# Patient Record
Sex: FEMALE | Race: WHITE | Employment: OTHER | URBAN - METROPOLITAN AREA
[De-identification: names, ages, dates, MRNs, and addresses within clinical notes are randomized per-mention and may not be internally consistent; named-entity substitution may affect disease eponyms.]

---

## 2017-02-16 ENCOUNTER — OFFICE VISIT (OUTPATIENT)
Dept: INTERNAL MEDICINE CLINIC | Age: 72
End: 2017-02-16

## 2017-02-16 VITALS
WEIGHT: 150 LBS | SYSTOLIC BLOOD PRESSURE: 130 MMHG | HEART RATE: 98 BPM | HEIGHT: 64 IN | OXYGEN SATURATION: 99 % | TEMPERATURE: 97.3 F | DIASTOLIC BLOOD PRESSURE: 80 MMHG | BODY MASS INDEX: 25.61 KG/M2

## 2017-02-16 DIAGNOSIS — M54.42 LEFT-SIDED LOW BACK PAIN WITH LEFT-SIDED SCIATICA, UNSPECIFIED CHRONICITY: Primary | ICD-10-CM

## 2017-02-16 RX ORDER — PREDNISONE 20 MG/1
TABLET ORAL
Qty: 18 TAB | Refills: 0 | Status: SHIPPED | OUTPATIENT
Start: 2017-02-16 | End: 2017-06-15 | Stop reason: ALTCHOICE

## 2017-02-16 NOTE — MR AVS SNAPSHOT
Visit Information Date & Time Provider Department Dept. Phone Encounter #  
 2/16/2017  9:30 AM Raul Knutson, [address] [facility] [place] [phone] Your Appointments 6/9/2017  8:45 AM  
LAB with [facility] NURSE VISIT Internist of [facility] ([facility]) Appt Note: labs 6mos. sarris; labs 6mos. sarris [address], [address] [place] [place] [address] [place]  
  
   
 [address], [address]  
  
    
 6/15/2017  9:00 AM  
Office Visit with Keiko Garcia MD  
Internist of [facility] [place] Appt Note: ov 6mos. sarris; ov 6mos. sarris [address], [address] [place] [address] [place]  
  
   
 [address], [address] Upcoming Health Maintenance Date Due  
 MEDICARE YEARLY EXAM 5/25/2017 GLAUCOMA SCREENING Q2Y 5/16/2018 BREAST CANCER SCRN MAMMOGRAM 7/25/2018 COLONOSCOPY 10/25/2020 DTaP/Tdap/Td series (2 - Td) 5/24/2026 Allergies as of 2/16/2017  Review Complete On: 2/16/2017 By: Dewey Zamudio LPN Severity Noted Reaction Type Reactions Codeine    Nausea Only Current Immunizations  Reviewed on 12/13/2016 Name Date Influenza High Dose Vaccine PF 12/13/2016 Influenza Vaccine 10/24/2014 Influenza Vaccine PF 11/5/2013  9:03 AM  
 Influenza Vaccine Split 11/6/2012, 11/1/2011 Pneumococcal Conjugate (PCV-13) 5/5/2015 Pneumococcal Vaccine (Unspecified Type) 5/4/2010 TD Vaccine 5/5/2009 Zoster Vaccine, Live 5/7/2013 Not reviewed this visit Vitals BP Pulse Temp Height(growth percentile) Weight(growth percentile) SpO2  
 130/80 98 97.3 °F (36.3 °C) 5' 4\" (1.626 m) 150 lb (68 kg) 99% BMI OB Status Smoking Status 25.75 kg/m2 Postmenopausal Never Smoker BMI and BSA Data Body Mass Index Body Surface Area 25.75 kg/m 2 1.75 m 2 Preferred Pharmacy Pharmacy Name Phone [facility] [phone] [facility] [phone] Your Updated Medication List  
  
   
This list is accurate as of: 2/16/17  9:40 AM.  Always use your most recent med list.  
  
  
  
  
 alendronate 70 mg tablet Commonly known as:  FOSAMAX Take 1 Tab by mouth every seven (7) days. ALLEGRA 60 mg tablet Generic drug:  fexofenadine Take 60 mg by mouth daily. aspirin 81 mg chewable tablet Take 81 mg by mouth daily. atorvastatin 10 mg tablet Commonly known as:  LIPITOR  
TAKE ONE TABLET BY MOUTH ONE TIME DAILY  
  
 biotin 2,500 mcg Tab Take 1,000 mcg by mouth. CALCIUM + D PO Take 1 Tab by mouth two (2) times a day. clotrimazole 1 % topical cream  
Commonly known as:  Ed Sacks Apply  to affected area two (2) times a day. CO Q-10 100 mg capsule Generic drug:  co-enzyme Q-10 Take 100 mg by mouth daily. COLACE 100 mg capsule Generic drug:  docusate sodium Take 100 mg by mouth daily as needed for Constipation. fluticasone 50 mcg/actuation nasal spray Commonly known as:  FLONASE  
2 sprays by Both Nostrils route daily. hydroCHLOROthiazide 25 mg tablet Commonly known as:  HYDRODIURIL Take 1 Tab by mouth daily. lisinopril 10 mg tablet Commonly known as:  PRINIVIL Take 1 Tab by mouth daily. triamcinolone acetonide 0.025 % topical cream  
Commonly known as:  KENALOG Apply  to affected area two (2) times a day. VITAMIN D3 1,000 unit Cap Generic drug:  cholecalciferol Take 1 Tab by mouth daily. Introducing [facility]! Dear Hung Mosley: 
Thank you for requesting a [employer] account. Our records indicate that you already have an active [employer] account. You can access your account anytime at [email or web address] Did you know that you can access your hospital and ER discharge instructions at any time in [employer]? You can also review all of your test results from your hospital stay or ER visit. Additional Information If you have questions, please visit the Frequently Asked Questions section of the [employer] website at [email or web address] Remember, [employer] is NOT to be used for urgent needs. For medical emergencies, dial 911. Now available from your iPhone and Android! Please provide this summary of care documentation to your next provider. Your primary care clinician is listed as Rodrigo Alicia. If you have any questions after today's visit, please call [phone].

## 2017-02-16 NOTE — PROGRESS NOTES
HPI/History  Carolyne Briceno is a 67 y.o.  female who presents for evaluation. Pt reports left lower back pain around SI region with radiation down posterior leg for a couple of weeks. Niece is a PT and thought it was mostly sacroiliac inflammation/dysfunction. Pt has been taking about 800mg ibuprofen bid without adverse effects and uncertain of benefit. No other sxs or complaints and no bowel/urinary incontinence or paresthesias. Pt plans to get with niece for tx, etc.    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    Tinnitus H93.19    Microhematuria R31.29    Eczema L30.9    Osteopenia M85.80    Advance directive discussed with patient Z70.80    Abnormal glucose R73.09    Candidal intertrigo B37.2     Past Medical History   Diagnosis Date    Essential hypertension     FH: breast cancer     Fibroids      uterine    Fracture of radius 11/2009     with ulna, left, closed    Hyperlipidemia     Microhematuria      chronic    Osteopenia     S/P radiation therapy      as child, to right shoulder for birthmark    Tinnitus     Zoster      right scapula     Past Surgical History   Procedure Laterality Date    Hx gyn       hysterectomy    Hx orthopaedic       left arm     Social History     Social History    Marital status: SINGLE     Spouse name: N/A    Number of children: N/A    Years of education: N/A     Occupational History    Not on file.      Social History Main Topics    Smoking status: Never Smoker    Smokeless tobacco: Not on file    Alcohol use 0.0 oz/week     0 Standard drinks or equivalent per week      Comment: occasionally    Drug use: No    Sexual activity: Yes     Partners: Male     Other Topics Concern    Not on file     Social History Narrative     Family History   Problem Relation Age of Onset    Cancer Mother pancreatic    Heart Disease Mother     Heart Disease Father     Cancer Sister      breast    MS Sister     Breast Cancer Sister 62     Current Outpatient Prescriptions   Medication Sig    docusate sodium (COLACE) 100 mg capsule Take 100 mg by mouth daily as needed for Constipation.  alendronate (FOSAMAX) 70 mg tablet Take 1 Tab by mouth every seven (7) days.  clotrimazole (LOTRIMIN) 1 % topical cream Apply  to affected area two (2) times a day.  triamcinolone acetonide (KENALOG) 0.025 % topical cream Apply  to affected area two (2) times a day.  hydrochlorothiazide (HYDRODIURIL) 25 mg tablet Take 1 Tab by mouth daily.  atorvastatin (LIPITOR) 10 mg tablet TAKE ONE TABLET BY MOUTH ONE TIME DAILY    lisinopril (PRINIVIL) 10 mg tablet Take 1 Tab by mouth daily.  co-enzyme Q-10 (CO Q-10) 100 mg capsule Take 100 mg by mouth daily.  biotin 2,500 mcg Tab Take 1,000 mcg by mouth.  Cholecalciferol, Vitamin D3, (VITAMIN D3) 1,000 unit Cap Take 1 Tab by mouth daily.  fexofenadine (ALLEGRA) 60 mg tablet Take 60 mg by mouth daily.  aspirin 81 mg chewable tablet Take 81 mg by mouth daily.  CALCIUM CARBONATE/VITAMIN D3 (CALCIUM + D PO) Take 1 Tab by mouth two (2) times a day.  fluticasone (FLONASE) 50 mcg/actuation nasal spray 2 sprays by Both Nostrils route daily. No current facility-administered medications for this visit. Allergies   Allergen Reactions    Codeine Nausea Only       Review of Systems  Significant findings included in HPI. Physical Examination  Visit Vitals    /80    Pulse 98    Temp 97.3 °F (36.3 °C)    Ht 5' 4\" (1.626 m)    Wt 150 lb (68 kg)    SpO2 99%    BMI 25.75 kg/m2       General - Alert and in no acute distress. Pt appears well, comfortable, and in good spirits. Nontoxic. Not anxious, non-diaphoretic. Mental status - Appropriate mood, behavior, speech content, dress, and thought processes.   Pulm - No tachypnea, retractions, or cyanosis. Good respiratory effort. Cardiovascular - Normal rate. Back/LE - Mild tenderness around left SI region. No visible findings or gross palpable abnormalities. Good ROM and no significant LE tenderness or findings. Assessment and Plan  1. Left low back pain with left sciatica - Mostly appears left SI region with sciatica. Will stop NSAIDs and start steroid taper. She will keep plan for therapy through her niece (PT). May refer for more structured PT if needed or send to ortho if warranted. Revisited conservative measures. Further planning as needed and pending progression. Pt happily agrees with plan. PLEASE NOTE:   This document has been produced using voice recognition software. Unrecognized errors in transcription may be present.     [employer] of [address] [place] [address]  [phone]  2/16/2017

## 2017-03-29 DIAGNOSIS — L30.4 INTERTRIGO: ICD-10-CM

## 2017-03-29 RX ORDER — ATORVASTATIN CALCIUM 10 MG/1
TABLET, FILM COATED ORAL
Qty: 90 TAB | Refills: 1 | Status: SHIPPED | OUTPATIENT
Start: 2017-03-29 | End: 2017-09-25 | Stop reason: SDUPTHER

## 2017-03-29 RX ORDER — CHLORPHENIRAMINE MALEATE 4 MG
TABLET ORAL
Qty: 15 G | Refills: 0 | Status: SHIPPED | OUTPATIENT
Start: 2017-03-29 | End: 2017-12-14 | Stop reason: ALTCHOICE

## 2017-04-11 RX ORDER — HYDROCHLOROTHIAZIDE 25 MG/1
25 TABLET ORAL DAILY
Qty: 90 TAB | Refills: 1 | Status: SHIPPED | OUTPATIENT
Start: 2017-04-11 | End: 2017-10-19 | Stop reason: SDUPTHER

## 2017-06-09 ENCOUNTER — HOSPITAL ENCOUNTER (OUTPATIENT)
Dept: LAB | Age: 72
Discharge: HOME | End: 2017-06-09
Payer: MEDICARE

## 2017-06-09 DIAGNOSIS — M85.9 DISORDER OF BONE DENSITY AND STRUCTURE, UNSPECIFIED: ICD-10-CM

## 2017-06-09 DIAGNOSIS — I10 ESSENTIAL HYPERTENSION: ICD-10-CM

## 2017-06-09 DIAGNOSIS — R73.09 ABNORMAL GLUCOSE: ICD-10-CM

## 2017-06-09 DIAGNOSIS — E78.5 HYPERLIPIDEMIA, UNSPECIFIED HYPERLIPIDEMIA TYPE: ICD-10-CM

## 2017-06-09 DIAGNOSIS — M85.80 OSTEOPENIA: ICD-10-CM

## 2017-06-09 LAB
ALBUMIN SERPL BCP-MCNC: 4 G/DL (ref 3.4–5)
ALBUMIN/GLOB SERPL: 1.3 {RATIO} (ref 0.8–1.7)
ALP SERPL-CCNC: 57 U/L (ref 45–117)
ALT SERPL-CCNC: 29 U/L (ref 13–56)
ANION GAP BLD CALC-SCNC: 11 MMOL/L (ref 3–18)
APPEARANCE UR: CLEAR
AST SERPL W P-5'-P-CCNC: 27 U/L (ref 15–37)
BACTERIA URNS QL MICRO: NEGATIVE /HPF
BASOPHILS # BLD AUTO: 0 K/UL (ref 0–0.06)
BASOPHILS # BLD: 1 % (ref 0–2)
BILIRUB SERPL-MCNC: 0.4 MG/DL (ref 0.2–1)
BILIRUB UR QL: NEGATIVE
BUN SERPL-MCNC: 20 MG/DL (ref 7–18)
BUN/CREAT SERPL: 28 (ref 12–20)
CALCIUM SERPL-MCNC: 8.9 MG/DL (ref 8.5–10.1)
CHLORIDE SERPL-SCNC: 102 MMOL/L (ref 100–108)
CHOLEST SERPL-MCNC: 154 MG/DL
CO2 SERPL-SCNC: 26 MMOL/L (ref 21–32)
COLOR UR: YELLOW
CREAT SERPL-MCNC: 0.72 MG/DL (ref 0.6–1.3)
DIFFERENTIAL METHOD BLD: ABNORMAL
EOSINOPHIL # BLD: 0.4 K/UL (ref 0–0.4)
EOSINOPHIL NFR BLD: 7 % (ref 0–5)
EPITH CASTS URNS QL MICRO: NEGATIVE /LPF (ref 0–5)
ERYTHROCYTE [DISTWIDTH] IN BLOOD BY AUTOMATED COUNT: 13.3 % (ref 11.6–14.5)
EST. AVERAGE GLUCOSE BLD GHB EST-MCNC: 111 MG/DL
GLOBULIN SER CALC-MCNC: 3.2 G/DL (ref 2–4)
GLUCOSE SERPL-MCNC: 86 MG/DL (ref 74–99)
GLUCOSE UR STRIP.AUTO-MCNC: NEGATIVE MG/DL
HBA1C MFR BLD: 5.5 % (ref 4.2–5.6)
HCT VFR BLD AUTO: 39.5 % (ref 35–45)
HDLC SERPL-MCNC: 67 MG/DL (ref 40–60)
HDLC SERPL: 2.3 {RATIO} (ref 0–5)
HGB BLD-MCNC: 12.3 G/DL (ref 12–16)
HGB UR QL STRIP: ABNORMAL
KETONES UR QL STRIP.AUTO: NEGATIVE MG/DL
LDLC SERPL CALC-MCNC: 70.4 MG/DL (ref 0–100)
LEUKOCYTE ESTERASE UR QL STRIP.AUTO: ABNORMAL
LIPID PROFILE,FLP: ABNORMAL
LYMPHOCYTES # BLD AUTO: 20 % (ref 21–52)
LYMPHOCYTES # BLD: 1.2 K/UL (ref 0.9–3.6)
MCH RBC QN AUTO: 29.5 PG (ref 24–34)
MCHC RBC AUTO-ENTMCNC: 31.1 G/DL (ref 31–37)
MCV RBC AUTO: 94.7 FL (ref 74–97)
MONOCYTES # BLD: 0.9 K/UL (ref 0.05–1.2)
MONOCYTES NFR BLD AUTO: 14 % (ref 3–10)
NEUTS SEG # BLD: 3.7 K/UL (ref 1.8–8)
NEUTS SEG NFR BLD AUTO: 58 % (ref 40–73)
NITRITE UR QL STRIP.AUTO: NEGATIVE
PH UR STRIP: 5 [PH] (ref 5–8)
PLATELET # BLD AUTO: 248 K/UL (ref 135–420)
PMV BLD AUTO: 10 FL (ref 9.2–11.8)
POTASSIUM SERPL-SCNC: 4 MMOL/L (ref 3.5–5.5)
PROT SERPL-MCNC: 7.2 G/DL (ref 6.4–8.2)
PROT UR STRIP-MCNC: NEGATIVE MG/DL
RBC # BLD AUTO: 4.17 M/UL (ref 4.2–5.3)
RBC #/AREA URNS HPF: ABNORMAL /HPF (ref 0–5)
SODIUM SERPL-SCNC: 139 MMOL/L (ref 136–145)
SP GR UR REFRACTOMETRY: 1.02 (ref 1–1.03)
TRIGL SERPL-MCNC: 83 MG/DL (ref ?–150)
TSH SERPL DL<=0.05 MIU/L-ACNC: 2.39 UIU/ML (ref 0.36–3.74)
UROBILINOGEN UR QL STRIP.AUTO: 0.2 EU/DL (ref 0.2–1)
VLDLC SERPL CALC-MCNC: 16.6 MG/DL
WBC # BLD AUTO: 6.3 K/UL (ref 4.6–13.2)
WBC URNS QL MICRO: NEGATIVE /HPF (ref 0–4)

## 2017-06-09 PROCEDURE — 85025 COMPLETE CBC W/AUTO DIFF WBC: CPT | Performed by: INTERNAL MEDICINE

## 2017-06-09 PROCEDURE — 84443 ASSAY THYROID STIM HORMONE: CPT | Performed by: INTERNAL MEDICINE

## 2017-06-09 PROCEDURE — 80061 LIPID PANEL: CPT | Performed by: INTERNAL MEDICINE

## 2017-06-09 PROCEDURE — 80053 COMPREHEN METABOLIC PANEL: CPT | Performed by: INTERNAL MEDICINE

## 2017-06-09 PROCEDURE — 36415 COLL VENOUS BLD VENIPUNCTURE: CPT | Performed by: INTERNAL MEDICINE

## 2017-06-09 PROCEDURE — 81001 URINALYSIS AUTO W/SCOPE: CPT | Performed by: INTERNAL MEDICINE

## 2017-06-09 PROCEDURE — 82306 VITAMIN D 25 HYDROXY: CPT | Performed by: INTERNAL MEDICINE

## 2017-06-09 PROCEDURE — 83036 HEMOGLOBIN GLYCOSYLATED A1C: CPT | Performed by: INTERNAL MEDICINE

## 2017-06-10 LAB — 25(OH)D3 SERPL-MCNC: 42.1 NG/ML (ref 30–100)

## 2017-06-15 ENCOUNTER — OFFICE VISIT (OUTPATIENT)
Dept: INTERNAL MEDICINE CLINIC | Age: 72
End: 2017-06-15

## 2017-06-15 VITALS
OXYGEN SATURATION: 96 % | BODY MASS INDEX: 26.29 KG/M2 | HEIGHT: 64 IN | SYSTOLIC BLOOD PRESSURE: 130 MMHG | WEIGHT: 154 LBS | HEART RATE: 84 BPM | TEMPERATURE: 97.7 F | DIASTOLIC BLOOD PRESSURE: 72 MMHG

## 2017-06-15 DIAGNOSIS — M85.89 OSTEOPENIA OF MULTIPLE SITES: ICD-10-CM

## 2017-06-15 DIAGNOSIS — E78.5 HYPERLIPIDEMIA, UNSPECIFIED HYPERLIPIDEMIA TYPE: ICD-10-CM

## 2017-06-15 DIAGNOSIS — Z71.89 ACP (ADVANCE CARE PLANNING): ICD-10-CM

## 2017-06-15 DIAGNOSIS — I10 ESSENTIAL HYPERTENSION: Primary | ICD-10-CM

## 2017-06-15 DIAGNOSIS — Z00.00 MEDICARE ANNUAL WELLNESS VISIT, SUBSEQUENT: ICD-10-CM

## 2017-06-15 DIAGNOSIS — B37.2 CANDIDAL INTERTRIGO: ICD-10-CM

## 2017-06-15 DIAGNOSIS — Z12.31 SCREENING MAMMOGRAM, ENCOUNTER FOR: ICD-10-CM

## 2017-06-15 DIAGNOSIS — R73.09 ABNORMAL GLUCOSE: ICD-10-CM

## 2017-06-15 DIAGNOSIS — M85.9 DISORDER OF BONE DENSITY AND STRUCTURE, UNSPECIFIED: ICD-10-CM

## 2017-06-15 DIAGNOSIS — Z80.3 FH: BREAST CANCER: ICD-10-CM

## 2017-06-15 DIAGNOSIS — L30.9 ECZEMA, UNSPECIFIED TYPE: ICD-10-CM

## 2017-06-15 RX ORDER — NYSTATIN 100000 [USP'U]/G
POWDER TOPICAL 2 TIMES DAILY
Qty: 30 G | Refills: 2 | Status: SHIPPED | OUTPATIENT
Start: 2017-06-15 | End: 2018-03-19

## 2017-06-15 NOTE — PATIENT INSTRUCTIONS
DASH Diet: Care Instructions  Your Care Instructions  The DASH diet is an eating plan that can help lower your blood pressure. DASH stands for Dietary Approaches to Stop Hypertension. Hypertension is high blood pressure. The DASH diet focuses on eating foods that are high in calcium, potassium, and magnesium. These nutrients can lower blood pressure. The foods that are highest in these nutrients are fruits, vegetables, low-fat dairy products, nuts, seeds, and legumes. But taking calcium, potassium, and magnesium supplements instead of eating foods that are high in those nutrients does not have the same effect. The DASH diet also includes whole grains, fish, and poultry. The DASH diet is one of several lifestyle changes your doctor may recommend to lower your high blood pressure. Your doctor may also want you to decrease the amount of sodium in your diet. Lowering sodium while following the DASH diet can lower blood pressure even further than just the DASH diet alone. Follow-up care is a key part of your treatment and safety. Be sure to make and go to all appointments, and call your doctor if you are having problems. It's also a good idea to know your test results and keep a list of the medicines you take. How can you care for yourself at home? Following the DASH diet  · Eat 4 to 5 servings of fruit each day. A serving is 1 medium-sized piece of fruit, ½ cup chopped or canned fruit, 1/4 cup dried fruit, or 4 ounces (½ cup) of fruit juice. Choose fruit more often than fruit juice. · Eat 4 to 5 servings of vegetables each day. A serving is 1 cup of lettuce or raw leafy vegetables, ½ cup of chopped or cooked vegetables, or 4 ounces (½ cup) of vegetable juice. Choose vegetables more often than vegetable juice. · Get 2 to 3 servings of low-fat and fat-free dairy each day. A serving is 8 ounces of milk, 1 cup of yogurt, or 1 ½ ounces of cheese. · Eat 6 to 8 servings of grains each day.  A serving is 1 slice of bread, 1 ounce of dry cereal, or ½ cup of cooked rice, pasta, or cooked cereal. Try to choose whole-grain products as much as possible. · Limit lean meat, poultry, and fish to 2 servings each day. A serving is 3 ounces, about the size of a deck of cards. · Eat 4 to 5 servings of nuts, seeds, and legumes (cooked dried beans, lentils, and split peas) each week. A serving is 1/3 cup of nuts, 2 tablespoons of seeds, or ½ cup of cooked beans or peas. · Limit fats and oils to 2 to 3 servings each day. A serving is 1 teaspoon of vegetable oil or 2 tablespoons of salad dressing. · Limit sweets and added sugars to 5 servings or less a week. A serving is 1 tablespoon jelly or jam, ½ cup sorbet, or 1 cup of lemonade. · Eat less than 2,300 milligrams (mg) of sodium a day. If you limit your sodium to 1,500 mg a day, you can lower your blood pressure even more. Tips for success  · Start small. Do not try to make dramatic changes to your diet all at once. You might feel that you are missing out on your favorite foods and then be more likely to not follow the plan. Make small changes, and stick with them. Once those changes become habit, add a few more changes. · Try some of the following:  ¨ Make it a goal to eat a fruit or vegetable at every meal and at snacks. This will make it easy to get the recommended amount of fruits and vegetables each day. ¨ Try yogurt topped with fruit and nuts for a snack or healthy dessert. ¨ Add lettuce, tomato, cucumber, and onion to sandwiches. ¨ Combine a ready-made pizza crust with low-fat mozzarella cheese and lots of vegetable toppings. Try using tomatoes, squash, spinach, broccoli, carrots, cauliflower, and onions. ¨ Have a variety of cut-up vegetables with a low-fat dip as an appetizer instead of chips and dip. ¨ Sprinkle sunflower seeds or chopped almonds over salads. Or try adding chopped walnuts or almonds to cooked vegetables. ¨ Try some vegetarian meals using beans and peas. Add garbanzo or kidney beans to salads. Make burritos and tacos with mashed ho beans or black beans. Where can you learn more? Go to http://mallika-suzanne.info/. Enter B667 in the search box to learn more about \"DASH Diet: Care Instructions. \"  Current as of: March 23, 2016  Content Version: 11.2  © 8666-0654 [employer]. Care instructions adapted under license by [employer] (which disclaims liability or warranty for this information). If you have questions about a medical condition or this instruction, always ask your healthcare professional. [place] [address] any warranty or liability for your use of this information. Medicare Wellness Visit, Female    The best way to improve and maintain good health is to have a healthy lifestyle by eating a well-balanced diet, exercising regularly, limiting alcohol and stopping smoking. Regular visits with your physician or non-physician health care provider also support your good health. Preventive screening tests can find health problems before they become diseases or illnesses. Preventive services such as immunizations prevent serious infections. All people over age 72 should have a Pneumovax and a Prevnar-13 shot to prevent potentially life threatening infections with the pneumococcus bacteria, a common cause of pneumonia. These are once in a lifetime unless you and your provider decide differently. All people over 65 should have a yearly influenza vaccine or \"flu\" shot. This does not prevent infection with cold viruses but has been proven to prevent hospitalization and death from influenza. Although Medicare part B \"regular Medicare\" currently only covers tetanus vaccination in the context of an injury, a tetanus vaccine (Tdap or Td) is recommended every 10 years. A shingles vaccine is recommended once in a lifetime after age 61.  The Shingles vaccine is also not covered by Medicare part B.    Note, however, that both the Shingles vaccine and Tdap/Td are generally covered by secondary carriers. Please check your coverage and out of pocket expenses. Consider contacting your local health department because it may stock these vaccines for a reasonable charge. We currently have documentation of the following immunization history for you:  Immunization History   Administered Date(s) Administered    Influenza High Dose Vaccine PF 12/13/2016    Influenza Vaccine 10/24/2014    Influenza Vaccine PF 11/05/2013    Influenza Vaccine Split 11/01/2011, 11/06/2012    Pneumococcal Conjugate (PCV-13) 05/05/2015    Pneumococcal Vaccine (Unspecified Type) 05/04/2010    TD Vaccine 05/05/2009    Zoster Vaccine, Live 05/07/2013       Screening for infection with Hepatitis C is recommended for anyone born between [birth year] through [address]. The table at the bottom of this document indicates the status of this and other preventive services. A bone mass density test (DEXA) to screen for osteoporosis or thinning of the bones should be done at least once after age 72 and may be done up to every 2 years as determined by you and your health care provider. The most recent DEXA we have on file for you is:  DEXA Results (most recent):    Results from Hospital Encounter encounter on 07/25/16   DEXA BONE DENSITY STUDY AXIAL   Narrative DEXA BONE DENSITOMETRY, CENTRAL    CPT CODE: 67013    INDICATION: Postmenopausal. History of osteopenia. Hypertension. Taking calcium  and vitamin D. Alendronate and Fosamax use. TECHNIQUE: Using GE LUNAR Prodigy densitometer, bone density measurement was  performed in the lumbar spine the proximal left and right femora and the right  forearm. T Score refers to standard deviations above or below average compared  to a young adult of the same sex. Z Score refers to standard deviations above or  below average compared to a patient of the same sex, age, race and weight.      COMPARISON: June 1, 2005. June 21, 2012. July 21, 2014. FINDINGS:     Lumbar Spine Levels: L1 and L2  Mean Bone Mineral Density (BMD):  0.910 g/cm2    T Score: -2.2       Z Score: -0.6      BMD increased 0.2%, which is not statistically significant within a 95 percent  confidence interval compared to preceding study. BMD increased 0.8%, which is not statistically significant compared to baseline  study. On PA image of the lumbar spine obtained for localization of vertebral levels  (not a diagnostic quality radiograph), there is     apparent increased density  at L3 and L4. The density is not well characterized on the basis of this image,  but likely degenerative. Since this density spuriously increases measured bone  mineral density, this has been excluded. Distal1/3 Radius BMD:  0.769 g/cm2   T Score: -1.3       Z Score: 0.6   BMD increased 0.7%, which is not statistically significant within a 95 percent  confidence interval compared to preceding study. BMD decreased 6.6%, which is not statistically significant compared to baseline  study which at the forearm was the study of 2012. Left Total Proximal Femur BMD: 0.721 g/cm2    T Score:  -2.3       Z Score:  -0.8       BMD decreased 1.9%, which is not statistically significant within a 95 percent  confidence interval compared to preceding study. BMD decreased 3.9%, which is not statistically significant compared to baseline  study. Right Total Proximal Femur BMD: 0.749 g/cm2  T Score:  -2.0      Z Score:  -0.6       BMD decreased 1.4%, which is not statistically significant within a 95 percent  confidence interval compared to preceding study. BMD decreased 3.4%, which is not statistically significant compared to baseline  study. Left Femoral Neck BMD:  0.739 g/cm2   T Score:  -2.1  Z Score:  -0.5    Right Femoral Neck BMD:  0.762 g/cm2   T Score:  -2.0  Z Score:  -0.3           Impression IMPRESSION:    1. BMD measures consistent with osteopenia.      2. Compare to the preceding study, BMD is without statistically significant  interval change. 3.  Compare to the baseline study, BMD is without statistically significant  interval change. Based upon current ISCD guidelines, the patient's overall diagnostic category,  selected using WHO criteria in postmenopausal women and males aged 48 and above,  is selected based upon the lowest T Score from among the lumbar spine, total  femur, femoral neck, (or distal third radius if measured). WHO Definition of Osteoporosis and Osteopenia on DXA (specified for  post-menopausal  females):     Normal:                     T Score at or above -1 SD   Osteopenia:              T Score between -1 and -2.5 SD   Osteoporosis:           T Score at or below -2.5 SD    The risk of fracture approximately doubles for each 1 SD decrease in T Score. It is important to consider other factors in assessing a patient's risk of  fracture, including age, risk of falling/injury, history of fragility fracture,  family history of osteoporosis, smoking, low weight. Various fracture risk tools have been developed for adult patients and are  available online. For example, the FRAX tool developed by [facility] is widely used. Reference www.iscd.org. It is also important to note that DXA measures bone density but does not  distinguish among causes of decreased bone density, which include primary versus  secondary osteoporosis (such as metabolic bone disorders or possible effects of  medications) and also other conditions (such as osteomalacia). Clinical  considerations should determine what additional evaluation may be warranted to  exclude secondary conditions in a patient with low bone density. Please note that reliable, valid comparisons can not be made between studies  which have been performed on different densitometers.   If clinically warranted,  follow up study performed at this site would best permit assessment of trend for  possible change in bone mineral density over time in comparison to this study. Thank you for this referral.          Screening for diabetes mellitus with a blood sugar test (glucose) should be done at least every 3 years until age 79. You and your health care provider may decide whether to continue screening after age 79. The most recent blood glucose we have on file for you is:   Lab Results   Component Value Date/Time    Glucose 86 06/09/2017 08:42 AM         Glaucoma is a disease of the eye due to increased ocular pressure that can lead to blindness. People with risk factors for glaucoma ( race, diabetes, family history) should be screened at least every 2 years by an eye professional.     Cardiovascular screening tests that check for elevated lipids or cholesterol (fatty part of blood) which can lead to heart disease and strokes should be done every 4-6 years through age 79. You and your health care provider may decide whether to continue screening after age 79. The most recent lipid panel we have on file for you is:   Lab Results   Component Value Date/Time    Cholesterol, total 154 06/09/2017 08:42 AM    HDL Cholesterol 67 06/09/2017 08:42 AM    LDL, calculated 70.4 06/09/2017 08:42 AM    VLDL, calculated 16.6 06/09/2017 08:42 AM    Triglyceride 83 06/09/2017 08:42 AM    CHOL/HDL Ratio 2.3 06/09/2017 08:42 AM       Colorectal cancer screening that evaluates for blood or polyps in your colon for people with average risk should be done yearly as a stool test, every five years as a flexible sigmoidoscope or every 10 years as a colonoscopy up to age 76. You and your health care provider may decide whether to continue screening after age 76. Breast cancer screening with a mammogram is recommended at least once every 2 years  for women age 54-69. You and your health care provider may decide whether to continue screening after age 76.  The most recent mammogram we have on file for you is: [facility] Results (most recent):    Results from [place] encounter on 07/25/16   [facility] 3D ROCÍO W MAMMO BI SCREENING DIGTL   Narrative BIRADS: 1-BENIGN    BREAST DENSITY: C-The breast parenchyma is heterogeneously dense which may  obscure detection of small masses. SCREENING MAMMOGRAM BILATERAL  3D tomosynthesis    HISTORY: Screening. Sister had breast cancer age 62. COMPARISON: [id number]    FINDINGS:   2D and 3D images were performed. Digital mammograms were performed. No suspicious microcalcifications, masses, or areas of architectural distortion. Interpretation performed in conjunction with computed assisted detection system. Impression IMPRESSION:      No evidence of malignancy. Suggest routine follow-up. Screening for cervical cancer with a pap smear is recommended for all women with a cervix until age 72. The frequency of this test is based on the details of her prior pap smear testing. You and your health care provider may decide whether to continue screening after age 72. People who have smoked the equivalent of 1 pack per day for 30 years or more may benefit from screening for lung cancer with a yearly low dose CT scan until they have been non smokers for 15 years or competing health conditions render this unlikely to be beneficial. Our records show: N/A    Your Medicare Wellness Exam is recommended annually.     Here is a list of your current Health Maintenance items with a due date:  Health Maintenance   Topic Date Due    INFLUENZA AGE 9 TO ADULT  08/01/2017    GLAUCOMA SCREENING Q2Y  05/16/2018    MEDICARE YEARLY EXAM  06/16/2018    BREAST CANCER SCRN MAMMOGRAM  07/25/2018    COLONOSCOPY  10/25/2020    DTaP/Tdap/Td series (2 - Td) 05/24/2026    Hepatitis C Screening  Completed    OSTEOPOROSIS SCREENING (DEXA)  Completed    ZOSTER VACCINE AGE 60>  Completed    Pneumococcal 65+ Low/Medium Risk  Completed

## 2017-06-15 NOTE — PROGRESS NOTES
1. Have you been to the ER, urgent care clinic or hospitalized since your last visit? NO.     2. Have you seen or consulted any other health care providers outside of the [address] [place] [address] since your last visit (Include any pap smears or colon screening)? NO      Do you have an Advanced Directive? YES will bring it in soon.     Health Maintenance Due   Topic Date Due    MEDICARE YEARLY EXAM  05/25/2017

## 2017-06-15 NOTE — ACP (ADVANCE CARE PLANNING)
Advance care planning discussion initiated. Patient states that she has completed an advance directive previously and has a copy prepared at home. She states that her  and son are her healthcare agents. She also expresses that she does not wish life prolonging procedure for end of life care. Recommended to bring completed advance directive to office to be copied and scanned into chart.

## 2017-06-15 NOTE — PROGRESS NOTES
This is a Subsequent Medicare Annual Wellness Visit providing Personalized Prevention Plan Services (PPPS) (Performed 12 months after initial AWV and PPPS )    I have reviewed the patient's medical history in detail and updated the computerized patient record. History     Past Medical History:   Diagnosis Date    Essential hypertension     FH: breast cancer     Fibroids     uterine    Fracture of radius 11/2009    with ulna, left, closed    Hyperlipidemia     Microhematuria     chronic    Osteopenia     S/P radiation therapy     as child, to right shoulder for birthmark    Tinnitus     Zoster     right scapula      Past Surgical History:   Procedure Laterality Date    HX GYN      hysterectomy    HX ORTHOPAEDIC      left arm     Current Outpatient Prescriptions   Medication Sig Dispense Refill    nystatin (MYCOSTATIN) powder Apply  to affected area two (2) times a day. As needed. 30 g 2    hydroCHLOROthiazide (HYDRODIURIL) 25 mg tablet Take 1 Tab by mouth daily. 90 Tab 1    clotrimazole (LOTRIMIN) 1 % topical cream APPLY EXTERNALLY TO THE AFFECTED AREA TWICE DAILY 15 g 0    atorvastatin (LIPITOR) 10 mg tablet TAKE ONE TABLET BY MOUTH ONE TIME DAILY 90 Tab 1    docusate sodium (COLACE) 100 mg capsule Take 100 mg by mouth daily as needed for Constipation.  alendronate (FOSAMAX) 70 mg tablet Take 1 Tab by mouth every seven (7) days. 12 Tab 3    triamcinolone acetonide (KENALOG) 0.025 % topical cream Apply  to affected area two (2) times a day. 15 g 2    lisinopril (PRINIVIL) 10 mg tablet Take 1 Tab by mouth daily. 90 Tab 3    co-enzyme Q-10 (CO Q-10) 100 mg capsule Take 100 mg by mouth daily.  biotin 2,500 mcg Tab Take 1,000 mcg by mouth.  Cholecalciferol, Vitamin D3, (VITAMIN D3) 1,000 unit Cap Take 1 Tab by mouth daily.  fexofenadine (ALLEGRA) 60 mg tablet Take 60 mg by mouth daily.  aspirin 81 mg chewable tablet Take 81 mg by mouth daily.       CALCIUM CARBONATE/VITAMIN D3 (CALCIUM + D PO) Take 1 Tab by mouth two (2) times a day. Allergies   Allergen Reactions    Codeine Nausea Only     Family History   Problem Relation Age of Onset   [facility] Cancer Mother      pancreatic    Heart Disease Mother     Heart Disease Father     Cancer Sister      breast    MS Sister     Breast Cancer Sister 62     Social History   Substance Use Topics    Smoking status: Never Smoker    Smokeless tobacco: Not on file    Alcohol use 0.0 oz/week     0 Standard drinks or equivalent per week      Comment: occasionally     Patient Active Problem List   Diagnosis Code    Essential hypertension I5    FH: breast cancer Z80.3    Hyperlipidemia E78.5    S/P radiation therapy to right shoulder for birthmark Z92.3    Tinnitus H93.19    Microhematuria R31.29    Eczema L30.9    Osteopenia M85.80    Advance directive discussed with patient Z70.80    Abnormal glucose R73.09    Candidal intertrigo B37.2       Depression Risk Factor Screening:     PHQ over the last two weeks 6/15/2017   Little interest or pleasure in doing things Not at all   Feeling down, depressed or hopeless Not at all   Total Score PHQ 2 0     Alcohol Risk Factor Screening: On any occasion during the past 3 months, have you had more than 3 drinks containing alcohol? No    Do you average more than 7 drinks per week? No      Functional Ability and Level of Safety:     Hearing Loss   none    Activities of Daily Living   Self-care. Requires assistance with: no ADLs    Fall Risk     Fall Risk Assessment, last 12 mths 6/15/2017   Able to walk? Yes   Fall in past 12 months? No     Abuse Screen   Patient is not abused    Review of Systems   A comprehensive review of systems was negative except for that written in the HPI.     Physical Examination     Evaluation of Cognitive Function:  Mood/affect:  happy  Appearance: age appropriate and within normal Limits  Family member/caregiver input: none    Exam: see office note    Patient Care Team:  Mario Alberto Ulloa MD as PCP - General (Internal Medicine)  Sarah Zhu RN as Ambulatory Care Navigator (Internal Medicine)  Laure Summers MD (Colon and Rectal Surgery)  Shweta Pacheco OD (Optometry)  Cheli Maldonado MD (Ophthalmology)  Tora Ganser, MD (Dermatology)    Advice/Referrals/Counseling   Education and counseling provided:  Are appropriate based on today's review and evaluation  End-of-Life planning (with patient's consent)  Screening Mammography  Colorectal cancer screening tests  Cardiovascular screening blood test  Bone mass measurement (DEXA)  Screening for glaucoma      Assessment/Plan       ICD-10-CM ICD-9-CM    1. Essential hypertension B79 307.3 METABOLIC PANEL, BASIC      URINALYSIS W/MICROSCOPIC   2. Hyperlipidemia, unspecified hyperlipidemia type E78.5 272.4    3. Osteopenia of multiple sites B26.40 610.76 METABOLIC PANEL, BASIC      VITAMIN D, 25 HYDROXY   4. Abnormal glucose R73.09 790.29    5. Candidal intertrigo B37.2 112.3    6. Screening mammogram, encounter for Z12.31 V76.12 [facility] MAMMO BI SCREENING INCL CAD   7. FH: breast cancer Z80.3 V16.3    8. Eczema, unspecified type L30.9 692.9    9. Disorder of bone density and structure, unspecified  M85.9 733.90 VITAMIN D, 25 HYDROXY     current treatment plan is effective, no change in therapy  lab results and schedule of future lab studies reviewed with patient  reviewed diet, exercise and weight control  cardiovascular risk and specific lipid/LDL goals reviewed  reviewed medications and side effects in detail  use of aspirin to prevent MI and TIA's discussed  radiology results and schedule of future radiology studies reviewed with patient.

## 2017-06-15 NOTE — PROGRESS NOTES
Advance Care Planning (ACP) Provider Note - Comprehensive     Date of ACP Conversation: 06/15/17  Persons included in Conversation:  patient  Length of ACP Conversation in minutes:  16 minutes    Authorized Decision Maker (if patient is incapable of making informed decisions):  (primary), son (secondary)  This person is:  Healthcare Agent/Medical Power of  under Advance Directive          General ACP for ALL Patients with Decision Making Capacity:   Importance of advance care planning, including choosing a healthcare agent to communicate patient's healthcare decisions if patient lost the ability to make decisions, such as after a sudden illness or accident  Understanding of the healthcare agent role was assessed and information provided  Exploration of values, goals, and preferences if recovery is not expected, even with continued medical treatment in the event of: Imminent death  Severe, permanent brain injury  \"In these circumstances, what matters most to you? \"  Care focused more on comfort or quality of life. Review of Existing Advance Directive:  Patient states that she has completed an advance directive previously and has a copy prepared at home. She states that her  and son are her healthcare agents. She also expresses that she does not wish life prolonging procedure for end of life care. For Serious or Chronic Illness:  Understanding of medical condition      Interventions Provided:  Reviewed existing Advance Directive   Recommended review of completed ACP document annually or upon change in health status   Recommended to bring advance directive to office to be copied and scanned into chart.

## 2017-06-15 NOTE — MR AVS SNAPSHOT
Visit Information Date & Time Provider Department Dept. Phone Encounter #  
 6/15/2017  9:00 AM Romina Jefferson MD Internist of [address] [place] [address] Road [id number] Follow-up Instructions Return in about 6 months (around 12/15/2017), or if symptoms worsen or fail to improve. Your Appointments 12/6/2017  8:00 AM  
LAB with [facility] NURSE VISIT Internist of [facility] ([facility]) Appt Note: lab  
 [address], Suite [facility] [address] [address] [place]  
  
   
 [address], [place]  
  
    
 12/14/2017  8:00 AM  
Office Visit with Romina Jefferson MD  
Internist of [facility] Appt Note: 6 months [address], Suite [place] [address] [address] [place]  
  
   
 [address], [facility] Upcoming Health Maintenance Date Due INFLUENZA AGE 9 TO ADULT 8/1/2017 GLAUCOMA SCREENING Q2Y 5/16/2018 MEDICARE YEARLY EXAM 6/16/2018 BREAST CANCER SCRN MAMMOGRAM 7/25/2018 COLONOSCOPY 10/25/2020 DTaP/Tdap/Td series (2 - Td) 5/24/2026 Allergies as of 6/15/2017  Review Complete On: 6/15/2017 By: John Turner Severity Noted Reaction Type Reactions Codeine    Nausea Only Current Immunizations  Reviewed on 12/13/2016 Name Date Influenza High Dose Vaccine PF 12/13/2016 Influenza Vaccine 10/24/2014 Influenza Vaccine PF 11/5/2013  9:03 AM  
 Influenza Vaccine Split 11/6/2012, 11/1/2011 Pneumococcal Conjugate (PCV-13) 5/5/2015 Pneumococcal Vaccine (Unspecified Type) 5/4/2010 TD Vaccine 5/5/2009 Zoster Vaccine, Live 5/7/2013 Not reviewed this visit You Were Diagnosed With   
  
 Codes Comments Screening mammogram, encounter for    -  Primary ICD-10-CM: Z12.31 
ICD-9-CM: V76.12 Vitals BP Pulse Temp Height(growth percentile) Weight(growth percentile) SpO2 130/72 (BP 1 Location: Left arm, BP Patient Position: Sitting) 84 97.7 °F (36.5 °C) (Oral) 5' 4\" (1.626 m) 154 lb (69.9 kg) 96% BMI OB Status Smoking Status 26.43 kg/m2 Postmenopausal Never Smoker Vitals History BMI and BSA Data Body Mass Index Body Surface Area  
 26.43 kg/m 2 1.78 m 2 Preferred Pharmacy Pharmacy Name Phone [facility] [address] [place], [place] [phone] Your Updated Medication List  
  
   
This list is accurate as of: 6/15/17  9:45 AM.  Always use your most recent med list.  
  
  
  
  
 alendronate 70 mg tablet Commonly known as:  FOSAMAX Take 1 Tab by mouth every seven (7) days. ALLEGRA 60 mg tablet Generic drug:  fexofenadine Take 60 mg by mouth daily. aspirin 81 mg chewable tablet Take 81 mg by mouth daily. atorvastatin 10 mg tablet Commonly known as:  LIPITOR  
TAKE ONE TABLET BY MOUTH ONE TIME DAILY  
  
 biotin 2,500 mcg Tab Take 1,000 mcg by mouth. CALCIUM + D PO Take 1 Tab by mouth two (2) times a day. clotrimazole 1 % topical cream  
Commonly known as:  LOTRIMIN  
APPLY EXTERNALLY TO THE AFFECTED AREA TWICE DAILY  
  
 CO Q-10 100 mg capsule Generic drug:  co-enzyme Q-10 Take 100 mg by mouth daily. COLACE 100 mg capsule Generic drug:  docusate sodium Take 100 mg by mouth daily as needed for Constipation. hydroCHLOROthiazide 25 mg tablet Commonly known as:  HYDRODIURIL Take 1 Tab by mouth daily. lisinopril 10 mg tablet Commonly known as:  PRINIVIL Take 1 Tab by mouth daily. nystatin powder Commonly known as:  MYCOSTATIN Apply  to affected area two (2) times a day. As needed. triamcinolone acetonide 0.025 % topical cream  
Commonly known as:  KENALOG Apply  to affected area two (2) times a day. VITAMIN D3 1,000 unit Cap Generic drug:  cholecalciferol Take 1 Tab by mouth daily. Prescriptions Sent to Pharmacy Refills  
 nystatin (MYCOSTATIN) powder 2 Sig: Apply  to affected area two (2) times a day. As needed. Class: Normal  
 [facility]acy: [facility] [address] [place] [address] [facility] [address] [place] [address] #: [phone] Route: Topical  
  
Follow-up Instructions Return in about 6 months (around 12/15/2017), or if symptoms worsen or fail to improve. To-Do List   
 07/26/2017 Imaging:  [facility] MAMMO BI SCREENING INCL CAD Patient Instructions DASH Diet: Care Instructions Your Care Instructions The DASH diet is an eating plan that can help lower your blood pressure. DASH stands for Dietary Approaches to Stop Hypertension. Hypertension is high blood pressure. The DASH diet focuses on eating foods that are high in calcium, potassium, and magnesium. These nutrients can lower blood pressure. The foods that are highest in these nutrients are fruits, vegetables, low-fat dairy products, nuts, seeds, and legumes. But taking calcium, potassium, and magnesium supplements instead of eating foods that are high in those nutrients does not have the same effect. The DASH diet also includes whole grains, fish, and poultry. The DASH diet is one of several lifestyle changes your doctor may recommend to lower your high blood pressure. Your doctor may also want you to decrease the amount of sodium in your diet. Lowering sodium while following the DASH diet can lower blood pressure even further than just the DASH diet alone. Follow-up care is a key part of your treatment and safety. Be sure to make and go to all appointments, and call your doctor if you are having problems. It's also a good idea to know your test results and keep a list of the medicines you take. How can you care for yourself at home? Following the DASH diet · Eat 4 to 5 servings of fruit each day.  A serving is 1 medium-sized piece of fruit, ½ cup chopped or canned fruit, 1/4 cup dried fruit, or 4 ounces (½ cup) of fruit juice. Choose fruit more often than fruit juice. · Eat 4 to 5 servings of vegetables each day. A serving is 1 cup of lettuce or raw leafy vegetables, ½ cup of chopped or cooked vegetables, or 4 ounces (½ cup) of vegetable juice. Choose vegetables more often than vegetable juice. · Get 2 to 3 servings of low-fat and fat-free dairy each day. A serving is 8 ounces of milk, 1 cup of yogurt, or 1 ½ ounces of cheese. · Eat 6 to 8 servings of grains each day. A serving is 1 slice of bread, 1 ounce of dry cereal, or ½ cup of cooked rice, pasta, or cooked cereal. Try to choose whole-grain products as much as possible. · Limit lean meat, poultry, and fish to 2 servings each day. A serving is 3 ounces, about the size of a deck of cards. · Eat 4 to 5 servings of nuts, seeds, and legumes (cooked dried beans, lentils, and split peas) each week. A serving is 1/3 cup of nuts, 2 tablespoons of seeds, or ½ cup of cooked beans or peas. · Limit fats and oils to 2 to 3 servings each day. A serving is 1 teaspoon of vegetable oil or 2 tablespoons of salad dressing. · Limit sweets and added sugars to 5 servings or less a week. A serving is 1 tablespoon jelly or jam, ½ cup sorbet, or 1 cup of lemonade. · Eat less than 2,300 milligrams (mg) of sodium a day. If you limit your sodium to 1,500 mg a day, you can lower your blood pressure even more. Tips for success · Start small. Do not try to make dramatic changes to your diet all at once. You might feel that you are missing out on your favorite foods and then be more likely to not follow the plan. Make small changes, and stick with them. Once those changes become habit, add a few more changes. · Try some of the following: ¨ Make it a goal to eat a fruit or vegetable at every meal and at snacks. This will make it easy to get the recommended amount of fruits and vegetables each day. ¨ Try yogurt topped with fruit and nuts for a snack or healthy dessert. ¨ Add lettuce, tomato, cucumber, and onion to sandwiches. ¨ Combine a ready-made pizza crust with low-fat mozzarella cheese and lots of vegetable toppings. Try using tomatoes, squash, spinach, broccoli, carrots, cauliflower, and onions. ¨ Have a variety of cut-up vegetables with a low-fat dip as an appetizer instead of chips and dip. ¨ Sprinkle sunflower seeds or chopped almonds over salads. Or try adding chopped walnuts or almonds to cooked vegetables. ¨ Try some vegetarian meals using beans and peas. Add garbanzo or kidney beans to salads. Make burritos and tacos with mashed ho beans or black beans. Where can you learn more? Go to http://[employer] #2suzanne.info/. Enter P001 in the search box to learn more about \"DASH Diet: Care Instructions. \" Current as of: March 23, 2016 Content Version: 11.2 © 0411-9049 [employer]. Care instructions adapted under license by [employer] (which disclaims liability or warranty for this information). If you have questions about a medical condition or this instruction, always ask your healthcare professional. [address] any warranty or liability for your use of this information. Medicare Wellness Visit, Female The best way to improve and maintain good health is to have a healthy lifestyle by eating a well-balanced diet, exercising regularly, limiting alcohol and stopping smoking. Regular visits with your physician or non-physician health care provider also support your good health. Preventive screening tests can find health problems before they become diseases or illnesses. Preventive services such as immunizations prevent serious infections.  
 
All people over age 72 should have a Pneumovax and a Prevnar-13 shot to prevent potentially life threatening infections with the pneumococcus bacteria, a common cause of pneumonia. These are once in a lifetime unless you and your provider decide differently. All people over 65 should have a yearly influenza vaccine or \"flu\" shot. This does not prevent infection with cold viruses but has been proven to prevent hospitalization and death from influenza. Although Medicare part B \"regular Medicare\" currently only covers tetanus vaccination in the context of an injury, a tetanus vaccine (Tdap or Td) is recommended every 10 years. A shingles vaccine is recommended once in a lifetime after age 61. The Shingles vaccine is also not covered by Medicare part B. Note, however, that both the Shingles vaccine and Tdap/Td are generally covered by secondary carriers. Please check your coverage and out of pocket expenses. Consider contacting your local health department because it may stock these vaccines for a reasonable charge. We currently have documentation of the following immunization history for you: 
Immunization History Administered Date(s) Administered  Influenza High Dose Vaccine PF 12/13/2016  Influenza Vaccine 10/24/2014  Influenza Vaccine PF 11/05/2013  Influenza Vaccine Split 11/01/2011, 11/06/2012  Pneumococcal Conjugate (PCV-13) 05/05/2015  Pneumococcal Vaccine (Unspecified Type) 05/04/2010  TD Vaccine 05/05/2009  Zoster Vaccine, Live 05/07/2013 Screening for infection with Hepatitis C is recommended for anyone born between [birth year] through [address]. The table at the bottom of this document indicates the status of this and other preventive services. A bone mass density test (DEXA) to screen for osteoporosis or thinning of the bones should be done at least once after age 72 and may be done up to every 2 years as determined by you and your health care provider. The most recent DEXA we have on file for you is: DEXA Results (most recent): 
 
Results from [facility] Encounter encounter on 07/25/16 DEXA BONE DENSITY STUDY AXIAL Narrative DEXA BONE DENSITOMETRY, CENTRAL 
 
CPT CODE: 84403 INDICATION: Postmenopausal. History of osteopenia. Hypertension. Taking calcium 
and vitamin D. Alendronate and Fosamax use. TECHNIQUE: Using GE LUNAR Prodigy densitometer, bone density measurement was 
performed in the lumbar spine the proximal left and right femora and the right 
forearm. T Score refers to standard deviations above or below average compared 
to a young adult of the same sex. Z Score refers to standard deviations above or 
below average compared to a patient of the same sex, age, race and weight. COMPARISON: June 1, 2005. June 21, 2012. July 21, 2014. FINDINGS:  
 
Lumbar Spine Levels: L1 and L2 Mean Bone Mineral Density (BMD):  0.910 g/cm2 T Score: -2.2 Z Score: -0.6 BMD increased 0.2%, which is not statistically significant within a 95 percent 
confidence interval compared to preceding study. BMD increased 0.8%, which is not statistically significant compared to baseline 
study. On PA image of the lumbar spine obtained for localization of vertebral levels (not a diagnostic quality radiograph), there is     apparent increased density 
at L3 and L4. The density is not well characterized on the basis of this image, 
but likely degenerative. Since this density spuriously increases measured bone 
mineral density, this has been excluded. Distal1/3 Radius BMD:  0.769 g/cm2 T Score: -1.3 Z Score: 0.6 BMD increased 0.7%, which is not statistically significant within a 95 percent 
confidence interval compared to preceding study. BMD decreased 6.6%, which is not statistically significant compared to baseline 
study which at the forearm was the study of 2012. Left Total Proximal Femur BMD: 0.721 g/cm2 T Score:  -2.3 Z Score:  -0.8 BMD decreased 1.9%, which is not statistically significant within a 95 percent confidence interval compared to preceding study. BMD decreased 3.9%, which is not statistically significant compared to baseline 
study. Right Total Proximal Femur BMD: 0.749 g/cm2 T Score:  -2.0     
Z Score:  -0.6 BMD decreased 1.4%, which is not statistically significant within a 95 percent 
confidence interval compared to preceding study. BMD decreased 3.4%, which is not statistically significant compared to baseline 
study. Left Femoral Neck BMD:  0.739 g/cm2 T Score:  -2.1 Z Score:  -0.5 Right Femoral Neck BMD:  0.762 g/cm2 T Score:  -2.0 
Z Score:  -0.3 Impression IMPRESSION: 
 
1. BMD measures consistent with osteopenia. 2.  Compare to the preceding study, BMD is without statistically significant 
interval change. 3.  Compare to the baseline study, BMD is without statistically significant 
interval change. Based upon current ISCD guidelines, the patient's overall diagnostic category, 
selected using WHO criteria in postmenopausal women and males aged 48 and above, 
is selected based upon the lowest T Score from among the lumbar spine, total 
femur, femoral neck, (or distal third radius if measured). WHO Definition of Osteoporosis and Osteopenia on DXA (specified for 
post-menopausal  females): 
 
 Normal:                     T Score at or above -1 SD Osteopenia:              T Score between -1 and -2.5 SD Osteoporosis:           T Score at or below -2.5 SD The risk of fracture approximately doubles for each 1 SD decrease in T Score. It is important to consider other factors in assessing a patient's risk of 
fracture, including age, risk of falling/injury, history of fragility fracture, 
family history of osteoporosis, smoking, low weight. Various fracture risk tools have been developed for adult patients and are 
available online. For example, the FRAX tool developed by [facility] is widely used.  
Reference www.iscd.org. 
 
 It is also important to note that DXA measures bone density but does not 
distinguish among causes of decreased bone density, which include primary versus 
secondary osteoporosis (such as metabolic bone disorders or possible effects of 
medications) and also other conditions (such as osteomalacia). Clinical 
considerations should determine what additional evaluation may be warranted to 
exclude secondary conditions in a patient with low bone density. Please note that reliable, valid comparisons can not be made between studies 
which have been performed on different densitometers. If clinically warranted, 
follow up study performed at this site would best permit assessment of trend for 
possible change in bone mineral density over time in comparison to this study. Thank you for this referral. 
   
 
 
Screening for diabetes mellitus with a blood sugar test (glucose) should be done at least every 3 years until age 79. You and your health care provider may decide whether to continue screening after age 79. The most recent blood glucose we have on file for you is:  
Lab Results Component Value Date/Time Glucose 86 06/09/2017 08:42 AM  
 
 
 
Glaucoma is a disease of the eye due to increased ocular pressure that can lead to blindness. People with risk factors for glaucoma ( race, diabetes, family history) should be screened at least every 2 years by an eye professional.  
 
Cardiovascular screening tests that check for elevated lipids or cholesterol (fatty part of blood) which can lead to heart disease and strokes should be done every 4-6 years through age 79. You and your health care provider may decide whether to continue screening after age 79. The most recent lipid panel we have on file for you is:  
Lab Results Component Value Date/Time  Cholesterol, total 154 06/09/2017 08:42 AM  
 HDL Cholesterol 67 06/09/2017 08:42 AM  
 LDL, calculated 70.4 06/09/2017 08:42 AM  
 VLDL, calculated 16.6 06/09/2017 08:42 AM  
 Triglyceride 83 06/09/2017 08:42 AM  
 CHOL/HDL Ratio 2.3 06/09/2017 08:42 AM  
 
 
Colorectal cancer screening that evaluates for blood or polyps in your colon for people with average risk should be done yearly as a stool test, every five years as a flexible sigmoidoscope or every 10 years as a colonoscopy up to age 76. You and your health care provider may decide whether to continue screening after age 76. Breast cancer screening with a mammogram is recommended at least once every 2 years  for women age 54-69. You and your health care provider may decide whether to continue screening after age 76. The most recent mammogram we have on file for you is: [facility] Results (most recent): 
 
Results from Hospital Encounter encounter on 07/25/16 [facility] 3D ROCÍO W MAMMO BI SCREENING DIGTL Narrative BIRADS: 1-BENIGN 
 
BREAST DENSITY: C-The breast parenchyma is heterogeneously dense which may 
obscure detection of small masses. SCREENING MAMMOGRAM BILATERAL  3D tomosynthesis HISTORY: Screening. Sister had breast cancer age 62. COMPARISON: [id number] FINDINGS:  
2D and 3D images were performed. Digital mammograms were performed. No suspicious microcalcifications, masses, or areas of architectural distortion. Interpretation performed in conjunction with computed assisted detection system. Impression IMPRESSION:   
 
No evidence of malignancy. Suggest routine follow-up. Screening for cervical cancer with a pap smear is recommended for all women with a cervix until age 72. The frequency of this test is based on the details of her prior pap smear testing. You and your health care provider may decide whether to continue screening after age 72.  
 
People who have smoked the equivalent of 1 pack per day for 30 years or more may benefit from screening for lung cancer with a yearly low dose CT scan until they have been non smokers for 15 years or competing health conditions render this unlikely to be beneficial. Our records show: N/A Your Medicare Wellness Exam is recommended annually. Here is a list of your current Health Maintenance items with a due date: 
Health Maintenance Topic Date Due  
 INFLUENZA AGE 9 TO ADULT  08/01/2017  GLAUCOMA SCREENING Q2Y  05/16/2018  MEDICARE YEARLY EXAM  06/16/2018  BREAST CANCER SCRN MAMMOGRAM  07/25/2018  COLONOSCOPY  10/25/2020  
 DTaP/Tdap/Td series (2 - Td) 05/24/2026  Hepatitis C Screening  Completed  OSTEOPOROSIS SCREENING (DEXA)  Completed  ZOSTER VACCINE AGE 60>  Completed  Pneumococcal 65+ Low/Medium Risk  Completed [facility]! Dear Trixie Sykes: 
Thank you for requesting a [facility] account. Our records indicate that you already have an active [facility] account. You can access your account anytime at [email or web address] Did you know that you can access your hospital and ER discharge instructions at any time in [facility]? You can also review all of your test results from your hospital stay or ER visit. Additional Information If you have questions, please visit the Frequently Asked Questions section of the [facility] website at [email or web address] Remember, [facility] is NOT to be used for urgent needs. For medical emergencies, dial 911. Now available from your iPhone and Android! Please provide this summary of care documentation to your next provider. Your primary care clinician is listed as Varsha Garsia. If you have any questions after today's visit, please call [phone].

## 2017-06-18 NOTE — PROGRESS NOTES
HPI:   Adolfo Wang is a 67y.o. year old female who presents today for evaluation of hypertension, hyperlipidemia, osteopenia, and chronic microhematuria. She reports that she is doing well. In 2/2017, she was evaluated by DEREK Lyons in 2/2017 with left lower back pain and left sciatica. She had been taking ibuprofen without relief, and was given a course of prednisone. She states that she did not find this helpful either, but reports resolution of symptoms after visiting a chiropractor. She states that she no longer is experiencing pain and is able to ambulate without difficulty. She is otherwise without complaints. She has a history of hypertension, treated with lisinopril and hydrochlorothiazide. She has not been checking her blood pressure at home regularly. She has been quite active in the past, and has resumed exercising regularly by walking with her  and on a treadmill. She reports that she has continued stress in her life. Helping to care for her 's mother (dementia), two disabled cousins, and her sister. She denies any chest pain, shortness of breath at rest or with exertion, palpitations, lightheadedness, or edema. She also has a history of hyperlipidemia, treated with moderate intensity dose atorvastatin. She has no history of ASCVD. She has a history of osteopenia, and in 2009, she sustained a fracture of her left 3rd toe (3/2009) and her distal radius (12/2009). At that time, she was started on alendronate and was treated until 11/2014. She had a repeat bone density study in 7/2016 showing T-scores: femoral neck left -2.3/ right -2.0 and lumbar -2.2. She was restarted on alendronate in 12/2016. She continues to take calcium and Vitamin D supplements. She has no further history of pathologic fractures. She reports that she had an extensive workup for microhematuria in the past, which was negative (records unavailable).  She denies any dysuria, gross hematuria, or flank pain.    She has had screening colonoscopy with Dr Kimmy Coto in 10/2010, which was normal except for hemorrhoids. Recommendation for follow-up is for 10 years. She denies any abdominal pain, nausea, vomiting, melena, hematochezia, or change in bowel movements. She has a history of eczema, treated with triamcinolone with good response. She is followed by Dr. Ariela Stockton. She also has a history of candidal intertrigo, treated with clotrimazole with good response. She also reports that she underwent radiation therapy to her right shoulder (as a child) for treatment of a birthmark. Past Medical History:   Diagnosis Date    Essential hypertension     FH: breast cancer     Fibroids     uterine    Fracture of radius 11/2009    with ulna, left, closed    Hyperlipidemia     Microhematuria     chronic    Osteopenia     S/P radiation therapy     as child, to right shoulder for birthmark    Tinnitus     Zoster     right scapula     Past Surgical History:   Procedure Laterality Date    HX GYN      hysterectomy    HX ORTHOPAEDIC      left arm     Current Outpatient Prescriptions   Medication Sig    nystatin (MYCOSTATIN) powder Apply  to affected area two (2) times a day. As needed.  hydroCHLOROthiazide (HYDRODIURIL) 25 mg tablet Take 1 Tab by mouth daily.  clotrimazole (LOTRIMIN) 1 % topical cream APPLY EXTERNALLY TO THE AFFECTED AREA TWICE DAILY    atorvastatin (LIPITOR) 10 mg tablet TAKE ONE TABLET BY MOUTH ONE TIME DAILY    docusate sodium (COLACE) 100 mg capsule Take 100 mg by mouth daily as needed for Constipation.  alendronate (FOSAMAX) 70 mg tablet Take 1 Tab by mouth every seven (7) days.  triamcinolone acetonide (KENALOG) 0.025 % topical cream Apply  to affected area two (2) times a day.  lisinopril (PRINIVIL) 10 mg tablet Take 1 Tab by mouth daily.  co-enzyme Q-10 (CO Q-10) 100 mg capsule Take 100 mg by mouth daily.  biotin 2,500 mcg Tab Take 1,000 mcg by mouth.     Cholecalciferol, Vitamin D3, (VITAMIN D3) 1,000 unit Cap Take 1 Tab by mouth daily.  fexofenadine (ALLEGRA) 60 mg tablet Take 60 mg by mouth daily.  aspirin 81 mg chewable tablet Take 81 mg by mouth daily.  CALCIUM CARBONATE/VITAMIN D3 (CALCIUM + D PO) Take 1 Tab by mouth two (2) times a day. No current facility-administered medications for this visit. Allergies and Intolerances: Allergies   Allergen Reactions    Codeine Nausea Only     Family History: Her sister has breast cancer and her mother had pancreatic cancer. No history of colon cancer. Family History   Problem Relation Age of Onset   [facility] Cancer Mother      pancreatic    Heart Disease Mother     Heart Disease Father     Cancer Sister      breast    MS Sister     Breast Cancer Sister 62     Social History:   She  reports that she has never smoked. She does not have any smokeless tobacco history on file. She has about 2 glasses of wine each night. She is  with one adult daughter who lives on [place], [place]. She is retired, and was a 4th- for 25 years. History   Alcohol Use    0.0 oz/week    0 Standard drinks or equivalent per week     Comment: occasionally     Immunization History:  Immunization History   Administered Date(s) Administered    Influenza High Dose Vaccine PF 12/13/2016    Influenza Vaccine 10/24/2014    Influenza Vaccine PF 11/05/2013    Influenza Vaccine Split 11/01/2011, 11/06/2012    Pneumococcal Conjugate (PCV-13) 05/05/2015    Pneumococcal Vaccine (Unspecified Type) 05/04/2010    TD Vaccine 05/05/2009    Tdap 12/12/2016    Zoster Vaccine, Live 05/07/2013       Review of Systems:   As above included in HPI.   Otherwise 11 point review of systems negative including constitutional, skin, HENT, eyes, respiratory, cardiovascular, gastrointestinal, genitourinary, musculoskeletal, endo/heme/aller, neurological.    Physical:   Vitals:   BP: 130/72  HR: 84  WT: 154 lb (69.9 kg)  BMI:  26.43 kg/m2    Exam:   Pt appears well; alert and oriented x 3; appropriate affect. HEENT: PERRLA, anicteric, oropharynx clear, no JVD, adenopathy or thyromegaly. No carotid bruits or radiated murmur. Lungs: clear to auscultation, no wheezes, rhonchi, or rales. Heart: regular rate and rhythm. No murmur, rubs, gallops  Abdomen: soft, nontender, nondistended, normal bowel sounds, no hepatosplenomegaly or masses. Extremities: without edema. Pulses 1-2+ bilaterally. Review of Data:  Labs:  Hospital Outpatient Visit on 06/09/2017   Component Date Value Ref Range Status    Hemoglobin A1c 06/09/2017 5.5  4.2 - 5.6 % Final    Est. average glucose 06/09/2017 111  mg/dL Final    Sodium 06/09/2017 139  136 - 145 mmol/L Final    Potassium 06/09/2017 4.0  3.5 - 5.5 mmol/L Final    Chloride 06/09/2017 102  100 - 108 mmol/L Final    CO2 06/09/2017 26  21 - 32 mmol/L Final    Anion gap 06/09/2017 11  3.0 - 18 mmol/L Final    Glucose 06/09/2017 86  74 - 99 mg/dL Final    BUN 06/09/2017 20* 7.0 - 18 MG/DL Final    Creatinine 06/09/2017 0.72  0.6 - 1.3 MG/DL Final    BUN/Creatinine ratio 06/09/2017 28* 12 - 20   Final    GFR est AA 06/09/2017 >60  >60 ml/min/1.73m2 Final    GFR est non-AA 06/09/2017 >60  >60 ml/min/1.73m2 Final    Calcium 06/09/2017 8.9  8.5 - 10.1 MG/DL Final    Bilirubin, total 06/09/2017 0.4  0.2 - 1.0 MG/DL Final    ALT (SGPT) 06/09/2017 29  13 - 56 U/L Final    AST (SGOT) 06/09/2017 27  15 - 37 U/L Final    Alk.  phosphatase 06/09/2017 57  45 - 117 U/L Final    Protein, total 06/09/2017 7.2  6.4 - 8.2 g/dL Final    Albumin 06/09/2017 4.0  3.4 - 5.0 g/dL Final    Globulin 06/09/2017 3.2  2.0 - 4.0 g/dL Final    A-G Ratio 06/09/2017 1.3  0.8 - 1.7   Final    WBC 06/09/2017 6.3  4.6 - 13.2 K/uL Final    RBC 06/09/2017 4.17* 4.20 - 5.30 M/uL Final    HGB 06/09/2017 12.3  12.0 - 16.0 g/dL Final    HCT 06/09/2017 39.5  35.0 - 45.0 % Final    MCV 06/09/2017 94.7  74.0 - 97.0 FL Final    MCH 06/09/2017 29.5  24.0 - 34.0 PG Final    MCHC 06/09/2017 31.1  31.0 - 37.0 g/dL Final    RDW 06/09/2017 13.3  11.6 - 14.5 % Final    PLATELET 49/50/5118 419  135 - 420 K/uL Final    MPV 06/09/2017 10.0  9.2 - 11.8 FL Final    NEUTROPHILS 06/09/2017 58  40 - 73 % Final    LYMPHOCYTES 06/09/2017 20* 21 - 52 % Final    MONOCYTES 06/09/2017 14* 3 - 10 % Final    EOSINOPHILS 06/09/2017 7* 0 - 5 % Final    BASOPHILS 06/09/2017 1  0 - 2 % Final    ABS. NEUTROPHILS 06/09/2017 3.7  1.8 - 8.0 K/UL Final    ABS. LYMPHOCYTES 06/09/2017 1.2  0.9 - 3.6 K/UL Final    ABS. MONOCYTES 06/09/2017 0.9  0.05 - 1.2 K/UL Final    ABS. EOSINOPHILS 06/09/2017 0.4  0.0 - 0.4 K/UL Final    ABS.  BASOPHILS 06/09/2017 0.0  0.0 - 0.06 K/UL Final    DF 06/09/2017 AUTOMATED    Final    Color 06/09/2017 YELLOW    Final    Appearance 06/09/2017 CLEAR    Final    Specific gravity 06/09/2017 1.021  1.005 - 1.030   Final    pH (UA) 06/09/2017 5.0  5.0 - 8.0   Final    Protein 06/09/2017 NEGATIVE   NEG mg/dL Final    Glucose 06/09/2017 NEGATIVE   NEG mg/dL Final    Ketone 06/09/2017 NEGATIVE   NEG mg/dL Final    Bilirubin 06/09/2017 NEGATIVE   NEG   Final    Blood 06/09/2017 MODERATE* NEG   Final    Urobilinogen 06/09/2017 0.2  0.2 - 1.0 EU/dL Final    Nitrites 06/09/2017 NEGATIVE   NEG   Final    Leukocyte Esterase 06/09/2017 LARGE* NEG   Final    WBC 06/09/2017 NEGATIVE   0 - 4 /hpf Final    RBC 06/09/2017 0 to 1  0 - 5 /hpf Final    Epithelial cells 06/09/2017 NEGATIVE   0 - 5 /lpf Final    Bacteria 06/09/2017 NEGATIVE   NEG /hpf Final    TSH 06/09/2017 2.39  0.36 - 3.74 uIU/mL Final    Vitamin D 25-Hydroxy 06/09/2017 42.1  30 - 100 ng/mL Final    LIPID PROFILE 06/09/2017        Final    Cholesterol, total 06/09/2017 154  <200 MG/DL Final    Triglyceride 06/09/2017 83  <150 MG/DL Final    HDL Cholesterol 06/09/2017 67* 40 - 60 MG/DL Final    LDL, calculated 06/09/2017 70.4  0 - 100 MG/DL Final    VLDL, calculated 06/09/2017 16.6  MG/DL Final    CHOL/HDL Ratio 06/09/2017 2.3  0 - 5.0   Final     Imaging: none    Health Maintenance:  Screening:    Mammogram: negative (7/2016)   PAP smear: S/P ALMAS   Colorectal: normal colonoscopy 10/2010 (Dr. Anirudh Car). Due 2020. Depression: none   DM (HbA1c/FPG): FPG 86; HbA1c 5.5 (6/2017)   Hepatitis C: negative (5/2016)   Falls: none   DEXA: osteopenia (7/2016)   Glaucoma: regular eye exams with Dr. Sven Hodgkin (last 5/2016). Smoking: none   Vitamin D: 42.1 (6/2017)   Medicare Wellness: today      Impression:  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    Tinnitus H93.19    Microhematuria R31.29    Eczema L30.9    Osteopenia M85.80    Advance directive discussed with patient Z70.80    Abnormal glucose R73.09    Candidal intertrigo B37.2       Plan:  1. Hypertension. Well controlled on current regimen of lisinopril and hydrochlorothiazide. Renal function normal with creatinine 0.72 / eGFR >60. Continue to follow. 2. Hyperlipidemia. On moderate intensity dose atorvastatin with LDL improved to 70.4, which is excellent control in this patient. Continue to follow. 3. Osteopenia. History of radial and toe fracture in 2009. Treated from 11/2009 to 11/2014 with alendronate. Repeat bone density scan in 7/2016 with FRAX 10 year risk of a major osteoporetic fracture at 14 % and hip fracture at 3.4 %. Restarted Fosamax in 12/2016. Continue calcium and Vitamin D supplement. Encouraged exercise, particularly weight bearing activities. Repeat bone density scan in 2018. 4. Family history of breast cancer. Patient with family history of breast cancer in first degree relative (sister). She is currently being screened with annual mammograms. Will discuss recommendations for breast cancer prevention for high risk women at next visit.    5. Abnormal glucose. Repeat normal with normal HbA1c. Continue to follow. Emphasized importance of lifestyle modifications, including diet, exercise, and weight loss. 6. Chronic microhematuria. Work-up reportedly negative in past. Urinalysis with moderate blood but only 0-1 RBC. Follow. 7. Eczema. Responding to triamcinolone. Followed by Dr. Elizabeth Chopra. 8. Candidal intertrigo. Using clotrimazole cream intermittently. Discussed preventative treatment with mycostatin powder. Will order. 9. Health maintenance. Received Tdap following last visit at [place]. Other immunizations up to date. Mammogram due next month. Will order. Colorectal cancer screening up to date. Continue regular eye exams with Dr. Debora Shelby. Vitamin D level normal. Continue maintenance dose supplement. In addition, an annual Medicare wellness visit was done today. Patient understands recommendations and agrees with plan. Follow-up in 6 months.

## 2017-07-27 ENCOUNTER — HOSPITAL ENCOUNTER (OUTPATIENT)
Dept: MAMMOGRAPHY | Age: 72
Discharge: HOME | End: 2017-07-27
Attending: INTERNAL MEDICINE
Payer: MEDICARE

## 2017-07-27 DIAGNOSIS — Z12.31 VISIT FOR SCREENING MAMMOGRAM: ICD-10-CM

## 2017-07-27 PROCEDURE — 77067 SCR MAMMO BI INCL CAD: CPT

## 2017-09-13 RX ORDER — LISINOPRIL 10 MG/1
10 TABLET ORAL DAILY
Qty: 90 TAB | Refills: 3 | Status: SHIPPED | OUTPATIENT
Start: 2017-09-13 | End: 2018-09-12 | Stop reason: SDUPTHER

## 2017-10-03 ENCOUNTER — CLINICAL SUPPORT (OUTPATIENT)
Dept: INTERNAL MEDICINE CLINIC | Age: 72
End: 2017-10-03

## 2017-10-03 DIAGNOSIS — Z23 ENCOUNTER FOR IMMUNIZATION: Primary | ICD-10-CM

## 2017-10-03 NOTE — PROGRESS NOTES
Patient given influenza vaccine, Fluzone, in left deltoid . Instructed patient to sit and wait 10-20 minutes before leaving the premises so that we can watch for any complications or adverse reactions. Patient given vaccine information statement handout before vaccine was given. Patient tolerated well without adverse reactions or complications.

## 2017-10-19 RX ORDER — HYDROCHLOROTHIAZIDE 25 MG/1
25 TABLET ORAL DAILY
Qty: 90 TAB | Refills: 1 | Status: SHIPPED | OUTPATIENT
Start: 2017-10-19 | End: 2018-04-17 | Stop reason: SDUPTHER

## 2017-10-19 NOTE — TELEPHONE ENCOUNTER
From: Luis Liu  To:  Kenna Whitten MD  Sent: 10/19/2017 11:02 AM EDT  Subject: Medication Renewal Request    Original authorizing provider: Kenna Whitten MD    [place] would like a refill of the following medications:  hydroCHLOROthiazide (HYDRODIURIL) 25 mg tablet Kenna Whitten MD]    [address]

## 2017-12-06 ENCOUNTER — HOSPITAL ENCOUNTER (OUTPATIENT)
Dept: LAB | Age: 72
Discharge: HOME | End: 2017-12-06
Payer: MEDICARE

## 2017-12-06 DIAGNOSIS — I10 ESSENTIAL HYPERTENSION: ICD-10-CM

## 2017-12-06 DIAGNOSIS — M85.9 DISORDER OF BONE DENSITY AND STRUCTURE, UNSPECIFIED: ICD-10-CM

## 2017-12-06 DIAGNOSIS — M85.89 OSTEOPENIA OF MULTIPLE SITES: ICD-10-CM

## 2017-12-06 LAB
25(OH)D3 SERPL-MCNC: 33.3 NG/ML (ref 30–100)
ANION GAP SERPL CALC-SCNC: 8 MMOL/L (ref 3–18)
APPEARANCE UR: CLEAR
BACTERIA URNS QL MICRO: ABNORMAL /HPF
BILIRUB UR QL: NEGATIVE
BUN SERPL-MCNC: 21 MG/DL (ref 7–18)
BUN/CREAT SERPL: 28 (ref 12–20)
CALCIUM SERPL-MCNC: 9.6 MG/DL (ref 8.5–10.1)
CHLORIDE SERPL-SCNC: 105 MMOL/L (ref 100–108)
CO2 SERPL-SCNC: 25 MMOL/L (ref 21–32)
COLOR UR: YELLOW
CREAT SERPL-MCNC: 0.76 MG/DL (ref 0.6–1.3)
EPITH CASTS URNS QL MICRO: ABNORMAL /LPF (ref 0–5)
GLUCOSE SERPL-MCNC: 90 MG/DL (ref 74–99)
GLUCOSE UR STRIP.AUTO-MCNC: NEGATIVE MG/DL
HGB UR QL STRIP: ABNORMAL
KETONES UR QL STRIP.AUTO: NEGATIVE MG/DL
LEUKOCYTE ESTERASE UR QL STRIP.AUTO: ABNORMAL
NITRITE UR QL STRIP.AUTO: NEGATIVE
PH UR STRIP: 5.5 [PH] (ref 5–8)
POTASSIUM SERPL-SCNC: 4.1 MMOL/L (ref 3.5–5.5)
PROT UR STRIP-MCNC: NEGATIVE MG/DL
RBC #/AREA URNS HPF: ABNORMAL /HPF (ref 0–5)
SODIUM SERPL-SCNC: 138 MMOL/L (ref 136–145)
SP GR UR REFRACTOMETRY: 1.02 (ref 1–1.03)
UROBILINOGEN UR QL STRIP.AUTO: 0.2 EU/DL (ref 0.2–1)
WBC URNS QL MICRO: ABNORMAL /HPF (ref 0–4)

## 2017-12-06 PROCEDURE — 80048 BASIC METABOLIC PNL TOTAL CA: CPT | Performed by: INTERNAL MEDICINE

## 2017-12-06 PROCEDURE — 36415 COLL VENOUS BLD VENIPUNCTURE: CPT | Performed by: INTERNAL MEDICINE

## 2017-12-06 PROCEDURE — 81001 URINALYSIS AUTO W/SCOPE: CPT | Performed by: INTERNAL MEDICINE

## 2017-12-06 PROCEDURE — 82306 VITAMIN D 25 HYDROXY: CPT | Performed by: INTERNAL MEDICINE

## 2017-12-14 ENCOUNTER — OFFICE VISIT (OUTPATIENT)
Dept: INTERNAL MEDICINE CLINIC | Age: 72
End: 2017-12-14

## 2017-12-14 VITALS
TEMPERATURE: 97.6 F | SYSTOLIC BLOOD PRESSURE: 130 MMHG | HEIGHT: 64 IN | DIASTOLIC BLOOD PRESSURE: 82 MMHG | BODY MASS INDEX: 26.29 KG/M2 | HEART RATE: 79 BPM | WEIGHT: 154 LBS | RESPIRATION RATE: 16 BRPM | OXYGEN SATURATION: 94 %

## 2017-12-14 DIAGNOSIS — M85.9 DISORDER OF BONE DENSITY AND STRUCTURE, UNSPECIFIED: ICD-10-CM

## 2017-12-14 DIAGNOSIS — M85.89 OSTEOPENIA OF MULTIPLE SITES: ICD-10-CM

## 2017-12-14 DIAGNOSIS — B37.2 CANDIDAL INTERTRIGO: ICD-10-CM

## 2017-12-14 DIAGNOSIS — Z80.3 FH: BREAST CANCER: ICD-10-CM

## 2017-12-14 DIAGNOSIS — L30.9 ECZEMA, UNSPECIFIED TYPE: ICD-10-CM

## 2017-12-14 DIAGNOSIS — E78.5 HYPERLIPIDEMIA, UNSPECIFIED HYPERLIPIDEMIA TYPE: ICD-10-CM

## 2017-12-14 DIAGNOSIS — I10 ESSENTIAL HYPERTENSION: Primary | ICD-10-CM

## 2017-12-14 DIAGNOSIS — R73.09 ABNORMAL GLUCOSE: ICD-10-CM

## 2017-12-14 RX ORDER — TRIAMCINOLONE ACETONIDE 0.25 MG/G
CREAM TOPICAL 2 TIMES DAILY
Qty: 15 G | Refills: 2 | Status: SHIPPED | OUTPATIENT
Start: 2017-12-14 | End: 2022-07-03 | Stop reason: ALTCHOICE

## 2017-12-14 RX ORDER — CLOTRIMAZOLE AND BETAMETHASONE DIPROPIONATE 10; .64 MG/G; MG/G
CREAM TOPICAL 2 TIMES DAILY
Qty: 15 G | Refills: 2 | Status: SHIPPED | OUTPATIENT
Start: 2017-12-14

## 2017-12-14 RX ORDER — CHLORPHENIRAMINE MALEATE 4 MG
TABLET ORAL
Qty: 15 G | Refills: 0 | Status: CANCELLED | OUTPATIENT
Start: 2017-12-14

## 2017-12-14 NOTE — PROGRESS NOTES
Chief Complaint   Patient presents with    Hypertension     6 month follow up with lab review. 1. Have you been to the ER, urgent care clinic or hospitalized since your last visit? NO.     2. Have you seen or consulted any other health care providers outside of the [address] [place] [address] since your last visit (Include any pap smears or colon screening)? NO      Do you have an Advanced Directive? YES, Patient states she has one at home and will bring in for us to copy and place in the chart.

## 2017-12-14 NOTE — MR AVS SNAPSHOT
Visit Information Date & Time Provider Department Dept. Phone Encounter #  
 12/14/2017  8:00 AM Aleks Delgado MD [facility] [phone] [id number] Follow-up Instructions Return in about 6 months (around 6/14/2018), or if symptoms worsen or fail to improve. Your Appointments 6/14/2018  8:00 AM  
Office Visit with Aleks Delgado MD  
[facility] [address]) Appt Note: ov 6mos. sarris [address], [address] [address] [address] [place]  
  
   
 [address], [address] Upcoming Health Maintenance Date Due  
 GLAUCOMA SCREENING Q2Y 5/16/2018 MEDICARE YEARLY EXAM 6/16/2018 COLONOSCOPY 10/25/2020 DTaP/Tdap/Td series (2 - Td) 12/12/2026 Allergies as of 12/14/2017  Review Complete On: 12/14/2017 By: Azeem Germain Severity Noted Reaction Type Reactions Codeine    Nausea Only Current Immunizations  Reviewed on 10/3/2017 Name Date Influenza High Dose Vaccine PF 10/3/2017  3:00 PM, 12/13/2016 Influenza Vaccine 10/24/2014 Influenza Vaccine PF 11/5/2013  9:03 AM  
 Influenza Vaccine Split 11/6/2012, 11/1/2011 Pneumococcal Conjugate (PCV-13) 5/5/2015 TD Vaccine 5/5/2009 Tdap 12/12/2016 ZZZ-RETIRED (DO NOT USE) Pneumococcal Vaccine (Unspecified Type) 5/4/2010 Zoster Vaccine, Live 5/7/2013 Not reviewed this visit You Were Diagnosed With   
  
 Codes Comments Intertrigo     ICD-10-CM: L30.4 ICD-9-CM: 695.89 Vitals BP Pulse Temp Resp Height(growth percentile) Weight(growth percentile) 130/82 (BP 1 Location: Left arm, BP Patient Position: Sitting) 79 97.6 °F (36.4 °C) (Oral) 16 5' 4\" (1.626 m) 154 lb (69.9 kg) SpO2 BMI OB Status Smoking Status 94% 26.43 kg/m2 Postmenopausal Never Smoker Vitals History BMI and BSA Data  Body Mass Index Body Surface Area  
 26.43 kg/m 2 1.78 m 2  
  
  
 Preferred Pharmacy Pharmacy Name Phone [facility] [address] [place] [address] [phone] Your Updated Medication List  
  
   
This list is accurate as of: 12/14/17  8:40 AM.  Always use your most recent med list.  
  
  
  
  
 alendronate 70 mg tablet Commonly known as:  FOSAMAX Take 1 Tab by mouth every seven (7) days. ALLEGRA 60 mg tablet Generic drug:  fexofenadine Take 60 mg by mouth daily. aspirin 81 mg chewable tablet Take 81 mg by mouth daily. atorvastatin 10 mg tablet Commonly known as:  LIPITOR  
TAKE 1 TABLET BY MOUTH EVERY DAY  
  
 biotin 2,500 mcg Tab Take 1,000 mcg by mouth. CALCIUM + D PO Take 1 Tab by mouth two (2) times a day. clotrimazole-betamethasone topical cream  
Commonly known as:  Leldon Maverick Apply  to affected area two (2) times a day. CO Q-10 100 mg capsule Generic drug:  co-enzyme Q-10 Take 100 mg by mouth daily. COLACE 100 mg capsule Generic drug:  docusate sodium Take 100 mg by mouth daily as needed for Constipation. hydroCHLOROthiazide 25 mg tablet Commonly known as:  HYDRODIURIL Take 1 Tab by mouth daily. lisinopril 10 mg tablet Commonly known as:  PRINIVIL Take 1 Tab by mouth daily. nystatin powder Commonly known as:  MYCOSTATIN Apply  to affected area two (2) times a day. As needed. triamcinolone acetonide 0.025 % topical cream  
Commonly known as:  KENALOG Apply  to affected area two (2) times a day. VITAMIN D3 1,000 unit Cap Generic drug:  cholecalciferol Take 1 Tab by mouth daily. Prescriptions Sent to Pharmacy Refills  
 triamcinolone acetonide (KENALOG) 0.025 % topical cream 2 Sig: Apply  to affected area two (2) times a day. Class: Normal  
 Pharmacy: [facility] [address] [place], [place] [address] Ph #: [phone] Route: Topical  
 clotrimazole-betamethasone (LOTRISONE) topical cream 2 Sig: Apply  to affected area two (2) times a day. Class: Normal  
 Pharmacy: [facility] [address] [place] [address] [facility] [address] [place] [address] #: [phone] Route: Topical  
  
Follow-up Instructions Return in about 6 months (around 6/14/2018), or if symptoms worsen or fail to improve. Patient Instructions Continue to monitor blood pressure and call office if greater than 130/80. DASH Diet: Care Instructions Your Care Instructions The DASH diet is an eating plan that can help lower your blood pressure. DASH stands for Dietary Approaches to Stop Hypertension. Hypertension is high blood pressure. The DASH diet focuses on eating foods that are high in calcium, potassium, and magnesium. These nutrients can lower blood pressure. The foods that are highest in these nutrients are fruits, vegetables, low-fat dairy products, nuts, seeds, and legumes. But taking calcium, potassium, and magnesium supplements instead of eating foods that are high in those nutrients does not have the same effect. The DASH diet also includes whole grains, fish, and poultry. The DASH diet is one of several lifestyle changes your doctor may recommend to lower your high blood pressure. Your doctor may also want you to decrease the amount of sodium in your diet. Lowering sodium while following the DASH diet can lower blood pressure even further than just the DASH diet alone. Follow-up care is a key part of your treatment and safety. Be sure to make and go to all appointments, and call your doctor if you are having problems. It's also a good idea to know your test results and keep a list of the medicines you take. How can you care for yourself at home? Following the DASH diet · Eat 4 to 5 servings of fruit each day.  A serving is 1 medium-sized piece of fruit, ½ cup chopped or canned fruit, 1/4 cup dried fruit, or 4 ounces (½ cup) of fruit juice. Choose fruit more often than fruit juice. · Eat 4 to 5 servings of vegetables each day. A serving is 1 cup of lettuce or raw leafy vegetables, ½ cup of chopped or cooked vegetables, or 4 ounces (½ cup) of vegetable juice. Choose vegetables more often than vegetable juice. · Get 2 to 3 servings of low-fat and fat-free dairy each day. A serving is 8 ounces of milk, 1 cup of yogurt, or 1 ½ ounces of cheese. · Eat 6 to 8 servings of grains each day. A serving is 1 slice of bread, 1 ounce of dry cereal, or ½ cup of cooked rice, pasta, or cooked cereal. Try to choose whole-grain products as much as possible. · Limit lean meat, poultry, and fish to 2 servings each day. A serving is 3 ounces, about the size of a deck of cards. · Eat 4 to 5 servings of nuts, seeds, and legumes (cooked dried beans, lentils, and split peas) each week. A serving is 1/3 cup of nuts, 2 tablespoons of seeds, or ½ cup of cooked beans or peas. · Limit fats and oils to 2 to 3 servings each day. A serving is 1 teaspoon of vegetable oil or 2 tablespoons of salad dressing. · Limit sweets and added sugars to 5 servings or less a week. A serving is 1 tablespoon jelly or jam, ½ cup sorbet, or 1 cup of lemonade. · Eat less than 2,300 milligrams (mg) of sodium a day. If you limit your sodium to 1,500 mg a day, you can lower your blood pressure even more. Tips for success · Start small. Do not try to make dramatic changes to your diet all at once. You might feel that you are missing out on your favorite foods and then be more likely to not follow the plan. Make small changes, and stick with them. Once those changes become habit, add a few more changes. · Try some of the following: ¨ Make it a goal to eat a fruit or vegetable at every meal and at snacks. This will make it easy to get the recommended amount of fruits and vegetables each day. ¨ Try yogurt topped with fruit and nuts for a snack or healthy dessert. ¨ Add lettuce, tomato, cucumber, and onion to sandwiches. ¨ Combine a ready-made pizza crust with low-fat mozzarella cheese and lots of vegetable toppings. Try using tomatoes, squash, spinach, broccoli, carrots, cauliflower, and onions. ¨ Have a variety of cut-up vegetables with a low-fat dip as an appetizer instead of chips and dip. ¨ Sprinkle sunflower seeds or chopped almonds over salads. Or try adding chopped walnuts or almonds to cooked vegetables. ¨ Try some vegetarian meals using beans and peas. Add garbanzo or kidney beans to salads. Make burritos and tacos with mashed ho beans or black beans. Where can you learn more? Go to http://mallikaDataEmail Groupsuzanne.info/. Enter Z230 in the search box to learn more about \"DASH Diet: Care Instructions. \" Current as of: September 21, 2016 Content Version: 11.4 © 0950-3393 [employer]. Care instructions adapted under license by [employer] (which disclaims liability or warranty for this information). If you have questions about a medical condition or this instruction, always ask your healthcare professional. [address] any warranty or liability for your use of this information. Introducing [facility]! Dear Lauri Barcenas: 
Thank you for requesting a [employer] account. Our records indicate that you already have an active [employer] account. You can access your account anytime at [email or web address] Did you know that you can access your hospital and ER discharge instructions at any time in [employer]? You can also review all of your test results from your hospital stay or ER visit. Additional Information If you have questions, please visit the Frequently Asked Questions section of the [employer] website at [email or web address] Remember, MyChart is NOT to be used for urgent needs. For medical emergencies, dial 911. Now available from your iPhone and Android! Please provide this summary of care documentation to your next provider. Your primary care clinician is listed as Juan Oneill. If you have any questions after today's visit, please call [phone].

## 2017-12-14 NOTE — PATIENT INSTRUCTIONS
Continue to monitor blood pressure and call office if greater than 130/80. DASH Diet: Care Instructions  Your Care Instructions    The DASH diet is an eating plan that can help lower your blood pressure. DASH stands for Dietary Approaches to Stop Hypertension. Hypertension is high blood pressure. The DASH diet focuses on eating foods that are high in calcium, potassium, and magnesium. These nutrients can lower blood pressure. The foods that are highest in these nutrients are fruits, vegetables, low-fat dairy products, nuts, seeds, and legumes. But taking calcium, potassium, and magnesium supplements instead of eating foods that are high in those nutrients does not have the same effect. The DASH diet also includes whole grains, fish, and poultry. The DASH diet is one of several lifestyle changes your doctor may recommend to lower your high blood pressure. Your doctor may also want you to decrease the amount of sodium in your diet. Lowering sodium while following the DASH diet can lower blood pressure even further than just the DASH diet alone. Follow-up care is a key part of your treatment and safety. Be sure to make and go to all appointments, and call your doctor if you are having problems. It's also a good idea to know your test results and keep a list of the medicines you take. How can you care for yourself at home? Following the DASH diet  · Eat 4 to 5 servings of fruit each day. A serving is 1 medium-sized piece of fruit, ½ cup chopped or canned fruit, 1/4 cup dried fruit, or 4 ounces (½ cup) of fruit juice. Choose fruit more often than fruit juice. · Eat 4 to 5 servings of vegetables each day. A serving is 1 cup of lettuce or raw leafy vegetables, ½ cup of chopped or cooked vegetables, or 4 ounces (½ cup) of vegetable juice. Choose vegetables more often than vegetable juice. · Get 2 to 3 servings of low-fat and fat-free dairy each day.  A serving is 8 ounces of milk, 1 cup of yogurt, or 1 ½ ounces of cheese. · Eat 6 to 8 servings of grains each day. A serving is 1 slice of bread, 1 ounce of dry cereal, or ½ cup of cooked rice, pasta, or cooked cereal. Try to choose whole-grain products as much as possible. · Limit lean meat, poultry, and fish to 2 servings each day. A serving is 3 ounces, about the size of a deck of cards. · Eat 4 to 5 servings of nuts, seeds, and legumes (cooked dried beans, lentils, and split peas) each week. A serving is 1/3 cup of nuts, 2 tablespoons of seeds, or ½ cup of cooked beans or peas. · Limit fats and oils to 2 to 3 servings each day. A serving is 1 teaspoon of vegetable oil or 2 tablespoons of salad dressing. · Limit sweets and added sugars to 5 servings or less a week. A serving is 1 tablespoon jelly or jam, ½ cup sorbet, or 1 cup of lemonade. · Eat less than 2,300 milligrams (mg) of sodium a day. If you limit your sodium to 1,500 mg a day, you can lower your blood pressure even more. Tips for success  · Start small. Do not try to make dramatic changes to your diet all at once. You might feel that you are missing out on your favorite foods and then be more likely to not follow the plan. Make small changes, and stick with them. Once those changes become habit, add a few more changes. · Try some of the following:  ¨ Make it a goal to eat a fruit or vegetable at every meal and at snacks. This will make it easy to get the recommended amount of fruits and vegetables each day. ¨ Try yogurt topped with fruit and nuts for a snack or healthy dessert. ¨ Add lettuce, tomato, cucumber, and onion to sandwiches. ¨ Combine a ready-made pizza crust with low-fat mozzarella cheese and lots of vegetable toppings. Try using tomatoes, squash, spinach, broccoli, carrots, cauliflower, and onions. ¨ Have a variety of cut-up vegetables with a low-fat dip as an appetizer instead of chips and dip. ¨ Sprinkle sunflower seeds or chopped almonds over salads.  Or try adding chopped walnuts or almonds to cooked vegetables. ¨ Try some vegetarian meals using beans and peas. Add garbanzo or kidney beans to salads. Make burritos and tacos with mashed ho beans or black beans. Where can you learn more? Go to http://mallika-suzanne.info/. Enter T072 in the search box to learn more about \"DASH Diet: Care Instructions. \"  Current as of: September 21, 2016  Content Version: 11.4  © 6536-1998 [employer]. Care instructions adapted under license by [employer] (which disclaims liability or warranty for this information). If you have questions about a medical condition or this instruction, always ask your healthcare professional. [address] any warranty or liability for your use of this information.

## 2017-12-17 ENCOUNTER — TELEPHONE (OUTPATIENT)
Dept: INTERNAL MEDICINE CLINIC | Age: 72
End: 2017-12-17

## 2017-12-17 NOTE — PROGRESS NOTES
HPI:   Nickolas Mckeon is a 67y.o. year old female who presents today for evaluation of hypertension, hyperlipidemia, osteopenia, and chronic microhematuria. She reports that she is doing well. She does report an increase in the intertrigo rash in her groin and reports that it is responding less to treatment with clotrimazole. She reports that she does apply the Mycostatin powder daily as well. She is otherwise without complaints. She has a history of hypertension, treated with lisinopril and hydrochlorothiazide. She has been checking her blood pressure intermittently and reports that most readings are with a systolic blood pressure <859. She remains quite active, although has not been having time to exercise. She reports that she has continued stress in her life, helping to care for her 's mother (dementia), two disabled cousins, and her sister. She denies any chest pain, shortness of breath at rest or with exertion, palpitations, lightheadedness, or edema. She also has a history of hyperlipidemia, treated with moderate intensity dose atorvastatin. She has no history of ASCVD. She has a history of osteopenia, and in 2009, she sustained a fracture of her left 3rd toe (3/2009) and her distal radius (12/2009). At that time, she was started on alendronate and was treated until 11/2014. She had a repeat bone density study in 7/2016 showing T-scores: femoral neck left -2.3/ right -2.0 and lumbar -2.2. She was restarted on alendronate in 12/2016. She continues to take calcium and Vitamin D supplements. She has no further history of pathologic fractures. In 2/2017, she was evaluated by DEREK Walter with left lower back pain and left sciatica. She had been taking ibuprofen without relief, and was given a course of prednisone. She states that she did not find this helpful either, but reports resolution of symptoms after visiting a chiropractor.  She states that she no longer is experiencing pain and is able to ambulate without difficulty. She reports that she had an extensive workup for microhematuria in the past, which was negative (records unavailable). She denies any dysuria, gross hematuria, or flank pain. She has had screening colonoscopy with Dr Nasreen Liang in 10/2010, which was normal except for hemorrhoids. Recommendation for follow-up is for 10 years. She denies any abdominal pain, nausea, vomiting, melena, hematochezia, or change in bowel movements. She has a history of eczema, treated with triamcinolone with good response. She is followed by Dr. Светлана Montanez. She also has a history of candidal intertrigo, treated with clotrimazole with good response. She also reports that she underwent radiation therapy to her right shoulder (as a child) for treatment of a birthmark. Past Medical History:   Diagnosis Date    Essential hypertension     FH: breast cancer     Fibroids     uterine    Fracture of radius 11/2009    with ulna, left, closed    Hyperlipidemia     Microhematuria     chronic    Osteopenia     S/P radiation therapy     as child, to right shoulder for birthmark    Tinnitus     Zoster     right scapula     Past Surgical History:   Procedure Laterality Date    HX GYN      hysterectomy    HX ORTHOPAEDIC      left arm     Current Outpatient Prescriptions   Medication Sig    triamcinolone acetonide (KENALOG) 0.025 % topical cream Apply  to affected area two (2) times a day.  clotrimazole-betamethasone (LOTRISONE) topical cream Apply  to affected area two (2) times a day.  hydroCHLOROthiazide (HYDRODIURIL) 25 mg tablet Take 1 Tab by mouth daily.  atorvastatin (LIPITOR) 10 mg tablet TAKE 1 TABLET BY MOUTH EVERY DAY    lisinopril (PRINIVIL) 10 mg tablet Take 1 Tab by mouth daily.  nystatin (MYCOSTATIN) powder Apply  to affected area two (2) times a day. As needed.  docusate sodium (COLACE) 100 mg capsule Take 100 mg by mouth daily as needed for Constipation.     alendronate (FOSAMAX) 70 mg tablet Take 1 Tab by mouth every seven (7) days.  co-enzyme Q-10 (CO Q-10) 100 mg capsule Take 100 mg by mouth daily.  biotin 2,500 mcg Tab Take 1,000 mcg by mouth.  fexofenadine (ALLEGRA) 60 mg tablet Take 60 mg by mouth daily.  aspirin 81 mg chewable tablet Take 81 mg by mouth daily.  CALCIUM CARBONATE/VITAMIN D3 (CALCIUM + D PO) Take 1 Tab by mouth two (2) times a day.  Cholecalciferol, Vitamin D3, (VITAMIN D3) 1,000 unit Cap Take 1 Tab by mouth daily. No current facility-administered medications for this visit. Allergies and Intolerances: Allergies   Allergen Reactions    Codeine Nausea Only     Family History: Her sister has breast cancer and her mother had pancreatic cancer. No history of colon cancer. Family History   Problem Relation Age of Onset   24 [facility] Cancer Mother      pancreatic    Heart Disease Mother     Heart Disease Father     Cancer Sister      breast    MS Sister     Breast Cancer Sister 62     Social History:   She  reports that she has never smoked. She has never used smokeless tobacco. She has about 2 glasses of wine each night. She is  with one adult daughter who lives with her family in [place], [place]. She is retired, and was a 4th- for 25 years. History   Alcohol Use    0.0 oz/week    0 Standard drinks or equivalent per week     Comment: occasionally     Immunization History:  Immunization History   Administered Date(s) Administered    Influenza High Dose Vaccine PF 12/13/2016, 10/03/2017    Influenza Vaccine 10/24/2014    Influenza Vaccine PF 11/05/2013    Influenza Vaccine Split 11/01/2011, 11/06/2012    Pneumococcal Conjugate (PCV-13) 05/05/2015    TD Vaccine 05/05/2009    Tdap 12/12/2016    ZZZ-RETIRED (DO NOT USE) Pneumococcal Vaccine (Unspecified Type) 05/04/2010    Zoster Vaccine, Live 05/07/2013       Review of Systems:   As above included in HPI.   Otherwise 11 point review of systems negative including constitutional, skin, HENT, eyes, respiratory, cardiovascular, gastrointestinal, genitourinary, musculoskeletal, endo/heme/aller, neurological.    Physical:   Vitals:   BP: 130/82  HR: 79  WT: 154 lb (69.9 kg)  BMI:  26.43 kg/m2    Exam:   Pt appears well; alert and oriented x 3; appropriate affect. HEENT: PERRLA, anicteric, oropharynx clear, no JVD, adenopathy or thyromegaly. No carotid bruits or radiated murmur. Lungs: clear to auscultation, no wheezes, rhonchi, or rales. Heart: regular rate and rhythm. No murmur, rubs, gallops  Abdomen: soft, nontender, nondistended, normal bowel sounds, no hepatosplenomegaly or masses. Extremities: without edema. Pulses 1-2+ bilaterally. Left groin with minimal erythema in crease.  Appears moist.    Review of Data:  Labs:  Hospital Outpatient Visit on 12/06/2017   Component Date Value Ref Range Status    Sodium 12/06/2017 138  136 - 145 mmol/L Final    Potassium 12/06/2017 4.1  3.5 - 5.5 mmol/L Final    Chloride 12/06/2017 105  100 - 108 mmol/L Final    CO2 12/06/2017 25  21 - 32 mmol/L Final    Anion gap 12/06/2017 8  3.0 - 18 mmol/L Final    Glucose 12/06/2017 90  74 - 99 mg/dL Final    BUN 12/06/2017 21* 7.0 - 18 MG/DL Final    Creatinine 12/06/2017 0.76  0.6 - 1.3 MG/DL Final    BUN/Creatinine ratio 12/06/2017 28* 12 - 20   Final    GFR est AA 12/06/2017 >60  >60 ml/min/1.73m2 Final    GFR est non-AA 12/06/2017 >60  >60 ml/min/1.73m2 Final    Calcium 12/06/2017 9.6  8.5 - 10.1 MG/DL Final    Vitamin D 25-Hydroxy 12/06/2017 33.3  30 - 100 ng/mL Final    Color 12/06/2017 YELLOW    Final    Appearance 12/06/2017 CLEAR    Final    Specific gravity 12/06/2017 1.021  1.005 - 1.030   Final    pH (UA) 12/06/2017 5.5  5.0 - 8.0   Final    Protein 12/06/2017 NEGATIVE   NEG mg/dL Final    Glucose 12/06/2017 NEGATIVE   NEG mg/dL Final    Ketone 12/06/2017 NEGATIVE   NEG mg/dL Final    Bilirubin 12/06/2017 NEGATIVE   NEG   Final    Blood 12/06/2017 TRACE* NEG   Final    Urobilinogen 12/06/2017 0.2  0.2 - 1.0 EU/dL Final    Nitrites 12/06/2017 NEGATIVE   NEG   Final    Leukocyte Esterase 12/06/2017 MODERATE* NEG   Final    WBC 12/06/2017 8 to 10  0 - 4 /hpf Final    RBC 12/06/2017 0 to 2  0 - 5 /hpf Final    Epithelial cells 12/06/2017 1+  0 - 5 /lpf Final    Bacteria 12/06/2017 FEW* NEG /hpf Final     Health Maintenance:  Screening:    Mammogram: negative (7/2017)   PAP smear: S/P ALMAS   Colorectal: colonoscopy (10/2010) normal. Dr. Chayo Moran. Due 2020. Depression: none   DM (HbA1c/FPG): FPG 90 (12/2017)   Hepatitis C: negative (5/2016)   Falls: none   DEXA: osteopenia (7/2016). On alendronate since 12/2016. Glaucoma: regular eye exams with Dr. Gerardo Salvador (last 5/2017). Smoking: none   Vitamin D: 42.1 (6/2017)   Medicare Wellness: 6/15/2017      Impression:  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    Tinnitus H93.19    Microhematuria R31.29    Eczema L30.9    Osteopenia M85.80    Advance directive discussed with patient Z70.80    Abnormal glucose R73.09    Candidal intertrigo B37.2       Plan:  1. Hypertension. Appears well controlled on current regimen of lisinopril and hydrochlorothiazide. Discussed new recommendations for goal <130/80. Renal function normal with creatinine 0.76 / eGFR >60. Elevated BUN/ creatinine ratio. Encouraged to drink plenty of fluids. Continue to follow. 2. Hyperlipidemia. On moderate intensity dose atorvastatin with LDL improved to 70.4 (6/2017), which is excellent control in this patient. Continue to follow. 3. Osteopenia. History of radial and toe fracture in 2009. Treated from 11/2009 to 11/2014 with alendronate. Repeat bone density scan in 7/2016 with FRAX 10 year risk of a major osteoporetic fracture at 14 % and hip fracture at 3.4 %.  Restarted Fosamax in 12/2016. Continue calcium and Vitamin D supplement. Encouraged exercise, particularly weight bearing activities. Repeat bone density scan in 2018. 4. Family history of breast cancer. Patient with family history of breast cancer in first degree relative (sister). She is currently being screened with annual mammograms. Will discuss recommendations for breast cancer prevention for high risk women at next visit. 5. Abnormal glucose. Repeat normal with normal HbA1c. Continue to follow. Emphasized importance of lifestyle modifications, including diet, exercise, and weight loss. 6. Chronic microhematuria. Work-up reportedly negative in past. Urinalysis with trace blood but only 0-2 RBC. Follow. 7. Eczema. Responding to triamcinolone. Followed by Dr. Martin Delacruz. 8. Candidal intertrigo. Using clotrimazole cream intermittently, but reports increasing symptoms. Will change to Lotrisone to see if more effective. Discussed preventative treatment with mycostatin powder and loose clothing. 9. Health maintenance. Already received influenza vaccine. Other immunizations up to date. Mammogram up to date. Colorectal cancer screening up to date. Continue regular eye exams with Dr. Carola Foote. Vitamin D level normal. Continue maintenance dose supplement. Medicare wellness visit up todate. Patient understands recommendations and agrees with plan. Follow-up in 6 months.

## 2018-03-19 ENCOUNTER — HOSPITAL ENCOUNTER (EMERGENCY)
Age: 73
Discharge: HOME | End: 2018-03-19
Attending: EMERGENCY MEDICINE
Payer: MEDICARE

## 2018-03-19 ENCOUNTER — APPOINTMENT (OUTPATIENT)
Dept: CT IMAGING | Age: 73
End: 2018-03-19
Attending: EMERGENCY MEDICINE
Payer: MEDICARE

## 2018-03-19 VITALS
DIASTOLIC BLOOD PRESSURE: 67 MMHG | BODY MASS INDEX: 24.99 KG/M2 | WEIGHT: 150 LBS | SYSTOLIC BLOOD PRESSURE: 133 MMHG | TEMPERATURE: 98 F | OXYGEN SATURATION: 99 % | RESPIRATION RATE: 18 BRPM | HEIGHT: 65 IN | HEART RATE: 98 BPM

## 2018-03-19 DIAGNOSIS — S16.1XXA STRAIN OF NECK MUSCLE, INITIAL ENCOUNTER: ICD-10-CM

## 2018-03-19 DIAGNOSIS — R03.0 ELEVATED BLOOD PRESSURE READING: ICD-10-CM

## 2018-03-19 DIAGNOSIS — S09.90XA CLOSED HEAD INJURY, INITIAL ENCOUNTER: Primary | ICD-10-CM
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
## 2018-03-19 DIAGNOSIS — R90.89 ABNORMAL CT OF BRAIN: ICD-10-CM

## 2018-03-19 DIAGNOSIS — S46.812A TRAPEZIUS STRAIN, LEFT, INITIAL ENCOUNTER: ICD-10-CM

## 2018-03-19 PROCEDURE — 99283 EMERGENCY DEPT VISIT LOW MDM: CPT

## 2018-03-19 PROCEDURE — 74011250637 HC RX REV CODE- 250/637: Performed by: EMERGENCY MEDICINE

## 2018-03-19 PROCEDURE — 70450 CT HEAD/BRAIN W/O DYE: CPT

## 2018-03-19 RX ORDER — ACETAMINOPHEN 325 MG/1
650 TABLET ORAL
Status: COMPLETED | OUTPATIENT
Start: 2018-03-19 | End: 2018-03-19

## 2018-03-19 RX ORDER — CYCLOBENZAPRINE HCL 10 MG
10 TABLET ORAL
Qty: 15 TAB | Refills: 0 | Status: SHIPPED | OUTPATIENT
Start: 2018-03-19 | End: 2018-06-14 | Stop reason: ALTCHOICE

## 2018-03-19 RX ORDER — IBUPROFEN 400 MG/1
400 TABLET ORAL
Qty: 20 TAB | Refills: 0 | Status: SHIPPED | OUTPATIENT
Start: 2018-03-19 | End: 2018-06-14 | Stop reason: ALTCHOICE

## 2018-03-19 RX ADMIN — ACETAMINOPHEN 650 MG: 325 TABLET ORAL at 11:09
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
## 2018-03-19 NOTE — ED NOTES
Current Discharge Medication List      START taking these medications    Details   ibuprofen (MOTRIN) 400 mg tablet Take 1 Tab by mouth every six (6) hours as needed for Pain. Qty: 20 Tab, Refills: 0      cyclobenzaprine (FLEXERIL) 10 mg tablet Take 1 Tab by mouth three (3) times daily as needed for Muscle Spasm(s). Qty: 15 Tab, Refills: 0         CONTINUE these medications which have NOT CHANGED    Details   clotrimazole-betamethasone (LOTRISONE) topical cream Apply  to affected area two (2) times a day. Qty: 15 g, Refills: 2      hydroCHLOROthiazide (HYDRODIURIL) 25 mg tablet Take 1 Tab by mouth daily. Qty: 90 Tab, Refills: 1      atorvastatin (LIPITOR) 10 mg tablet TAKE 1 TABLET BY MOUTH EVERY DAY  Qty: 90 Tab, Refills: 2      lisinopril (PRINIVIL) 10 mg tablet Take 1 Tab by mouth daily. Qty: 90 Tab, Refills: 3      docusate sodium (COLACE) 100 mg capsule Take 100 mg by mouth daily as needed for Constipation. alendronate (FOSAMAX) 70 mg tablet Take 1 Tab by mouth every seven (7) days. Qty: 12 Tab, Refills: 3      co-enzyme Q-10 (CO Q-10) 100 mg capsule Take 100 mg by mouth daily. biotin 2,500 mcg Tab Take 5,000 mcg by mouth. Cholecalciferol, Vitamin D3, (VITAMIN D3) 1,000 unit Cap Take 1 Tab by mouth daily. fexofenadine (ALLEGRA) 60 mg tablet Take 60 mg by mouth daily. aspirin 81 mg chewable tablet Take 81 mg by mouth daily. CALCIUM CARBONATE/VITAMIN D3 (CALCIUM + D PO) Take 1 Tab by mouth two (2) times a day. triamcinolone acetonide (KENALOG) 0.025 % topical cream Apply  to affected area two (2) times a day. Qty: 15 g, Refills: 2           Patient armband removed and shredded  Prescriptions sent to pharmacy.
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
## 2018-03-20 ENCOUNTER — PATIENT OUTREACH (OUTPATIENT)
Dept: INTERNAL MEDICINE CLINIC | Age: 73
End: 2018-03-20
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
## 2018-03-22 ENCOUNTER — TELEPHONE (OUTPATIENT)
Dept: INTERNAL MEDICINE CLINIC | Age: 73
End: 2018-03-22

## 2018-03-22 DIAGNOSIS — G93.89 BRAIN MASS: Primary | ICD-10-CM
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
## 2018-04-12 ENCOUNTER — TELEPHONE (OUTPATIENT)
Dept: INTERNAL MEDICINE CLINIC | Age: 73
End: 2018-04-12

## 2018-04-12 ENCOUNTER — HOSPITAL ENCOUNTER (OUTPATIENT)
Age: 73
Discharge: HOME | End: 2018-04-12
Attending: INTERNAL MEDICINE
Payer: MEDICARE

## 2018-04-12 DIAGNOSIS — G93.89 BRAIN MASS: ICD-10-CM

## 2018-04-12 LAB — CREAT UR-MCNC: 0.7 MG/DL (ref 0.6–1.3)

## 2018-04-12 PROCEDURE — 82565 ASSAY OF CREATININE: CPT

## 2018-04-12 PROCEDURE — A9585 GADOBUTROL INJECTION: HCPCS | Performed by: INTERNAL MEDICINE

## 2018-04-12 PROCEDURE — 74011250636 HC RX REV CODE- 250/636: Performed by: INTERNAL MEDICINE

## 2018-04-12 PROCEDURE — 70553 MRI BRAIN STEM W/O & W/DYE: CPT

## 2018-04-12 RX ADMIN — GADOBUTROL 7.5 ML: 604.72 INJECTION INTRAVENOUS at 06:00

## 2018-04-13 NOTE — TELEPHONE ENCOUNTER
Called patient earlier today at 6:05 pm. 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 Mild mass effect on adjacent brain seen. Discussed that this does not need follow-up. Also shared that the rest of the brain parenchyma looks essentially normal. Answered all questions.

## 2018-04-17 RX ORDER — HYDROCHLOROTHIAZIDE 25 MG/1
25 TABLET ORAL DAILY
Qty: 90 TAB | Refills: 1 | Status: SHIPPED | OUTPATIENT
Start: 2018-04-17 | End: 2018-10-11 | Stop reason: SDUPTHER

## 2018-04-17 NOTE — TELEPHONE ENCOUNTER
From: Melinda Liu  To:  Christine Newman MD  Sent: 4/17/2018 12:07 PM EDT  Subject: Medication Renewal Request    Original authorizing provider: MD Cathy Nicetresa Debbie would like a refill of the following medications:  hydroCHLOROthiazide (HYDRODIURIL) 25 mg tablet Christine Newman MD]    [address]

## 2018-04-19 ENCOUNTER — PATIENT OUTREACH (OUTPATIENT)
Dept: INTERNAL MEDICINE CLINIC | Age: 73
End: 2018-04-19

## 2018-04-19 NOTE — PROGRESS NOTES
Goals Addressed             Most Recent       Patient Stated     COMPLETED: Coordinate Pain Management Plan for Patient. (pt-stated)   On track (3/20/2018)             Pain management   Flexeril   Motrin   Goal 0/10  3/20/2018 reports improvement in headache with medication applicaiton         Other     COMPLETED: Reduce ED Utilization   On track (4/19/2018)             No admissions within 30 days post discharge, 3/19/2018  Episode closed: no hospitalization or ED admission post 30 days from discharge

## 2018-06-07 ENCOUNTER — APPOINTMENT (OUTPATIENT)
Dept: INTERNAL MEDICINE CLINIC | Age: 73
End: 2018-06-07

## 2018-06-07 ENCOUNTER — HOSPITAL ENCOUNTER (OUTPATIENT)
Dept: LAB | Age: 73
Discharge: HOME | End: 2018-06-07
Payer: MEDICARE

## 2018-06-07 DIAGNOSIS — R73.09 ABNORMAL GLUCOSE: ICD-10-CM

## 2018-06-07 DIAGNOSIS — M85.9 DISORDER OF BONE DENSITY AND STRUCTURE, UNSPECIFIED: ICD-10-CM

## 2018-06-07 DIAGNOSIS — M85.89 OSTEOPENIA OF MULTIPLE SITES: ICD-10-CM

## 2018-06-07 DIAGNOSIS — I10 ESSENTIAL HYPERTENSION: ICD-10-CM

## 2018-06-07 DIAGNOSIS — E78.5 HYPERLIPIDEMIA, UNSPECIFIED HYPERLIPIDEMIA TYPE: ICD-10-CM

## 2018-06-07 LAB
ALBUMIN SERPL-MCNC: 4.5 G/DL (ref 3.4–5)
ALBUMIN/GLOB SERPL: 1.6 {RATIO} (ref 0.8–1.7)
ALP SERPL-CCNC: 49 U/L (ref 45–117)
ALT SERPL-CCNC: 29 U/L (ref 13–56)
AMORPH CRY URNS QL MICRO: ABNORMAL
ANION GAP SERPL CALC-SCNC: 9 MMOL/L (ref 3–18)
APPEARANCE UR: CLEAR
AST SERPL-CCNC: 26 U/L (ref 15–37)
BACTERIA URNS QL MICRO: ABNORMAL /HPF
BASOPHILS # BLD: 0 K/UL (ref 0–0.06)
BASOPHILS NFR BLD: 1 % (ref 0–2)
BILIRUB SERPL-MCNC: 0.7 MG/DL (ref 0.2–1)
BILIRUB UR QL: NEGATIVE
BUN SERPL-MCNC: 24 MG/DL (ref 7–18)
BUN/CREAT SERPL: 30 (ref 12–20)
CALCIUM SERPL-MCNC: 9.6 MG/DL (ref 8.5–10.1)
CHLORIDE SERPL-SCNC: 102 MMOL/L (ref 100–108)
CHOLEST SERPL-MCNC: 207 MG/DL
CO2 SERPL-SCNC: 29 MMOL/L (ref 21–32)
COLOR UR: YELLOW
CREAT SERPL-MCNC: 0.79 MG/DL (ref 0.6–1.3)
DIFFERENTIAL METHOD BLD: NORMAL
EOSINOPHIL # BLD: 0.2 K/UL (ref 0–0.4)
EOSINOPHIL NFR BLD: 3 % (ref 0–5)
EPITH CASTS URNS QL MICRO: ABNORMAL /LPF (ref 0–5)
ERYTHROCYTE [DISTWIDTH] IN BLOOD BY AUTOMATED COUNT: 13.6 % (ref 11.6–14.5)
EST. AVERAGE GLUCOSE BLD GHB EST-MCNC: 111 MG/DL
GLOBULIN SER CALC-MCNC: 2.8 G/DL (ref 2–4)
GLUCOSE SERPL-MCNC: 98 MG/DL (ref 74–99)
GLUCOSE UR STRIP.AUTO-MCNC: NEGATIVE MG/DL
HBA1C MFR BLD: 5.5 % (ref 4.2–5.6)
HCT VFR BLD AUTO: 41.7 % (ref 35–45)
HDLC SERPL-MCNC: 82 MG/DL (ref 40–60)
HDLC SERPL: 2.5 {RATIO} (ref 0–5)
HGB BLD-MCNC: 13 G/DL (ref 12–16)
HGB UR QL STRIP: NEGATIVE
KETONES UR QL STRIP.AUTO: NEGATIVE MG/DL
LDLC SERPL CALC-MCNC: 106.4 MG/DL (ref 0–100)
LEUKOCYTE ESTERASE UR QL STRIP.AUTO: ABNORMAL
LIPID PROFILE,FLP: ABNORMAL
LYMPHOCYTES # BLD: 1.6 K/UL (ref 0.9–3.6)
LYMPHOCYTES NFR BLD: 27 % (ref 21–52)
MCH RBC QN AUTO: 28.8 PG (ref 24–34)
MCHC RBC AUTO-ENTMCNC: 31.2 G/DL (ref 31–37)
MCV RBC AUTO: 92.5 FL (ref 74–97)
MONOCYTES # BLD: 0.5 K/UL (ref 0.05–1.2)
MONOCYTES NFR BLD: 9 % (ref 3–10)
NEUTS SEG # BLD: 3.6 K/UL (ref 1.8–8)
NEUTS SEG NFR BLD: 60 % (ref 40–73)
NITRITE UR QL STRIP.AUTO: NEGATIVE
PH UR STRIP: 5.5 [PH] (ref 5–8)
PLATELET # BLD AUTO: 305 K/UL (ref 135–420)
PMV BLD AUTO: 9.8 FL (ref 9.2–11.8)
POTASSIUM SERPL-SCNC: 4 MMOL/L (ref 3.5–5.5)
PROT SERPL-MCNC: 7.3 G/DL (ref 6.4–8.2)
PROT UR STRIP-MCNC: NEGATIVE MG/DL
RBC # BLD AUTO: 4.51 M/UL (ref 4.2–5.3)
RBC #/AREA URNS HPF: ABNORMAL /HPF (ref 0–5)
SODIUM SERPL-SCNC: 140 MMOL/L (ref 136–145)
SP GR UR REFRACTOMETRY: 1.03 (ref 1–1.03)
TRIGL SERPL-MCNC: 93 MG/DL (ref ?–150)
TSH SERPL DL<=0.05 MIU/L-ACNC: 3.38 UIU/ML (ref 0.36–3.74)
UROBILINOGEN UR QL STRIP.AUTO: 0.2 EU/DL (ref 0.2–1)
VLDLC SERPL CALC-MCNC: 18.6 MG/DL
WBC # BLD AUTO: 6 K/UL (ref 4.6–13.2)
WBC URNS QL MICRO: ABNORMAL /HPF (ref 0–4)

## 2018-06-07 PROCEDURE — 81001 URINALYSIS AUTO W/SCOPE: CPT | Performed by: INTERNAL MEDICINE

## 2018-06-07 PROCEDURE — 80061 LIPID PANEL: CPT | Performed by: INTERNAL MEDICINE

## 2018-06-07 PROCEDURE — 36415 COLL VENOUS BLD VENIPUNCTURE: CPT | Performed by: INTERNAL MEDICINE

## 2018-06-07 PROCEDURE — 84443 ASSAY THYROID STIM HORMONE: CPT | Performed by: INTERNAL MEDICINE

## 2018-06-07 PROCEDURE — 82306 VITAMIN D 25 HYDROXY: CPT | Performed by: INTERNAL MEDICINE

## 2018-06-07 PROCEDURE — 85025 COMPLETE CBC W/AUTO DIFF WBC: CPT | Performed by: INTERNAL MEDICINE

## 2018-06-07 PROCEDURE — 80053 COMPREHEN METABOLIC PANEL: CPT | Performed by: INTERNAL MEDICINE

## 2018-06-07 PROCEDURE — 83036 HEMOGLOBIN GLYCOSYLATED A1C: CPT | Performed by: INTERNAL MEDICINE

## 2018-06-08 LAB — 25(OH)D3 SERPL-MCNC: 32.9 NG/ML (ref 30–100)

## 2018-06-14 ENCOUNTER — OFFICE VISIT (OUTPATIENT)
Dept: INTERNAL MEDICINE CLINIC | Age: 73
End: 2018-06-14

## 2018-06-14 ENCOUNTER — TELEPHONE (OUTPATIENT)
Dept: INTERNAL MEDICINE CLINIC | Age: 73
End: 2018-06-14

## 2018-06-14 VITALS
TEMPERATURE: 97.3 F | BODY MASS INDEX: 25.76 KG/M2 | HEART RATE: 75 BPM | RESPIRATION RATE: 14 BRPM | SYSTOLIC BLOOD PRESSURE: 124 MMHG | HEIGHT: 65 IN | DIASTOLIC BLOOD PRESSURE: 70 MMHG | OXYGEN SATURATION: 97 % | WEIGHT: 154.6 LBS

## 2018-06-14 DIAGNOSIS — Z00.00 MEDICARE ANNUAL WELLNESS VISIT, SUBSEQUENT: ICD-10-CM

## 2018-06-14 DIAGNOSIS — I10 ESSENTIAL HYPERTENSION: Primary | ICD-10-CM

## 2018-06-14 DIAGNOSIS — E78.5 HYPERLIPIDEMIA, UNSPECIFIED HYPERLIPIDEMIA TYPE: ICD-10-CM

## 2018-06-14 DIAGNOSIS — R73.09 ABNORMAL GLUCOSE: ICD-10-CM

## 2018-06-14 DIAGNOSIS — B37.2 CANDIDAL INTERTRIGO: ICD-10-CM

## 2018-06-14 DIAGNOSIS — Z71.89 ACP (ADVANCE CARE PLANNING): ICD-10-CM

## 2018-06-14 DIAGNOSIS — Z12.31 SCREENING MAMMOGRAM, ENCOUNTER FOR: ICD-10-CM

## 2018-06-14 DIAGNOSIS — M85.89 OSTEOPENIA OF MULTIPLE SITES: ICD-10-CM

## 2018-06-14 DIAGNOSIS — M85.9 DISORDER OF BONE DENSITY AND STRUCTURE, UNSPECIFIED: ICD-10-CM

## 2018-06-14 DIAGNOSIS — L30.9 ECZEMA, UNSPECIFIED TYPE: ICD-10-CM

## 2018-06-14 RX ORDER — ATORVASTATIN CALCIUM 10 MG/1
TABLET, FILM COATED ORAL
Qty: 90 TAB | Refills: 2 | Status: SHIPPED | OUTPATIENT
Start: 2018-06-14 | End: 2019-03-15 | Stop reason: SDUPTHER

## 2018-06-14 NOTE — ACP (ADVANCE CARE PLANNING)
Advance Care Planning (ACP) Provider Note - Comprehensive     Date of ACP Conversation: 06/14/18  Persons included in Conversation:  patient  Length of ACP Conversation in minutes:  16 minutes    Authorized Decision Maker (if patient is incapable of making informed decisions):  and son Kate Kent)  This person is:  Healthcare Agent/Medical Power of  under Advance Directive          General ACP for ALL Patients with Decision Making Capacity:   Importance of advance care planning, including choosing a healthcare agent to communicate patient's healthcare decisions if patient lost the ability to make decisions, such as after a sudden illness or accident  Understanding of the healthcare agent role was assessed and information provided  Exploration of values, goals, and preferences if recovery is not expected, even with continued medical treatment in the event of: Imminent death  Severe, permanent brain injury  \"In these circumstances, what matters most to you? \"  Care focused more on comfort or quality of life. Review of Existing Advance Directive:  Patient has an existing advance directive completed previously with an . She states that it designates her  and son  as ner healthcare agents and expresses that sne does not wish life prolonging procedures for end of life care. Requested that she bring in a copy to be scanned into the chart.        For Serious or Chronic Illness:  Understanding of medical condition      Interventions Provided:  Discussed existing Advance Directive   Recommended communicating the plan and making copies for the healthcare agent, personal physician, and others as appropriate (e.g., health system)  Recommended review of completed ACP document annually or upon change in health status

## 2018-06-14 NOTE — PROGRESS NOTES
1. Have you been to the ER, urgent care clinic or hospitalized since your last visit? NO.     2. Have you seen or consulted any other health care providers outside of the [address] [place] [address] since your last visit (Include any pap smears or colon screening)? NO      Do you have an Advanced Directive? NO    Would you like information on Advanced Directives?  NO

## 2018-06-14 NOTE — PATIENT INSTRUCTIONS
Medicare Wellness Visit, Female    The best way to improve and maintain good health is to have a healthy lifestyle by eating a well-balanced diet, exercising regularly, limiting alcohol and stopping smoking. Regular visits with your physician or non-physician health care provider also support your good health. Preventive screening tests can find health problems before they become diseases or illnesses. Preventive services such as immunizations prevent serious infections. All people over age 72 should have a Pneumovax and a Prevnar-13 shot to prevent potentially life threatening infections with the pneumococcus bacteria, a common cause of pneumonia. These are once in a lifetime unless you and your provider decide differently. All people over 65 should have a yearly influenza vaccine or \"flu\" shot. This does not prevent infection with cold viruses but has been proven to prevent hospitalization and death from influenza. Although Medicare part B \"regular Medicare\" currently only covers tetanus vaccination in the context of an injury, a tetanus vaccine (Tdap or Td) is recommended every 10 years. A shingles vaccine is recommended once in a lifetime after age 61. The Shingles vaccine is also not covered by Medicare part B. Note, however, that both the Shingles vaccine and Tdap/Td are generally covered by secondary carriers. Please check your coverage and out of pocket expenses. Consider contacting your local health department because it may stock these vaccines for a reasonable charge.     We currently have documentation of the following immunization history for you:  Immunization History   Administered Date(s) Administered    Influenza High Dose Vaccine PF 12/13/2016, 10/03/2017    Influenza Vaccine 10/24/2014    Influenza Vaccine PF 11/05/2013    Influenza Vaccine Split 11/01/2011, 11/06/2012    Pneumococcal Conjugate (PCV-13) 05/05/2015    TD Vaccine 05/05/2009    Tdap 12/12/2016    ZZZ-RETIRED (DO NOT USE) Pneumococcal Vaccine (Unspecified Type) 05/04/2010    Zoster Vaccine, Live 05/07/2013       Screening for infection with Hepatitis C is recommended for anyone born between [birth year] through [address]. The table at the bottom of this document indicates the status of this and other preventive services. A bone mass density test (DEXA) to screen for osteoporosis or thinning of the bones should be done at least once after age 72 and may be done up to every 2 years as determined by you and your health care provider. The most recent DEXA we have on file for you is:  DEXA Results (most recent):    Results from Hospital Encounter encounter on 07/25/16   DEXA BONE DENSITY STUDY AXIAL   Narrative DEXA BONE DENSITOMETRY, CENTRAL    CPT CODE: 59772    INDICATION: Postmenopausal. History of osteopenia. Hypertension. Taking calcium  and vitamin D. Alendronate and Fosamax use. TECHNIQUE: Using GE LUNAR Prodigy densitometer, bone density measurement was  performed in the lumbar spine the proximal left and right femora and the right  forearm. T Score refers to standard deviations above or below average compared  to a young adult of the same sex. Z Score refers to standard deviations above or  below average compared to a patient of the same sex, age, race and weight. COMPARISON: June 1, 2005. June 21, 2012. July 21, 2014. FINDINGS:     Lumbar Spine Levels: L1 and L2  Mean Bone Mineral Density (BMD):  0.910 g/cm2    T Score: -2.2       Z Score: -0.6      BMD increased 0.2%, which is not statistically significant within a 95 percent  confidence interval compared to preceding study. BMD increased 0.8%, which is not statistically significant compared to baseline  study. On PA image of the lumbar spine obtained for localization of vertebral levels  (not a diagnostic quality radiograph), there is     apparent increased density  at L3 and L4.   The density is not well characterized on the basis of this image,  but likely degenerative. Since this density spuriously increases measured bone  mineral density, this has been excluded. Distal1/3 Radius BMD:  0.769 g/cm2   T Score: -1.3       Z Score: 0.6   BMD increased 0.7%, which is not statistically significant within a 95 percent  confidence interval compared to preceding study. BMD decreased 6.6%, which is not statistically significant compared to baseline  study which at the forearm was the study of 2012. Left Total Proximal Femur BMD: 0.721 g/cm2    T Score:  -2.3       Z Score:  -0.8       BMD decreased 1.9%, which is not statistically significant within a 95 percent  confidence interval compared to preceding study. BMD decreased 3.9%, which is not statistically significant compared to baseline  study. Right Total Proximal Femur BMD: 0.749 g/cm2  T Score:  -2.0      Z Score:  -0.6       BMD decreased 1.4%, which is not statistically significant within a 95 percent  confidence interval compared to preceding study. BMD decreased 3.4%, which is not statistically significant compared to baseline  study. Left Femoral Neck BMD:  0.739 g/cm2   T Score:  -2.1  Z Score:  -0.5    Right Femoral Neck BMD:  0.762 g/cm2   T Score:  -2.0  Z Score:  -0.3           Impression IMPRESSION:    1. BMD measures consistent with osteopenia. 2.  Compare to the preceding study, BMD is without statistically significant  interval change. 3.  Compare to the baseline study, BMD is without statistically significant  interval change. Based upon current ISCD guidelines, the patient's overall diagnostic category,  selected using WHO criteria in postmenopausal women and males aged 48 and above,  is selected based upon the lowest T Score from among the lumbar spine, total  femur, femoral neck, (or distal third radius if measured).     WHO Definition of Osteoporosis and Osteopenia on DXA (specified for  post-menopausal  females):     Normal:                     T Score at or above -1 SD   Osteopenia:              T Score between -1 and -2.5 SD   Osteoporosis:           T Score at or below -2.5 SD    The risk of fracture approximately doubles for each 1 SD decrease in T Score. It is important to consider other factors in assessing a patient's risk of  fracture, including age, risk of falling/injury, history of fragility fracture,  family history of osteoporosis, smoking, low weight. Various fracture risk tools have been developed for adult patients and are  available online. For example, the FRAX tool developed by [facility] is widely used. Reference www.iscd.org. It is also important to note that DXA measures bone density but does not  distinguish among causes of decreased bone density, which include primary versus  secondary osteoporosis (such as metabolic bone disorders or possible effects of  medications) and also other conditions (such as osteomalacia). Clinical  considerations should determine what additional evaluation may be warranted to  exclude secondary conditions in a patient with low bone density. Please note that reliable, valid comparisons can not be made between studies  which have been performed on different densitometers. If clinically warranted,  follow up study performed at this site would best permit assessment of trend for  possible change in bone mineral density over time in comparison to this study. Thank you for this referral.          Screening for diabetes mellitus with a blood sugar test (glucose) should be done at least every 3 years until age 79. You and your health care provider may decide whether to continue screening after age 79. The most recent blood glucose we have on file for you is: Lab Results   Component Value Date/Time    Glucose 98 06/07/2018 08:59 AM         Glaucoma is a disease of the eye due to increased ocular pressure that can lead to blindness.  People with risk factors for glaucoma ( race, diabetes, family history) should be screened at least every 2 years by an eye professional.     Cardiovascular screening tests that check for elevated lipids or cholesterol (fatty part of blood) which can lead to heart disease and strokes should be done every 4-6 years through age 79. You and your health care provider may decide whether to continue screening after age 79. The most recent lipid panel we have on file for you is:   Lab Results   Component Value Date/Time    Cholesterol, total 207 (H) 06/07/2018 08:59 AM    HDL Cholesterol 82 (H) 06/07/2018 08:59 AM    LDL, calculated 106.4 (H) 06/07/2018 08:59 AM    VLDL, calculated 18.6 06/07/2018 08:59 AM    Triglyceride 93 06/07/2018 08:59 AM    CHOL/HDL Ratio 2.5 06/07/2018 08:59 AM       Colorectal cancer screening that evaluates for blood or polyps in your colon for people with average risk should be done yearly as a stool test, every five years as a flexible sigmoidoscope or every 10 years as a colonoscopy up to age 76. You and your health care provider may decide whether to continue screening after age 76. Breast cancer screening with a mammogram is recommended at least once every 2 years  for women age 54-69. You and your health care provider may decide whether to continue screening after age 76. The most recent mammogram we have on file for you is:   [facility] Results (most recent):    Results from [place] encounter on 07/27/17   [facility] 3D ROCÍO W MAMMO BI SCREENING INCL CAD   Narrative SCREENING MAMMOGRAM BILATERAL  3D tomosynthesis    HISTORY: Screening    COMPARISON: [id number]    FINDINGS:   2D and 3D images were performed  Digital mammograms were performed. No suspicious microcalcifications, masses, or areas of architectural distortion. Interpretation performed in conjunction with computed assisted detection system. BREAST DENSITY: C-The breast parenchyma is heterogeneously dense which may  obscure detection of small masses.          Impression IMPRESSION:      No evidence of malignancy. Suggest routine follow-up. BIRADS: 1-NEGATIVE         Screening for cervical cancer with a pap smear is recommended for all women with a cervix until age 72. The frequency of this test is based on the details of her prior pap smear testing. You and your health care provider may decide whether to continue screening after age 72. People who have smoked the equivalent of 1 pack per day for 30 years or more may benefit from screening for lung cancer with a yearly low dose CT scan until they have been non smokers for 15 years or competing health conditions render this unlikely to be beneficial. Our records show: N/A    Your Medicare Wellness Exam is recommended annually. Here is a list of your current Health Maintenance items with a due date:  Health Maintenance   Topic Date Due    GLAUCOMA SCREENING Q2Y  06/27/2018    Influenza Age 5 to Adult  08/01/2018    MEDICARE YEARLY EXAM  06/15/2019    BREAST CANCER SCRN MAMMOGRAM  07/27/2019    COLONOSCOPY  10/25/2020    DTaP/Tdap/Td series (2 - Td) 12/12/2026    Hepatitis C Screening  Completed    Bone Densitometry (Dexa) Screening  Completed    ZOSTER VACCINE AGE 60>  Completed    Pneumococcal 65+ Low/Medium Risk  Completed     Hyperlipidemia: After Your Visit  Your Care Instructions  Hyperlipidemia is too much fat in your blood. The body has several kinds of fat, including cholesterol and triglycerides. Your body needs fat for many things, such as making new cells. But too much fat in your blood increases your chances of having a heart attack or stroke. You may be able to lower your cholesterol and triglycerides with a heart-healthy diet, exercise, and if needed, medicine. Your doctor may want you to try lifestyle changes first to see whether they lower the fat in your blood. You may need to take medicine if lifestyle changes do not lower the fat in your blood enough.   Follow-up care is a key part of your treatment and safety. Be sure to make and go to all appointments, and call your doctor if you are having problems. Its also a good idea to know your test results and keep a list of the medicines you take. How can you care for yourself at home? Take your medicines  · Take your medicines exactly as prescribed. Call your doctor if you think you are having a problem with your medicine. · If you take medicine to lower your cholesterol, go to follow-up visits. You will need to have blood tests. · Do not take large doses of niacin, which is a B vitamin, while taking medicine called statins. It may increase the chance of muscle pain and liver problems. · Talk to your doctor about avoiding grapefruit juice if you are taking statins. Grapefruit juice can raise the level of this medicine in your blood. This could increase side effects. Eat more fruits, vegetables, and fiber  · Fruits and vegetables have lots of nutrients that help protect against heart disease, and they have little--if any--fat. Try to eat at least five servings a day. Dark green, deep orange, or yellow fruits and vegetables are healthy choices. · Keep carrots, celery, and other veggies handy for snacks. Buy fruit that is in season and store it where you can see it so that you will be tempted to eat it. Cook dishes that have a lot of veggies in them, such as stir-fries and soups. · Foods high in fiber may reduce your cholesterol and provide important vitamins and minerals. High-fiber foods include whole-grain cereals and breads, oatmeal, beans, brown rice, citrus fruits, and apples. · Buy whole-grain breads and cereals instead of white bread and pastries. Limit saturated fat  · Read food labels and try to avoid saturated fat and trans fat. They increase your risk of heart disease. · Use olive or canola oil when you cook. Try cholesterol-lowering spreads, such as Benecol or Take Control.   · Bake, broil, grill, or steam foods instead of frying them.  · Limit the amount of high-fat meats you eat, including hot dogs and sausages. Cut out all visible fat when you prepare meat. · Eat fish, skinless poultry, and soy products such as tofu instead of high-fat meats. Soybeans may be especially good for your heart. Eat at least two servings of fish a week. Certain fish, such as salmon, contain omega-3 fatty acids, which may help reduce your risk of heart attack. · Choose low-fat or fat-free milk and dairy products. Get exercise, limit alcohol, and quit smoking  · Get more exercise. Work with your doctor to set up an exercise program. Even if you can do only a small amount, exercise will help you get stronger, have more energy, and manage your weight and your stress. Walking is an easy way to get exercise. Gradually increase the amount you walk every day. Aim for at least 30 minutes on most days of the week. You also may want to swim, bike, or do other activities. · Limit alcohol to no more than 2 drinks a day for men and 1 drink a day for women. · Do not smoke. If you need help quitting, talk to your doctor about stop-smoking programs and medicines. These can increase your chances of quitting for good. When should you call for help? Call 911 anytime you think you may need emergency care. For example, call if:  · You have symptoms of a heart attack. These may include:  ¨ Chest pain or pressure, or a strange feeling in the chest.  ¨ Sweating. ¨ Shortness of breath. ¨ Nausea or vomiting. ¨ Pain, pressure, or a strange feeling in the back, neck, jaw, or upper belly or in one or both shoulders or arms. ¨ Lightheadedness or sudden weakness. ¨ A fast or irregular heartbeat. After you call 911, the  may tell you to chew 1 adult-strength or 2 to 4 low-dose aspirin. Wait for an ambulance. Do not try to drive yourself. · You have signs of a stroke.  These may include:  ¨ Sudden numbness, paralysis, or weakness in your face, arm, or leg, especially on only one side of your body. ¨ New problems with walking or balance. ¨ Sudden vision changes. ¨ Drooling or slurred speech. ¨ New problems speaking or understanding simple statements, or feeling confused. ¨ A sudden, severe headache that is different from past headaches. · You passed out (lost consciousness). Call your doctor now or seek immediate medical care if:  · You have muscle pain or weakness. Watch closely for changes in your health, and be sure to contact your doctor if:  · You are very tired. · You have an upset stomach, gas, constipation, or belly pain or cramps. Where can you learn more? Go to [employer]  Enter C406 in the search box to learn more about \"Hyperlipidemia: After Your Visit. \"   © 7494-9353 [employer]. Care instructions adapted under license by [employer] (which disclaims liability or warranty for this information). This care instruction is for use with your licensed healthcare professional. If you have questions about a medical condition or this instruction, always ask your healthcare professional. [place] [address] any warranty or liability for your use of this information.   Content Version: 4.1.377192; Last Revised: October 13, 2011

## 2018-06-14 NOTE — MR AVS SNAPSHOT
[facility] 
 
 
 [address], [address] [facility] 
[phone] Patient: Kelly Wellington MRN: [id number] YF:[birth year] Visit Information Date & Time Provider Department Dept. Phone Encounter #  
 6/14/2018  8:00 AM Abdulaziz Alas MD [facility] [id number] Follow-up Instructions Return in about 6 months (around 12/14/2018), or if symptoms worsen or fail to improve. Your Appointments 12/5/2018  9:55 AM  
LAB with [facility] NURSE VISIT [facility] ([facility]) Appt Note: labs [address], [facility] [id number] [place] [address]  
  
   
 [address], [address]  
  
    
 12/13/2018  8:30 AM  
Office Visit with Abdulaziz Alas MD  
[facility]) Appt Note: ov 6mos. sarris [address], [id number] [place] [address] [address] [facility]  
  
   
 [address], [address] Upcoming Health Maintenance Date Due  
 GLAUCOMA SCREENING Q2Y 6/27/2018 Influenza Age 5 to Adult 8/1/2018 MEDICARE YEARLY EXAM 6/15/2019 BREAST CANCER SCRN MAMMOGRAM 7/27/2019 COLONOSCOPY 10/25/2020 DTaP/Tdap/Td series (2 - Td) 12/12/2026 Allergies as of 6/14/2018  Review Complete On: 6/14/2018 By: Elisabeth Contreras Severity Noted Reaction Type Reactions Codeine    Nausea Only Current Immunizations  Reviewed on 10/3/2017 Name Date Influenza High Dose Vaccine PF 10/3/2017  3:00 PM, 12/13/2016 Influenza Vaccine 10/24/2014 Influenza Vaccine PF 11/5/2013  9:03 AM  
 Influenza Vaccine Split 11/6/2012, 11/1/2011 Pneumococcal Conjugate (PCV-13) 5/5/2015 TD Vaccine 5/5/2009 Tdap 12/12/2016 ZZZ-RETIRED (DO NOT USE) Pneumococcal Vaccine (Unspecified Type) 5/4/2010 Zoster Vaccine, Live 5/7/2013 Not reviewed this visit You Were Diagnosed With   
 Codes Comments Screening mammogram, encounter for    -  Primary ICD-10-CM: Z12.31 
ICD-9-CM: V76.12 Vitals BP Pulse Temp Resp Height(growth percentile) Weight(growth percentile) 124/70 (BP 1 Location: Left arm, BP Patient Position: Sitting) 75 97.3 °F (36.3 °C) (Oral) 14 5' 5\" (1.651 m) 154 lb 9.6 oz (70.1 kg) SpO2 BMI OB Status Smoking Status 97% 25.73 kg/m2 Postmenopausal Never Smoker Vitals History BMI and BSA Data Body Mass Index Body Surface Area 25.73 kg/m 2 1.79 m 2 Preferred Pharmacy Pharmacy Name Phone [facility] Jyotsna Morataya [address] [place], [place] [phone] Your Updated Medication List  
  
   
This list is accurate as of 6/14/18  8:31 AM.  Always use your most recent med list.  
  
  
  
  
 alendronate 70 mg tablet Commonly known as:  FOSAMAX TAKE 1 TABLET BY MOUTH EVERY 7 DAYS ALLEGRA 60 mg tablet Generic drug:  fexofenadine Take 60 mg by mouth daily. aspirin 81 mg chewable tablet Take 81 mg by mouth daily. atorvastatin 10 mg tablet Commonly known as:  LIPITOR  
TAKE 1 TABLET BY MOUTH EVERY DAY  
  
 biotin 2,500 mcg Tab Take 5,000 mcg by mouth. CALCIUM + D PO Take 1 Tab by mouth two (2) times a day. clotrimazole-betamethasone topical cream  
Commonly known as:  Spence Perking Apply  to affected area two (2) times a day. CO Q-10 100 mg capsule Generic drug:  co-enzyme Q-10 Take 100 mg by mouth daily. COLACE 100 mg capsule Generic drug:  docusate sodium Take 100 mg by mouth daily as needed for Constipation. EUCRISA 2 % Oint Generic drug:  crisaborole  
by Apply Externally route. hydroCHLOROthiazide 25 mg tablet Commonly known as:  HYDRODIURIL Take 1 Tab by mouth daily. lisinopril 10 mg tablet Commonly known as:  PRINIVIL Take 1 Tab by mouth daily. triamcinolone acetonide 0.025 % topical cream  
Commonly known as:  KENALOG Apply  to affected area two (2) times a day. VITAMIN D3 1,000 unit Cap Generic drug:  cholecalciferol Take 1 Tab by mouth daily. Prescriptions Sent to Pharmacy Refills  
 atorvastatin (LIPITOR) 10 mg tablet 2 Sig: TAKE 1 TABLET BY MOUTH EVERY DAY Class: Normal  
 Pharmacy: [facility] [address] [place] [address] Jyotsna Morataya [address] [place] [address] #: [phone] Follow-up Instructions Return in about 6 months (around 12/14/2018), or if symptoms worsen or fail to improve. To-Do List   
 06/14/2018 Imaging:  [facility] MAMMO BI SCREENING INCL CAD Patient Instructions Medicare Wellness Visit, Female The best way to improve and maintain good health is to have a healthy lifestyle by eating a well-balanced diet, exercising regularly, limiting alcohol and stopping smoking. Regular visits with your physician or non-physician health care provider also support your good health. Preventive screening tests can find health problems before they become diseases or illnesses. Preventive services such as immunizations prevent serious infections. All people over age 72 should have a Pneumovax and a Prevnar-13 shot to prevent potentially life threatening infections with the pneumococcus bacteria, a common cause of pneumonia. These are once in a lifetime unless you and your provider decide differently. All people over 65 should have a yearly influenza vaccine or \"flu\" shot. This does not prevent infection with cold viruses but has been proven to prevent hospitalization and death from influenza. Although Medicare part B \"regular Medicare\" currently only covers tetanus vaccination in the context of an injury, a tetanus vaccine (Tdap or Td) is recommended every 10 years. A shingles vaccine is recommended once in a lifetime after age 61.  The Shingles vaccine is also not covered by Medicare part B. Note, however, that both the Shingles vaccine and Tdap/Td are generally covered by secondary carriers. Please check your coverage and out of pocket expenses. Consider contacting your local health department because it may stock these vaccines for a reasonable charge. We currently have documentation of the following immunization history for you: 
Immunization History Administered Date(s) Administered  Influenza High Dose Vaccine PF 12/13/2016, 10/03/2017  Influenza Vaccine 10/24/2014  Influenza Vaccine PF 11/05/2013  Influenza Vaccine Split 11/01/2011, 11/06/2012  Pneumococcal Conjugate (PCV-13) 05/05/2015  TD Vaccine 05/05/2009  Tdap 12/12/2016  ZZZ-RETIRED (DO NOT USE) Pneumococcal Vaccine (Unspecified Type) 05/04/2010  Zoster Vaccine, Live 05/07/2013 Screening for infection with Hepatitis C is recommended for anyone born between [birth year] through [address]. The table at the bottom of this document indicates the status of this and other preventive services. A bone mass density test (DEXA) to screen for osteoporosis or thinning of the bones should be done at least once after age 72 and may be done up to every 2 years as determined by you and your health care provider. The most recent DEXA we have on file for you is: DEXA Results (most recent): 
 
Results from [facility] Encounter encounter on 07/25/16 DEXA BONE DENSITY STUDY AXIAL Narrative DEXA BONE DENSITOMETRY, CENTRAL 
 
CPT CODE: 48482 INDICATION: Postmenopausal. History of osteopenia. Hypertension. Taking calcium 
and vitamin D. Alendronate and Fosamax use. TECHNIQUE: Using GE LUNAR Prodigy densitometer, bone density measurement was 
performed in the lumbar spine the proximal left and right femora and the right 
forearm. T Score refers to standard deviations above or below average compared 
to a young adult of the same sex.  Z Score refers to standard deviations above or 
below average compared to a patient of the same sex, age, race and weight. COMPARISON: June 1, 2005. June 21, 2012. July 21, 2014. FINDINGS:  
 
Lumbar Spine Levels: L1 and L2 Mean Bone Mineral Density (BMD):  0.910 g/cm2 T Score: -2.2 Z Score: -0.6 BMD increased 0.2%, which is not statistically significant within a 95 percent 
confidence interval compared to preceding study. BMD increased 0.8%, which is not statistically significant compared to baseline 
study. On PA image of the lumbar spine obtained for localization of vertebral levels (not a diagnostic quality radiograph), there is     apparent increased density 
at L3 and L4. The density is not well characterized on the basis of this image, 
but likely degenerative. Since this density spuriously increases measured bone 
mineral density, this has been excluded. Distal1/3 Radius BMD:  0.769 g/cm2 T Score: -1.3 Z Score: 0.6 BMD increased 0.7%, which is not statistically significant within a 95 percent 
confidence interval compared to preceding study. BMD decreased 6.6%, which is not statistically significant compared to baseline 
study which at the forearm was the study of 2012. Left Total Proximal Femur BMD: 0.721 g/cm2 T Score:  -2.3 Z Score:  -0.8 BMD decreased 1.9%, which is not statistically significant within a 95 percent 
confidence interval compared to preceding study. BMD decreased 3.9%, which is not statistically significant compared to baseline 
study. Right Total Proximal Femur BMD: 0.749 g/cm2 T Score:  -2.0     
Z Score:  -0.6 BMD decreased 1.4%, which is not statistically significant within a 95 percent 
confidence interval compared to preceding study. BMD decreased 3.4%, which is not statistically significant compared to baseline 
study. Left Femoral Neck BMD:  0.739 g/cm2 T Score:  -2.1 Z Score:  -0.5 Right Femoral Neck BMD:  0.762 g/cm2 T Score:  -2.0 Z Score:  -0.3 Impression IMPRESSION: 
 
1. BMD measures consistent with osteopenia. 2.  Compare to the preceding study, BMD is without statistically significant 
interval change. 3.  Compare to the baseline study, BMD is without statistically significant 
interval change. Based upon current ISCD guidelines, the patient's overall diagnostic category, 
selected using WHO criteria in postmenopausal women and males aged 48 and above, 
is selected based upon the lowest T Score from among the lumbar spine, total 
femur, femoral neck, (or distal third radius if measured). WHO Definition of Osteoporosis and Osteopenia on DXA (specified for 
post-menopausal  females): 
 
 Normal:                     T Score at or above -1 SD Osteopenia:              T Score between -1 and -2.5 SD Osteoporosis:           T Score at or below -2.5 SD The risk of fracture approximately doubles for each 1 SD decrease in T Score. It is important to consider other factors in assessing a patient's risk of 
fracture, including age, risk of falling/injury, history of fragility fracture, 
family history of osteoporosis, smoking, low weight. Various fracture risk tools have been developed for adult patients and are 
available online. For example, the FRAX tool developed by [facility] is widely used. Reference www.iscd.org. It is also important to note that DXA measures bone density but does not 
distinguish among causes of decreased bone density, which include primary versus 
secondary osteoporosis (such as metabolic bone disorders or possible effects of 
medications) and also other conditions (such as osteomalacia). Clinical 
considerations should determine what additional evaluation may be warranted to 
exclude secondary conditions in a patient with low bone density. Please note that reliable, valid comparisons can not be made between studies which have been performed on different densitometers. If clinically warranted, 
follow up study performed at this site would best permit assessment of trend for 
possible change in bone mineral density over time in comparison to this study. Thank you for this referral. 
   
 
 
Screening for diabetes mellitus with a blood sugar test (glucose) should be done at least every 3 years until age 79. You and your health care provider may decide whether to continue screening after age 79. The most recent blood glucose we have on file for you is: Lab Results Component Value Date/Time Glucose 98 06/07/2018 08:59 AM  
 
 
 
Glaucoma is a disease of the eye due to increased ocular pressure that can lead to blindness. People with risk factors for glaucoma ( race, diabetes, family history) should be screened at least every 2 years by an eye professional.  
 
Cardiovascular screening tests that check for elevated lipids or cholesterol (fatty part of blood) which can lead to heart disease and strokes should be done every 4-6 years through age 79. You and your health care provider may decide whether to continue screening after age 79. The most recent lipid panel we have on file for you is:  
Lab Results Component Value Date/Time Cholesterol, total 207 (H) 06/07/2018 08:59 AM  
 HDL Cholesterol 82 (H) 06/07/2018 08:59 AM  
 LDL, calculated 106.4 (H) 06/07/2018 08:59 AM  
 VLDL, calculated 18.6 06/07/2018 08:59 AM  
 Triglyceride 93 06/07/2018 08:59 AM  
 CHOL/HDL Ratio 2.5 06/07/2018 08:59 AM  
 
 
Colorectal cancer screening that evaluates for blood or polyps in your colon for people with average risk should be done yearly as a stool test, every five years as a flexible sigmoidoscope or every 10 years as a colonoscopy up to age 76. You and your health care provider may decide whether to continue screening after age 76.  
 
Breast cancer screening with a mammogram is recommended at least once every 2 years  for women age 54-69. You and your health care provider may decide whether to continue screening after age 76. The most recent mammogram we have on file for you is: [facility] Results (most recent): 
 
Results from Hospital Encounter encounter on 07/27/17 [facility] 3D ROCÍO W MAMMO BI SCREENING INCL CAD Narrative SCREENING MAMMOGRAM BILATERAL  3D tomosynthesis HISTORY: Screening COMPARISON: [id number] FINDINGS:  
2D and 3D images were performed Digital mammograms were performed. No suspicious microcalcifications, masses, or areas of architectural distortion. Interpretation performed in conjunction with computed assisted detection system. BREAST DENSITY: C-The breast parenchyma is heterogeneously dense which may 
obscure detection of small masses. Impression IMPRESSION:   
 
No evidence of malignancy. Suggest routine follow-up. BIRADS: 1-NEGATIVE Screening for cervical cancer with a pap smear is recommended for all women with a cervix until age 72. The frequency of this test is based on the details of her prior pap smear testing. You and your health care provider may decide whether to continue screening after age 72. People who have smoked the equivalent of 1 pack per day for 30 years or more may benefit from screening for lung cancer with a yearly low dose CT scan until they have been non smokers for 15 years or competing health conditions render this unlikely to be beneficial. Our records show: N/A Your Medicare Wellness Exam is recommended annually. Here is a list of your current Health Maintenance items with a due date: 
Health Maintenance Topic Date Due  GLAUCOMA SCREENING Q2Y  06/27/2018  Influenza Age 5 to Adult  08/01/2018  MEDICARE YEARLY EXAM  06/15/2019  BREAST CANCER SCRN MAMMOGRAM  07/27/2019  COLONOSCOPY  10/25/2020  
 DTaP/Tdap/Td series (2 - Td) 12/12/2026  Hepatitis C Screening  Completed  Bone Densitometry (Dexa) Screening  Completed  ZOSTER VACCINE AGE 60>  Completed  Pneumococcal 65+ Low/Medium Risk  Completed Introducing [facility]! Dear Sherrie Pinedo: 
Thank you for requesting a link bird account. Our records indicate that you already have an active link bird account. You can access your account anytime at [email or web address] Did you know that you can access your hospital and ER discharge instructions at any time in link bird? You can also review all of your test results from your hospital stay or ER visit. Additional Information If you have questions, please visit the Frequently Asked Questions section of the link bird website at [email or web address] Remember, link bird is NOT to be used for urgent needs. For medical emergencies, dial 911. Now available from your iPhone and Android! Please provide this summary of care documentation to your next provider. Your primary care clinician is listed as Christine Newman. If you have any questions after today's visit, please call [phone].

## 2018-06-14 NOTE — PROGRESS NOTES
This is the Subsequent Medicare Annual Wellness Exam, performed 12 months or more after the Initial AWV or the last Subsequent AWV    I have reviewed the patient's medical history in detail and updated the computerized patient record. History     Past Medical History:   Diagnosis Date    Essential hypertension     FH: breast cancer     Fibroids     uterine    Fracture of radius 11/2009    with ulna, left, closed    Hyperlipidemia     Microhematuria     chronic    Osteopenia     S/P radiation therapy     as child, to right shoulder for birthmark    Tinnitus     Zoster     right scapula      Past Surgical History:   Procedure Laterality Date    HX GYN      hysterectomy    HX ORTHOPAEDIC      left arm     Current Outpatient Prescriptions   Medication Sig Dispense Refill    crisaborole (EUCRISA) 2 % oint by Apply Externally route.  atorvastatin (LIPITOR) 10 mg tablet TAKE 1 TABLET BY MOUTH EVERY DAY 90 Tab 2    hydroCHLOROthiazide (HYDRODIURIL) 25 mg tablet Take 1 Tab by mouth daily. 90 Tab 1    alendronate (FOSAMAX) 70 mg tablet TAKE 1 TABLET BY MOUTH EVERY 7 DAYS 12 Tab 3    triamcinolone acetonide (KENALOG) 0.025 % topical cream Apply  to affected area two (2) times a day. 15 g 2    clotrimazole-betamethasone (LOTRISONE) topical cream Apply  to affected area two (2) times a day. 15 g 2    lisinopril (PRINIVIL) 10 mg tablet Take 1 Tab by mouth daily. 90 Tab 3    docusate sodium (COLACE) 100 mg capsule Take 100 mg by mouth daily as needed for Constipation.  co-enzyme Q-10 (CO Q-10) 100 mg capsule Take 100 mg by mouth daily.  biotin 2,500 mcg Tab Take 5,000 mcg by mouth.  Cholecalciferol, Vitamin D3, (VITAMIN D3) 1,000 unit Cap Take 1 Tab by mouth daily.  fexofenadine (ALLEGRA) 60 mg tablet Take 60 mg by mouth daily.  aspirin 81 mg chewable tablet Take 81 mg by mouth daily.  CALCIUM CARBONATE/VITAMIN D3 (CALCIUM + D PO) Take 1 Tab by mouth two (2) times a day. Allergies   Allergen Reactions    Codeine Nausea Only     Family History   Problem Relation Age of Onset   [facility] Cancer Mother      pancreatic    Heart Disease Mother     Heart Disease Father     Cancer Sister      breast    MS Sister     Breast Cancer Sister 62     Social History   Substance Use Topics    Smoking status: Never Smoker    Smokeless tobacco: Never Used    Alcohol use 0.0 oz/week     0 Standard drinks or equivalent per week      Comment: occasionally     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    Tinnitus H93.19    Microhematuria R31.29    Eczema L30.9    Osteopenia M85.80    Abnormal glucose R73.09    Candidal intertrigo B37.2       Depression Risk Factor Screening:     PHQ over the last two weeks 6/14/2018   Little interest or pleasure in doing things Not at all   Feeling down, depressed or hopeless Not at all   Total Score PHQ 2 0     Alcohol Risk Factor Screening: You do not drink alcohol or very rarely. Functional Ability and Level of Safety:   Hearing Loss  Hearing is good. Activities of Daily Living  The home contains: no safety equipment. Patient does total self care    Fall Risk  Fall Risk Assessment, last 12 mths 6/14/2018   Able to walk? Yes   Fall in past 12 months?  No       Abuse Screen  Patient is not abused    Cognitive Screening   Evaluation of Cognitive Function:  Has your family/caregiver stated any concerns about your memory: no  Normal    Patient Care Team   Patient Care Team:  Fabienne Deleon MD as PCP - General (Internal Medicine)  Soy Brown, RN as Ambulatory Care Navigator (Internal Medicine)  Tiffany Gracia MD (Colon and Rectal Surgery)  Margaret José OD (Optometry)  Ab Sinclair MD (Ophthalmology)  Ricky Cherry MD (Dermatology)    Assessment/Plan   Education and counseling provided:  Are appropriate based on today's review and evaluation  End-of-Life planning (with patient's consent)  Influenza Vaccine  Screening Mammography  Colorectal cancer screening tests  Cardiovascular screening blood test  Bone mass measurement (DEXA)  Screening for glaucoma  Diabetes screening test  Shingrix vaccine    Diagnoses and all orders for this visit:    1. Essential hypertension  -     METABOLIC PANEL, BASIC; Future    2. Hyperlipidemia, unspecified hyperlipidemia type  -     LIPID PANEL; Future    3. Abnormal glucose  -     HEMOGLOBIN A1C WITH EAG; Future    4. Osteopenia of multiple sites  -     METABOLIC PANEL, BASIC; Future  -     VITAMIN D, 25 HYDROXY; Future    5. Eczema, unspecified type    6. Candidal intertrigo    7. Screening mammogram, encounter for  -     [facility] MAMMO BI SCREENING INCL CAD; Future    8. Medicare annual wellness visit, subsequent    9. ACP (advance care planning)    10.  Disorder of bone density and structure, unspecified   -     VITAMIN D, 25 HYDROXY; Future    Other orders  -     atorvastatin (LIPITOR) 10 mg tablet; TAKE 1 TABLET BY MOUTH EVERY DAY        Health Maintenance Due   Topic Date Due    GLAUCOMA SCREENING Q2Y  06/27/2018

## 2018-06-17 NOTE — PROGRESS NOTES
HPI:   Sandy Lucas is a 68y.o. year old female who presents today for evaluation of hypertension, hyperlipidemia, osteopenia, and chronic microhematuria. She reports that she is doing well and is currently without complaints. She has a history of hypertension, treated with lisinopril and hydrochlorothiazide. She has been checking her blood pressure intermittently and reports that most readings are with a systolic blood pressure <476. She remains quite active, although has not been having time to exercise. She reports that she has continued stress in her life, helping to care for her 's mother (dementia), two disabled cousins, and her sister. She denies any chest pain, shortness of breath at rest or with exertion, palpitations, lightheadedness, or edema. She also has a history of hyperlipidemia, treated with moderate intensity dose atorvastatin. She has no history of ASCVD. She has a history of osteopenia, and in 2009, she sustained a fracture of her left 3rd toe (3/2009) and her distal radius (12/2009). At that time, she was started on alendronate and was treated until 11/2014. She had a repeat bone density study in 7/2016 showing T-scores: femoral neck left -2.3/ right -2.0 and lumbar -2.2. She was restarted on alendronate in 12/2016. She continues to take calcium and Vitamin D supplements. She has no further history of pathologic fractures. In 2/2017, she was evaluated by DEREK Walter with left lower back pain and left sciatica. She had been taking ibuprofen without relief, and was given a course of prednisone. She states that she did not find this helpful either, but reports resolution of symptoms after visiting a chiropractor. She states that she no longer is experiencing pain and is able to ambulate without difficulty. She reports that she had an extensive workup for microhematuria in the past, which was negative (records unavailable).  She denies any dysuria, gross hematuria, or flank pain.    She has had screening colonoscopy with Dr Clarissa Maharaj in 10/2010, which was normal except for hemorrhoids. Recommendation for follow-up is for 10 years. She denies any abdominal pain, nausea, vomiting, melena, hematochezia, or change in bowel movements. She has a history of eczema and is followed by Dr. Johnnie Chand. She states that she was recently started on Eucrisa with excellent response. She also has a history of candidal intertrigo, treated with lotrisone with good response. She also reports that she underwent radiation therapy to her right shoulder (as a child) for treatment of a birthmark. Past Medical History:   Diagnosis Date    Essential hypertension     FH: breast cancer     Fibroids     uterine    Fracture of radius 11/2009    with ulna, left, closed    Hyperlipidemia     Microhematuria     chronic    Osteopenia     S/P radiation therapy     as child, to right shoulder for birthmark    Tinnitus     Zoster     right scapula     Past Surgical History:   Procedure Laterality Date    HX GYN      hysterectomy    HX ORTHOPAEDIC      left arm     Current Outpatient Prescriptions   Medication Sig    crisaborole (EUCRISA) 2 % oint by Apply Externally route.  atorvastatin (LIPITOR) 10 mg tablet TAKE 1 TABLET BY MOUTH EVERY DAY    hydroCHLOROthiazide (HYDRODIURIL) 25 mg tablet Take 1 Tab by mouth daily.  alendronate (FOSAMAX) 70 mg tablet TAKE 1 TABLET BY MOUTH EVERY 7 DAYS    triamcinolone acetonide (KENALOG) 0.025 % topical cream Apply  to affected area two (2) times a day.  clotrimazole-betamethasone (LOTRISONE) topical cream Apply  to affected area two (2) times a day.  lisinopril (PRINIVIL) 10 mg tablet Take 1 Tab by mouth daily.  docusate sodium (COLACE) 100 mg capsule Take 100 mg by mouth daily as needed for Constipation.  co-enzyme Q-10 (CO Q-10) 100 mg capsule Take 100 mg by mouth daily.  biotin 2,500 mcg Tab Take 5,000 mcg by mouth.     Cholecalciferol, Vitamin D3, (VITAMIN D3) 1,000 unit Cap Take 1 Tab by mouth daily.  fexofenadine (ALLEGRA) 60 mg tablet Take 60 mg by mouth daily.  aspirin 81 mg chewable tablet Take 81 mg by mouth daily.  CALCIUM CARBONATE/VITAMIN D3 (CALCIUM + D PO) Take 1 Tab by mouth two (2) times a day. No current facility-administered medications for this visit. Allergies and Intolerances: Allergies   Allergen Reactions    Codeine Nausea Only     Family History: Her sister has breast cancer and her mother had pancreatic cancer. No history of colon cancer. Family History   Problem Relation Age of Onset   Brijesh Rowan Cancer Mother      pancreatic    Heart Disease Mother     Heart Disease Father     Cancer Sister      breast    MS Sister     Breast Cancer Sister 62     Social History:   She  reports that she has never smoked. She has never used smokeless tobacco. She has about 2 glasses of wine each night. She is  with one adult daughter who lives with her family in [place], [place]. She is retired, and was a 4th- for 25 years. History   Alcohol Use    0.0 oz/week    0 Standard drinks or equivalent per week     Comment: occasionally     Immunization History:  Immunization History   Administered Date(s) Administered    Influenza High Dose Vaccine PF 12/13/2016, 10/03/2017    Influenza Vaccine 10/24/2014    Influenza Vaccine PF 11/05/2013    Influenza Vaccine Split 11/01/2011, 11/06/2012    Pneumococcal Conjugate (PCV-13) 05/05/2015    TD Vaccine 05/05/2009    Tdap 12/12/2016    ZZZ-RETIRED (DO NOT USE) Pneumococcal Vaccine (Unspecified Type) 05/04/2010    Zoster Vaccine, Live 05/07/2013       Review of Systems:   As above included in HPI.   Otherwise 11 point review of systems negative including constitutional, skin, HENT, eyes, respiratory, cardiovascular, gastrointestinal, genitourinary, musculoskeletal, endo/heme/aller, neurological.    Physical:   Vitals:   BP: 124/70  HR: 75  WT: 154 lb 9.6 oz (70.1 kg)  BMI:  25.73 kg/m2    Exam:   Patient appears in no apparent distress. Affect is appropriate. HEENT --Anicteric sclerae, tympanic membranes normal,  ear canals normal.  PERRL, EOMI, conjunctiva and lids normal.   Sinuses were nontender, turbinates normal, hearing normal.  Oropharynx without  erythema, normal tongue, oral mucosa and tonsils. No cervical lymphadenopathy. No thyromegaly, JVD, or bruits. Carotid pulses 2+ with normal upstroke. Lungs --Clear to auscultation. No wheezing or rales. Heart --Regular rate and rhythm, no murmurs, rubs, gallops, or clicks. Breasts -- no masses, skin dimpling, asymmetry, nipple discharge, nodules, axillary lymphadenopathy. Chest wall --Nontender to palpation. PMI normal.  Abdomen -- Soft and nontender, no hepatosplenomegaly or masses. Extremities -- Without cyanosis, clubbing, edema. 2+ pulses equally and bilaterally. Normal looking digits, ROM intact  Neuro -- CN 2-12 intact, strength 5/5 with intact soft touch in all extremities  Derm - no obvious abnormalities noted, no rash    Review of Data:  Labs:  Hospital Outpatient Visit on 06/07/2018   Component Date Value Ref Range Status    WBC 06/07/2018 6.0  4.6 - 13.2 K/uL Final    RBC 06/07/2018 4.51  4.20 - 5.30 M/uL Final    HGB 06/07/2018 13.0  12.0 - 16.0 g/dL Final    HCT 06/07/2018 41.7  35.0 - 45.0 % Final    MCV 06/07/2018 92.5  74.0 - 97.0 FL Final    MCH 06/07/2018 28.8  24.0 - 34.0 PG Final    MCHC 06/07/2018 31.2  31.0 - 37.0 g/dL Final    RDW 06/07/2018 13.6  11.6 - 14.5 % Final    PLATELET 72/65/4568 327  135 - 420 K/uL Final    MPV 06/07/2018 9.8  9.2 - 11.8 FL Final    NEUTROPHILS 06/07/2018 60  40 - 73 % Final    LYMPHOCYTES 06/07/2018 27  21 - 52 % Final    MONOCYTES 06/07/2018 9  3 - 10 % Final    EOSINOPHILS 06/07/2018 3  0 - 5 % Final    BASOPHILS 06/07/2018 1  0 - 2 % Final    ABS. NEUTROPHILS 06/07/2018 3.6  1.8 - 8.0 K/UL Final    ABS.  LYMPHOCYTES 06/07/2018 1.6  0.9 - 3.6 K/UL Final    ABS. MONOCYTES 06/07/2018 0.5  0.05 - 1.2 K/UL Final    ABS. EOSINOPHILS 06/07/2018 0.2  0.0 - 0.4 K/UL Final    ABS. BASOPHILS 06/07/2018 0.0  0.0 - 0.06 K/UL Final    DF 06/07/2018 AUTOMATED    Final    Hemoglobin A1c 06/07/2018 5.5  4.2 - 5.6 % Final    Est. average glucose 06/07/2018 111  mg/dL Final    LIPID PROFILE 06/07/2018        Final    Cholesterol, total 06/07/2018 207* <200 MG/DL Final    Triglyceride 06/07/2018 93  <150 MG/DL Final    HDL Cholesterol 06/07/2018 82* 40 - 60 MG/DL Final    LDL, calculated 06/07/2018 106.4* 0 - 100 MG/DL Final    VLDL, calculated 06/07/2018 18.6  MG/DL Final    CHOL/HDL Ratio 06/07/2018 2.5  0 - 5.0   Final    Sodium 06/07/2018 140  136 - 145 mmol/L Final    Potassium 06/07/2018 4.0  3.5 - 5.5 mmol/L Final    Chloride 06/07/2018 102  100 - 108 mmol/L Final    CO2 06/07/2018 29  21 - 32 mmol/L Final    Anion gap 06/07/2018 9  3.0 - 18 mmol/L Final    Glucose 06/07/2018 98  74 - 99 mg/dL Final    BUN 06/07/2018 24* 7.0 - 18 MG/DL Final    Creatinine 06/07/2018 0.79  0.6 - 1.3 MG/DL Final    BUN/Creatinine ratio 06/07/2018 30* 12 - 20   Final    GFR est AA 06/07/2018 >60  >60 ml/min/1.73m2 Final    GFR est non-AA 06/07/2018 >60  >60 ml/min/1.73m2 Final    Calcium 06/07/2018 9.6  8.5 - 10.1 MG/DL Final    Bilirubin, total 06/07/2018 0.7  0.2 - 1.0 MG/DL Final    ALT (SGPT) 06/07/2018 29  13 - 56 U/L Final    AST (SGOT) 06/07/2018 26  15 - 37 U/L Final    Alk.  phosphatase 06/07/2018 49  45 - 117 U/L Final    Protein, total 06/07/2018 7.3  6.4 - 8.2 g/dL Final    Albumin 06/07/2018 4.5  3.4 - 5.0 g/dL Final    Globulin 06/07/2018 2.8  2.0 - 4.0 g/dL Final    A-G Ratio 06/07/2018 1.6  0.8 - 1.7   Final    Color 06/07/2018 YELLOW    Final    Appearance 06/07/2018 CLEAR    Final    Specific gravity 06/07/2018 1.027  1.005 - 1.030   Final    pH (UA) 06/07/2018 5.5  5.0 - 8.0   Final    Protein 06/07/2018 NEGATIVE   NEG mg/dL Final    Glucose 06/07/2018 NEGATIVE   NEG mg/dL Final    Ketone 06/07/2018 NEGATIVE   NEG mg/dL Final    Bilirubin 06/07/2018 NEGATIVE   NEG   Final    Blood 06/07/2018 NEGATIVE   NEG   Final    Urobilinogen 06/07/2018 0.2  0.2 - 1.0 EU/dL Final    Nitrites 06/07/2018 NEGATIVE   NEG   Final    Leukocyte Esterase 06/07/2018 MODERATE* NEG   Final    WBC 06/07/2018 4 to 10  0 - 4 /hpf Final    RBC 06/07/2018 NONE  0 - 5 /hpf Final    Epithelial cells 06/07/2018 FEW  0 - 5 /lpf Final    Bacteria 06/07/2018 FEW* NEG /hpf Final    Amorphous Crystals 06/07/2018 FEW* NEG   Final    TSH 06/07/2018 3.38  0.36 - 3.74 uIU/mL Final    Vitamin D 25-Hydroxy 06/07/2018 32.9  30 - 100 ng/mL Final     Health Maintenance:  Screening:    Mammogram: negative (7/2017)   PAP smear: S/P ALMAS   Colorectal: colonoscopy (10/2010) normal. Dr. Nayeli Salvador. Due 2020. Depression: none   DM (HbA1c/FPG): HbA1c 5.5 (6/2018)   Hepatitis C: negative (5/2016)   Falls: none   DEXA: osteopenia (7/2016). On alendronate since 12/2016. Glaucoma: regular eye exams with Dr. Maritza Bejarano (last 6/2017)   Smoking: none   Vitamin D: 32.9 (6/2018)   Medicare Wellness: today      Impression:  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    Tinnitus H93.19    Microhematuria R31.29    Eczema L30.9    Osteopenia M85.80    Abnormal glucose R73.09    Candidal intertrigo B37.2       Plan:  1. Hypertension. Appears well controlled on current regimen of lisinopril and hydrochlorothiazide. Discussed new recommendations for goal <130/80. Renal function normal with creatinine 0.79/ eGFR >60. Continued elevation of BUN/ creatinine ratio. Encouraged to drink plenty of fluids. Continue to follow. 2. Hyperlipidemia.  On moderate intensity dose atorvastatin with LDL increased to 106, which is fair control in this patient. Emphasized importance of lifestyle modifications, including diet, exercise, and weight loss. Will recheck next visit. If remains elevated, will consider increasing to 20 mg daily. Continue to follow. 3. Osteopenia. History of radial and toe fracture in 2009. Treated from 11/2009 to 11/2014 with alendronate. Repeat bone density scan in 7/2016 with FRAX 10 year risk of a major osteoporetic fracture at 14 % and hip fracture at 3.4 %. Restarted Fosamax in 12/2016. Continue calcium and Vitamin D supplement. Encouraged exercise, particularly weight bearing activities. Repeat bone density scan in 2018. 4. Family history of breast cancer. Patient with family history of breast cancer in first degree relative (sister). She is currently being screened with annual mammograms. Discussed recommendations for breast cancer prevention for high risk women. 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 No other family member with breast cancer. Will continue with annual mammograms and breast exams. 5. Abnormal glucose. Repeat normal with normal HbA1c. Continue to follow. Emphasized importance of lifestyle modifications, including diet, exercise, and weight loss. 6. Chronic microhematuria. Work-up reportedly negative in past. Urinalysis negative for blood today. Follow. 7. Eczema. Found good response with [place]. Followed by Dr. Jayshree Schaeffer. 8. Candidal intertrigo. Improved with Lotrisone. Discussed preventative treatment with mycostatin powder and loose clothing. 9. Overweight. BMI mildly increased. Emphasized importance of lifestyle modifications, including diet, exercise, and weight loss. Will continue to follow. 10. Health maintenance. Received script for Shingrix vaccine. Other immunizations up to date. Mammogram due in 7/2018. Will order. Colorectal cancer screening up to date. Continue regular eye exams with Dr. Elan Nicholas. Vitamin D level normal. Continue maintenance dose supplement.      In addition, an annual Medicare wellness visit was done today. Patient understands recommendations and agrees with plan. Follow-up in 6 months.

## 2018-07-20 ENCOUNTER — OFFICE VISIT (OUTPATIENT)
Dept: INTERNAL MEDICINE CLINIC | Age: 73
End: 2018-07-20

## 2018-07-20 VITALS
RESPIRATION RATE: 14 BRPM | HEIGHT: 65 IN | HEART RATE: 86 BPM | TEMPERATURE: 98.6 F | WEIGHT: 156.8 LBS | OXYGEN SATURATION: 97 % | SYSTOLIC BLOOD PRESSURE: 118 MMHG | BODY MASS INDEX: 26.12 KG/M2 | DIASTOLIC BLOOD PRESSURE: 76 MMHG

## 2018-07-20 DIAGNOSIS — B02.9 HERPES ZOSTER WITHOUT COMPLICATION: Primary | ICD-10-CM

## 2018-07-20 RX ORDER — VALACYCLOVIR HYDROCHLORIDE 1 G/1
1000 TABLET, FILM COATED ORAL 3 TIMES DAILY
Qty: 7 TAB | Refills: 0 | Status: SHIPPED | OUTPATIENT
Start: 2018-07-20 | End: 2018-12-13 | Stop reason: ALTCHOICE

## 2018-07-20 NOTE — MR AVS SNAPSHOT
[address] [facility] 
 
 
 [address], [address] [facility] 
[phone] Patient: Mike Lee MRN: [id number] Visit Information Date & Time Provider Department Dept. Phone Encounter #  
 7/20/2018  3:00 PM Aaron Castano NP [facility] [phone] Your Appointments 12/5/2018  9:55 AM  
LAB with IOC NURSE VISIT [facility] ([facility]) Appt Note: labs [address], [address] [address] [address]  
  
   
 [address], [address]  
  
    
 12/13/2018  8:30 AM  
Office Visit with Stepan Patrick MD  
[facility] Appt Note: ov 6mos. sarris [address], [address] [address] [address]  
  
   
 [address], [address] Upcoming Health Maintenance Date Due  
 GLAUCOMA SCREENING Q2Y 6/27/2018 Influenza Age 5 to Adult 8/1/2018 MEDICARE YEARLY EXAM 6/15/2019 BREAST CANCER SCRN MAMMOGRAM 7/27/2019 COLONOSCOPY 10/25/2020 DTaP/Tdap/Td series (2 - Td) 12/12/2026 Allergies as of 7/20/2018  Review Complete On: 7/20/2018 By: Krzysztof Grewal Severity Noted Reaction Type Reactions Codeine    Nausea Only Current Immunizations  Reviewed on 10/3/2017 Name Date Influenza High Dose Vaccine PF 10/3/2017  3:00 PM, 12/13/2016 Influenza Vaccine 10/24/2014 Influenza Vaccine PF 11/5/2013  9:03 AM  
 Influenza Vaccine Split 11/6/2012, 11/1/2011 Pneumococcal Conjugate (PCV-13) 5/5/2015 TD Vaccine 5/5/2009 Tdap 12/12/2016 ZZZ-RETIRED (DO NOT USE) Pneumococcal Vaccine (Unspecified Type) 5/4/2010 Zoster Vaccine, Live 5/7/2013 Not reviewed this visit You Were Diagnosed With   
  
 Codes Comments Herpes zoster without complication    -  Primary ICD-10-CM: B02.9 ICD-9-CM: 053.9 Vitals BP Pulse Temp Resp Height(growth percentile) Weight(growth percentile) 118/76 (BP 1 Location: Left arm, BP Patient Position: Sitting) 86 98.6 °F (37 °C) (Oral) 14 5' 5\" (1.651 m) 156 lb 12.8 oz (71.1 kg) SpO2 BMI OB Status Smoking Status 97% 26.09 kg/m2 Postmenopausal Never Smoker Vitals History BMI and BSA Data Body Mass Index Body Surface Area 26.09 kg/m 2 1.81 m 2 Preferred Pharmacy Pharmacy Name Phone [facility] J[address]otsna Morataya [address] [place] [address] [phone] Your Updated Medication List  
  
   
This list is accurate as of 7/20/18  3:26 PM.  Always use your most recent med list.  
  
  
  
  
 alendronate 70 mg tablet Commonly known as:  FOSAMAX TAKE 1 TABLET BY MOUTH EVERY 7 DAYS ALLEGRA 60 mg tablet Generic drug:  fexofenadine Take 60 mg by mouth daily. aspirin 81 mg chewable tablet Take 81 mg by mouth daily. atorvastatin 10 mg tablet Commonly known as:  LIPITOR  
TAKE 1 TABLET BY MOUTH EVERY DAY  
  
 biotin 2,500 mcg Tab Take 5,000 mcg by mouth. CALCIUM + D PO Take 1 Tab by mouth two (2) times a day. clotrimazole-betamethasone topical cream  
Commonly known as:  Collene Bones Apply  to affected area two (2) times a day. CO Q-10 100 mg capsule Generic drug:  co-enzyme Q-10 Take 100 mg by mouth daily. COLACE 100 mg capsule Generic drug:  docusate sodium Take 100 mg by mouth daily as needed for Constipation. EUCRISA 2 % Oint Generic drug:  crisaborole  
by Apply Externally route. hydroCHLOROthiazide 25 mg tablet Commonly known as:  HYDRODIURIL Take 1 Tab by mouth daily. lisinopril 10 mg tablet Commonly known as:  PRINIVIL Take 1 Tab by mouth daily. triamcinolone acetonide 0.025 % topical cream  
Commonly known as:  KENALOG Apply  to affected area two (2) times a day. valACYclovir 1 gram tablet Commonly known as:  VALTREX Take 1 Tab by mouth three (3) times daily. VITAMIN D3 1,000 unit Cap Generic drug:  cholecalciferol Take 1 Tab by mouth daily. Prescriptions Sent to Pharmacy Refills  
 valACYclovir (VALTREX) 1 gram tablet 0 Sig: Take 1 Tab by mouth three (3) times daily. Class: Normal  
 Pharmacy: [facility] [address] [place] [address] Jyotsna Morataya [address] [place] [address] #: [phone] Route: Oral  
  
To-Do List   
 08/03/2018 9:30 AM  
  Appointment with JASE BENNETT  1 at [address] [place] [address] ([phone]) OUTSIDE FILMS  - Any outside films related to the study being scheduled should be brought with you on the day of the exam.  If this cannot be done there may be a delay in the reading of the study. MEDICATIONS  - Patient must bring a complete list of all medications currently taking to include prescriptions, over-the-counter meds, herbals, vitamins & any dietary supplements  GENERAL INSTRUCTIONS  - On the day of your exam do not use any bath powder, deodorant or lotions on the armpit area. -Tenderness of breasts may cause an increase of discomfort during procedure. If you are experiencing breast tenderness on the day of your appointment and would like to reschedule, please call [phone]. Introducing [facility]! Dear Phillip Clement: 
Thank you for requesting a [facility] account. Our records indicate that you already have an active [facility] account. You can access your account anytime at [email or web address] Did you know that you can access your hospital and ER discharge instructions at any time in [facility]? You can also review all of your test results from your hospital stay or ER visit. Additional Information If you have questions, please visit the Frequently Asked Questions section of the [facility] website at [email or web address] Remember, MyChart is NOT to be used for urgent needs. For medical emergencies, dial 911. Now available from your iPhone and Android! Please provide this summary of care documentation to your next provider. Your primary care clinician is listed as Kenna Whitten. If you have any questions after today's visit, please call [phone].

## 2018-07-20 NOTE — PROGRESS NOTES
Mariama Simpson is a 68 y.o.  female and presents with    Chief Complaint   Patient presents with    Skin Problem     Patient here for rash/shingles above right breast, right underarm, and right upper back. Patient report discomfort and and has had them 2 x before. Subjective:  HPI   Mrs. Liu presents today with complaints of a rash to the right breast, right axilla, and right upper to mid back. She reports noted this morning and reports a discomfort prior to the rash. Denies pain, itch. She has had in the past. She reports increased home stress over the last year. She reports given Shingrix vaccine on visit with Dr. Sky Wang however did not have completed yet because her pharmacy reported very expensive at the time and just to hold off a little while longer as cost will go down. Additional Concerns: none     ROS   Review of Systems   Constitutional: Negative. Respiratory: Negative. Cardiovascular: Negative. Gastrointestinal: Negative. Skin: Positive for rash. Neurological: Negative. Allergies   Allergen Reactions    Codeine Nausea Only       Current Outpatient Prescriptions   Medication Sig Dispense Refill    valACYclovir (VALTREX) 1 gram tablet Take 1 Tab by mouth three (3) times daily. 7 Tab 0    crisaborole (EUCRISA) 2 % oint by Apply Externally route.  atorvastatin (LIPITOR) 10 mg tablet TAKE 1 TABLET BY MOUTH EVERY DAY 90 Tab 2    hydroCHLOROthiazide (HYDRODIURIL) 25 mg tablet Take 1 Tab by mouth daily. 90 Tab 1    alendronate (FOSAMAX) 70 mg tablet TAKE 1 TABLET BY MOUTH EVERY 7 DAYS 12 Tab 3    clotrimazole-betamethasone (LOTRISONE) topical cream Apply  to affected area two (2) times a day. 15 g 2    lisinopril (PRINIVIL) 10 mg tablet Take 1 Tab by mouth daily. 90 Tab 3    docusate sodium (COLACE) 100 mg capsule Take 100 mg by mouth daily as needed for Constipation.  co-enzyme Q-10 (CO Q-10) 100 mg capsule Take 100 mg by mouth daily.       biotin 2,500 mcg Tab Take 5,000 mcg by mouth.  Cholecalciferol, Vitamin D3, (VITAMIN D3) 1,000 unit Cap Take 1 Tab by mouth daily.  fexofenadine (ALLEGRA) 60 mg tablet Take 60 mg by mouth daily.  aspirin 81 mg chewable tablet Take 81 mg by mouth daily.  CALCIUM CARBONATE/VITAMIN D3 (CALCIUM + D PO) Take 1 Tab by mouth two (2) times a day.  triamcinolone acetonide (KENALOG) 0.025 % topical cream Apply  to affected area two (2) times a day. 15 g 2       Social History     Social History    Marital status: SINGLE     Spouse name: N/A    Number of children: N/A    Years of education: N/A     Occupational History    Not on file.      Social History Main Topics    Smoking status: Never Smoker    Smokeless tobacco: Never Used    Alcohol use 0.0 oz/week     0 Standard drinks or equivalent per week      Comment: occasionally    Drug use: No    Sexual activity: Yes     Partners: Male     Other Topics Concern    Not on file     Social History Narrative       Past Medical History:   Diagnosis Date    Essential hypertension     FH: breast cancer     Fibroids     uterine    Fracture of radius 11/2009    with ulna, left, closed    Hyperlipidemia     Microhematuria     chronic    Osteopenia     S/P radiation therapy     as child, to right shoulder for birthmark    Tinnitus     Zoster     right scapula       Past Surgical History:   Procedure Laterality Date    HX GYN      hysterectomy    HX ORTHOPAEDIC      left arm       Family History   Problem Relation Age of Onset    Cancer Mother      pancreatic    Heart Disease Mother     Heart Disease Father     Cancer Sister      breast    MS Sister     Breast Cancer Sister 62       Objective:  Vitals:    07/20/18 1454   BP: 118/76   Pulse: 86   Resp: 14   Temp: 98.6 °F (37 °C)   TempSrc: Oral   SpO2: 97%   Weight: 156 lb 12.8 oz (71.1 kg)   Height: 5' 5\" (1.651 m)   PainSc:   0 - No pain       LABS   none    TESTS  none    PE  Physical Exam Constitutional: She is oriented to person, place, and time. She appears well-developed and well-nourished. No distress. HENT:   Head: Normocephalic and atraumatic. Neurological: She is alert and oriented to person, place, and time. Skin: Skin is warm and dry. Rash noted. Rash is maculopapular and vesicular. She is not diaphoretic. There is erythema. Vitals reviewed. Assessment/Plan:    1. Herpes zoster- Valacyclovir 1 g TID x 7 days. Keep rash covered, contagious while in vesicular state. Ok to get Shingrix vaccines after rash has cleared. Lab review: no lab studies available for review at time of visit    Today's Visit:   Diagnoses and all orders for this visit:    1. Herpes zoster without complication    Other orders  -     valACYclovir (VALTREX) 1 gram tablet; Take 1 Tab by mouth three (3) times daily. Health Maintenance: Deferred to PCP. I have discussed the diagnosis with the patient and the intended plan as seen in the above orders. The patient has received an after-visit summary and questions were answered concerning future plans. I have discussed medication side effects and warnings with the patient as well. I have reviewed the plan of care with the patient, accepted their input and they are in agreement with the treatment goals. Follow-up Disposition: Not on File   More than 1/2 of this 15 minute visit was spent in counseling and coordination of care, as described above.     MIRANDA Allred  Internist of [address], [facility]  Phone: [phone]  Fax: [phone]

## 2018-07-20 NOTE — PROGRESS NOTES
1. Have you been to the ER, urgent care clinic or hospitalized since your last visit? NO.     2. Have you seen or consulted any other health care providers outside of the [address] [place] [address] since your last visit (Include any pap smears or colon screening)? NO      Do you have an Advanced Directive? NO    Would you like information on Advanced Directives?  NO

## 2018-08-03 ENCOUNTER — HOSPITAL ENCOUNTER (OUTPATIENT)
Dept: MAMMOGRAPHY | Age: 73
Discharge: HOME | End: 2018-08-03
Attending: INTERNAL MEDICINE
Payer: MEDICARE

## 2018-08-03 DIAGNOSIS — Z12.31 SCREENING MAMMOGRAM, ENCOUNTER FOR: ICD-10-CM

## 2018-08-03 PROCEDURE — 77063 BREAST TOMOSYNTHESIS BI: CPT

## 2018-10-09 ENCOUNTER — CLINICAL SUPPORT (OUTPATIENT)
Dept: INTERNAL MEDICINE CLINIC | Age: 73
End: 2018-10-09

## 2018-10-09 DIAGNOSIS — Z23 ENCOUNTER FOR IMMUNIZATION: ICD-10-CM

## 2018-10-09 NOTE — PROGRESS NOTES
Chief Complaint   Patient presents with    Immunization/Injection     Influenza Vaccine     Patient given influenza vaccine, Adjuvanted FLUAD, in left deltoid, per order from Dr. Mehreen Germain. Instructed patient to sit and wait 10-20 minutes before leaving the premises so that we can watch for any complications or adverse reactions. Patient given vaccine information statement handout before vaccine was given. Patient tolerated well without adverse reactions or complications.

## 2018-12-05 ENCOUNTER — APPOINTMENT (OUTPATIENT)
Dept: INTERNAL MEDICINE CLINIC | Age: 73
End: 2018-12-05

## 2018-12-05 ENCOUNTER — HOSPITAL ENCOUNTER (OUTPATIENT)
Dept: LAB | Age: 73
Discharge: HOME | End: 2018-12-05
Payer: MEDICARE

## 2018-12-05 DIAGNOSIS — I10 ESSENTIAL HYPERTENSION: ICD-10-CM

## 2018-12-05 DIAGNOSIS — R73.09 ABNORMAL GLUCOSE: ICD-10-CM

## 2018-12-05 DIAGNOSIS — E78.5 HYPERLIPIDEMIA, UNSPECIFIED HYPERLIPIDEMIA TYPE: ICD-10-CM

## 2018-12-05 DIAGNOSIS — M85.89 OSTEOPENIA OF MULTIPLE SITES: ICD-10-CM

## 2018-12-05 DIAGNOSIS — M85.9 DISORDER OF BONE DENSITY AND STRUCTURE, UNSPECIFIED: ICD-10-CM

## 2018-12-05 LAB
ANION GAP SERPL CALC-SCNC: 8 MMOL/L (ref 3–18)
BUN SERPL-MCNC: 27 MG/DL (ref 7–18)
BUN/CREAT SERPL: 42 (ref 12–20)
CALCIUM SERPL-MCNC: 9.6 MG/DL (ref 8.5–10.1)
CHLORIDE SERPL-SCNC: 102 MMOL/L (ref 100–108)
CHOLEST SERPL-MCNC: 164 MG/DL
CO2 SERPL-SCNC: 28 MMOL/L (ref 21–32)
CREAT SERPL-MCNC: 0.65 MG/DL (ref 0.6–1.3)
EST. AVERAGE GLUCOSE BLD GHB EST-MCNC: 117 MG/DL
GLUCOSE SERPL-MCNC: 92 MG/DL (ref 74–99)
HBA1C MFR BLD: 5.7 % (ref 4.2–5.6)
HDLC SERPL-MCNC: 75 MG/DL (ref 40–60)
HDLC SERPL: 2.2 {RATIO} (ref 0–5)
LDLC SERPL CALC-MCNC: 74.6 MG/DL (ref 0–100)
LIPID PROFILE,FLP: ABNORMAL
POTASSIUM SERPL-SCNC: 4.1 MMOL/L (ref 3.5–5.5)
SODIUM SERPL-SCNC: 138 MMOL/L (ref 136–145)
TRIGL SERPL-MCNC: 72 MG/DL (ref ?–150)
VLDLC SERPL CALC-MCNC: 14.4 MG/DL

## 2018-12-05 PROCEDURE — 82306 VITAMIN D 25 HYDROXY: CPT

## 2018-12-05 PROCEDURE — 36415 COLL VENOUS BLD VENIPUNCTURE: CPT

## 2018-12-05 PROCEDURE — 80048 BASIC METABOLIC PNL TOTAL CA: CPT

## 2018-12-05 PROCEDURE — 80061 LIPID PANEL: CPT

## 2018-12-05 PROCEDURE — 83036 HEMOGLOBIN GLYCOSYLATED A1C: CPT

## 2018-12-06 LAB — 25(OH)D3 SERPL-MCNC: 39.2 NG/ML (ref 30–100)

## 2018-12-13 ENCOUNTER — TELEPHONE (OUTPATIENT)
Dept: INTERNAL MEDICINE CLINIC | Age: 73
End: 2018-12-13

## 2018-12-13 ENCOUNTER — OFFICE VISIT (OUTPATIENT)
Dept: INTERNAL MEDICINE CLINIC | Age: 73
End: 2018-12-13

## 2018-12-13 VITALS
RESPIRATION RATE: 16 BRPM | DIASTOLIC BLOOD PRESSURE: 84 MMHG | SYSTOLIC BLOOD PRESSURE: 126 MMHG | HEART RATE: 84 BPM | HEIGHT: 65 IN | TEMPERATURE: 97.8 F | BODY MASS INDEX: 25.16 KG/M2 | WEIGHT: 151 LBS | OXYGEN SATURATION: 100 %

## 2018-12-13 DIAGNOSIS — L30.9 ECZEMA, UNSPECIFIED TYPE: ICD-10-CM

## 2018-12-13 DIAGNOSIS — I10 ESSENTIAL HYPERTENSION: Primary | ICD-10-CM

## 2018-12-13 DIAGNOSIS — R31.29 MICROHEMATURIA: ICD-10-CM

## 2018-12-13 DIAGNOSIS — R73.09 ABNORMAL GLUCOSE: ICD-10-CM

## 2018-12-13 DIAGNOSIS — M85.89 OSTEOPENIA OF MULTIPLE SITES: ICD-10-CM

## 2018-12-13 DIAGNOSIS — E78.5 HYPERLIPIDEMIA, UNSPECIFIED HYPERLIPIDEMIA TYPE: ICD-10-CM

## 2018-12-13 DIAGNOSIS — H93.19 TINNITUS, UNSPECIFIED LATERALITY: ICD-10-CM

## 2018-12-13 DIAGNOSIS — M85.9 DISORDER OF BONE DENSITY AND STRUCTURE, UNSPECIFIED: ICD-10-CM

## 2018-12-13 NOTE — TELEPHONE ENCOUNTER
Please request recent eye exam from Dr. Ephraim Thomas . Patient reports being seen in 10/2018 . Thank you.

## 2018-12-13 NOTE — PROGRESS NOTES
Chief Complaint   Patient presents with    Hypertension     6 month follow up with lab review. Health Maintenance Due   Topic Date Due    Shingrix Vaccine Age 49> (1 of 2) 01/18/1995    GLAUCOMA SCREENING Q2Y  06/27/2018     1. Have you been to the ER, urgent care clinic or hospitalized since your last visit? NO.     2. Have you seen or consulted any other health care providers outside of the [address] [place] [address] since your last visit (Include any pap smears or colon screening)? YES, wisdom teeth removed on November 2018. Do you have an Advanced Directive? YES, patient states she will bring a copy in soon.

## 2018-12-13 NOTE — PATIENT INSTRUCTIONS
DASH Diet: Care Instructions  Your Care Instructions    The DASH diet is an eating plan that can help lower your blood pressure. DASH stands for Dietary Approaches to Stop Hypertension. Hypertension is high blood pressure. The DASH diet focuses on eating foods that are high in calcium, potassium, and magnesium. These nutrients can lower blood pressure. The foods that are highest in these nutrients are fruits, vegetables, low-fat dairy products, nuts, seeds, and legumes. But taking calcium, potassium, and magnesium supplements instead of eating foods that are high in those nutrients does not have the same effect. The DASH diet also includes whole grains, fish, and poultry. The DASH diet is one of several lifestyle changes your doctor may recommend to lower your high blood pressure. Your doctor may also want you to decrease the amount of sodium in your diet. Lowering sodium while following the DASH diet can lower blood pressure even further than just the DASH diet alone. Follow-up care is a key part of your treatment and safety. Be sure to make and go to all appointments, and call your doctor if you are having problems. It's also a good idea to know your test results and keep a list of the medicines you take. How can you care for yourself at home? Following the DASH diet  · Eat 4 to 5 servings of fruit each day. A serving is 1 medium-sized piece of fruit, ½ cup chopped or canned fruit, 1/4 cup dried fruit, or 4 ounces (½ cup) of fruit juice. Choose fruit more often than fruit juice. · Eat 4 to 5 servings of vegetables each day. A serving is 1 cup of lettuce or raw leafy vegetables, ½ cup of chopped or cooked vegetables, or 4 ounces (½ cup) of vegetable juice. Choose vegetables more often than vegetable juice. · Get 2 to 3 servings of low-fat and fat-free dairy each day. A serving is 8 ounces of milk, 1 cup of yogurt, or 1 ½ ounces of cheese. · Eat 6 to 8 servings of grains each day.  A serving is 1 slice of bread, 1 ounce of dry cereal, or ½ cup of cooked rice, pasta, or cooked cereal. Try to choose whole-grain products as much as possible. · Limit lean meat, poultry, and fish to 2 servings each day. A serving is 3 ounces, about the size of a deck of cards. · Eat 4 to 5 servings of nuts, seeds, and legumes (cooked dried beans, lentils, and split peas) each week. A serving is 1/3 cup of nuts, 2 tablespoons of seeds, or ½ cup of cooked beans or peas. · Limit fats and oils to 2 to 3 servings each day. A serving is 1 teaspoon of vegetable oil or 2 tablespoons of salad dressing. · Limit sweets and added sugars to 5 servings or less a week. A serving is 1 tablespoon jelly or jam, ½ cup sorbet, or 1 cup of lemonade. · Eat less than 2,300 milligrams (mg) of sodium a day. If you limit your sodium to 1,500 mg a day, you can lower your blood pressure even more. Tips for success  · Start small. Do not try to make dramatic changes to your diet all at once. You might feel that you are missing out on your favorite foods and then be more likely to not follow the plan. Make small changes, and stick with them. Once those changes become habit, add a few more changes. · Try some of the following:  ? Make it a goal to eat a fruit or vegetable at every meal and at snacks. This will make it easy to get the recommended amount of fruits and vegetables each day. ? Try yogurt topped with fruit and nuts for a snack or healthy dessert. ? Add lettuce, tomato, cucumber, and onion to sandwiches. ? Combine a ready-made pizza crust with low-fat mozzarella cheese and lots of vegetable toppings. Try using tomatoes, squash, spinach, broccoli, carrots, cauliflower, and onions. ? Have a variety of cut-up vegetables with a low-fat dip as an appetizer instead of chips and dip. ? Sprinkle sunflower seeds or chopped almonds over salads. Or try adding chopped walnuts or almonds to cooked vegetables.   ? Try some vegetarian meals using beans and peas. Add garbanzo or kidney beans to salads. Make burritos and tacos with mashed ho beans or black beans. Where can you learn more? Go to http://mallika-suzanne.info/. Enter J548 in the search box to learn more about \"DASH Diet: Care Instructions. \"  Current as of: December 6, 2017  Content Version: 11.8  © 8118-7427 [employer]. Care instructions adapted under license by [employer] (which disclaims liability or warranty for this information). If you have questions about a medical condition or this instruction, always ask your healthcare professional. [address] any warranty or liability for your use of this information.

## 2018-12-16 NOTE — PROGRESS NOTES
HPI:   Reyna Bahena is a 68y.o. year old female who presents today for evaluation of hypertension, hyperlipidemia, osteopenia, and chronic microhematuria. She reports that she is doing well. On 7/20/2018, she presented for evaluation to [facility] for a right upper back rash and was diagnosed with Herpes Zoster. She was treated with Valtrex with improvement. She states today that she continues to experience mild tingling over her right scapular in the morning which resolves without further discomfort throughout the day. She has not yet obtained the Shingrix vaccine due to the supply issues. She is otherwise without complaints. She has a history of hypertension, treated with lisinopril and hydrochlorothiazide. She has been checking her blood pressure intermittently and reports that most readings are with a systolic blood pressure <349. She remains quite active, although has not been having time to exercise. She reports that she has continued stress in her life, helping to care for her 's mother (dementia), two disabled cousins, and her sister. She denies any chest pain, shortness of breath at rest or with exertion, palpitations, lightheadedness, or edema. She also has a history of hyperlipidemia, treated with moderate intensity dose atorvastatin. She has no history of ASCVD. She has a history of osteopenia, and in 2009, she sustained a fracture of her left 3rd toe (3/2009) and her distal radius (12/2009). At that time, she was started on alendronate and was treated until 11/2014. She had a repeat bone density study in 7/2016 showing T-scores: femoral neck left -2.3/ right -2.0 and lumbar -2.2. She was restarted on alendronate in 12/2016. She continues to take calcium and Vitamin D supplements. She has no further history of pathologic fractures. In 2/2017, she was evaluated by [facility] with left lower back pain and left sciatica.  She had been taking ibuprofen without relief, and was given a course of prednisone. She states that she did not find this helpful either, but reports resolution of symptoms after visiting a chiropractor. She states that she no longer is experiencing pain and is able to ambulate without difficulty. She will continue to visit a chiropractor periodically when she develops a recurrence of pain with improvement. She reports that she had an extensive workup for microhematuria in the past, which was negative (records unavailable). She denies any dysuria, gross hematuria, or flank pain. She has had screening colonoscopy with Dr Norris Morales in 10/2010, which was normal except for hemorrhoids. Recommendation for follow-up is for 10 years. She denies any abdominal pain, nausea, vomiting, melena, hematochezia, or change in bowel movements. She has a history of eczema and is followed by Dr. Pearline Boxer. She states that she was recently started on Eucrisa with excellent response. She also has a history of candidal intertrigo, treated with lotrisone with good response. She also reports that she underwent radiation therapy to her right shoulder (as a child) for treatment of a birthmark. Past Medical History:   Diagnosis Date    Essential hypertension     FH: breast cancer     Fibroids     uterine    Fracture of radius 11/2009    with ulna, left, closed    Hyperlipidemia     Microhematuria     chronic    Osteopenia     S/P radiation therapy     as child, to right shoulder for birthmark    Tinnitus     Zoster     right scapula     Past Surgical History:   Procedure Laterality Date    HX GYN      hysterectomy    HX ORTHOPAEDIC      left arm     Current Outpatient Medications   Medication Sig    hydroCHLOROthiazide (HYDRODIURIL) 25 mg tablet TAKE 1 TABLET BY MOUTH DAILY    lisinopril (PRINIVIL, ZESTRIL) 10 mg tablet TAKE 1 TABLET BY MOUTH DAILY    crisaborole (EUCRISA) 2 % oint by Apply Externally route.     atorvastatin (LIPITOR) 10 mg tablet TAKE 1 TABLET BY MOUTH EVERY DAY  alendronate (FOSAMAX) 70 mg tablet TAKE 1 TABLET BY MOUTH EVERY 7 DAYS    docusate sodium (COLACE) 100 mg capsule Take 100 mg by mouth daily as needed for Constipation.  co-enzyme Q-10 (CO Q-10) 100 mg capsule Take 100 mg by mouth daily.  biotin 2,500 mcg Tab Take 5,000 mcg by mouth.  cholecalciferol (VITAMIN D3) 1,000 unit cap Take 1 Tab by mouth daily.  fexofenadine (ALLEGRA) 60 mg tablet Take 60 mg by mouth daily.  aspirin 81 mg chewable tablet Take 81 mg by mouth daily.  CALCIUM CARBONATE/VITAMIN D3 (CALCIUM + D PO) Take 1 Tab by mouth two (2) times a day.  triamcinolone acetonide (KENALOG) 0.025 % topical cream Apply  to affected area two (2) times a day.  clotrimazole-betamethasone (LOTRISONE) topical cream Apply  to affected area two (2) times a day. No current facility-administered medications for this visit. Allergies and Intolerances: Allergies   Allergen Reactions    Codeine Nausea Only     Family History: Her sister has breast cancer and her mother had pancreatic cancer. No history of colon cancer. Family History   Problem Relation Age of Onset   Santana Chavis Cancer Mother         pancreatic    Heart Disease Mother     Heart Disease Father     Cancer Sister         breast    MS Sister     Breast Cancer Sister 62     Social History:   She  reports that  has never smoked. she has never used smokeless tobacco. She has about 2 glasses of wine each night. She is  with one adult daughter who lives with her family in [place], [place]. She is retired, and was a 4th- for 25 years.   Social History     Substance and Sexual Activity   Alcohol Use Yes    Alcohol/week: 0.0 oz    Comment: occasionally     Immunization History:  Immunization History   Administered Date(s) Administered    Influenza High Dose Vaccine PF 12/13/2016, 10/03/2017    Influenza Vaccine 10/24/2014    Influenza Vaccine (Tri) Adjuvanted 10/09/2018    Influenza Vaccine  11/05/2013    Influenza Vaccine Split 11/01/2011, 11/06/2012    Pneumococcal Conjugate (PCV-13) 05/05/2015    TD Vaccine 05/05/2009    Tdap 12/12/2016    ZZZ-RETIRED (DO NOT USE) Pneumococcal Vaccine (Unspecified Type) 05/04/2010    Zoster Vaccine, Live 05/07/2013       Review of Systems:   As above included in HPI. Otherwise 11 point review of systems negative including constitutional, skin, HENT, eyes, respiratory, cardiovascular, gastrointestinal, genitourinary, musculoskeletal, endo/heme/aller, neurological.    Physical:   Vitals:   BP: 126/84  HR: 84  WT: 151 lb (68.5 kg)  BMI:  25.13 kg/m2    Exam:   Patient appears in no apparent distress. Affect is appropriate. HEENT: PERRLA, anicteric, oropharynx clear, no JVD, adenopathy or thyromegaly. No carotid bruits or radiated murmur. Lungs: clear to auscultation, no wheezes, rhonchi, or rales. Heart: regular rate and rhythm. No murmur, rubs, gallops  Abdomen: soft, nontender, nondistended, normal bowel sounds, no hepatosplenomegaly or masses. Extremities: without edema. Pulses 1-2+ bilaterally.   Derm: no skin lesions on right upper back      Review of Data:  Labs:  Hospital Outpatient Visit on 12/05/2018   Component Date Value Ref Range Status    Hemoglobin A1c 12/05/2018 5.7* 4.2 - 5.6 % Final    Est. average glucose 12/05/2018 117  mg/dL Final    LIPID PROFILE 12/05/2018        Final    Cholesterol, total 12/05/2018 164  <200 MG/DL Final    Triglyceride 12/05/2018 72  <150 MG/DL Final    HDL Cholesterol 12/05/2018 75* 40 - 60 MG/DL Final    LDL, calculated 12/05/2018 74.6  0 - 100 MG/DL Final    VLDL, calculated 12/05/2018 14.4  MG/DL Final    CHOL/HDL Ratio 12/05/2018 2.2  0 - 5.0   Final    Sodium 12/05/2018 138  136 - 145 mmol/L Final    Potassium 12/05/2018 4.1  3.5 - 5.5 mmol/L Final    Chloride 12/05/2018 102  100 - 108 mmol/L Final    CO2 12/05/2018 28  21 - 32 mmol/L Final    Anion gap 12/05/2018 8  3.0 - 18 mmol/L Final    Glucose 12/05/2018 92  74 - 99 mg/dL Final    BUN 12/05/2018 27* 7.0 - 18 MG/DL Final    Creatinine 12/05/2018 0.65  0.6 - 1.3 MG/DL Final    BUN/Creatinine ratio 12/05/2018 42* 12 - 20   Final    GFR est AA 12/05/2018 >60  >60 ml/min/1.73m2 Final    GFR est non-AA 12/05/2018 >60  >60 ml/min/1.73m2 Final    Calcium 12/05/2018 9.6  8.5 - 10.1 MG/DL Final    Vitamin D 25-Hydroxy 12/05/2018 39.2  30 - 100 ng/mL Final     Health Maintenance:  Screening:    Mammogram: negative (7/2018)   PAP smear: S/P ALMAS   Colorectal: colonoscopy (10/2010) normal. Dr. Katharine Steele. Due 2020. Depression: none   DM (HbA1c/FPG): HbA1c 5.7 (12/2018)   Hepatitis C: negative (5/2016)   Falls: none   DEXA: osteopenia (7/2016). On alendronate since 12/2016. Glaucoma: regular eye exams with Dr. Oralia Lo (last 11/2018)   Smoking: none   Vitamin D: 32.9 (6/2018)   Medicare Wellness: 6/14/2018      Impression:  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    Tinnitus H93.19    Microhematuria R31.29    Eczema L30.9    Osteopenia M85.80    Abnormal glucose R73.09    Candidal intertrigo B37.2       Plan:  1. Hypertension. Appears well controlled on current regimen of lisinopril 10 mg daily and hydrochlorothiazide 25 mg daily. Discussed new recommendations for goal <130/80. Renal function normal with creatinine 0.65/ eGFR >60. Continued elevation of BUN/ creatinine ratio at 42 and encouraged to drink plenty of fluids. Continue to follow. 2. Hyperlipidemia. On moderate intensity dose atorvastatin with LDL 74 and HDL 75, which is excellent control in this patient. Emphasized importance of lifestyle modifications, including diet, exercise, and weight loss. Will continue to follow. 3. Osteopenia. History of radial and toe fracture in 2009. Treated from 11/2009 to 11/2014 with alendronate.  Repeat bone density scan in 7/2016 with FRAX 10 year risk of a major osteoporetic fracture at 14 % and hip fracture at 3.4 %. Restarted Fosamax in 12/2016. Continue calcium and Vitamin D supplement. Encouraged exercise, particularly weight bearing activities. Due for repeat bone density scan. Wishing to wait to obtain with next mammogram. Vitamin D and calcium levels normal.   4. Family history of breast cancer. Patient with family history of breast cancer in first degree relative (sister). She is currently being screened with annual mammograms. Discussed recommendations for breast cancer prevention for high risk women. 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 No other family member with breast cancer. Will continue with annual mammograms and breast exams. 5. Abnormal glucose. Repeat normal with mildly elevated HbA1c today. Emphasized importance of lifestyle modifications, including diet, exercise, and weight loss. 6. Chronic microhematuria. Work-up reportedly negative in past. Urinalysis negative for blood today. Follow. 7. Eczema. Found good response with [place]. Followed by Dr. Cheri Connor. 8. Candidal intertrigo. Improved with Lotrisone. Discussed preventative treatment with mycostatin powder and loose clothing. 9. Overweight. BMI mildly increased. Emphasized importance of lifestyle modifications, including diet, exercise, and weight loss. Will continue to follow. 10. Health maintenance. Already received influenza vaccine. Given another script for Shingrix vaccine. Other immunizations up to date. Mammogram up to date. Will order bone density scan with next mammogram. Colorectal cancer screening up to date. Continue regular eye exams with Dr. Ephraim Thomas. Vitamin D level normal. Continue maintenance dose supplement. Medicare wellness visit up to date. Patient understands recommendations and agrees with plan. Follow-up in 6 months.

## 2019-04-15 ENCOUNTER — TELEPHONE (OUTPATIENT)
Dept: INTERNAL MEDICINE CLINIC | Age: 74
End: 2019-04-15

## 2019-04-15 DIAGNOSIS — M79.671 ACUTE FOOT PAIN, RIGHT: Primary | ICD-10-CM

## 2019-04-15 NOTE — TELEPHONE ENCOUNTER
Pt called, said she talked with you over the weekend about her right foot pain, still having it, more severe today. Has tried to call [facility] but their phone line is down apparently. Can you enter referral?  Please notify patient at [phone].

## 2019-04-16 ENCOUNTER — HOSPITAL ENCOUNTER (OUTPATIENT)
Dept: LAB | Age: 74
Discharge: HOME | End: 2019-04-16
Payer: MEDICARE

## 2019-04-16 ENCOUNTER — HOSPITAL ENCOUNTER (OUTPATIENT)
Dept: GENERAL RADIOLOGY | Age: 74
Discharge: HOME | End: 2019-04-16
Payer: MEDICARE

## 2019-04-16 ENCOUNTER — OFFICE VISIT (OUTPATIENT)
Dept: ORTHOPEDIC SURGERY | Age: 74
End: 2019-04-16

## 2019-04-16 VITALS
HEART RATE: 99 BPM | WEIGHT: 148 LBS | TEMPERATURE: 97.3 F | OXYGEN SATURATION: 97 % | RESPIRATION RATE: 16 BRPM | BODY MASS INDEX: 24.66 KG/M2 | SYSTOLIC BLOOD PRESSURE: 134 MMHG | DIASTOLIC BLOOD PRESSURE: 81 MMHG | HEIGHT: 65 IN

## 2019-04-16 DIAGNOSIS — M79.671 RIGHT FOOT PAIN: ICD-10-CM

## 2019-04-16 DIAGNOSIS — S92.331A DISPLACED FRACTURE OF THIRD METATARSAL BONE, RIGHT FOOT, INITIAL ENCOUNTER FOR CLOSED FRACTURE: Primary | ICD-10-CM

## 2019-04-16 DIAGNOSIS — Z01.818 PRE-OPERATIVE EXAMINATION: ICD-10-CM

## 2019-04-16 DIAGNOSIS — S92.331A DISPLACED FRACTURE OF THIRD METATARSAL BONE, RIGHT FOOT, INITIAL ENCOUNTER FOR CLOSED FRACTURE: ICD-10-CM

## 2019-04-16 LAB
ALBUMIN SERPL-MCNC: 4.3 G/DL (ref 3.4–5)
ALBUMIN/GLOB SERPL: 1.3 {RATIO} (ref 0.8–1.7)
ALP SERPL-CCNC: 62 U/L (ref 45–117)
ALT SERPL-CCNC: 47 U/L (ref 13–56)
ANION GAP SERPL CALC-SCNC: 6 MMOL/L (ref 3–18)
APPEARANCE UR: CLEAR
AST SERPL-CCNC: 33 U/L (ref 15–37)
BACTERIA URNS QL MICRO: ABNORMAL /HPF
BASOPHILS # BLD: 0 K/UL (ref 0–0.1)
BASOPHILS NFR BLD: 0 % (ref 0–2)
BILIRUB SERPL-MCNC: 0.3 MG/DL (ref 0.2–1)
BILIRUB UR QL: NEGATIVE
BUN SERPL-MCNC: 24 MG/DL (ref 7–18)
BUN/CREAT SERPL: 29 (ref 12–20)
CALCIUM SERPL-MCNC: 10 MG/DL (ref 8.5–10.1)
CHLORIDE SERPL-SCNC: 108 MMOL/L (ref 100–108)
CO2 SERPL-SCNC: 27 MMOL/L (ref 21–32)
COLOR UR: YELLOW
CREAT SERPL-MCNC: 0.84 MG/DL (ref 0.6–1.3)
DIFFERENTIAL METHOD BLD: ABNORMAL
EOSINOPHIL # BLD: 0.2 K/UL (ref 0–0.4)
EOSINOPHIL NFR BLD: 2 % (ref 0–5)
EPITH CASTS URNS QL MICRO: ABNORMAL /LPF (ref 0–5)
ERYTHROCYTE [DISTWIDTH] IN BLOOD BY AUTOMATED COUNT: 14.2 % (ref 11.6–14.5)
GLOBULIN SER CALC-MCNC: 3.3 G/DL (ref 2–4)
GLUCOSE SERPL-MCNC: 92 MG/DL (ref 74–99)
GLUCOSE UR STRIP.AUTO-MCNC: NEGATIVE MG/DL
HCT VFR BLD AUTO: 41.6 % (ref 35–45)
HGB BLD-MCNC: 13 G/DL (ref 12–16)
HGB UR QL STRIP: NEGATIVE
INR PPP: 0.9 (ref 0.8–1.2)
KETONES UR QL STRIP.AUTO: NEGATIVE MG/DL
LEUKOCYTE ESTERASE UR QL STRIP.AUTO: ABNORMAL
LYMPHOCYTES # BLD: 1.4 K/UL (ref 0.9–3.6)
LYMPHOCYTES NFR BLD: 17 % (ref 21–52)
MCH RBC QN AUTO: 28 PG (ref 24–34)
MCHC RBC AUTO-ENTMCNC: 31.3 G/DL (ref 31–37)
MCV RBC AUTO: 89.5 FL (ref 74–97)
MONOCYTES # BLD: 0.9 K/UL (ref 0.05–1.2)
MONOCYTES NFR BLD: 11 % (ref 3–10)
MUCOUS THREADS URNS QL MICRO: ABNORMAL /LPF
NEUTS SEG # BLD: 5.6 K/UL (ref 1.8–8)
NEUTS SEG NFR BLD: 70 % (ref 40–73)
NITRITE UR QL STRIP.AUTO: NEGATIVE
PH UR STRIP: 5 [PH] (ref 5–8)
PLATELET # BLD AUTO: 310 K/UL (ref 135–420)
PMV BLD AUTO: 9.8 FL (ref 9.2–11.8)
POTASSIUM SERPL-SCNC: 3.8 MMOL/L (ref 3.5–5.5)
PROT SERPL-MCNC: 7.6 G/DL (ref 6.4–8.2)
PROT UR STRIP-MCNC: NEGATIVE MG/DL
PROTHROMBIN TIME: 12.3 SEC (ref 11.5–15.2)
RBC # BLD AUTO: 4.65 M/UL (ref 4.2–5.3)
RBC #/AREA URNS HPF: NEGATIVE /HPF (ref 0–5)
SODIUM SERPL-SCNC: 141 MMOL/L (ref 136–145)
SP GR UR REFRACTOMETRY: 1.01 (ref 1–1.03)
UROBILINOGEN UR QL STRIP.AUTO: 0.2 EU/DL (ref 0.2–1)
WBC # BLD AUTO: 8.1 K/UL (ref 4.6–13.2)
WBC URNS QL MICRO: ABNORMAL /HPF (ref 0–4)

## 2019-04-16 PROCEDURE — 71046 X-RAY EXAM CHEST 2 VIEWS: CPT

## 2019-04-16 PROCEDURE — 93005 ELECTROCARDIOGRAM TRACING: CPT

## 2019-04-16 PROCEDURE — 80053 COMPREHEN METABOLIC PANEL: CPT

## 2019-04-16 PROCEDURE — 36415 COLL VENOUS BLD VENIPUNCTURE: CPT

## 2019-04-16 PROCEDURE — 87086 URINE CULTURE/COLONY COUNT: CPT

## 2019-04-16 PROCEDURE — 85610 PROTHROMBIN TIME: CPT

## 2019-04-16 PROCEDURE — 85025 COMPLETE CBC W/AUTO DIFF WBC: CPT

## 2019-04-16 PROCEDURE — 81001 URINALYSIS AUTO W/SCOPE: CPT

## 2019-04-16 RX ORDER — FLUCONAZOLE 150 MG/1
150 TABLET ORAL DAILY
Qty: 1 TAB | Refills: 1 | Status: SHIPPED | OUTPATIENT
Start: 2019-04-16 | End: 2019-04-17

## 2019-04-16 RX ORDER — POLYETHYLENE GLYCOL 3350 17 G/17G
17 POWDER, FOR SOLUTION ORAL DAILY
Qty: 10 PACKET | Refills: 1 | Status: SHIPPED | OUTPATIENT
Start: 2019-04-16 | End: 2020-01-09 | Stop reason: ALTCHOICE

## 2019-04-16 RX ORDER — ONDANSETRON 4 MG/1
4 TABLET, FILM COATED ORAL
Qty: 30 TAB | Refills: 0 | Status: SHIPPED | OUTPATIENT
Start: 2019-04-16 | End: 2020-01-09 | Stop reason: ALTCHOICE

## 2019-04-16 RX ORDER — DIPHENHYDRAMINE HCL 25 MG
25 CAPSULE ORAL
Qty: 30 CAP | Refills: 0 | Status: SHIPPED | OUTPATIENT
Start: 2019-04-16 | End: 2019-04-21

## 2019-04-16 RX ORDER — ASPIRIN 325 MG
325 TABLET ORAL DAILY
Qty: 30 TAB | Refills: 0 | Status: SHIPPED | OUTPATIENT
Start: 2019-04-16 | End: 2019-06-25 | Stop reason: ALTCHOICE

## 2019-04-16 RX ORDER — MEPERIDINE HYDROCHLORIDE 50 MG/1
25-50 TABLET ORAL
Qty: 30 TAB | Refills: 0 | Status: SHIPPED | OUTPATIENT
Start: 2019-04-16 | End: 2019-04-23

## 2019-04-16 NOTE — LETTER
[address] [facility] Surgery Request Form for the Operating Room at [facility] Fax to [phone]                                        Telephone: [phone] or [phone] To be completed by Physician Office: 
 
Date: 2019    Requested by: Susana Mustafa Phone No: [phone]  Fax No:  [phone] Surgery Date: 19    Requested Time: Third Case Surgeon: Wolfgang Clancy MD Assistant/2nd Surgeon: Carina Avery PA-C 
 
CPT CODE Procedure 33259 ORIF Right Third Metatarsal  
09361 Possible Bone Graft ICD10 code(s): Right Third Metatarsal FX [id number] Patient Information: 
 
Name: Loc Martínez SSN: [id number]  : 1945  Male/Female: female Home Phone No: [phone] (home) Primary Insurance: Medicare   Insurance Policy Number: [id number] Allergies: Allergies Allergen Reactions  Codeine Nausea Only Admission:  Outpatient Anesthesia Type General w/Nerve Block for Post-op Pain Control Comments/Special Equipment and/or : Arthrex  LARGE C-Arm   

## 2019-04-16 NOTE — H&P (VIEW-ONLY)
FOOT AND ANKLE HISTORY AND PHYSICAL Patient: Adeline Severin                   MRN: [id number]         SSN: [id number] YOB: 1945                 AGE: 76 y.o. SEX: female Patient scheduled for:  Open reduction internal fixation right third metatarsal fracture, possible bone grafting Date of surgery: 4/18/19 [facility] Surgeon: Sylvia Mccann. MD Kapil 
ANESTHESIA TYPE:  General,  Popliteal block  
  
 
 
PRESCRIPTIONS AND/OR ORDERS PROVIDED DURING H&P: 
 
Orders Placed This Encounter  AMB SUPPLY ORDER  
 CULTURE, URINE  XR CHEST PA LAT  METABOLIC PANEL, COMPREHENSIVE  
 CBC WITH AUTOMATED DIFF  
 PROTHROMBIN TIME + INR  
 URINALYSIS W/ RFLX MICROSCOPIC  EKG, 12 LEAD, SUBSEQUENT  meperidine (DEMEROL) 50 mg tablet  diphenhydrAMINE (BENADRYL) 25 mg capsule  ondansetron hcl (ZOFRAN) 4 mg tablet  polyethylene glycol (MIRALAX) 17 gram packet  aspirin (ASPIRIN) 325 mg tablet  fluconazole (DIFLUCAN) 150 mg tablet  
  
  
  
 
HISTORY:  
 
The patient was seen in the office today for a preoperative history and physical for an upcoming above listed surgery. The patient is a pleasant 76 y.o. female who has a history of right third metatarsal fracture. Patient states that she does not know how it happened. She states has had some soreness to her right foot that increased in pain yesterday. She states that her sister is in the hospital and she did quite a bit of walking around the hospital yesterday and had increased pain. She reports a history of osteopenia being treated by Dr. Brett Garces. She also reports a history of foot fractures on her left and right as well as a left wrist fracture repaired by Dr. Ramu Goodrich in the past.  
 
Due to the current findings, affected activity of daily living and continued pain and discomfort, surgical intervention is indicated.  The alternatives, risks, and complications, including but not limited to infection, blood loss, need for blood transfusion, neurovascular damage, yanet-incisional numbness, subcutaneous hematoma, bone fracture, anesthetic complications, DVT, PE, death, RSD, postoperative stiffness and pain, possible surgical scar, delayed healing and nonhealing, reflexive sympathetic dystrophy, damage to blood vessels and nerves, need for more surgery, MI, and stroke, have been discussed. The patient understands and wishes to proceed with surgery. PAST MEDICAL HISTORY:  
 
Past Medical History:  
Diagnosis Date  Eczema  Essential hypertension  FH: breast cancer  Fibroids   
 uterine  Fracture of radius 11/2009  
 with ulna, left, closed  History of cataract  History of foot fracture   
 right and left  Hyperlipidemia  Microhematuria   
 chronic  Osteopenia  S/P radiation therapy   
 as child, to right shoulder for birthmark  Tinnitus  Zoster   
 right scapula CURRENT MEDICATIONS:  
 
Current Outpatient Medications Medication Sig Dispense Refill  meperidine (DEMEROL) 50 mg tablet Take 0.5-1 Tabs by mouth every six (6) hours as needed for Pain for up to 7 days. Max Daily Amount: 200 mg. FOR PAIN **AFTER SURGERY** DO NOT TAKE PRIOR TO SURGERY  Indications: Severe Pain 30 Tab 0  
 diphenhydrAMINE (BENADRYL) 25 mg capsule Take 1 Cap by mouth every six (6) hours as needed for Itching for up to 5 days. Indications: itching 30 Cap 0  
 ondansetron hcl (ZOFRAN) 4 mg tablet Take 1 Tab by mouth every eight (8) hours as needed for Nausea. 30 Tab 0  
 polyethylene glycol (MIRALAX) 17 gram packet Take 1 Packet by mouth daily. 10 Packet 1  
 aspirin (ASPIRIN) 325 mg tablet Take 1 Tab by mouth daily. 30 Tab 0  
 fluconazole (DIFLUCAN) 150 mg tablet Take 1 Tab by mouth daily for 1 day.  1 Tab 1  
 atorvastatin (LIPITOR) 10 mg tablet TAKE 1 TABLET BY MOUTH EVERY DAY 90 Tab 2  
  hydroCHLOROthiazide (HYDRODIURIL) 25 mg tablet TAKE 1 TABLET BY MOUTH DAILY 90 Tab 2  
 lisinopril (PRINIVIL, ZESTRIL) 10 mg tablet TAKE 1 TABLET BY MOUTH DAILY 90 Tab 3  
 crisaborole (EUCRISA) 2 % oint by Apply Externally route.  alendronate (FOSAMAX) 70 mg tablet TAKE 1 TABLET BY MOUTH EVERY 7 DAYS 12 Tab 3  
 triamcinolone acetonide (KENALOG) 0.025 % topical cream Apply  to affected area two (2) times a day. 15 g 2  clotrimazole-betamethasone (LOTRISONE) topical cream Apply  to affected area two (2) times a day. 15 g 2  
 docusate sodium (COLACE) 100 mg capsule Take 100 mg by mouth daily as needed for Constipation.  co-enzyme Q-10 (CO Q-10) 100 mg capsule Take 100 mg by mouth daily.  biotin 2,500 mcg Tab Take 5,000 mcg by mouth.  cholecalciferol (VITAMIN D3) 1,000 unit cap Take 1 Tab by mouth daily.  fexofenadine (ALLEGRA) 60 mg tablet Take 60 mg by mouth daily.  aspirin 81 mg chewable tablet Take 81 mg by mouth daily.  CALCIUM CARBONATE/VITAMIN D3 (CALCIUM + D PO) Take 1 Tab by mouth two (2) times a day. ALLERGIES:  
 
Allergies Allergen Reactions  Codeine Nausea Only SURGICAL HISTORY:  
 
Past Surgical History:  
Procedure Laterality Date  HX CATARACT REMOVAL    
 HX GYN    
 hysterectomy  HX ORTHOPAEDIC    
 left arm  HX WISDOM TEETH EXTRACTION    
 HX WRIST FRACTURE TX    
 
 
SOCIAL HISTORY:  
 
Social History Socioeconomic History  Marital status:  Spouse name: Not on file  Number of children: Not on file  Years of education: Not on file  Highest education level: Not on file Tobacco Use  Smoking status: Never Smoker  Smokeless tobacco: Never Used Substance and Sexual Activity  Alcohol use: Yes Alcohol/week: 0.0 oz  
  Comment: occasionally  Drug use: No  
 Sexual activity: Yes  
  Partners: Male FAMILY HISTORY:  
 
Family History Problem Relation Age of Onset  Cancer Mother   
     pancreatic  Heart Disease Mother  Heart Disease Father  Cancer Sister   
     breast  
 MS Sister  Breast Cancer Sister 62 REVIEW OF SYSTEMS:  
 
Negative for fevers, chills, chest pain, shortness of breath, weight loss, recent illness General: Negative for fever and chills. No unexpected change in weight. Denies fatigue. No change in appetite. Skin: Negative for rash or itching. HEENT: Negative for congestion, sore throat, neck pain and neck stiffness. No change in vision or hearing. Hasn't noted any enlarged lymph nodes in the neck. Cardiovascular:  Negative for chest pain and palpitations. Has not noted pedal edema. Respiratory: Negative for cough, colds, sinus, hemoptysis, shortness of breath and wheezing. Gastrointestinal: Negative for nausea and vomiting, rectal bleeding, coffee ground emesis, abdominal pain, diarrhea and constipation. Genitourinary: Negative for dysuria, frequency urgency, or burning on micturition. No flank pain, no foul smelling urine, no difficulty with initiating urination. Hematological: Negative for bleeding or easy bruising. Musculoskeletal: Negative  for arthralgias, back pain or neck pain. Neurological: Negative for dizziness, seizures or syncopal episodes. Denies headaches. Endocrine: Denies excessive thirst.  No heat/cold intolerance. Psychiatric: Negative for depression or insomnia. PHYSICAL EXAMINATION:  
 
VITALS: There were no vitals taken for this visit. GEN:  Well developed, well nourished 76 y.o. female in no acute distress. PSYCH: Alert an oriented to person, place and time. Mood, memory, affect, behavior and judgment normal 
HEENT: Normocephalic and atraumatic. Eyes: Conjunctivae and EOM are normal.Pupils are equal, round, and reactive to light. External ear normal appearance, external nose normal appearing.  Mouth/Throat: Oropharynx is clear and moist, able to handle oral secretions w/out difficulty, airway patent NECK: Supple. Normal ROM, No lymphadenopathy. Trachea is midline. No bruising, swelling or deformity RESP: Clear to auscultation bilaterally. No wheezes, rales, rhonchi. Normal effort and breath sounds. No respiratory distress CARDIO: Normal rate, regular rhythm and normal heart sounds. No MGR. ABDOMEN: Soft, non-tender, non-distended, normoactive bowel sounds in all four quadrants. There is no tenderness. There is no rebound and no guarding. BACK: No CVA or spinal tenderness BREAST:  Deferred PELVIC:    Deferred RECTAL:  Deferred :           Deferred EXTREMITIES: EXAMINATION OF: right foot SKIN: mild edema , no erythema, no  ecchymosis TENDERNESS:  mild tenderness to palpation NEUROVASCULAR: Sensation intact to light touch and strength grossly intact and symmetrical. No nystagmus. Positive distal pulses and capillary refill. DVT ASSESSMENT:  There is not  calf tenderness. No evidence of DVT seen on physical exam. 
ROM: within normal limits MOTOR: In tact RADIOGRAPHS & DIAGNOSTIC STUDIES:  
 
No notes on file LABS:  
Pending ASSESSMENT:  
 
 
Encounter Diagnoses Name Primary?  Displaced fracture of third metatarsal bone, right foot, initial encounter for closed fracture Yes  Pre-operative examination  Right foot pain PLAN:  
 
Again, the alternatives, risks, and complications, as well as expected outcome were discussed. The patient understands and agrees to proceed with the above listed surgery pending completion of labs and diagnostic studies. Patient has been given Hibiclens wash with instructions and prescriptions and or orders listed above.  
 
Haider Timmons PA-C 
4/16/2019 
2:54 PM

## 2019-04-16 NOTE — H&P
FOOT AND ANKLE HISTORY AND PHYSICAL      Patient: Margarita Raza                   MRN: [id number]         SSN: [id number]  YOB: 1945                 AGE: 76 y.o. SEX: female    Patient scheduled for:  Open reduction internal fixation right third metatarsal fracture, possible bone grafting   Date of surgery: 4/18/19   [facility]  Surgeon: Clarisse Weathers. MD Kapil  ANESTHESIA TYPE:  General,  Popliteal block          PRESCRIPTIONS AND/OR ORDERS PROVIDED DURING H&P:    Orders Placed This Encounter    AMB SUPPLY ORDER    CULTURE, URINE    XR CHEST PA LAT    METABOLIC PANEL, COMPREHENSIVE    CBC WITH AUTOMATED DIFF    PROTHROMBIN TIME + INR    URINALYSIS W/ RFLX MICROSCOPIC    EKG, 12 LEAD, SUBSEQUENT    meperidine (DEMEROL) 50 mg tablet    diphenhydrAMINE (BENADRYL) 25 mg capsule    ondansetron hcl (ZOFRAN) 4 mg tablet    polyethylene glycol (MIRALAX) 17 gram packet    aspirin (ASPIRIN) 325 mg tablet    fluconazole (DIFLUCAN) 150 mg tablet              HISTORY:     The patient was seen in the office today for a preoperative history and physical for an upcoming above listed surgery. The patient is a pleasant 76 y.o. female who has a history of right third metatarsal fracture. Patient states that she does not know how it happened. She states has had some soreness to her right foot that increased in pain yesterday. She states that her sister is in the hospital and she did quite a bit of walking around the hospital yesterday and had increased pain. She reports a history of osteopenia being treated by Dr. Shakira Parker. She also reports a history of foot fractures on her left and right as well as a left wrist fracture repaired by Dr. Alme Taveras in the past.     Due to the current findings, affected activity of daily living and continued pain and discomfort, surgical intervention is indicated.  The alternatives, risks, and complications, including but not limited to infection, blood loss, need for blood transfusion, neurovascular damage, yanet-incisional numbness, subcutaneous hematoma, bone fracture, anesthetic complications, DVT, PE, death, RSD, postoperative stiffness and pain, possible surgical scar, delayed healing and nonhealing, reflexive sympathetic dystrophy, damage to blood vessels and nerves, need for more surgery, MI, and stroke, have been discussed. The patient understands and wishes to proceed with surgery. PAST MEDICAL HISTORY:     Past Medical History:   Diagnosis Date    Eczema     Essential hypertension     FH: breast cancer     Fibroids     uterine    Fracture of radius 11/2009    with ulna, left, closed    History of cataract     History of foot fracture     right and left    Hyperlipidemia     Microhematuria     chronic    Osteopenia     S/P radiation therapy     as child, to right shoulder for birthmark    Tinnitus     Zoster     right scapula       CURRENT MEDICATIONS:     Current Outpatient Medications   Medication Sig Dispense Refill    meperidine (DEMEROL) 50 mg tablet Take 0.5-1 Tabs by mouth every six (6) hours as needed for Pain for up to 7 days. Max Daily Amount: 200 mg. FOR PAIN **AFTER SURGERY** DO NOT TAKE PRIOR TO SURGERY  Indications: Severe Pain 30 Tab 0    diphenhydrAMINE (BENADRYL) 25 mg capsule Take 1 Cap by mouth every six (6) hours as needed for Itching for up to 5 days. Indications: itching 30 Cap 0    ondansetron hcl (ZOFRAN) 4 mg tablet Take 1 Tab by mouth every eight (8) hours as needed for Nausea. 30 Tab 0    polyethylene glycol (MIRALAX) 17 gram packet Take 1 Packet by mouth daily. 10 Packet 1    aspirin (ASPIRIN) 325 mg tablet Take 1 Tab by mouth daily. 30 Tab 0    fluconazole (DIFLUCAN) 150 mg tablet Take 1 Tab by mouth daily for 1 day.  1 Tab 1    atorvastatin (LIPITOR) 10 mg tablet TAKE 1 TABLET BY MOUTH EVERY DAY 90 Tab 2    hydroCHLOROthiazide (HYDRODIURIL) 25 mg tablet TAKE 1 TABLET BY MOUTH DAILY 90 Tab 2    lisinopril (PRINIVIL, ZESTRIL) 10 mg tablet TAKE 1 TABLET BY MOUTH DAILY 90 Tab 3    crisaborole (EUCRISA) 2 % oint by Apply Externally route.  alendronate (FOSAMAX) 70 mg tablet TAKE 1 TABLET BY MOUTH EVERY 7 DAYS 12 Tab 3    triamcinolone acetonide (KENALOG) 0.025 % topical cream Apply  to affected area two (2) times a day. 15 g 2    clotrimazole-betamethasone (LOTRISONE) topical cream Apply  to affected area two (2) times a day. 15 g 2    docusate sodium (COLACE) 100 mg capsule Take 100 mg by mouth daily as needed for Constipation.  co-enzyme Q-10 (CO Q-10) 100 mg capsule Take 100 mg by mouth daily.  biotin 2,500 mcg Tab Take 5,000 mcg by mouth.  cholecalciferol (VITAMIN D3) 1,000 unit cap Take 1 Tab by mouth daily.  fexofenadine (ALLEGRA) 60 mg tablet Take 60 mg by mouth daily.  aspirin 81 mg chewable tablet Take 81 mg by mouth daily.  CALCIUM CARBONATE/VITAMIN D3 (CALCIUM + D PO) Take 1 Tab by mouth two (2) times a day. ALLERGIES:     Allergies   Allergen Reactions    Codeine Nausea Only         SURGICAL HISTORY:     Past Surgical History:   Procedure Laterality Date    HX CATARACT REMOVAL      HX GYN      hysterectomy    HX ORTHOPAEDIC      left arm    HX WISDOM TEETH EXTRACTION      HX WRIST FRACTURE TX         SOCIAL HISTORY:     Social History     Socioeconomic History    Marital status:      Spouse name: Not on file    Number of children: Not on file    Years of education: Not on file    Highest education level: Not on file   Tobacco Use    Smoking status: Never Smoker    Smokeless tobacco: Never Used   Substance and Sexual Activity    Alcohol use:  Yes     Alcohol/week: 0.0 oz     Comment: occasionally    Drug use: No    Sexual activity: Yes     Partners: Male       FAMILY HISTORY:     Family History   Problem Relation Age of Onset    Cancer Mother         pancreatic    Heart Disease Mother     Heart Disease Father    Lindsey Gaucher Sister         breast    MS Sister     Breast Cancer Sister 62       REVIEW OF SYSTEMS:     Negative for fevers, chills, chest pain, shortness of breath, weight loss, recent illness     General: Negative for fever and chills. No unexpected change in weight. Denies fatigue. No change in appetite. Skin: Negative for rash or itching. HEENT: Negative for congestion, sore throat, neck pain and neck stiffness. No change in vision or hearing. Hasn't noted any enlarged lymph nodes in the neck. Cardiovascular:  Negative for chest pain and palpitations. Has not noted pedal edema. Respiratory: Negative for cough, colds, sinus, hemoptysis, shortness of breath and wheezing. Gastrointestinal: Negative for nausea and vomiting, rectal bleeding, coffee ground emesis, abdominal pain, diarrhea and constipation. Genitourinary: Negative for dysuria, frequency urgency, or burning on micturition. No flank pain, no foul smelling urine, no difficulty with initiating urination. Hematological: Negative for bleeding or easy bruising. Musculoskeletal: Negative  for arthralgias, back pain or neck pain. Neurological: Negative for dizziness, seizures or syncopal episodes. Denies headaches. Endocrine: Denies excessive thirst.  No heat/cold intolerance. Psychiatric: Negative for depression or insomnia. PHYSICAL EXAMINATION:     VITALS: There were no vitals taken for this visit. GEN:  Well developed, well nourished 76 y.o. female in no acute distress. PSYCH: Alert an oriented to person, place and time. Mood, memory, affect, behavior and judgment normal  HEENT: Normocephalic and atraumatic. Eyes: Conjunctivae and EOM are normal.Pupils are equal, round, and reactive to light. External ear normal appearance, external nose normal appearing. Mouth/Throat: Oropharynx is clear and moist, able to handle oral secretions w/out difficulty, airway patent  NECK: Supple. Normal ROM, No lymphadenopathy.  Trachea is midline. No bruising, swelling or deformity  RESP: Clear to auscultation bilaterally. No wheezes, rales, rhonchi. Normal effort and breath sounds. No respiratory distress  CARDIO: Normal rate, regular rhythm and normal heart sounds. No MGR. ABDOMEN: Soft, non-tender, non-distended, normoactive bowel sounds in all four quadrants. There is no tenderness. There is no rebound and no guarding. BACK: No CVA or spinal tenderness  BREAST:  Deferred  PELVIC:    Deferred   RECTAL:  Deferred   :           Deferred  EXTREMITIES: EXAMINATION OF: right foot  SKIN: mild edema , no erythema, no  ecchymosis    TENDERNESS:  mild tenderness to palpation  NEUROVASCULAR: Sensation intact to light touch and strength grossly intact and symmetrical. No nystagmus. Positive distal pulses and capillary refill. DVT ASSESSMENT:  There is not  calf tenderness. No evidence of DVT seen on physical exam.  ROM: within normal limits  MOTOR: In tact    RADIOGRAPHS & DIAGNOSTIC STUDIES:     No notes on file    LABS:     Pending    ASSESSMENT:       Encounter Diagnoses   Name Primary?  Displaced fracture of third metatarsal bone, right foot, initial encounter for closed fracture Yes    Pre-operative examination     Right foot pain        PLAN:     Again, the alternatives, risks, and complications, as well as expected outcome were discussed. The patient understands and agrees to proceed with the above listed surgery pending completion of labs and diagnostic studies. Patient has been given Hibiclens wash with instructions and prescriptions and or orders listed above.     Flora Christian PA-C  4/16/2019  2:54 PM

## 2019-04-16 NOTE — PROGRESS NOTES
Monica Abdul  1945   Chief Complaint   Patient presents with    Foot Pain     right foot pain        HISTORY OF PRESENT ILLNESS  Monica Abdul is a 76 y.o. female who presents today for evaluation of right foot pain. She rates her pain 5/10 today. Pain has been present for 1 week, denies injury. She was seen at [facility] and presents today with a boot. Patient describes the pain as aching and dull that is Intermittent in nature. Symptoms are worse with walking and standing , Activity and is better with  Rest, Elevation. Associated symptoms include tenderness, Swelling. Since problem started, it: is unchanged. Pain does not wake patient up at night. Has taken no meds for the problem. Has tried following treatments: Injections:NO; Brace:NO; Therapy:NO; Cane/Crutch:NO       Allergies   Allergen Reactions    Codeine Nausea Only        Past Medical History:   Diagnosis Date    Essential hypertension     FH: breast cancer     Fibroids     uterine    Fracture of radius 11/2009    with ulna, left, closed    Hyperlipidemia     Microhematuria     chronic    Osteopenia     S/P radiation therapy     as child, to right shoulder for birthmark    Tinnitus     Zoster     right scapula      Social History     Socioeconomic History    Marital status:      Spouse name: Not on file    Number of children: Not on file    Years of education: Not on file    Highest education level: Not on file   Occupational History    Not on file   Social Needs    Financial resource strain: Not on file    Food insecurity:     Worry: Not on file     Inability: Not on file    Transportation needs:     Medical: Not on file     Non-medical: Not on file   Tobacco Use    Smoking status: Never Smoker    Smokeless tobacco: Never Used   Substance and Sexual Activity    Alcohol use:  Yes     Alcohol/week: 0.0 oz     Comment: occasionally    Drug use: No    Sexual activity: Yes     Partners: Male   Lifestyle    Physical activity:     Days per week: Not on file     Minutes per session: Not on file    Stress: Not on file   Relationships    Social connections:     Talks on phone: Not on file     Gets together: Not on file     Attends [employer] service: Not on file     Active member of club or organization: Not on file     Attends meetings of clubs or organizations: Not on file     Relationship status: Not on file    Intimate partner violence:     Fear of current or ex partner: Not on file     Emotionally abused: Not on file     Physically abused: Not on file     Forced sexual activity: Not on file   Other Topics Concern    Not on file   Social History Narrative    Not on file      Past Surgical History:   Procedure Laterality Date    HX GYN      hysterectomy    HX ORTHOPAEDIC      left arm      Family History   Problem Relation Age of Onset    Cancer Mother         pancreatic    Heart Disease Mother     Heart Disease Father     Cancer Sister         breast    MS Sister     Breast Cancer Sister 62      Current Outpatient Medications   Medication Sig    atorvastatin (LIPITOR) 10 mg tablet TAKE 1 TABLET BY MOUTH EVERY DAY    hydroCHLOROthiazide (HYDRODIURIL) 25 mg tablet TAKE 1 TABLET BY MOUTH DAILY    lisinopril (PRINIVIL, ZESTRIL) 10 mg tablet TAKE 1 TABLET BY MOUTH DAILY    crisaborole (EUCRISA) 2 % oint by Apply Externally route.  alendronate (FOSAMAX) 70 mg tablet TAKE 1 TABLET BY MOUTH EVERY 7 DAYS    clotrimazole-betamethasone (LOTRISONE) topical cream Apply  to affected area two (2) times a day.  docusate sodium (COLACE) 100 mg capsule Take 100 mg by mouth daily as needed for Constipation.  co-enzyme Q-10 (CO Q-10) 100 mg capsule Take 100 mg by mouth daily.  biotin 2,500 mcg Tab Take 5,000 mcg by mouth.  cholecalciferol (VITAMIN D3) 1,000 unit cap Take 1 Tab by mouth daily.  fexofenadine (ALLEGRA) 60 mg tablet Take 60 mg by mouth daily.     CALCIUM CARBONATE/VITAMIN D3 (CALCIUM + D PO) Take 1 Tab by mouth two (2) times a day.  triamcinolone acetonide (KENALOG) 0.025 % topical cream Apply  to affected area two (2) times a day.  aspirin 81 mg chewable tablet Take 81 mg by mouth daily. No current facility-administered medications for this visit. REVIEW OF SYSTEM   Patient denies: Weight loss, Fever/Chills, HA, Visual changes, Fatigue, Chest pain, SOB, Abdominal pain, N/V/D/C, Blood in stool or urine, Edema. Pertinent positive as above in HPI. All others were negative    PHYSICAL EXAM:   Visit Vitals  /81   Pulse 99   Temp 97.3 °F (36.3 °C) (Oral)   Resp 16   Ht 5' 5\" (1.651 m)   Wt 148 lb (67.1 kg)   SpO2 97%   BMI 24.63 kg/m²     The patient is a well-developed, well-nourished female   in no acute distress. The patient is alert and oriented times three. The patient is alert and oriented times three. Mood and affect are normal.  LYMPHATIC: lymph nodes are not enlarged and are within normal limits  SKIN: normal in color and non tender to palpation. There are no bruises or abrasions noted. NEUROLOGICAL: Motor sensory exam is within normal limits. Reflexes are equal bilaterally. There is normal sensation to pinprick and light touch  MUSCULOSKELETAL:  Examination Right Ankle/Foot   Skin Intact   Swelling -   Dorsiflexion 10   Plantarflexion 25   Deformity -   Inversion laxity -   Anterior drawer -   Medial malleolus tenderness -   Lateral malleolus tenderness -   Heel cord Intact   Heel cord Tightness -   Boyer's Test -   Mervat's Test -   Sensation Intact   Bunion -   Capillary refill Normal     IMAGING: XR of right foot dated 4/16/19 was reviewed and read: mid third metatarsal fracture with displacement. IMPRESSION:      ICD-10-CM ICD-9-CM    1. Displaced fracture of third metatarsal bone, right foot, initial encounter for closed fracture S92.331A 825.25         PLAN:  1.  The patient presents today with right foot pain due to a fracture of the third metatarsal and I would like her to see Dr. Gilford Jesus for surgery. Risk factors include: htn  2. No ultrasound exam indicated today  3. No cortisone injection indicated today   4. No Physical/Occupational Therapy indicated today  5. No diagnostic test indicated today:   6. No durable medical equipment indicated today  7. Yes referral indicated today DR. BROWNE   8. No medications indicated today:   9. No Narcotic indicated today    RTC prn    Scribed by Iain Valencia [facility]) as dictated by Katja Mayo MD    I, Dr. Katja Mayo, confirm that all documentation is accurate.     Katja Mayo M.D.   [facility]

## 2019-04-16 NOTE — LETTER
Patient: Monica Mustafa PROCEDURE: ORIF Right Third Metatarsal 
 
PRE OPERATIVE INSTRUCTIONS: 
Five (5) days prior to surgery STOP taking any hormonal medications, aspirin and/or anti-inflammatory medications. If you are taking blood thinner medication (such as Coumadin, Plavix, Heparin or others) you will need special  instructions from the prescribing physician. Report to the Lab  At [facility] with your enclosed order Form on 4/16/19 It does not matter if you have eaten before going to the lab. Surgery Date: 4/18/19  Time: 12:30pm 
Report to [facility] on the [address] [place] [address] at: 10:30am 
 
THE DAY OF SURGERY:  
      1. Do not eat, chew gum or drink anything after Midnight prior to the date of your surgery. 2. Take your regular medications with small sips of water unless otherwise instructed. (This means blood pressure and/or heart medicine) If you are insulin dependent, bring your insulin with you, unless otherwise instructed. 3. Bring a list of your medications and the dosage to the hospital including  vitamins. 4. Do not wear nail polish, make-up, jewelry, perfumes or Skin Creams. 5. Do not bring valuables or money to the hospital. 
      6. Must have a responsible adult to accompany you and stay during your surgery so they may drive you home following your surgery and stay with you 24 hours after surgery. Post op visit  appointment is scheduled with Jaqueline REED   
   on 4/23/19 @ 2:15pm  at the  [facility] office. Antonette Grijalva, Surgery Scheduler  [phone]

## 2019-04-16 NOTE — PROGRESS NOTES
A user error has taken place:patient was not seen by Stephanie Silverio. She was moved to Dr. Jerod Rowan schedule.

## 2019-04-16 NOTE — PATIENT INSTRUCTIONS
Dr. Gurmeet Zaragoza Pre-operative Instructions:      Patient: Alexandria Huerta   :  1945     I understand I am to stop taking oral birth control pills, hormonal replacement therapy and all Aspirin, Aspirin containing medications, Non-Steroidal Anti-Inflammatory medications (such as Advil, Aleve, Motrin, Ibuprofen) and or Blood thinner medication such as Coumadin, Plavix, Heparin or others 5 days prior to surgery. I understand I am to STOP taking these Medications 5 days prior to surgery:  I am to get instructions from my prescribing physician. 1. __As listed above_______________________  2. _____________________________________  3. _____________________________________  4. _____________________________________    I understand that if I am taking daily medications for high blood pressure, I can take them the morning of surgery with a small sip of water. I will consult my prescribing physician or call [facility] with specific questions. I also understand that:     I am to report important observations or changes that may occur prior to surgery. If I have any changes in my physical condition, such as a rash, a fever, sore throat, abscess, ulcers, nausea, vomiting, or diarrhea. I am to call the office and I am to consult my primary care physician to assess and treat the problem.  I am not to eat or drink anything after midnight the night before my surgery.  I am not to drink alcoholic beverages 24 hours prior to surgery.  I am not to do any illegal drugs prior to surgery.  I am not to smoke at least 24 hours prior to surgery.  I am able and will shower or bathe before surgery. I will use the Hibiclens solution on my surgical site only. The hibiclens directions are one packet a day starting two days before surgery.  I will remove any nail polish, make-up or jewelry prior to arriving for my surgery.       If I wear glasses, contact lenses or dentures they must be removed prior to going to the operating room.  All body piercing and artifical eye-lashes must be removed prior to surgery     I will not wear any aerosol sprays, perfumes or skin creams.  I am to make arrangements for a family member or friend to accompany me to surgery and take me home after my surgery as I will not be allowed to leave the hospital alone. A cab or bus will not be acceptable. Please make arrange for someone to stay with you for 24 hours after surgery.  Patient has expressed understanding of the diagnosis, treatment and planned surgery        Surgery: What to Expect at [address] [place] [address]  This care sheet gives you a general idea about how long it will take for you to recover from your surgery. But each person recovers at a different pace. How can you care for yourself at home? Activity  · Allow your body to heal. Don't move quickly or lift anything heavy until you are feeling better. · Rest when you feel tired. · Your doctor may give you specific instructions on when you can do your normal activities again, such as driving and going back to work. · Be active. Walking is a good choice. Diet  · You can eat your normal diet when you feel well. If your stomach is upset, try bland, low-fat foods like plain rice, broiled chicken, toast, and yogurt. · If your bowel movements are not regular right after surgery, try to avoid constipation and straining. Drink plenty of water. Your doctor may suggest fiber, a stool softener, or a mild laxative. Medicines  · Your doctor will tell you if and when you can restart your medicines. He or she will also give you instructions about taking any new medicines. · If you take blood thinners, such as warfarin (Coumadin), clopidogrel (Plavix), or aspirin, be sure to talk to your doctor. He or she will tell you if and when to start taking those medicines again. Make sure that you understand exactly what your doctor wants you to do. · Be safe with medicines.  Read and follow all instructions on the label. ¨ If the doctor gave you a prescription medicine for pain, take it as prescribed. ¨ If you are not taking a prescription pain medicine, ask your doctor if you can take an over-the-counter medicine. Incision care  · You will have a dressing over the cut (incision). A dressing helps the incision heal and protects it. Your doctor will tell you how to take care of this. · If you have strips of tape on the cut the doctor made, leave the tape on for a week or until it falls off. · If you had stitches, your doctor will tell you when to come back to have them removed. · If you have skin adhesive on the cut, leave it on until it falls off. Skin adhesive is also called liquid stitches. · Change the bandage every day. · Wash the area daily with warm, soapy water, and pat it dry. Don't use hydrogen peroxide or alcohol. They can slow healing. · You may cover the area with a gauze bandage if it oozes fluid or rubs against clothing. · You may shower 24 to 48 hours after surgery. Pat the incision dry. Don't swim or take a bath for the first 2 weeks, or until your doctor tells you it is okay. Follow-up care is a key part of your treatment and safety. Be sure to make and go to all appointments, and call your doctor if you are having problems. It's also a good idea to know your test results and keep a list of the medicines you take. When should you call for help? Call 911 anytime you think you may need emergency care. For example, call if:  · You passed out (lost consciousness). · You have severe trouble breathing. · You have sudden chest pain and shortness of breath, or you cough up blood. Call your doctor now or seek immediate medical care if:  · You have pain that does not get better after you take pain medicine. · You have loose stitches, or your incision comes open. · You are bleeding through your dressing.  A small amount of blood is normal.  · You have signs of infection, such as:  ¨ Increased pain, swelling, warmth, or redness. ¨ Red streaks leading from the incision. ¨ Pus draining from the incision. ¨ A fever. · You have symptoms of a blood clot in your arm or leg (called a deep vein thrombosis). These may include:  ¨ Pain in your calf, back of the knee, thigh, or groin. ¨ Redness and swelling in the arm, leg, or groin. Watch closely for any changes in your health, and be sure to contact your doctor if:  · You do not have a bowel movement after taking a laxative. Where can you learn more? Go to http://mallika-suzanne.info/  Enter U758 in the search box to learn more about \"Surgery: What to Expect at Home. \"  © 9594-8089 [employer]. Care instructions adapted under license by [employer] (which disclaims liability or warranty for this information). This care instruction is for use with your licensed healthcare professional. If you have questions about a medical condition or this instruction, always ask your healthcare professional. [place] [address] any warranty or liability for your use of this information. Content Version: 39.7.819366; Current as of: November 20, 2015          Acute Pain After Surgery: Care Instructions  Your Care Instructions      It's common to have some pain after surgery. Pain doesn't mean that something is wrong or that the surgery didn't go well. But when the pain is severe, it's important to work with your doctor to manage it. It's also important to be aware of a few facts about pain and pain medicine. · You are the only person who knows what your pain feels like. So be sure to tell your doctor when you are in pain or when the pain changes. Then he or she will know how to adjust your medicines. · Pain is often easier to control right after it starts. So it may be better to take regular doses of pain medicine and not wait until the pain gets bad. · Medicine can help control pain.  But this doesn't mean you'll have no pain. Medicine works to keep the pain at a level you can live with. With time, you will feel better. Follow-up care is a key part of your treatment and safety. Be sure to make and go to all appointments, and call your doctor if you are having problems. It's also a good idea to know your test results and keep a list of the medicines you take. How can you care for yourself at home? · Be safe with medicines. Read and follow all instructions on the label. ¨ If the doctor gave you a prescription medicine for pain, take it as prescribed. ¨ If you are not taking a prescription pain medicine, ask your doctor if you can take an over-the-counter medicine. · If you take an over-the-counter pain medicine, such as acetaminophen (Tylenol), ibuprofen (Advil, Motrin), or naproxen (Aleve), read and follow all instructions on the label. · Do not take two or more pain medicines at the same time unless the doctor told you to. · Do not drink alcohol while you are taking pain medicines. · Try to walk each day if your doctor recommends it. Start by walking a little more than you did the day before. Bit by bit, increase the amount you walk. Walking increases blood flow. It also helps prevent pneumonia and constipation. · To prevent constipation from opioid pain medicines:  ¨ Talk to your doctor about a laxative. ¨ Include fruits, vegetables, beans, and whole grains in your diet each day. These foods are high in fiber. ¨ Drink plenty of fluids, enough so that your urine is light yellow or clear like water. Drink water, fruit juice, or other drinks that do not contain caffeine or alcohol. If you have kidney, heart, or liver disease and have to limit fluids, talk with your doctor before you increase the amount of fluids you drink. ¨ Take a fiber supplement, such as Citrucel or Metamucil, every day if needed. Read and follow all instructions on the label.  If you take pain medicine for more than a few days, talk to your doctor before you take fiber. When should you call for help? Call your doctor now or seek immediate medical care if:  ? · Your pain gets worse. ? · Your pain is not controlled by medicine. ? Watch closely for changes in your health, and be sure to contact your doctor if you have any problems. Where can you learn more? Go to http://mallika-suzanne.info/. Enter (32) 006-843 in the search box to learn more about \"Acute Pain After Surgery: Care Instructions. \"  Current as of: March 20, 2017  Content Version: 11.4  © 4096-0395 [employer]. Care instructions adapted under license by [employer] (which disclaims liability or warranty for this information). If you have questions about a medical condition or this instruction, always ask your healthcare professional. [address] any warranty or liability for your use of this information.

## 2019-04-16 NOTE — PROGRESS NOTES
AMBULATORY PROGRESS NOTE Patient: Carey Snider             MRN: [id number]     SSN: [id number] Body mass index is 24.63 kg/m². YOB: 1945     AGE: 76 y.o. EX: female PCP: Jeannine Rosario MD 
 
 IMPRESSION/DIAGNOSIS AND TREATMENT PLAN  
 
DIAGNOSES No diagnosis found. No orders of the defined types were placed in this encounter. Carey Snider understands her diagnoses and the proposed plan. Plan: 
 
1) 
2) RTO - 
 HPI AND EXAMINATION Carey Snider IS A 76 y.o. female who presents to my outpatient office complaining of right foot pain. Visit Vitals /81 Pulse 99 Temp 97.3 °F (36.3 °C) (Oral) Resp 16 Ht 5' 5\" (1.651 m) Wt 148 lb (67.1 kg) SpO2 97% BMI 24.63 kg/m² Appearance: Alert, well appearing and pleasant patient who is in no distress, oriented to person, place/time, and who follows commands. This patient is accompanied in the examination room by her {:[id number]}. Dementia: no dementia Psychiatric: Affect and mood are appropriate. Patient arrives to office via: {With-without:67663} assistive device: *** HEENT: Head normocephalic & atraumatic. Both pupils are round, non icteric sclera Eye: EOM are intact and sclera are {Blank Multiple Select    Template:20062::\"clear\",\"yellow hue\"} Neck: ROM WNL and JVD neck is {Present /OCT:75903928} Hearings Intact, {DOES_DOES [id number] require hearing aid device Respiratory: Breathing is unlabored without accessory chest muscle use Cardiovascular/Peripheral Vascular: Normal Pulses to each hand and foot ANKLE/FOOT {LEFT/RIGHT/BILATERAL:93130} Gait: Normal 
Tenderness:*** No tenderness . Cutaneous: *** WNL. Joint Motion:*** WNL. Joint / Tendon Stability: No Ankle or Subtalar instability or joint laxity. No peroneal sublux ability or dislocation Alignment: Forefoot, Midfoot, Hindfoot WNL. Neuro Motor/Sensory: NL/NL. Vascular: NL foot/ankle pulses. Lymphatics: No extremity lymphedema, No calf swelling, no tenderness to calf muscles. CHART REVIEW Past Medical History:  
Diagnosis Date  Essential hypertension  FH: breast cancer  Fibroids   
 uterine  Fracture of radius 11/2009  
 with ulna, left, closed  Hyperlipidemia  Microhematuria   
 chronic  Osteopenia  S/P radiation therapy   
 as child, to right shoulder for birthmark  Tinnitus  Zoster   
 right scapula Current Outpatient Medications Medication Sig  
 atorvastatin (LIPITOR) 10 mg tablet TAKE 1 TABLET BY MOUTH EVERY DAY  hydroCHLOROthiazide (HYDRODIURIL) 25 mg tablet TAKE 1 TABLET BY MOUTH DAILY  lisinopril (PRINIVIL, ZESTRIL) 10 mg tablet TAKE 1 TABLET BY MOUTH DAILY  crisaborole (EUCRISA) 2 % oint by Apply Externally route.  alendronate (FOSAMAX) 70 mg tablet TAKE 1 TABLET BY MOUTH EVERY 7 DAYS  clotrimazole-betamethasone (LOTRISONE) topical cream Apply  to affected area two (2) times a day.  docusate sodium (COLACE) 100 mg capsule Take 100 mg by mouth daily as needed for Constipation.  co-enzyme Q-10 (CO Q-10) 100 mg capsule Take 100 mg by mouth daily.  biotin 2,500 mcg Tab Take 5,000 mcg by mouth.  cholecalciferol (VITAMIN D3) 1,000 unit cap Take 1 Tab by mouth daily.  fexofenadine (ALLEGRA) 60 mg tablet Take 60 mg by mouth daily.  CALCIUM CARBONATE/VITAMIN D3 (CALCIUM + D PO) Take 1 Tab by mouth two (2) times a day.  triamcinolone acetonide (KENALOG) 0.025 % topical cream Apply  to affected area two (2) times a day.  aspirin 81 mg chewable tablet Take 81 mg by mouth daily. No current facility-administered medications for this visit. Allergies Allergen Reactions  Codeine Nausea Only Past Surgical History:  
Procedure Laterality Date  HX GYN    
 hysterectomy  HX ORTHOPAEDIC    
 left arm Social History Occupational History  Not on file Tobacco Use  
  Smoking status: Never Smoker  Smokeless tobacco: Never Used Substance and Sexual Activity  Alcohol use: Yes Alcohol/week: 0.0 oz  
  Comment: occasionally  Drug use: No  
 Sexual activity: Yes  
  Partners: Male Family History Problem Relation Age of Onset  Cancer Mother   
     pancreatic  Heart Disease Mother  Heart Disease Father  Cancer Sister   
     breast  
 MS Sister  Breast Cancer Sister 62 REVIEW OF SYSTEMS : 4/16/2019  ALL BELOW ARE Negative except : SEE HPI Constitutional: Negative for fever, chills and weight loss. Neg Weight Loss Cardiovascular: Negative for chest pain, claudication and leg swelling. SOB, RUTLEDGE Gastrointestinal/Urological: Negative for  pain, N/V/D/C, Blood in stool or urine,dysuria                         Hematuria, Incontinence, pelvic pain Musculoskeletal: see HPI. Neurological: Negative for dizziness and weakness, headaches,Visual Changes             Confusion,  Or Seizures, Psychiatric/Behavioral: Negative for depression, memory loss and substance abuse. Extremities:  Negative for hair changes, rash or skin lesion changes. Hematologic: Negative for Bleeding problems, bruising, pallor or swollen lymph nodes. Peripheral Vascular: No calf pain, vascular vein tenderness to calf pain No calf throbbing, posterior knee throbbing pain DIAGNOSTIC IMAGING No notes on file Please see above section of this report. I have personally reviewed the results of the above study. The interpretation of this study is my professional opinion. Written by Emmanuel Cardenas, as dictated by Dr. Mary Tse. I, Dr. Mary Tse, confirm that all documentation is accurate.

## 2019-04-17 ENCOUNTER — DOCUMENTATION ONLY (OUTPATIENT)
Dept: ORTHOPEDIC SURGERY | Age: 74
End: 2019-04-17

## 2019-04-17 ENCOUNTER — TELEPHONE (OUTPATIENT)
Dept: INTERNAL MEDICINE CLINIC | Age: 74
End: 2019-04-17

## 2019-04-17 LAB
ATRIAL RATE: 82 BPM
CALCULATED P AXIS, ECG09: 28 DEGREES
CALCULATED R AXIS, ECG10: -44 DEGREES
CALCULATED T AXIS, ECG11: 30 DEGREES
DIAGNOSIS, 93000: NORMAL
P-R INTERVAL, ECG05: 158 MS
Q-T INTERVAL, ECG07: 350 MS
QRS DURATION, ECG06: 80 MS
QTC CALCULATION (BEZET), ECG08: 408 MS
VENTRICULAR RATE, ECG03: 82 BPM

## 2019-04-17 RX ORDER — SULFAMETHOXAZOLE AND TRIMETHOPRIM 800; 160 MG/1; MG/1
TABLET ORAL
Qty: 28 TAB | Refills: 0 | Status: CANCELLED | OUTPATIENT
Start: 2019-04-17

## 2019-04-17 RX ORDER — NITROFURANTOIN 25; 75 MG/1; MG/1
100 CAPSULE ORAL 2 TIMES DAILY
Qty: 14 CAP | Refills: 0 | Status: SHIPPED | OUTPATIENT
Start: 2019-04-17 | End: 2019-09-25 | Stop reason: ALTCHOICE

## 2019-04-17 NOTE — TELEPHONE ENCOUNTER
[facility] for Dr. Lashonda Pedroza asking if Dr. Goldie Michael can call them. Pt has surgery tomorrow but has a uti. They want to give her an antibiotic but not sure which one will be best. Please call asap.

## 2019-04-17 NOTE — TELEPHONE ENCOUNTER
Reviewed urinalysis and culture results. 4/16/2019  Component Value Flag Ref Range Units Status   Color YELLOW      Final   Appearance CLEAR      Final   Specific gravity 1.015   1.005 - 1.030   Final   pH (UA) 5.0   5.0 - 8.0   Final   Protein NEGATIVE    NEG mg/dL Final   Glucose NEGATIVE    NEG mg/dL Final   Ketone NEGATIVE    NEG mg/dL Final   Bilirubin NEGATIVE    NEG   Final   Blood NEGATIVE    NEG   Final   Urobilinogen 0.2   0.2 - 1.0 EU/dL Final   Nitrites NEGATIVE    NEG   Final   Leukocyte Esterase MODERATE  Abnormal   NEG   Final     Component Value Flag Ref Range Units Status   WBC 5 to 10   0 - 4 /hpf Final   RBC NEGATIVE    0 - 5 /hpf Final   Epithelial cells 1+   0 - 5 /lpf Final   Bacteria FEW  Abnormal   NEG /hpf Final   Mucus FEW  Abnormal   NEG /lpf Final     Component Value Ref Range & Units Status   Special Requests: NO SPECIAL REQUESTS    Preliminary   Culture result: NO GROWTH AFTER 15 HOURS    Preliminary     Given negative urine culture results, no need to treat with antibiotics. Please let DEREK Conley know.

## 2019-04-17 NOTE — PROGRESS NOTES
Patient has a UTI (few bacteria and moderate leukocyte esterase). Culture is not complete at this time. Patient is scheduled for ORIF right third metatarsal tomorrow. We wanted to prescribe Bactrim, but the patient takes Lisinopril. Bactrim is contraindicated with Lisinopril due to possible increase in potassium. Call placed to Dr. Holli Nash office for guidance/recommendation on ABX treatment of UTI. Was advised that Dr. Holli Nash nurse is currently rooming a patient and she will call me back. Left my cell number for CB. I am in surgery today. If the nurse calls back, please document what ABX is recommended by Dr. Holli Nash to treat the UTI. Patient was non-symptomatic during H&P on yesterday.      Oni Demarco PA-C  4/17/2019   9:21 AM

## 2019-04-17 NOTE — TELEPHONE ENCOUNTER
Spoke with DEREK Garcias, she was given message below,  She said Dr Gurmeet Zaragoza is putting hardware in and he likes to be sure that the pt is free of infection/UTI's etc.

## 2019-04-17 NOTE — PROGRESS NOTES
DEREK Retana has asked that I send the EKG to Anesthesia to make sure that Charmaine Baumgarten is ok for surgery. I called Sandy Grant with PAT at [facility] and asked her to ask anesthesia to look at EKG. Sandy Grant called me back and said that [facility] said the EKG was fine to proceed with surgery.

## 2019-04-19 LAB
BACTERIA SPEC CULT: NORMAL
SERVICE CMNT-IMP: NORMAL

## 2019-04-22 DIAGNOSIS — Z01.818 PRE-OP EXAM: Primary | ICD-10-CM

## 2019-04-23 ENCOUNTER — ANESTHESIA EVENT (OUTPATIENT)
Dept: SURGERY | Age: 74
End: 2019-04-23
Payer: MEDICARE

## 2019-04-23 ENCOUNTER — HOSPITAL ENCOUNTER (OUTPATIENT)
Dept: PREADMISSION TESTING | Age: 74
Discharge: HOME | End: 2019-04-23
Payer: MEDICARE

## 2019-04-23 DIAGNOSIS — Z01.818 PRE-OP EXAM: ICD-10-CM

## 2019-04-23 LAB
APPEARANCE UR: CLEAR
BACTERIA URNS QL MICRO: ABNORMAL /HPF
BILIRUB UR QL: NEGATIVE
COLOR UR: YELLOW
EPITH CASTS URNS QL MICRO: ABNORMAL /LPF (ref 0–5)
GLUCOSE UR STRIP.AUTO-MCNC: NEGATIVE MG/DL
HGB UR QL STRIP: ABNORMAL
KETONES UR QL STRIP.AUTO: NEGATIVE MG/DL
LEUKOCYTE ESTERASE UR QL STRIP.AUTO: ABNORMAL
NITRITE UR QL STRIP.AUTO: NEGATIVE
PH UR STRIP: 5.5 [PH] (ref 5–8)
PROT UR STRIP-MCNC: NEGATIVE MG/DL
RBC #/AREA URNS HPF: ABNORMAL /HPF (ref 0–5)
SP GR UR REFRACTOMETRY: 1.02 (ref 1–1.03)
UROBILINOGEN UR QL STRIP.AUTO: 1 EU/DL (ref 0.2–1)
WBC URNS QL MICRO: ABNORMAL /HPF (ref 0–4)

## 2019-04-23 PROCEDURE — 81001 URINALYSIS AUTO W/SCOPE: CPT

## 2019-04-23 PROCEDURE — 87086 URINE CULTURE/COLONY COUNT: CPT

## 2019-04-24 ENCOUNTER — ANESTHESIA (OUTPATIENT)
Dept: SURGERY | Age: 74
End: 2019-04-24
Payer: MEDICARE

## 2019-04-24 ENCOUNTER — APPOINTMENT (OUTPATIENT)
Dept: GENERAL RADIOLOGY | Age: 74
End: 2019-04-24
Attending: ORTHOPAEDIC SURGERY
Payer: MEDICARE

## 2019-04-24 ENCOUNTER — HOSPITAL ENCOUNTER (OUTPATIENT)
Age: 74
Setting detail: OUTPATIENT SURGERY
Discharge: HOME | End: 2019-04-24
Attending: ORTHOPAEDIC SURGERY | Admitting: ORTHOPAEDIC SURGERY
Payer: MEDICARE

## 2019-04-24 VITALS
OXYGEN SATURATION: 95 % | WEIGHT: 151 LBS | HEART RATE: 96 BPM | DIASTOLIC BLOOD PRESSURE: 64 MMHG | RESPIRATION RATE: 16 BRPM | BODY MASS INDEX: 25.16 KG/M2 | HEIGHT: 65 IN | TEMPERATURE: 97.6 F | SYSTOLIC BLOOD PRESSURE: 108 MMHG

## 2019-04-24 LAB
BACTERIA SPEC CULT: NORMAL
SERVICE CMNT-IMP: NORMAL

## 2019-04-24 PROCEDURE — 76060000034 HC ANESTHESIA 1.5 TO 2 HR: Performed by: ORTHOPAEDIC SURGERY

## 2019-04-24 PROCEDURE — C1713 ANCHOR/SCREW BN/BN,TIS/BN: HCPCS | Performed by: ORTHOPAEDIC SURGERY

## 2019-04-24 PROCEDURE — 77030037097 HC BIT DRL ARTHSCP ARTH -B: Performed by: ORTHOPAEDIC SURGERY

## 2019-04-24 PROCEDURE — 74011250636 HC RX REV CODE- 250/636: Performed by: NURSE ANESTHETIST, CERTIFIED REGISTERED

## 2019-04-24 PROCEDURE — 77030000032 HC CUF TRNQT ZIMM -B: Performed by: ORTHOPAEDIC SURGERY

## 2019-04-24 PROCEDURE — 76942 ECHO GUIDE FOR BIOPSY: CPT | Performed by: ANESTHESIOLOGY

## 2019-04-24 PROCEDURE — 74011250636 HC RX REV CODE- 250/636

## 2019-04-24 PROCEDURE — C1769 GUIDE WIRE: HCPCS | Performed by: ORTHOPAEDIC SURGERY

## 2019-04-24 PROCEDURE — 76210000020 HC REC RM PH II FIRST 0.5 HR: Performed by: ORTHOPAEDIC SURGERY

## 2019-04-24 PROCEDURE — 77030031139 HC SUT VCRL2 J&J -A: Performed by: ORTHOPAEDIC SURGERY

## 2019-04-24 PROCEDURE — 77030019605: Performed by: ORTHOPAEDIC SURGERY

## 2019-04-24 PROCEDURE — 76010000153 HC OR TIME 1.5 TO 2 HR: Performed by: ORTHOPAEDIC SURGERY

## 2019-04-24 PROCEDURE — 77030018836 HC SOL IRR NACL ICUM -A: Performed by: ORTHOPAEDIC SURGERY

## 2019-04-24 PROCEDURE — 76210000063 HC OR PH I REC FIRST 0.5 HR: Performed by: ORTHOPAEDIC SURGERY

## 2019-04-24 PROCEDURE — 74011250636 HC RX REV CODE- 250/636: Performed by: PHYSICIAN ASSISTANT

## 2019-04-24 PROCEDURE — 77030010509 HC AIRWY LMA MSK TELE -A: Performed by: ANESTHESIOLOGY

## 2019-04-24 PROCEDURE — 74011000250 HC RX REV CODE- 250

## 2019-04-24 PROCEDURE — 74011000250 HC RX REV CODE- 250: Performed by: ORTHOPAEDIC SURGERY

## 2019-04-24 PROCEDURE — 77030003666 HC NDL SPINAL BD -A: Performed by: ORTHOPAEDIC SURGERY

## 2019-04-24 PROCEDURE — 77030008463 HC STPLR SKN PROX J&J -B: Performed by: ORTHOPAEDIC SURGERY

## 2019-04-24 PROCEDURE — 77030032490 HC SLV COMPR SCD KNE COVD -B: Performed by: ORTHOPAEDIC SURGERY

## 2019-04-24 PROCEDURE — 64445 NJX AA&/STRD SCIATIC NRV IMG: CPT | Performed by: ANESTHESIOLOGY

## 2019-04-24 PROCEDURE — 73630 X-RAY EXAM OF FOOT: CPT

## 2019-04-24 PROCEDURE — 77030020782 HC GWN BAIR PAWS FLX 3M -B: Performed by: ORTHOPAEDIC SURGERY

## 2019-04-24 PROCEDURE — 74011000250 HC RX REV CODE- 250: Performed by: NURSE ANESTHETIST, CERTIFIED REGISTERED

## 2019-04-24 PROCEDURE — 77030020274 HC MISC IMPL ORTHOPEDIC: Performed by: ORTHOPAEDIC SURGERY

## 2019-04-24 DEVICE — SCREW BNE L10MM DIA2.4MM VOLAR DST WRST TI VAR ANG LOK FULL: Type: IMPLANTABLE DEVICE | Site: FOOT | Status: FUNCTIONAL

## 2019-04-24 DEVICE — SCREW BNE L16MM DIA2.4MM VOLAR DST WRST TI VAR ANG LOK FULL: Type: IMPLANTABLE DEVICE | Site: FOOT | Status: FUNCTIONAL

## 2019-04-24 DEVICE — SCREW BNE L12MM DIA2.4MM VOLAR DST WRST TI VAR ANG LOK FULL: Type: IMPLANTABLE DEVICE | Site: FOOT | Status: FUNCTIONAL

## 2019-04-24 RX ORDER — NITROFURANTOIN 25; 75 MG/1; MG/1
100 CAPSULE ORAL 2 TIMES DAILY
Qty: 14 CAP | Refills: 0 | Status: SHIPPED | OUTPATIENT
Start: 2019-04-24 | End: 2019-05-01

## 2019-04-24 RX ORDER — LIDOCAINE HYDROCHLORIDE 20 MG/ML
INJECTION, SOLUTION EPIDURAL; INFILTRATION; INTRACAUDAL; PERINEURAL AS NEEDED
Status: DISCONTINUED | OUTPATIENT
Start: 2019-04-24 | End: 2019-04-24 | Stop reason: HOSPADM

## 2019-04-24 RX ORDER — SODIUM CHLORIDE 0.9 % (FLUSH) 0.9 %
5-40 SYRINGE (ML) INJECTION EVERY 8 HOURS
Status: DISCONTINUED | OUTPATIENT
Start: 2019-04-24 | End: 2019-04-24 | Stop reason: HOSPADM

## 2019-04-24 RX ORDER — MIDAZOLAM HYDROCHLORIDE 1 MG/ML
2 INJECTION, SOLUTION INTRAMUSCULAR; INTRAVENOUS ONCE
Status: COMPLETED | OUTPATIENT
Start: 2019-04-24 | End: 2019-04-24

## 2019-04-24 RX ORDER — NEOMYCIN AND POLYMYXIN B SULFATES 40; 200000 MG/ML; [USP'U]/ML
SOLUTION IRRIGATION AS NEEDED
Status: DISCONTINUED | OUTPATIENT
Start: 2019-04-24 | End: 2019-04-24 | Stop reason: HOSPADM

## 2019-04-24 RX ORDER — ONDANSETRON 2 MG/ML
INJECTION INTRAMUSCULAR; INTRAVENOUS AS NEEDED
Status: DISCONTINUED | OUTPATIENT
Start: 2019-04-24 | End: 2019-04-24 | Stop reason: HOSPADM

## 2019-04-24 RX ORDER — SODIUM CHLORIDE, SODIUM LACTATE, POTASSIUM CHLORIDE, CALCIUM CHLORIDE 600; 310; 30; 20 MG/100ML; MG/100ML; MG/100ML; MG/100ML
100 INJECTION, SOLUTION INTRAVENOUS CONTINUOUS
Status: DISCONTINUED | OUTPATIENT
Start: 2019-04-24 | End: 2019-04-24 | Stop reason: HOSPADM

## 2019-04-24 RX ORDER — EPHEDRINE SULFATE 50 MG/ML
INJECTION, SOLUTION INTRAVENOUS AS NEEDED
Status: DISCONTINUED | OUTPATIENT
Start: 2019-04-24 | End: 2019-04-24 | Stop reason: HOSPADM

## 2019-04-24 RX ORDER — SODIUM CHLORIDE 0.9 % (FLUSH) 0.9 %
5-40 SYRINGE (ML) INJECTION AS NEEDED
Status: DISCONTINUED | OUTPATIENT
Start: 2019-04-24 | End: 2019-04-24 | Stop reason: HOSPADM

## 2019-04-24 RX ORDER — SODIUM CHLORIDE, SODIUM LACTATE, POTASSIUM CHLORIDE, CALCIUM CHLORIDE 600; 310; 30; 20 MG/100ML; MG/100ML; MG/100ML; MG/100ML
75 INJECTION, SOLUTION INTRAVENOUS CONTINUOUS
Status: DISCONTINUED | OUTPATIENT
Start: 2019-04-24 | End: 2019-04-24 | Stop reason: HOSPADM

## 2019-04-24 RX ORDER — PROPOFOL 10 MG/ML
INJECTION, EMULSION INTRAVENOUS AS NEEDED
Status: DISCONTINUED | OUTPATIENT
Start: 2019-04-24 | End: 2019-04-24 | Stop reason: HOSPADM

## 2019-04-24 RX ORDER — FENTANYL CITRATE 50 UG/ML
100 INJECTION, SOLUTION INTRAMUSCULAR; INTRAVENOUS ONCE
Status: COMPLETED | OUTPATIENT
Start: 2019-04-24 | End: 2019-04-24

## 2019-04-24 RX ORDER — CEFAZOLIN SODIUM 2 G/50ML
2 SOLUTION INTRAVENOUS
Status: COMPLETED | OUTPATIENT
Start: 2019-04-24 | End: 2019-04-24

## 2019-04-24 RX ORDER — ROPIVACAINE HYDROCHLORIDE 5 MG/ML
20 INJECTION, SOLUTION EPIDURAL; INFILTRATION; PERINEURAL
Status: COMPLETED | OUTPATIENT
Start: 2019-04-24 | End: 2019-04-24

## 2019-04-24 RX ORDER — ONDANSETRON 2 MG/ML
4 INJECTION INTRAMUSCULAR; INTRAVENOUS ONCE
Status: DISCONTINUED | OUTPATIENT
Start: 2019-04-24 | End: 2019-04-24 | Stop reason: HOSPADM

## 2019-04-24 RX ORDER — ROPIVACAINE HYDROCHLORIDE 5 MG/ML
INJECTION, SOLUTION EPIDURAL; INFILTRATION; PERINEURAL
Status: COMPLETED | OUTPATIENT
Start: 2019-04-24 | End: 2019-04-24

## 2019-04-24 RX ORDER — HYDROMORPHONE HYDROCHLORIDE 1 MG/ML
0.2 INJECTION, SOLUTION INTRAMUSCULAR; INTRAVENOUS; SUBCUTANEOUS
Status: DISCONTINUED | OUTPATIENT
Start: 2019-04-24 | End: 2019-04-24 | Stop reason: HOSPADM

## 2019-04-24 RX ADMIN — CEFAZOLIN 2 G: 10 INJECTION, POWDER, FOR SOLUTION INTRAVENOUS at 07:42

## 2019-04-24 RX ADMIN — ONDANSETRON 4 MG: 2 INJECTION INTRAMUSCULAR; INTRAVENOUS at 07:29

## 2019-04-24 RX ADMIN — ROPIVACAINE HYDROCHLORIDE 100 MG: 5 INJECTION, SOLUTION EPIDURAL; INFILTRATION; PERINEURAL at 07:06

## 2019-04-24 RX ADMIN — FAMOTIDINE: 10 INJECTION INTRAVENOUS at 06:50

## 2019-04-24 RX ADMIN — FENTANYL CITRATE 100 MCG: 50 INJECTION INTRAMUSCULAR; INTRAVENOUS at 07:03

## 2019-04-24 RX ADMIN — SODIUM CHLORIDE, SODIUM LACTATE, POTASSIUM CHLORIDE, AND CALCIUM CHLORIDE 75 ML/HR: 600; 310; 30; 20 INJECTION, SOLUTION INTRAVENOUS at 06:43

## 2019-04-24 RX ADMIN — SODIUM CHLORIDE, SODIUM LACTATE, POTASSIUM CHLORIDE, AND CALCIUM CHLORIDE: 600; 310; 30; 20 INJECTION, SOLUTION INTRAVENOUS at 08:29

## 2019-04-24 RX ADMIN — LIDOCAINE HYDROCHLORIDE 80 MG: 20 INJECTION, SOLUTION EPIDURAL; INFILTRATION; INTRACAUDAL; PERINEURAL at 07:34

## 2019-04-24 RX ADMIN — MIDAZOLAM HYDROCHLORIDE 2 MG: 2 INJECTION, SOLUTION INTRAMUSCULAR; INTRAVENOUS at 07:03

## 2019-04-24 RX ADMIN — SODIUM CHLORIDE, SODIUM LACTATE, POTASSIUM CHLORIDE, AND CALCIUM CHLORIDE 75 ML/HR: 600; 310; 30; 20 INJECTION, SOLUTION INTRAVENOUS at 06:49

## 2019-04-24 RX ADMIN — EPHEDRINE SULFATE 5 MG: 50 INJECTION, SOLUTION INTRAVENOUS at 07:51

## 2019-04-24 RX ADMIN — EPHEDRINE SULFATE 5 MG: 50 INJECTION, SOLUTION INTRAVENOUS at 07:58

## 2019-04-24 RX ADMIN — ROPIVACAINE HYDROCHLORIDE 25 ML: 5 INJECTION, SOLUTION EPIDURAL; INFILTRATION; PERINEURAL at 07:07

## 2019-04-24 RX ADMIN — EPHEDRINE SULFATE 5 MG: 50 INJECTION, SOLUTION INTRAVENOUS at 08:12

## 2019-04-24 RX ADMIN — PROPOFOL 150 MG: 10 INJECTION, EMULSION INTRAVENOUS at 07:34

## 2019-04-24 NOTE — BRIEF OP NOTE
BRIEF OPERATIVE NOTE Date of Procedure: 4/24/2019 Preoperative Diagnosis: S92.331A RIGHT THIRD METATARSAL Postoperative Diagnosis:  Asa above Procedure(s): OPEN REDUCTION RIGHT THIRD METATARSAL 
DBX PUTTY BONE GRAFTING BONE GRAFT/C-ARM/ARTHREX/NERVE BLOCK Surgeon(s) and Role: * Mally Murrieta MD - Primary Surgical Assistant: Yandel Gonsalves SA Surgical Staff: 
Circ-1: Dhruv Rubio Scrub Tech-1: Nishi Sutherland Surg Asst-1: Eula Mcardle No case tracking events are documented in the log. Anesthesia: General and Regional Anesthesia IV FLUIDS:  1000 ml IVF Tourniquet Time:  51 minutes at  300  Mm HG  
Estimated Blood Loss:  5 ML Specimens: * No specimens in log * Findings:  RIGHT 3RD METATARSAL SHAFT FRACTURE Complications: NONE Implants:  
 
Implant Name Type Inv. Item Serial No.  Lot No. LRB No. Used Action  
7 HOLE STRAIGHT PLATE    ARTHREX NA Right 1 Implanted 2.4 X 14 CORTICAL SCREW    ARTHREX NA Right 1 Implanted SCR BNE L[employer] LP 2.4X10MM TI --  - [id number]  SCR BNE LCK VA LP 2.4X10MM TI --   ARTHREX NA Right 1 Implanted VESUVIUS DBM PUTTY 100    [place] [employer] [place] [employer] [id number] Right 1 Implanted

## 2019-04-24 NOTE — DISCHARGE INSTRUCTIONS
DISCHARGE PLAN     1. DISCHARGE DISPOSITION:  Home  2. FOLLOW UP RETURN TO OFFICE: 5 days    Call [phone] to confirm your appointment to see Dr. Dwain Domínguez. Parker Barton MD.   Follow up with Primary Care Provider in 7 to 10 days. 3. WEIGHT BEARING STATUS/ROM:    PARTIAL RIGHT HEEL PRESSURE FOR SHORT DISTANCE WALKING WITH USE OF WALKER, OTHERWISE, AS MUCH AS POSSIBLE PLEASE STAY OFF THE RIGHT FOOT. WEIGHT BEARING TO   to the Right LOWER EXTREMITY  4. ELEVATE the Right lower extremity on 1 pillow. Place the pillow horizontal so that no pressure is on the back of your heel    Please leave your current dressings and in place. Keep your dressings clean and dry to the: Right lower extremity      Please call [phone] ([address]) if any: fever, shakes, chills, intractable pain, or for any questions you have regarding your care/medical condition   1. If you experience any calf pain or swelling, or are having any shortness of  breath, chest pain, or extremity swelling, or bleeding thru any surgical dressings,  or Bleeding at any body location while you are taking on any blood thinners. ie (mouth,nose, skin sites:)   2. Please go to closest ER  ASAP for assessment to rule out a leg clot and to       assess any bleeding. 3. After general anesthesia or intravenous sedation, for 24 hours or while taking  prescription Narcotics:      [x]  Limit your activities     [x]   Do not drive and operate hazardous machinery    [x]   Do not make important personal or business decisions    [x]   Do  not drink alcoholic beverages    [x]  If you have not urinated within 8 hours after discharge, please contact    your surgeon on call.      Report the following to your surgeon: If any below is true         [x]   Excessive pain, swelling, redness or odor of or around the surgical area       [x]   Temperature over 100.5       [x]  Nausea and vomiting lasting longer than 4 hours or if unable to take medications       [x] Any signs of decreased circulation or nerve impairment to extremity:    Change in color, persistent  numbness, tingling, coldness or increase pain          [x]  No smoking/ No tobacco products/ Avoid exposure to second hand smoke    5. DIET:  Regular Diet  OTC (Nutritional supplements/multivitamins/calcium w/ Vitamin  D   6. ACTIVITY:   No lifting, twisting, squatting, deep bending. 7. INCISION CARE/DRESSINGS: LEAVE DRESSINGS IN PLACE PLEASE  ,     Keep all pets away from  any wound present in order to prevent infection. 8. PAIN CONTROL: Prescriptions written: Narcotics     Start taking your pain medications when you get home  9. VTE prophylaxis : Start Aspirin on date // START 4/24/2019 . 10. ANTIBIOTICS: Macrobid for yanet op UTI          Jaren Leal MD  4/24/2019  9:22 AM      DISCHARGE SUMMARY from Nurse    PATIENT INSTRUCTIONS:    After general anesthesia or intravenous sedation, for 24 hours or while taking prescription Narcotics:  · Limit your activities  · Do not drive and operate hazardous machinery  · Do not make important personal or business decisions  · Do  not drink alcoholic beverages  · If you have not urinated within 8 hours after discharge, please contact your surgeon on call. Report the following to your surgeon:  · Excessive pain, swelling, redness or odor of or around the surgical area  · Temperature over 100.5  · Nausea and vomiting lasting longer than 4 hours or if unable to take medications  · Any signs of decreased circulation or nerve impairment to extremity: change in color, persistent  numbness, tingling, coldness or increase pain  · Any questions      These are general instructions for a healthy lifestyle:    No smoking/ No tobacco products/ Avoid exposure to second hand smoke  Surgeon General's Warning:  Quitting smoking now greatly reduces serious risk to your health.     Obesity, smoking, and sedentary lifestyle greatly increases your risk for illness    A healthy diet, regular physical exercise & weight monitoring are important for maintaining a healthy lifestyle    You may be retaining fluid if you have a history of heart failure or if you experience any of the following symptoms:  Weight gain of 3 pounds or more overnight or 5 pounds in a week, increased swelling in our hands or feet or shortness of breath while lying flat in bed. Please call your doctor as soon as you notice any of these symptoms; do not wait until your next office visit. Recognize signs and symptoms of STROKE:    F-face looks uneven    A-arms unable to move or move unevenly    S-speech slurred or non-existent    T-time-call 911 as soon as signs and symptoms begin-DO NOT go       Back to bed or wait to see if you get better-TIME IS BRAIN. Warning Signs of HEART ATTACK     Call 911 if you have these symptoms:   Chest discomfort. Most heart attacks involve discomfort in the center of the chest that lasts more than a few minutes, or that goes away and comes back. It can feel like uncomfortable pressure, squeezing, fullness, or pain.  Discomfort in other areas of the upper body. Symptoms can include pain or discomfort in one or both arms, the back, neck, jaw, or stomach.  Shortness of breath with or without chest discomfort.  Other signs may include breaking out in a cold sweat, nausea, or lightheadedness. Don't wait more than five minutes to call 911 - MINUTES MATTER! Fast action can save your life. Calling 911 is almost always the fastest way to get lifesaving treatment. Emergency Medical Services staff can begin treatment when they arrive -- up to an hour sooner than if someone gets to the hospital by car. The discharge information has been reviewed with the {PATIENT PARENT GUARDIAN:86614}. The {PATIENT PARENT GUARDIAN:92430} verbalized understanding.   Discharge medications reviewed with the {D[place]car[place] meds reviewed ABHP:77097} and appropriate educational materials and side effects teaching were provided.   ___________________________________________________________________________________________________________________________________

## 2019-04-24 NOTE — INTERVAL H&P NOTE
H&P Update: 
Annabella Webb was seen and examined. History and physical has been reviewed. The patient has been examined. There have been no significant clinical changes since the completion of the originally dated History and Physical. 
Patient identified by surgeon; surgical site was confirmed by patient and surgeon.

## 2019-04-24 NOTE — ANESTHESIA POSTPROCEDURE EVALUATION
Procedure(s): OPEN REDUCTION RIGHT THIRD METATARSAL. general, regional 
 
Anesthesia Post Evaluation Multimodal analgesia: multimodal analgesia used between 6 hours prior to anesthesia start to PACU discharge Patient location during evaluation: bedside Patient participation: complete - patient participated Level of consciousness: awake Pain score: 0 Pain management: adequate Airway patency: patent Anesthetic complications: no 
Cardiovascular status: stable Respiratory status: acceptable Hydration status: acceptable Post anesthesia nausea and vomiting:  controlled Vitals Value Taken Time BP 97/69 4/24/2019  9:36 AM  
Temp 36.3 °C (97.4 °F) 4/24/2019  9:17 AM  
Pulse 97 4/24/2019  9:42 AM  
Resp 12 4/24/2019  9:42 AM  
SpO2 97 % 4/24/2019  9:42 AM

## 2019-04-24 NOTE — ANESTHESIA PROCEDURE NOTES
Peripheral Block    Start time: 4/24/2019 7:02 AM  End time: 4/24/2019 7:08 AM  Performed by: Krissy Steward MD  Authorized by: Krissy Steward MD       Pre-procedure: Indications: at surgeon's request, post-op pain management and procedure for pain    Preanesthetic Checklist: patient identified, risks and benefits discussed, site marked, timeout performed, anesthesia consent given and patient being monitored    Timeout Time: 07:02          Block Type:   Block Type:  Sciatic single shot  Laterality:  Right  Monitoring:  Standard ASA monitoring, continuous pulse ox, frequent vital sign checks, oxygen, responsive to questions and heart rate  Injection Technique:  Single shot  Procedures: ultrasound guided and nerve stimulator    Prep: chlorhexidine    Location:  Mid thigh  Needle Type:  Stimuplex  Needle Gauge:  21 G  Needle Localization:  Ultrasound guidance and nerve stimulator    Assessment:  Number of attempts:  1  Injection Assessment:  Incremental injection every 5 mL, no paresthesia, ultrasound image on chart, no intravascular symptoms, negative aspiration for blood and local visualized surrounding nerve on ultrasound  Patient tolerance:  Patient tolerated the procedure well with no immediate complications  Location:  PREOP HOLDING    Patient given 2 mg IV Versed and 100 mcg IV Fentanyl for sedation.     4/24/2019     7:10 AM     Minerva Tran MD

## 2019-04-24 NOTE — OP NOTES
Patient: Alondra Ruvalcaba                MRN:[id number]           SSN: [id number]   YOB: 1945         AGE: 76 y.o. SEX: female       OPERATIVE REPORT    Date of Procedure: 4/24/2019   Preoperative Diagnosis: S92.331A RIGHT THIRD METATARSAL  Postoperative Diagnosis:  Asa above  Procedure(s):  OPEN REDUCTION RIGHT THIRD METATARSAL  DBX PUTTY BONE GRAFTING BONE GRAFT/C-ARM/ARTHREX/NERVE BLOCK  Surgeon(s) and Role:     * Emily Dick MD - Primary         Surgical Assistant: Krissy Silver SA    Surgical Staff:  Circ-1: Carmella Serrato  Scrub Tech-1: Megan Bose  Surg Asst-1: Karle Amauri  No case tracking events are documented in the log. Anesthesia: General and Regional Anesthesia  IV FLUIDS:  1000 ml IVF  Tourniquet Time:  51 minutes at  300  Mm HG   Estimated Blood Loss:  5 ML  Specimens: * No specimens in log *   Findings:  RIGHT 3RD METATARSAL SHAFT FRACTURE   Complications: NONE  Implants:     Implant Name Type Inv. Item Serial No.  Lot No. LRB No. Used Action   7 HOLE STRAIGHT PLATE    ARTHREX NA Right 1 Implanted   2.4 X 14 CORTICAL SCREW    ARTHREX NA Right 1 Implanted   SCR BNE LCK VA LP 2.4X10MM TI --  - [id number]  SCR BNE LCK VA LP 2.4X10MM TI --   ARTHREX NA Right 1 Implanted   VESUVIUS DBM PUTTY 100    [place] [employer] [id number] Right 1 Implanted       I discussed the risks/benefits and potential adverse outcomes of non operative and operative treatment options for the diagnosis of RIGHT 3RD METATARSAL FRACTURE DISPLACED 100%/WITH SHORTENING with  Zahira Liu and mother and .     Risks of operative intervention include but not limited to: Bleeding, infection, deep vein thrombosis, pulmonary embolism, death, limb length discrepancy, reflexive sympathetic dystrophy, fat embolism syndrome, damage to blood vessels and nerves, yanet incisional numbness, subcutaneous hematoma, deep hematoma, surgical scar/keloid, malunion, non-union, delayed union, failure of hardware, post traumatic arthritis, stroke, heart attack, recurrence of deformity, postoperative stiffness. Ilda Lowry has mad an informed decision to proceed with surgical treatment. Risks of non operative treatment: nonunion, limb pain, limb deformity, decubitus wounds, pneumonia, inability to ever walk well without potential metatarsalgia due to short metatarsal.    Zahira Liu  understands that an infection may arise and that she may require numerous surgeries ( I and D procedures), plastic surgical intervention, and possible limb amputation if an infection arises and is recalcitrant to operative and non operative methods. She has been on Macrobid for a per op UTI (which has improved on repeat UA/CX assessment. Ilda Lowry understands and agrees to proceed with operative intervention. Ilda Lowry for  OR for 4/24/2019  date      CONSENT GIVEN BY [[id number]]: Zahira Liu   WITNESSED BY: the patient and the patient's REUBEN OR STAFF. Theo Cruz MD  9:02 AM         OPERATIVE REPORT    OPERATIVE NOTE:     Ilda Lowry  was taken to the operating room today, on 4/24/2019. Ilda Lowry was positioned supine, general anesthesia was conducted. A tourniquet was placed to the right upper thigh. A bump of sheets placed so as to keep right leg facing upward and not externally rotated. Ilda Lowry was then fully prepped with chlorhexidine gluconate soap and chlorhexidine gluconate yellow prep stick. A formal time-out was conducted identifying correct the patient, correct limb, correct location for surgery, confirmation that he received antibiotics. All members of the surgical team agreed to the surgical time-out. I exsanguinated the right lower extremity, insufflated tourniquet to 300 mmHg and attention was then directed towards the surgery. I made a 5 cm incision over the 3rd metatarsal region (C arm confirmed).   Incision made to skin, subcutaneous fat. I gently retracted  A branch of the SPN. I dissected to the Fx. I removed the interposed muscle/periosteum . I reduced the Fx, provisionally reduced the fracture, then definitively reduced and held the Fx with a 7 hole ARTHREX 2.7 mm plate. non locked screws plates first to compress plate to bone, then non locked screws. Her Fx was soft at Fx site despite this being cortical bone. I confirmed bony alignment was very good. I irrigated,  Deflated he tourniquet, then I placed 1 ml DBX at Fx site. . Selective electro cauterization was used for hemostasis. The incision was then closed with  3-0 Vicryl and 3-O Monocryl and 3-0 nylon. Sterile dressings were placed, which consisted of Xeroform, 4 x 4's, 4-inh sterile cast padding, the a well padded posterior splint was placed. Bianka Winter was then extubated and transferred to the recovery room in stable condition. No complications correct sponge/tape counts.         Leonora Lino MD  4/24/2019  9:02 AM

## 2019-04-25 NOTE — PROGRESS NOTES
AMBULATORY PROGRESS NOTE      Patient: Pardeep Daniel             MRN: [id number]     SSN: [id number] There is no height or weight on file to calculate BMI. YOB: 1945     AGE: 76 y.o. EX: female    PCP: Kathy Ochoa MD       IMPRESSION//  DIAGNOSIS AND TREATMENT PLAN      DIAGNOSES  1. Displaced fracture of third metatarsal bone, right foot, initial encounter for closed fracture    2. Pre-operative examination    3. Right foot pain        Orders Placed This Encounter    AMB SUPPLY ORDER     Order dated: 4/16/2019, 3:29 PM      Knee roller,  Wheelchair,  CURAD CAST COVER    CULTURE, URINE     Standing Status:   Future     Number of Occurrences:   1     Standing Expiration Date:   4/16/2020    XR CHEST PA LAT     Standing Status:   Future     Number of Occurrences:   1     Standing Expiration Date:   10/19/2019     Scheduling Instructions:      Please recheck to confirm if:      1. Patient has Allergry to IV contrast dye      2. Patient is pregnant     Order Specific Question:   Reason for Exam     Answer:   Preop Risk stratificatiion     Order Specific Question:   Is Patient Allergic to Contrast Dye?      Answer:   No    METABOLIC PANEL, COMPREHENSIVE     Standing Status:   Future     Number of Occurrences:   1     Standing Expiration Date:   7/16/2019    CBC WITH AUTOMATED DIFF     Standing Status:   Future     Number of Occurrences:   1     Standing Expiration Date:   7/16/2019    PROTHROMBIN TIME + INR     PRE OP     Standing Status:   Future     Number of Occurrences:   1     Standing Expiration Date:   7/16/2019     Scheduling Instructions:      Pre op    URINALYSIS W/ RFLX MICROSCOPIC     Standing Status:   Future     Number of Occurrences:   1     Standing Expiration Date:   7/16/2019    EKG, 12 LEAD, SUBSEQUENT     preop     Standing Status:   Future     Number of Occurrences:   1     Standing Expiration Date:   10/15/2019     Order Specific Question:   Reason for Exam: Answer:   risk stratify    meperidine (DEMEROL) 50 mg tablet     Sig: Take 0.5-1 Tabs by mouth every six (6) hours as needed for Pain for up to 7 days. Max Daily Amount: 200 mg. FOR PAIN **AFTER SURGERY** DO NOT TAKE PRIOR TO SURGERY  Indications: Severe Pain     Dispense:  30 Tab     Refill:  0    diphenhydrAMINE (BENADRYL) 25 mg capsule     Sig: Take 1 Cap by mouth every six (6) hours as needed for Itching for up to 5 days. Indications: itching     Dispense:  30 Cap     Refill:  0    ondansetron hcl (ZOFRAN) 4 mg tablet     Sig: Take 1 Tab by mouth every eight (8) hours as needed for Nausea. Dispense:  30 Tab     Refill:  0    polyethylene glycol (MIRALAX) 17 gram packet     Sig: Take 1 Packet by mouth daily. Dispense:  10 Packet     Refill:  1    aspirin (ASPIRIN) 325 mg tablet     Sig: Take 1 Tab by mouth daily. Dispense:  30 Tab     Refill:  0    fluconazole (DIFLUCAN) 150 mg tablet     Sig: Take 1 Tab by mouth daily for 1 day. Dispense:  1 Tab     Refill:  1               PLAN  HPI //  OBJECTIVE EXAMINATION       Zahira Liu IS A 76 y.o. female who presents to my outpatient office for evaluation of: The patient sustained a right, third, metatarsal shaft fracture. Because of the displacement, because of the risk of metatarsalgia, and because of the risk of delayed union, nonunion, or malunion, recommendation is for operative fixation. She had x-rays at, I believe, a [facility] facility that showed a displaced third metatarsal fracture of the right foot. HISTORY:      The patient was seen in the office today for a preoperative history and physical for an upcoming above listed surgery. The patient is a pleasant 76 y.o. female who has a history of right third metatarsal fracture. Patient states that she does not know how it happened. She states has had some soreness to her right foot that increased in pain yesterday.  She states that her sister is in the hospital and she did quite a bit of walking around the hospital yesterday and had increased pain. She reports a history of osteopenia being treated by Dr. Apollo Andino. She also reports a history of foot fractures on her left and right as well as a left wrist fracture repaired by Dr. Vadim Shay in the past.      Due to the current findings, affected activity of daily living and continued pain and discomfort, surgical intervention is indicated. The alternatives, risks, and complications, including but not limited to infection, blood loss, need for blood transfusion, neurovascular damage, yanet-incisional numbness, subcutaneous hematoma, bone fracture, anesthetic complications, DVT, PE, death, RSD, postoperative stiffness and pain, possible surgical scar, delayed healing and nonhealing, reflexive sympathetic dystrophy, damage to blood vessels and nerves, need for more surgery, MI, and stroke, have been discussed. The patient understands and wishes to proceed with surgery.      PAST MEDICAL HISTORY:           Past Medical History:   Diagnosis Date    Eczema      Essential hypertension      FH: breast cancer      Fibroids       uterine    Fracture of radius 11/2009     with ulna, left, closed    History of cataract      History of foot fracture       right and left    Hyperlipidemia      Microhematuria       chronic    Osteopenia      S/P radiation therapy       as child, to right shoulder for birthmark    Tinnitus      Zoster       right scapula         CURRENT MEDICATIONS:             Current Outpatient Medications   Medication Sig Dispense Refill    meperidine (DEMEROL) 50 mg tablet Take 0.5-1 Tabs by mouth every six (6) hours as needed for Pain for up to 7 days. Max Daily Amount: 200 mg. FOR PAIN **AFTER SURGERY** DO NOT TAKE PRIOR TO SURGERY  Indications: Severe Pain 30 Tab 0    diphenhydrAMINE (BENADRYL) 25 mg capsule Take 1 Cap by mouth every six (6) hours as needed for Itching for up to 5 days.  Indications: itching 30 Cap 0  ondansetron hcl (ZOFRAN) 4 mg tablet Take 1 Tab by mouth every eight (8) hours as needed for Nausea. 30 Tab 0    polyethylene glycol (MIRALAX) 17 gram packet Take 1 Packet by mouth daily. 10 Packet 1    aspirin (ASPIRIN) 325 mg tablet Take 1 Tab by mouth daily. 30 Tab 0    fluconazole (DIFLUCAN) 150 mg tablet Take 1 Tab by mouth daily for 1 day. 1 Tab 1    atorvastatin (LIPITOR) 10 mg tablet TAKE 1 TABLET BY MOUTH EVERY DAY 90 Tab 2    hydroCHLOROthiazide (HYDRODIURIL) 25 mg tablet TAKE 1 TABLET BY MOUTH DAILY 90 Tab 2    lisinopril (PRINIVIL, ZESTRIL) 10 mg tablet TAKE 1 TABLET BY MOUTH DAILY 90 Tab 3    crisaborole (EUCRISA) 2 % oint by Apply Externally route.        alendronate (FOSAMAX) 70 mg tablet TAKE 1 TABLET BY MOUTH EVERY 7 DAYS 12 Tab 3    triamcinolone acetonide (KENALOG) 0.025 % topical cream Apply  to affected area two (2) times a day. 15 g 2    clotrimazole-betamethasone (LOTRISONE) topical cream Apply  to affected area two (2) times a day.  15 g 2    docusate sodium (COLACE) 100 mg capsule Take 100 mg by mouth daily as needed for Constipation.        co-enzyme Q-10 (CO Q-10) 100 mg capsule Take 100 mg by mouth daily.        biotin 2,500 mcg Tab Take 5,000 mcg by mouth.        cholecalciferol (VITAMIN D3) 1,000 unit cap Take 1 Tab by mouth daily.          fexofenadine (ALLEGRA) 60 mg tablet Take 60 mg by mouth daily.        aspirin 81 mg chewable tablet Take 81 mg by mouth daily.        CALCIUM CARBONATE/VITAMIN D3 (CALCIUM + D PO) Take 1 Tab by mouth two (2) times a day.             ALLERGIES:           Allergies   Allergen Reactions    Codeine Nausea Only            SURGICAL HISTORY:            Past Surgical History:   Procedure Laterality Date    HX CATARACT REMOVAL        HX GYN         hysterectomy    HX ORTHOPAEDIC         left arm    HX WISDOM TEETH EXTRACTION        HX WRIST FRACTURE TX             SOCIAL HISTORY:      Social History               Socioeconomic History    Marital status:        Spouse name: Not on file    Number of children: Not on file    Years of education: Not on file    Highest education level: Not on file   Tobacco Use    Smoking status: Never Smoker    Smokeless tobacco: Never Used   Substance and Sexual Activity    Alcohol use: Yes       Alcohol/week: 0.0 oz       Comment: occasionally    Drug use: No    Sexual activity: Yes       Partners: Male            FAMILY HISTORY:            Family History   Problem Relation Age of Onset    Cancer Mother           pancreatic    Heart Disease Mother      Heart Disease Father      Cancer Sister           breast    MS Sister      Breast Cancer Sister 62         REVIEW OF SYSTEMS:      Negative for fevers, chills, chest pain, shortness of breath, weight loss, recent illness      General: Negative for fever and chills. No unexpected change in weight. Denies fatigue. No change in appetite. Skin: Negative for rash or itching. HEENT: Negative for congestion, sore throat, neck pain and neck stiffness. No change in vision or hearing. Hasn't noted any enlarged lymph nodes in the neck. Cardiovascular:  Negative for chest pain and palpitations. Has not noted pedal edema. Respiratory: Negative for cough, colds, sinus, hemoptysis, shortness of breath and wheezing. Gastrointestinal: Negative for nausea and vomiting, rectal bleeding, coffee ground emesis, abdominal pain, diarrhea and constipation. Genitourinary: Negative for dysuria, frequency urgency, or burning on micturition. No flank pain, no foul smelling urine, no difficulty with initiating urination. Hematological: Negative for bleeding or easy bruising. Musculoskeletal: Negative  for arthralgias, back pain or neck pain. Neurological: Negative for dizziness, seizures or syncopal episodes. Denies headaches. Endocrine: Denies excessive thirst.  No heat/cold intolerance.   Psychiatric: Negative for depression or insomnia.     PHYSICAL EXAMINATION:      VITALS: There were no vitals taken for this visit.     GEN:  Well developed, well nourished 76 y.o. female in no acute distress. PSYCH: Alert an oriented to person, place and time. Mood, memory, affect, behavior and judgment normal  HEENT: Normocephalic and atraumatic. Eyes: Conjunctivae and EOM are normal.Pupils are equal, round, and reactive to light. External ear normal appearance, external nose normal appearing. Mouth/Throat: Oropharynx is clear and moist, able to handle oral secretions w/out difficulty, airway patent  NECK: Supple. Normal ROM, No lymphadenopathy. Trachea is midline. No bruising, swelling or deformity  RESP: Clear to auscultation bilaterally. No wheezes, rales, rhonchi. Normal effort and breath sounds. No respiratory distress  CARDIO: Normal rate, regular rhythm and normal heart sounds. No MGR. ABDOMEN: Soft, non-tender, non-distended, normoactive bowel sounds in all four quadrants. There is no tenderness. There is no rebound and no guarding. BACK: No CVA or spinal tenderness  BREAST:  Deferred  PELVIC:    Deferred   RECTAL:  Deferred   :           Deferred  EXTREMITIES: EXAMINATION OF: right foot  SKIN: mild edema , no erythema, no  ecchymosis    TENDERNESS:  mild tenderness to palpation  NEUROVASCULAR: Sensation intact to light touch and strength grossly intact and symmetrical. No nystagmus. Positive distal pulses and capillary refill. DVT ASSESSMENT:  There is not  calf tenderness. No evidence of DVT seen on physical exam.  ROM: within normal limits  MOTOR: In tact     RADIOGRAPHS & DIAGNOSTIC STUDIES:      No notes on file     LABS:      Pending     ASSESSMENT:              Encounter Diagnoses   Name Primary?     Displaced fracture of third metatarsal bone, right foot, initial encounter for closed fracture Yes    Pre-operative examination      Right foot pain           PLAN:      Again, the alternatives, risks, and complications, as well as expected outcome were discussed. The patient understands and agrees to proceed with the above listed surgery pending completion of labs and diagnostic studies. Patient has been given Hibiclens wash with instructions and prescriptions and or orders listed above. I discussed the risks/benefits and potential adverse outcomes of non operative and operative treatment options for the diagnosis of displaced right 3rd metatarsal fracture with  Mustapha Lowry and mother and spouse. Risks of operative intervention include but not limited to: Bleeding, infection, deep vein thrombosis, pulmonary embolism, death, limb length discrepancy, reflexive sympathetic dystrophy, fat embolism syndrome, damage to blood vessels and nerves, yanet incisional numbness, subcutaneous hematoma, deep hematoma, surgical scar/keloid, malunion, non-union, delayed union, failure of hardware, post traumatic arthritis, stroke, heart attack, recurrence of deformity, postoperative stiffness. Mustapha Lowry has mad an informed decision to proceed with surgical treatment. Risks of non operative treatment: nonunion, limb pain, limb deformity, decubitus wounds, pneumonia,   inability to ever walk without forefoot pain. Mustapha Lowry  understands that an infection may arise and that she may require numerous surgeries ( I and D procedures), plastic surgical intervention, and possible limb amputation if an infection arises and is recalcitrant to operative and non operative methods. Mustapha Lowry understands and agrees to proceed with operative intervention. CONSENT GIVEN BY [[id number]]:    Zahira Liu   WITNESSED BY: the patient and the patient's  and spouse    Sharad Espitia MD  9:49 AM        Sharad Espitia MD  4/25/2019  9:48 AM                              CHART REVIEW       Past Medical History:   Diagnosis Date    Eczema     Essential hypertension     FH: breast cancer     Fibroids     uterine    Fracture of radius 11/2009    with ulna, left, closed    History of cataract     History of foot fracture     right and left    Hyperlipidemia     Microhematuria     chronic    Osteopenia     S/P radiation therapy     as child, to right shoulder for birthmark    Tinnitus     Zoster     right scapula     Current Outpatient Medications   Medication Sig    ondansetron hcl (ZOFRAN) 4 mg tablet Take 1 Tab by mouth every eight (8) hours as needed for Nausea.  polyethylene glycol (MIRALAX) 17 gram packet Take 1 Packet by mouth daily.  aspirin (ASPIRIN) 325 mg tablet Take 1 Tab by mouth daily.  nitrofurantoin, macrocrystal-monohydrate, (MACROBID) 100 mg capsule Take 1 Cap by mouth two (2) times a day for 7 days.  nitrofurantoin, macrocrystal-monohydrate, (MACROBID) 100 mg capsule Take 1 Cap by mouth two (2) times a day.  atorvastatin (LIPITOR) 10 mg tablet TAKE 1 TABLET BY MOUTH EVERY DAY    hydroCHLOROthiazide (HYDRODIURIL) 25 mg tablet TAKE 1 TABLET BY MOUTH DAILY    lisinopril (PRINIVIL, ZESTRIL) 10 mg tablet TAKE 1 TABLET BY MOUTH DAILY    crisaborole (EUCRISA) 2 % oint by Apply Externally route.  alendronate (FOSAMAX) 70 mg tablet TAKE 1 TABLET BY MOUTH EVERY 7 DAYS    triamcinolone acetonide (KENALOG) 0.025 % topical cream Apply  to affected area two (2) times a day.  clotrimazole-betamethasone (LOTRISONE) topical cream Apply  to affected area two (2) times a day.  docusate sodium (COLACE) 100 mg capsule Take 100 mg by mouth daily as needed for Constipation.  co-enzyme Q-10 (CO Q-10) 100 mg capsule Take 100 mg by mouth daily.  biotin 2,500 mcg Tab Take 5,000 mcg by mouth.  cholecalciferol (VITAMIN D3) 1,000 unit cap Take 1 Tab by mouth daily.  fexofenadine (ALLEGRA) 60 mg tablet Take 60 mg by mouth daily.  CALCIUM CARBONATE/VITAMIN D3 (CALCIUM + D PO) Take 1 Tab by mouth two (2) times a day. No current facility-administered medications for this visit.       Allergies Allergen Reactions    Codeine Nausea Only     Past Surgical History:   Procedure Laterality Date    HX CATARACT REMOVAL      HX ORTHOPAEDIC      left arm    HX WISDOM TEETH EXTRACTION      HX WRIST FRACTURE TX       Social History     Occupational History    Not on file   Tobacco Use    Smoking status: Never Smoker    Smokeless tobacco: Never Used   Substance and Sexual Activity    Alcohol use: Yes     Alcohol/week: 0.0 oz     Comment: occasionally    Drug use: No    Sexual activity: Yes     Partners: Male     Family History   Problem Relation Age of Onset    Cancer Mother         pancreatic    Heart Disease Mother     Heart Disease Father     Cancer Sister         breast    MS Sister     Breast Cancer Sister 62        REVIEW OF SYSTEMS : 4/25/2019  ALL BELOW ARE Negative except : SEE HPI     Constitutional: Negative for fever, chills and weight loss. Neg Weight Loss  Cardiovascular: Negative for chest pain, claudication and leg swelling. SOB, RUTLEDGE   Gastrointestinal/Urological: Negative for pain, N/V/D/C, Blood in stool or urine,dysuria,  Hematuria, Incontinence  Musculoskeletal: see HPI. Neurological: Negative for dizziness and weakness, headaches,Visual Changes or   Confusion, or Seizures,   Psychiatric/Behavioral: Negative for depression, memory loss and substance abuse. Extremities:  Negative for hair changes, rash or skin lesion changes. Hematologic: Negative for Bleeding problems, bruising, pallor or swollen lymph nodes. Peripheral Vascular: No calf pain, vascular vein tenderness to calf pain              No calf throbbing, posterior knee throbbing pain     DIAGNOSTIC IMAGING      Please see above section of this report. I have personally reviewed the results of the above study. The interpretation of this study is my professional opinion.       Giovanni Stark MD  4/25/2019  9:47 AM

## 2019-04-30 ENCOUNTER — OFFICE VISIT (OUTPATIENT)
Dept: ORTHOPEDIC SURGERY | Age: 74
End: 2019-04-30

## 2019-04-30 VITALS
DIASTOLIC BLOOD PRESSURE: 89 MMHG | OXYGEN SATURATION: 96 % | HEART RATE: 89 BPM | SYSTOLIC BLOOD PRESSURE: 125 MMHG | HEIGHT: 65 IN | WEIGHT: 151 LBS | TEMPERATURE: 98.9 F | BODY MASS INDEX: 25.16 KG/M2

## 2019-04-30 DIAGNOSIS — Z98.890 POST-OPERATIVE STATE: ICD-10-CM

## 2019-04-30 DIAGNOSIS — S92.331A DISPLACED FRACTURE OF THIRD METATARSAL BONE, RIGHT FOOT, INITIAL ENCOUNTER FOR CLOSED FRACTURE: ICD-10-CM

## 2019-04-30 DIAGNOSIS — M79.671 RIGHT FOOT PAIN: Primary | ICD-10-CM

## 2019-04-30 NOTE — PROGRESS NOTES
Patient: Mustapha Lowry                MRN: [id number]       SSN: [id number]  YOB: 1945              AGE: 76 y.o. SEX: female    Antony Mcintosh MD    POST OP OFFICE NOTE  DOS: 4/24/19    Chief Complaint:   Chief Complaint   Patient presents with    Foot Pain     RIGHT       HPI:     The patient is a 76 y.o. female who presents today for follow up 6 days s/p:   OPEN REDUCTION RIGHT THIRD METATARSAL  DBX PUTTY BONE GRAFTING BONE GRAFT/C-ARM/ARTHREX/NERVE BLOCK    Patient has been NWB to the right lower extremity. Patient reports doing well other than decreased appetite and fear of nausea. Patient denies any fever, chills, chest pain, shortness of breath or calf pain. There are no new illness or injuries to report since last seen in the office. Patient is on ASA  for DVT prophylaxis. PHYSICAL EXAM:     Visit Vitals  /89   Pulse 89   Temp 98.9 °F (37.2 °C) (Oral)   Ht 5' 5\" (1.651 m)   Wt 151 lb (68.5 kg)   SpO2 96%   BMI 25.13 kg/m²         Pain Scale: 0 - No pain/10      GEN:  Alert, well developed, well nourished, well appearing 76 y.o. female in no acute distress. PSYCH:  Normal affect, mood, and conduct. alert, oriented x 3 alert, oriented x 3, no dementia  M/S EXAMINATION OF: right foot  DRESSINGS: CDI other than mild soilage on dressing   DRAINAGE: none  INCISION: Incision looks good, skin well approximated, no dehiscence, nylon sutures in place without disruption. SKIN: mild edema , no erythema, mild ecchymosis with a few petechial spots on the dorsal foot and lower leg (possibly from Hibiclens prep), no warmth    TENDERNESS:  mild tenderness to palpation   NEUROVASCULAR:  grossly intact. Positive distal pulses and capillary refill. DVT ASSESSMENT:  The calf is not tender to palpation.  No evidence of DVT seen on physical exam.  ROM: not tested       RADIOGRAPHS & DIAGNOSTIC STUDIES       X-rays, 3 views of the right foot reveals post op changes s/p ORIF third metatarsal.   Overall alignment looks good. Hardware in good position. IMPRESSION:     Encounter Diagnoses     ICD-10-CM ICD-9-CM   1. Right foot pain M79.671 729.5   2. Displaced fracture of third metatarsal bone, right foot, initial encounter for closed fracture S92.331A 825.25   3. Post-operative state Z98.890 V45.89       PLAN:         Orders Placed This Encounter    Generic Supply Order    [[id number]] Foot Min 3V              · Dressing: changed in the office today. Patient placed in post op shoe    · Keep the current dressings on and in place. You need to keep this incision clean and dry. If you have a splint or cast on please keep this clean and dry as well. · You should expect swelling and bruising in the area over the next several days. You may elevate the surgical extremity on 1 pillow. Place the pillow horizontal so that no pressure is on the back of your heel. You may apply an icepack on top of the dressing to help minimize the swelling. · Keep all pets away from  any wound present in order to prevent infection. · Continue activity modification    · Weight bearing status:  no weight bearing to the surgical extremity    · No lifting, twisting, squatting, deep bending, driving or strenuous activity.     PLEASE SEEK IMMEDIATE ASSESSMENT BY ER PHYSICIAN IF ANY OF THE FOLLOWING EXIST:      Excessive pain, swelling, redness or odor of or around the surgical area    Temperature over 100.5    Nausea and vomiting lasting longer than 4 hours or if unable to take medications    Any signs of decreased circulation or nerve impairment to extremity: change in color, persistent numbness, tingling, coldness or increase pain    If any calf pain, calf tightness, shortness of breath, chest pain    Any difficulty breathing at rest or with ambulation, any chest tightness/soreness   Severe intractable pain, persistent swelling or drainage, development of a wound, incisional redness, finger/toe swelling or color changes, or CALF PAIN    · Perform ankle pumps with non-surgical/non-injured extremity to help reduce the risk of blood clots    · Please follow up in 2 weeks     · Continue taking ASA as directed      · Drink Ensure nutrition supplement      Patient has been discussed with Dr. Rachel Delacruz during this visit and he agrees with the assessment and plan    Patient expresses understanding of the plan. Patient education provided on post surgical care. REVIEW OF SYSTEMS:     Otherwise as noted in HPI      PAST MEDICAL HISTORY:     Past Medical History:   Diagnosis Date    Eczema     Essential hypertension     FH: breast cancer     Fibroids     uterine    Fracture of radius 11/2009    with ulna, left, closed    History of cataract     History of foot fracture     right and left    Hyperlipidemia     Microhematuria     chronic    Osteopenia     S/P radiation therapy     as child, to right shoulder for birthmark    Tinnitus     Zoster     right scapula       MEDICATIONS:     Current Outpatient Medications   Medication Sig    nitrofurantoin, macrocrystal-monohydrate, (MACROBID) 100 mg capsule Take 1 Cap by mouth two (2) times a day for 7 days.  nitrofurantoin, macrocrystal-monohydrate, (MACROBID) 100 mg capsule Take 1 Cap by mouth two (2) times a day.  ondansetron hcl (ZOFRAN) 4 mg tablet Take 1 Tab by mouth every eight (8) hours as needed for Nausea.  polyethylene glycol (MIRALAX) 17 gram packet Take 1 Packet by mouth daily.  aspirin (ASPIRIN) 325 mg tablet Take 1 Tab by mouth daily.  atorvastatin (LIPITOR) 10 mg tablet TAKE 1 TABLET BY MOUTH EVERY DAY    hydroCHLOROthiazide (HYDRODIURIL) 25 mg tablet TAKE 1 TABLET BY MOUTH DAILY    lisinopril (PRINIVIL, ZESTRIL) 10 mg tablet TAKE 1 TABLET BY MOUTH DAILY    crisaborole (EUCRISA) 2 % oint by Apply Externally route.     alendronate (FOSAMAX) 70 mg tablet TAKE 1 TABLET BY MOUTH EVERY 7 DAYS    triamcinolone acetonide (KENALOG) 0. 025 % topical cream Apply  to affected area two (2) times a day.  clotrimazole-betamethasone (LOTRISONE) topical cream Apply  to affected area two (2) times a day.  docusate sodium (COLACE) 100 mg capsule Take 100 mg by mouth daily as needed for Constipation.  co-enzyme Q-10 (CO Q-10) 100 mg capsule Take 100 mg by mouth daily.  biotin 2,500 mcg Tab Take 5,000 mcg by mouth.  cholecalciferol (VITAMIN D3) 1,000 unit cap Take 1 Tab by mouth daily.  fexofenadine (ALLEGRA) 60 mg tablet Take 60 mg by mouth daily.  CALCIUM CARBONATE/VITAMIN D3 (CALCIUM + D PO) Take 1 Tab by mouth two (2) times a day. No current facility-administered medications for this visit. ALLERGIES:     Allergies   Allergen Reactions    Codeine Nausea Only         PAST SURGICAL HISTORY:     Past Surgical History:   Procedure Laterality Date    HX CATARACT REMOVAL      HX ORTHOPAEDIC      left arm    HX WISDOM TEETH EXTRACTION      HX WRIST FRACTURE TX         SOCIAL HISTORY:     Social History     Socioeconomic History    Marital status:      Spouse name: Not on file    Number of children: Not on file    Years of education: Not on file    Highest education level: Not on file   Occupational History    Not on file   Social Needs    Financial resource strain: Not on file    Food insecurity:     Worry: Not on file     Inability: Not on file    Transportation needs:     Medical: Not on file     Non-medical: Not on file   Tobacco Use    Smoking status: Never Smoker    Smokeless tobacco: Never Used   Substance and Sexual Activity    Alcohol use:  Yes     Alcohol/week: 0.0 oz     Comment: occasionally    Drug use: No    Sexual activity: Yes     Partners: Male   Lifestyle    Physical activity:     Days per week: Not on file     Minutes per session: Not on file    Stress: Not on file   Relationships    Social connections:     Talks on phone: Not on file     Gets together: Not on file     Attends Tenriism service: Not on file     Active member of club or organization: Not on file     Attends meetings of clubs or organizations: Not on file     Relationship status: Not on file    Intimate partner violence:     Fear of current or ex partner: Not on file     Emotionally abused: Not on file     Physically abused: Not on file     Forced sexual activity: Not on file   Other Topics Concern    Not on file   Social History Narrative    Not on file       FAMILY HISTORY:     Family History   Problem Relation Age of Onset    Cancer Mother         pancreatic    Heart Disease Mother     Heart Disease Father     Cancer Sister         breast    MS Sister     Breast Cancer Sister [address], [place]  4/30/2019

## 2019-04-30 NOTE — PATIENT INSTRUCTIONS
· Keep the current dressings on and in place. You need to keep this incision clean and dry. If you have a splint or cast on please keep this clean and dry as well. · You should expect swelling and bruising in the area over the next several days. You may elevate the surgical extremity on 1 pillow. Place the pillow horizontal so that no pressure is on the back of your heel. You may apply an icepack on top of the dressing to help minimize the swelling. · Keep all pets away from  any wound present in order to prevent infection. · Continue activity modification    · Weight bearing status:  no weight bearing to the surgical extremity    · No lifting, twisting, squatting, deep bending, driving or strenuous activity. PLEASE SEEK IMMEDIATE ASSESSMENT BY ER PHYSICIAN IF ANY OF THE FOLLOWING EXIST:     Excessive pain, swelling, redness or odor of or around the surgical area   Temperature over 100.5   Nausea and vomiting lasting longer than 4 hours or if unable to take medications   Any signs of decreased circulation or nerve impairment to extremity: change in color, persistent numbness, tingling, coldness or increase pain   If any calf pain, calf tightness, shortness of breath, chest pain   Any difficulty breathing at rest or with ambulation, any chest tightness/soreness  Severe intractable pain, persistent swelling or drainage, development of a wound, incisional redness, finger/toe swelling or color changes, or CALF PAIN    · Perform ankle pumps with non-surgical/non-injured extremity to help reduce the risk of blood clots    · Please follow up in 2 weeks     · Continue taking Aspirin as directed           Acute Pain After Surgery: Care Instructions  Your Care Instructions    It's common to have some pain after surgery. Pain doesn't mean that something is wrong or that the surgery didn't go well. But when the pain is severe, it's important to work with your doctor to manage it.  It's also important to be aware of a few facts about pain and pain medicine. · You are the only person who knows what your pain feels like. So be sure to tell your doctor when you are in pain or when the pain changes. Then he or she will know how to adjust your medicines. · Pain is often easier to control right after it starts. So it may be better to take regular doses of pain medicine and not wait until the pain gets bad. · Medicine can help control pain. But this doesn't mean you'll have no pain. Medicine works to keep the pain at a level you can live with. With time, you will feel better. Follow-up care is a key part of your treatment and safety. Be sure to make and go to all appointments, and call your doctor if you are having problems. It's also a good idea to know your test results and keep a list of the medicines you take. How can you care for yourself at home? · Be safe with medicines. Read and follow all instructions on the label. ? If the doctor gave you a prescription medicine for pain, take it as prescribed. ? If you are not taking a prescription pain medicine, ask your doctor if you can take an over-the-counter medicine. · If you take an over-the-counter pain medicine, such as acetaminophen (Tylenol), ibuprofen (Advil, Motrin), or naproxen (Aleve), read and follow all instructions on the label. · Do not take two or more pain medicines at the same time unless the doctor told you to. · Do not drink alcohol while you are taking pain medicines. · Try to walk each day if your doctor recommends it. Start by walking a little more than you did the day before. Bit by bit, increase the amount you walk. Walking increases blood flow. It also helps prevent pneumonia and constipation. · To prevent constipation from opioid pain medicines:  ? Talk to your doctor about a laxative. ? Include fruits, vegetables, beans, and whole grains in your diet each day. These foods are high in fiber.   ? Drink plenty of fluids, enough so that your urine is light yellow or clear like water. Drink water, fruit juice, or other drinks that do not contain caffeine or alcohol. If you have kidney, heart, or liver disease and have to limit fluids, talk with your doctor before you increase the amount of fluids you drink. ? Take a fiber supplement, such as Citrucel or Metamucil, every day if needed. Read and follow all instructions on the label. If you take pain medicine for more than a few days, talk to your doctor before you take fiber. When should you call for help? Call your doctor now or seek immediate medical care if:    · Your pain gets worse.     · Your pain is not controlled by medicine.    Watch closely for changes in your health, and be sure to contact your doctor if you have any problems. Where can you learn more? Go to http://mallika-suzanne.info/. Enter ([phone] 280-226 in the search box to learn more about \"Acute Pain After Surgery: Care Instructions. \"  Current as of: September 23, 2018  Content Version: 11.9  © 1070-3708 [employer]. Care instructions adapted under license by [employer] (which disclaims liability or warranty for this information). If you have questions about a medical condition or this instruction, always ask your healthcare professional. [address] any warranty or liability for your use of this information.

## 2019-05-01 DIAGNOSIS — S92.331A CLOSED DISPLACED FRACTURE OF THIRD METATARSAL BONE OF RIGHT FOOT, INITIAL ENCOUNTER: Primary | ICD-10-CM

## 2019-05-14 ENCOUNTER — OFFICE VISIT (OUTPATIENT)
Dept: ORTHOPEDIC SURGERY | Age: 74
End: 2019-05-14

## 2019-05-14 VITALS
RESPIRATION RATE: 16 BRPM | BODY MASS INDEX: 25.13 KG/M2 | TEMPERATURE: 97 F | DIASTOLIC BLOOD PRESSURE: 70 MMHG | SYSTOLIC BLOOD PRESSURE: 119 MMHG | OXYGEN SATURATION: 97 % | HEART RATE: 95 BPM | HEIGHT: 65 IN

## 2019-05-14 DIAGNOSIS — S92.331D CLOSED DISPLACED FRACTURE OF THIRD METATARSAL BONE OF RIGHT FOOT WITH ROUTINE HEALING, SUBSEQUENT ENCOUNTER: ICD-10-CM

## 2019-05-14 DIAGNOSIS — Z98.890 POST-OPERATIVE STATE: ICD-10-CM

## 2019-05-14 DIAGNOSIS — M79.671 RIGHT FOOT PAIN: Primary | ICD-10-CM

## 2019-05-14 NOTE — PROGRESS NOTES
1. Have you been to the ER, urgent care clinic since your last visit? Hospitalized since your last visit? NO    2. Have you seen or consulted any other health care providers outside of the [address] [place] [address] since your last visit? Include any pap smears or colon screening.  NO  \

## 2019-05-14 NOTE — PATIENT INSTRUCTIONS
· Dressing: sutures removed in the office today. Patient placed in CAM boot - no weight bearing    · You may shower in 3 days, no submerging in any water    · Keep all pets away from  any wound present in order to prevent infection. · Continue activity modification    · Weight bearing status:  no weight bearing to the surgical extremity    · No lifting, twisting, squatting, deep bending, driving or strenuous activity. PLEASE SEEK IMMEDIATE ASSESSMENT BY ER PHYSICIAN IF ANY OF THE FOLLOWING EXIST:      Excessive pain, swelling, redness or odor of or around the surgical area    Temperature over 100.5    Nausea and vomiting lasting longer than 4 hours or if unable to take medications    Any signs of decreased circulation or nerve impairment to extremity: change in color, persistent numbness, tingling, coldness or increase pain    If any calf pain, calf tightness, shortness of breath, chest pain    Any difficulty breathing at rest or with ambulation, any chest tightness/soreness   Severe intractable pain, persistent swelling or drainage, development of a wound, incisional redness, finger/toe swelling or color changes, or CALF PAIN    · Perform ankle pumps with non-surgical/non-injured extremity to help reduce the risk of blood clots    · Please follow up in 3 weeks     · Continue taking ASA as directed           Metatarsal Fracture: Care Instructions  Your Care Instructions    A metatarsal fracture is a break or a thin, hairline crack in one of the metatarsal bones of the foot. This type of fracture usually happens from repeated stress on the bones of the foot. Or it can happen when a person jumps or changes direction quickly and twists his or her foot or ankle the wrong way. 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 Treatment depends on how bad the fracture is and where the fracture is on the bone.  You may or may not have had surgery. Your doctor may have put your foot in a cast or splint to keep it stable. You may have been given crutches to use to keep weight off your foot. A metatarsal fracture may take from 6 weeks to several months to heal. It is important to give your foot time to heal completely, so that you do not hurt it again. Do not return to your usual activities until your doctor says you can. Your doctor may suggest that you get physical therapy to help regain strength and range of motion in your foot. You heal best when you take good care of yourself. Eat a variety of healthy foods, and don't smoke. Follow-up care is a key part of your treatment and safety. Be sure to make and go to all appointments, and call your doctor if you are having problems. It is also a good idea to know your test results and keep a list of the medicines you take. How can you care for yourself at home? · Be safe with medicines. Read and follow all instructions on the label. ? If your doctor gave you a prescription medicine for pain, take it as prescribed. ? If you are not taking a prescription pain medicine, ask your doctor if you can take an over-the-counter medicine. · Follow your doctor's instructions about how much weight you can put on your foot and when you can go back to your usual activities. If you were given crutches, use them as directed. · Put ice or a cold pack on your foot for 10 to 20 minutes at a time. Try to do this every 1 to 2 hours for the next 3 days (when you are awake) or until the swelling goes down. Put a thin cloth between the ice and your skin. · Prop up your foot on a pillow when you ice it or anytime you sit or lie down for the next 3 days. Try to keep it above the level of your heart. This will help reduce swelling. Cast and splint care  · If your foot is in a cast or splint, follow the cast or splint care instructions your doctor gives you.  If you have a removable fiberglass walking cast or a splint, do not take it off unless your doctor tells you to. · Keep your cast or splint dry. If you have a removable fiberglass walking cast or a splint, ask your doctor if it is okay to remove it to bathe. Your doctor may want you to keep it on as much as possible. · If you're told to keep your cast or splint on, tape a sheet of plastic to cover it when you bathe. Or ask your doctor about products that can help keep a cast or splint dry. Water under the cast or splint can cause your skin to itch and hurt. · Never cut your cast or stick anything down it to scratch an itch on your leg. When should you call for help? Call your doctor now or seek immediate medical care if:    · You have problems with your cast or splint. For example:  ? The skin under the cast or splint is burning or stinging. ? The cast or splint feels too tight. ? There is a lot of swelling near the cast or splint. (Some swelling is normal.)  ? You have a new fever. ? There is drainage or a bad smell coming from the cast or splint.     · You have increased or severe pain.     · You have tingling, weakness, or numbness in your foot and toes.     · You cannot move your toes.     · Your foot turns cold or changes color.    Watch closely for changes in your health, and be sure to contact your doctor if:    · The pain does not get better day by day.     · You do not get better as expected. Where can you learn more? Go to http://mallika-suzanne.info/. Enter ([phone] [phone] in the search box to learn more about \"Metatarsal Fracture: Care Instructions. \"  Current as of: September 20, 2018  Content Version: 11.9  © 1235-4440 [employer]. Care instructions adapted under license by [employer] (which disclaims liability or warranty for this information).  If you have questions about a medical condition or this instruction, always ask your healthcare professional. Hugo Almazan disclaims any warranty or liability for your use of this information.

## 2019-05-14 NOTE — PROGRESS NOTES
Patient: Felipa Mejia                MRN: [id number]       SSN: [id number]  YOB: 1945              AGE: 76 y.o. SEX: female    Lizeth Ruth MD    POST OP OFFICE NOTE  DOS: 4/24/19    Chief Complaint:   Chief Complaint   Patient presents with    Foot Pain     ORIF right 3rd metatarsal post op        HPI:     The patient is a 76 y.o. female who presents today for follow up 20 days s/p:   OPEN REDUCTION RIGHT THIRD METATARSAL  DBX PUTTY BONE GRAFTING BONE GRAFT/C-ARM/ARTHREX/NERVE BLOCK    Patient has been NWB to the right lower extremity. Patient reports doing well and her appetite has improved. She states that the reaction to the Hibiclens wash has improved. Patient denies any fever, chills, chest pain, shortness of breath or calf pain. There are no new illness or injuries to report since last seen in the office. Patient is on ASA  for DVT prophylaxis. PHYSICAL EXAM:     Visit Vitals  /70 (BP 1 Location: Right arm, BP Patient Position: Sitting)   Pulse 95   Temp 97 °F (36.1 °C) (Oral)   Resp 16   Ht 5' 5\" (1.651 m)   SpO2 97%   BMI 25.13 kg/m²         Pain Scale: 0 - No pain/10      GEN:  Alert, well developed, well nourished, well appearing 76 y.o. female in no acute distress. PSYCH:  Normal affect, mood, and conduct. alert, oriented x 3 alert, oriented x 3, no dementia  M/S EXAMINATION OF: right foot  DRESSINGS: CDI other than mild soilage on dressing   DRAINAGE: none  INCISION: Incision looks good, skin well approximated, no dehiscence, nylon sutures in place without disruption. SKIN: mild edema , no erythema, mild ecchymosis with a few petechial spots on the dorsal foot and lower leg (possibly from Hibiclens prep), no warmth    TENDERNESS:  mild tenderness to palpation   NEUROVASCULAR:  grossly intact. Positive distal pulses and capillary refill. DVT ASSESSMENT:  The calf is not tender to palpation.  No evidence of DVT seen on physical exam.  ROM: not tested       RADIOGRAPHS & DIAGNOSTIC STUDIES       None    IMPRESSION:     Encounter Diagnoses     ICD-10-CM ICD-9-CM   1. Right foot pain M79.671 729.5   2. Post-operative state Z98.890 V45.89   3. Closed displaced fracture of third metatarsal bone of right foot with routine healing, subsequent encounter S92.331D V54.19       PLAN:         Orders Placed This Encounter    Generic Supply Order              · Dressing: sutures removed in the office today. Patient placed in CAM boot. Shower in 3 days, no submerging    · Keep the current dressings on and in place. You need to keep this incision clean and dry. If you have a splint or cast on please keep this clean and dry as well. · You should expect swelling and bruising in the area over the next several days. You may elevate the surgical extremity on 1 pillow. Place the pillow horizontal so that no pressure is on the back of your heel. You may apply an icepack on top of the dressing to help minimize the swelling. · Keep all pets away from  any wound present in order to prevent infection. · Continue activity modification    · Weight bearing status:  no weight bearing to the surgical extremity    · No lifting, twisting, squatting, deep bending, driving or strenuous activity.     PLEASE SEEK IMMEDIATE ASSESSMENT BY ER PHYSICIAN IF ANY OF THE FOLLOWING EXIST:      Excessive pain, swelling, redness or odor of or around the surgical area    Temperature over 100.5    Nausea and vomiting lasting longer than 4 hours or if unable to take medications    Any signs of decreased circulation or nerve impairment to extremity: change in color, persistent numbness, tingling, coldness or increase pain    If any calf pain, calf tightness, shortness of breath, chest pain    Any difficulty breathing at rest or with ambulation, any chest tightness/soreness   Severe intractable pain, persistent swelling or drainage, development of a wound, incisional redness, finger/toe swelling or color changes, or CALF PAIN    · Perform ankle pumps with non-surgical/non-injured extremity to help reduce the risk of blood clots    · Please follow up in 3 weeks - X[facility] at [facility]    · Continue taking ASA as directed        Patient has been discussed with Dr. Colin Ruiz during this visit and he agrees with the assessment and plan    Patient expresses understanding of the plan. Patient education provided on post surgical care. REVIEW OF SYSTEMS:     Otherwise as noted in HPI      PAST MEDICAL HISTORY:     Past Medical History:   Diagnosis Date    Eczema     Essential hypertension     FH: breast cancer     Fibroids     uterine    Fracture of radius 11/2009    with ulna, left, closed    History of cataract     History of foot fracture     right and left    Hyperlipidemia     Microhematuria     chronic    Osteopenia     S/P radiation therapy     as child, to right shoulder for birthmark    Tinnitus     Zoster     right scapula       MEDICATIONS:     Current Outpatient Medications   Medication Sig    ondansetron hcl (ZOFRAN) 4 mg tablet Take 1 Tab by mouth every eight (8) hours as needed for Nausea.  polyethylene glycol (MIRALAX) 17 gram packet Take 1 Packet by mouth daily.  aspirin (ASPIRIN) 325 mg tablet Take 1 Tab by mouth daily.  atorvastatin (LIPITOR) 10 mg tablet TAKE 1 TABLET BY MOUTH EVERY DAY    hydroCHLOROthiazide (HYDRODIURIL) 25 mg tablet TAKE 1 TABLET BY MOUTH DAILY    lisinopril (PRINIVIL, ZESTRIL) 10 mg tablet TAKE 1 TABLET BY MOUTH DAILY    crisaborole (EUCRISA) 2 % oint by Apply Externally route.  alendronate (FOSAMAX) 70 mg tablet TAKE 1 TABLET BY MOUTH EVERY 7 DAYS    triamcinolone acetonide (KENALOG) 0.025 % topical cream Apply  to affected area two (2) times a day.  clotrimazole-betamethasone (LOTRISONE) topical cream Apply  to affected area two (2) times a day.     docusate sodium (COLACE) 100 mg capsule Take 100 mg by mouth daily as needed for Constipation.  co-enzyme Q-10 (CO Q-10) 100 mg capsule Take 100 mg by mouth daily.  biotin 2,500 mcg Tab Take 5,000 mcg by mouth.  cholecalciferol (VITAMIN D3) 1,000 unit cap Take 1 Tab by mouth daily.  fexofenadine (ALLEGRA) 60 mg tablet Take 60 mg by mouth daily.  CALCIUM CARBONATE/VITAMIN D3 (CALCIUM + D PO) Take 1 Tab by mouth two (2) times a day.  nitrofurantoin, macrocrystal-monohydrate, (MACROBID) 100 mg capsule Take 1 Cap by mouth two (2) times a day. No current facility-administered medications for this visit. ALLERGIES:     Allergies   Allergen Reactions    Codeine Nausea Only    Hibiclens [Chlorhexidine Gluconate] Rash         PAST SURGICAL HISTORY:     Past Surgical History:   Procedure Laterality Date    HX CATARACT REMOVAL      HX ORTHOPAEDIC      left arm    HX WISDOM TEETH EXTRACTION      HX WRIST FRACTURE TX         SOCIAL HISTORY:     Social History     Socioeconomic History    Marital status:      Spouse name: Not on file    Number of children: Not on file    Years of education: Not on file    Highest education level: Not on file   Occupational History    Not on file   Social Needs    Financial resource strain: Not on file    Food insecurity:     Worry: Not on file     Inability: Not on file    Transportation needs:     Medical: Not on file     Non-medical: Not on file   Tobacco Use    Smoking status: Never Smoker    Smokeless tobacco: Never Used   Substance and Sexual Activity    Alcohol use:  Yes     Alcohol/week: 0.0 oz     Comment: occasionally    Drug use: No    Sexual activity: Yes     Partners: Male   Lifestyle    Physical activity:     Days per week: Not on file     Minutes per session: Not on file    Stress: Not on file   Relationships    Social connections:     Talks on phone: Not on file     Gets together: Not on file     Attends Protestant service: Not on file     Active member of club or organization: Not on file     Attends meetings of clubs or organizations: Not on file     Relationship status: Not on file    Intimate partner violence:     Fear of current or ex partner: Not on file     Emotionally abused: Not on file     Physically abused: Not on file     Forced sexual activity: Not on file   Other Topics Concern    Not on file   Social History Narrative    Not on file       FAMILY HISTORY:     Family History   Problem Relation Age of Onset    Cancer Mother         pancreatic    Heart Disease Mother     Heart Disease Father     Cancer Sister         breast    MS Sister     Breast Cancer Sister [address], [place]  5/14/2019

## 2019-05-15 ENCOUNTER — OFFICE VISIT (OUTPATIENT)
Dept: INTERNAL MEDICINE CLINIC | Age: 74
End: 2019-05-15

## 2019-05-15 ENCOUNTER — HOSPITAL ENCOUNTER (OUTPATIENT)
Dept: LAB | Age: 74
Discharge: HOME | End: 2019-05-15
Payer: MEDICARE

## 2019-05-15 ENCOUNTER — TELEPHONE (OUTPATIENT)
Dept: INTERNAL MEDICINE CLINIC | Age: 74
End: 2019-05-15

## 2019-05-15 VITALS
HEART RATE: 86 BPM | RESPIRATION RATE: 16 BRPM | DIASTOLIC BLOOD PRESSURE: 68 MMHG | TEMPERATURE: 98.2 F | HEIGHT: 65 IN | OXYGEN SATURATION: 99 % | BODY MASS INDEX: 25.13 KG/M2 | SYSTOLIC BLOOD PRESSURE: 118 MMHG

## 2019-05-15 DIAGNOSIS — M79.641 PAIN IN BOTH HANDS: Primary | ICD-10-CM

## 2019-05-15 DIAGNOSIS — M79.642 PAIN IN BOTH HANDS: ICD-10-CM

## 2019-05-15 DIAGNOSIS — M85.89 OSTEOPENIA OF MULTIPLE SITES: ICD-10-CM

## 2019-05-15 DIAGNOSIS — S92.331D CLOSED DISPLACED FRACTURE OF THIRD METATARSAL BONE OF RIGHT FOOT WITH ROUTINE HEALING: ICD-10-CM

## 2019-05-15 DIAGNOSIS — E78.5 HYPERLIPIDEMIA, UNSPECIFIED HYPERLIPIDEMIA TYPE: ICD-10-CM

## 2019-05-15 DIAGNOSIS — I10 ESSENTIAL HYPERTENSION: ICD-10-CM

## 2019-05-15 DIAGNOSIS — R73.09 ABNORMAL GLUCOSE: ICD-10-CM

## 2019-05-15 DIAGNOSIS — M79.641 PAIN IN BOTH HANDS: ICD-10-CM

## 2019-05-15 DIAGNOSIS — M79.642 PAIN IN BOTH HANDS: Primary | ICD-10-CM

## 2019-05-15 PROCEDURE — 84550 ASSAY OF BLOOD/URIC ACID: CPT

## 2019-05-15 PROCEDURE — 86200 CCP ANTIBODY: CPT

## 2019-05-15 PROCEDURE — 86431 RHEUMATOID FACTOR QUANT: CPT

## 2019-05-15 PROCEDURE — 86225 DNA ANTIBODY NATIVE: CPT

## 2019-05-15 PROCEDURE — 85025 COMPLETE CBC W/AUTO DIFF WBC: CPT

## 2019-05-15 PROCEDURE — 85652 RBC SED RATE AUTOMATED: CPT

## 2019-05-15 PROCEDURE — 80053 COMPREHEN METABOLIC PANEL: CPT

## 2019-05-15 PROCEDURE — 86140 C-REACTIVE PROTEIN: CPT

## 2019-05-15 NOTE — PATIENT INSTRUCTIONS
Begin Ibuprofen 400 mg up to three times per day with food. Obtain carpal tunnel wrist splints for bilateral hands and wear at night. Carpal Tunnel Syndrome: Care Instructions Your Care Instructions Carpal tunnel syndrome is a nerve problem. It can cause tingling, numbness, weakness, or pain in the fingers, thumb, and hand. The median nerve and several tough tissues called tendons run through a space in the wrist called the carpal tunnel. The repeated hand motions used in work and some hobbies and sports can put pressure on the nerve. Pregnancy and several conditions, including diabetes, arthritis, and an underactive thyroid, also can cause carpal tunnel syndrome. You may be able to limit an activity or do it differently to reduce your symptoms. You also can take other steps to feel better. If your symptoms are mild, 1 to 2 weeks of home treatment are likely to ease your pain. Surgery is needed only if other treatments do not work. Follow-up care is a key part of your treatment and safety. Be sure to make and go to all appointments, and call your doctor if you are having problems. It's also a good idea to know your test results and keep a list of the medicines you take. How can you care for yourself at home? · If possible, stop or reduce the activity that causes your symptoms. If you cannot stop the activity, take frequent breaks to rest and stretch or change hand positions to do a task. Try switching hands, such as when using a computer mouse. · Try to avoid bending or twisting your wrists. · Ask your doctor if you can take an over-the-counter pain medicine, such as acetaminophen (Tylenol), ibuprofen (Advil, Motrin), or naproxen (Aleve). Be safe with medicines. Read and follow all instructions on the label. · If your doctor prescribes corticosteroid medicine to help reduce pain and swelling, take it exactly as prescribed. Call your doctor if you think you are having a problem with your medicine. · Put ice or a cold pack on your wrist for 10 to 20 minutes at a time to ease pain. Put a thin cloth between the ice and your skin. · If your doctor or your physical or occupational therapist tells you to wear a wrist splint, wear it as directed to keep your wrist in a neutral position. This also eases pressure on your median nerve. · Ask your doctor whether you should have physical or occupational therapy to learn how to do tasks differently. · Try a yoga class to stretch your muscles and build strength in your hands and wrists. Yoga has been shown to ease carpal tunnel symptoms. To prevent carpal tunnel · When working at a computer, keep your hands and wrists in line with your forearms. Hold your elbows close to your sides. Take a break every 10 to 15 minutes. · Try these exercises: 
? Warm up: Rotate your wrist up, down, and from side to side. Repeat this 4 times. Stretch your fingers far apart, relax them, then stretch them again. Repeat 4 times. Stretch your thumb by pulling it back gently, holding it, and then releasing it. Repeat 4 times. ? Prayer stretch: Start with your palms together in front of your chest just below your chin. Slowly lower your hands toward your waistline while keeping your hands close to your stomach and your palms together until you feel a mild to moderate stretch under your forearms. Hold for 10 to 20 seconds. Repeat 4 times. ? Wrist flexor stretch: Hold your arm in front of you with your palm up. Bend your wrist, pointing your hand toward the floor. With your other hand, gently bend your wrist further until you feel a mild to moderate stretch in your forearm. Hold for 10 to 20 seconds. Repeat 4 times. ? Wrist extensor stretch: Repeat the steps for the wrist flexor stretch, but begin with your extended hand palm down. · Squeeze a rubber exercise ball several times a day to keep your hands and fingers strong. · Avoid holding objects (such as a book) in one position for a long time. When possible, use your whole hand to grasp an object. Using just the thumb and index finger can put stress on the wrist. 
· Do not smoke. It can make this condition worse by reducing blood flow to the median nerve. If you need help quitting, talk to your doctor about stop-smoking programs and medicines. These can increase your chances of quitting for good. When should you call for help? Watch closely for changes in your health, and be sure to contact your doctor if: 
  · Your pain or other problems do not get better with home care.  
  · You want more information about physical or occupational therapy.  
  · You have side effects of your corticosteroid medicine, such as: 
? Weight gain. ? Mood changes. ? Trouble sleeping. ? Bruising easily.  
  · You have any other problems with your medicine. Where can you learn more? Go to http://mallika-suzanne.info/. Enter R432 in the search box to learn more about \"Carpal Tunnel Syndrome: Care Instructions. \" Current as of: September 20, 2018 Content Version: 11.9 © 8653-2123 [employer]. Care instructions adapted under license by [employer] (which disclaims liability or warranty for this information). If you have questions about a medical condition or this instruction, always ask your healthcare professional. [address] any warranty or liability for your use of this information. Carpal Tunnel Syndrome: Exercises Your Care Instructions Here are some examples of typical rehabilitation exercises for your condition. Start each exercise slowly. Ease off the exercise if you start to have pain. Your doctor or your physical or occupational therapist will tell you when you can start these exercises and which ones will work best for you. Warm-up stretches When you no longer have pain or numbness, you can do exercises to help prevent carpal tunnel syndrome from coming back. Do not do any stretch or movement that is uncomfortable or painful. 1. Rotate your wrist up, down, and from side to side. Repeat 4 times. 2. Stretch your fingers far apart. Relax them, and then stretch them again. Repeat 4 times. 3. Stretch your thumb by pulling it back gently, holding it, and then releasing it. Repeat 4 times. How to do the exercises Prayer stretch 1. Start with your palms together in front of your chest just below your chin. 2. Slowly lower your hands toward your waistline, keeping your hands close to your stomach and your palms together until you feel a mild to moderate stretch under your forearms. 3. Hold for at least 15 to 30 seconds. Repeat 2 to 4 times. Wrist flexor stretch 1. Extend your arm in front of you with your palm up. 2. Bend your wrist, pointing your hand toward the floor. 3. With your other hand, gently bend your wrist farther until you feel a mild to moderate stretch in your forearm. 4. Hold for at least 15 to 30 seconds. Repeat 2 to 4 times. Wrist extensor stretch 1. Repeat steps 1 through 4 of the stretch above, but begin with your extended hand palm down. Follow-up care is a key part of your treatment and safety. Be sure to make and go to all appointments, and call your doctor if you are having problems. It's also a good idea to know your test results and keep a list of the medicines you take. Where can you learn more? Go to http://mallika-suzanne.info/. Enter S682 in the search box to learn more about \"Carpal Tunnel Syndrome: Exercises. \" Current as of: September 20, 2018 Content Version: 11.9 © 1272-5608 [employer]. Care instructions adapted under license by [employer] (which disclaims liability or warranty for this information).  If you have questions about a medical condition or this instruction, always ask your healthcare professional. [employer] disclaims any warranty or liability for your use of this information.

## 2019-05-15 NOTE — PROGRESS NOTES
Chief Complaint Patient presents with  
 Hand Pain Patient complains of bilateral hand pain. Onset 2 days. Patient stating it was difficult to use hands with having difficulty lifting arms. However states she can use both of her hands but left hand is very sore. 1. Have you been to the ER, urgent care clinic or hospitalized since your last visit? YES, [facility] on April 14th for right foot. 2. Have you seen or consulted any other health care providers outside of the [address] [place] [address] since your last visit (Include any pap smears or colon screening)?  NO

## 2019-05-16 ENCOUNTER — TELEPHONE (OUTPATIENT)
Dept: INTERNAL MEDICINE CLINIC | Age: 74
End: 2019-05-16

## 2019-05-16 LAB
ALBUMIN SERPL-MCNC: 3.9 G/DL (ref 3.4–5)
ALBUMIN/GLOB SERPL: 1.1 {RATIO} (ref 0.8–1.7)
ALP SERPL-CCNC: 99 U/L (ref 45–117)
ALT SERPL-CCNC: 38 U/L (ref 13–56)
ANION GAP SERPL CALC-SCNC: 12 MMOL/L (ref 3–18)
AST SERPL-CCNC: 28 U/L (ref 15–37)
BASOPHILS # BLD: 0.1 K/UL (ref 0–0.1)
BASOPHILS NFR BLD: 1 % (ref 0–2)
BILIRUB SERPL-MCNC: 0.6 MG/DL (ref 0.2–1)
BUN SERPL-MCNC: 18 MG/DL (ref 7–18)
BUN/CREAT SERPL: 26 (ref 12–20)
CALCIUM SERPL-MCNC: 9.2 MG/DL (ref 8.5–10.1)
CHLORIDE SERPL-SCNC: 99 MMOL/L (ref 100–108)
CO2 SERPL-SCNC: 25 MMOL/L (ref 21–32)
CREAT SERPL-MCNC: 0.7 MG/DL (ref 0.6–1.3)
CRP SERPL-MCNC: 9.1 MG/DL (ref 0–0.3)
DIFFERENTIAL METHOD BLD: ABNORMAL
EOSINOPHIL # BLD: 0.2 K/UL (ref 0–0.4)
EOSINOPHIL NFR BLD: 2 % (ref 0–5)
ERYTHROCYTE [DISTWIDTH] IN BLOOD BY AUTOMATED COUNT: 14.6 % (ref 11.6–14.5)
ERYTHROCYTE [SEDIMENTATION RATE] IN BLOOD: 19 MM/HR (ref 0–30)
GLOBULIN SER CALC-MCNC: 3.7 G/DL (ref 2–4)
GLUCOSE SERPL-MCNC: 42 MG/DL (ref 74–99)
HCT VFR BLD AUTO: 38.3 % (ref 35–45)
HGB BLD-MCNC: 11.7 G/DL (ref 12–16)
LYMPHOCYTES # BLD: 1.8 K/UL (ref 0.9–3.6)
LYMPHOCYTES NFR BLD: 23 % (ref 21–52)
MCH RBC QN AUTO: 27.7 PG (ref 24–34)
MCHC RBC AUTO-ENTMCNC: 30.5 G/DL (ref 31–37)
MCV RBC AUTO: 90.8 FL (ref 74–97)
MONOCYTES # BLD: 0.7 K/UL (ref 0.05–1.2)
MONOCYTES NFR BLD: 9 % (ref 3–10)
NEUTS SEG # BLD: 5.2 K/UL (ref 1.8–8)
NEUTS SEG NFR BLD: 65 % (ref 40–73)
PLATELET # BLD AUTO: 350 K/UL (ref 135–420)
PMV BLD AUTO: 10.3 FL (ref 9.2–11.8)
POTASSIUM SERPL-SCNC: 4.6 MMOL/L (ref 3.5–5.5)
PROT SERPL-MCNC: 7.6 G/DL (ref 6.4–8.2)
RBC # BLD AUTO: 4.22 M/UL (ref 4.2–5.3)
SODIUM SERPL-SCNC: 136 MMOL/L (ref 136–145)
URATE SERPL-MCNC: 3.8 MG/DL (ref 2.6–7.2)
WBC # BLD AUTO: 7.9 K/UL (ref 4.6–13.2)

## 2019-05-17 LAB — RHEUMATOID FACT SERPL-ACNC: <10 IU/ML

## 2019-05-17 NOTE — TELEPHONE ENCOUNTER
This was a lab error- [facility] did not pick her labs up after we sujit them (drawn after 5pm), they sat in the box for over 24 hours.

## 2019-05-17 NOTE — TELEPHONE ENCOUNTER
Phone call from lab  Gluc 42 - drawn yesterday pm?  pls check on status of pt - suspect lab is erroneous  Has she had sx of hypoglycemia lately?

## 2019-05-17 NOTE — TELEPHONE ENCOUNTER
Spoke with patient to let her know per Eleazar Stokes she can take her medications and eat. He believes the glucose of 42 was from a lab error. Message forwarded to Amanda Fierro so she is aware.

## 2019-05-18 LAB
CENTROMERE B AB SER-ACNC: <0.2 AI (ref 0–0.9)
CHROMATIN AB SERPL-ACNC: <0.2 AI (ref 0–0.9)
DSDNA AB SER-ACNC: <1 IU/ML (ref 0–9)
ENA JO1 AB SER-ACNC: <0.2 AI (ref 0–0.9)
ENA RNP AB SER-ACNC: <0.2 AI (ref 0–0.9)
ENA SCL70 AB SER-ACNC: <0.2 AI (ref 0–0.9)
ENA SM AB SER-ACNC: <0.2 AI (ref 0–0.9)
ENA SS-A AB SER-ACNC: <0.2 AI (ref 0–0.9)
ENA SS-B AB SER-ACNC: <0.2 AI (ref 0–0.9)
SEE BELOW, 164869: NORMAL

## 2019-05-20 ENCOUNTER — TELEPHONE (OUTPATIENT)
Dept: INTERNAL MEDICINE CLINIC | Age: 74
End: 2019-05-20

## 2019-05-20 PROBLEM — S92.331D CLOSED DISPLACED FRACTURE OF THIRD METATARSAL BONE OF RIGHT FOOT WITH ROUTINE HEALING: Status: ACTIVE | Noted: 2019-05-20

## 2019-05-20 LAB — CCP IGA+IGG SERPL IA-ACNC: 7 UNITS (ref 0–19)

## 2019-05-20 NOTE — PROGRESS NOTES
HPI:  
Loc Martínez is a 76y.o. year old female who presents today for an acute visit to evaluate bilateral hand pain. She has a history of hypertension, hyperlipidemia, osteopenia, and chronic microhematuria. On 4/15/2019, she underwent evaluation at [facility] for right foot pain. She denied known injury. Right foot x-ray showed a displaced third metatarsal fracture and she was referred to Dr. Pablito Cuevas for evaluation. Given the displacement and risk of metatarsalgia, surgery was recommended, and on 4/24/2019, she underwent open reduction/internal fixation of the third metatarsal with putty bone grafting. She reports that she did well after surgery, and remains non-weight bearing in CAM boot. She reports that her friend was helping her clean out her closet on 5/13/2019. She states that she was sitting on her bed sorting through items. She states that the following morning (5/14/2019), she awoke and noted significant pain in her bilateral hands, unable to flex fingers to make a fist. She states that the discomfort lasted several hours, but improved throughout the day with increasing use. She states that she also noted bilateral wrist pain throughout the day, and left shoulder pain at night. She states that she awoke again this morning and again was experiencing bilateral hand and wrist discomfort, unable to make a fist. She states that the pain has gradually improved throughout the day, but she does notice redness and swelling of her left 5th MCP joint this afternoon. However, she states that it is not painful. She denies any fever, chills, sore throat, rashes, dyspnea, cough, abdominal pain, dysuria, focal weakness, visual changes, or headache. She does admit to recently sleeping with her bilateral hands tucked beneath her in a flexed position while lying on her back. She states that this is the most comfortable due to her limitations from her foot surgery.  She also reports that she has been using a walker to ambulate and has been gripping it tightly with her hands. She reports some mild shoulder discomfort, but states that most of her discomfort is in her bilateral wrists and hands. She is otherwise without complaints. She has a history of hypertension, treated with lisinopril and hydrochlorothiazide. She has been checking her blood pressure intermittently and reports that most readings are with a systolic blood pressure <956. She remains quite active, although has not been having time to exercise. She reports that she has continued stress in her life, helping to care for her 's mother (dementia), two disabled cousins, and her sister. She denies any chest pain, shortness of breath at rest or with exertion, palpitations, lightheadedness, or edema. She also has a history of hyperlipidemia, treated with moderate intensity dose atorvastatin. She has no history of ASCVD. She has a history of osteopenia, and in 2009, she sustained a fracture of her left 3rd toe (3/2009) and her distal radius (12/2009). At that time, she was started on alendronate and was treated until 11/2014. She had a repeat bone density study in 7/2016 showing T-scores: femoral neck left -2.3/ right -2.0 and lumbar -2.2. She was restarted on alendronate in 12/2016. She continues to take calcium and Vitamin D supplements. She has no further history of pathologic fractures. In 2/2017, she was evaluated by DERE[facility] Walter with left lower back pain and left sciatica. She had been taking ibuprofen without relief, and was given a course of prednisone. She states that she did not find this helpful either, but reports resolution of symptoms after visiting a chiropractor. She states that she no longer is experiencing pain and is able to ambulate without difficulty. She will continue to visit a chiropractor periodically when she develops a recurrence of pain with improvement.   
 
She reports that she had an extensive workup for microhematuria in the past, which was negative (records unavailable). She denies any dysuria, gross hematuria, or flank pain. She has had screening colonoscopy with Dr Violet Marshall in 10/2010, which was normal except for hemorrhoids. Recommendation for follow-up is for 10 years. She denies any abdominal pain, nausea, vomiting, melena, hematochezia, or change in bowel movements. She has a history of eczema and is followed by Dr. Ford Baird. She states that she was recently started on Eucrisa with excellent response. She also has a history of candidal intertrigo, treated with lotrisone with good response. She also reports that she underwent radiation therapy to her right shoulder (as a child) for treatment of a birthmark. On 7/20/2018, she presented for evaluation to [facility] for a right upper back rash and was diagnosed with herpes zoster. She was treated with Valtrex with improvement. Past Medical History:  
Diagnosis Date  Eczema  Essential hypertension  FH: breast cancer  Fibroids   
 uterine  Fracture of radius 11/2009  
 with ulna, left, closed  History of cataract  History of foot fracture   
 right and left  Hyperlipidemia  Microhematuria   
 chronic  Osteopenia  S/P radiation therapy   
 as child, to right shoulder for birthmark  Tinnitus  Zoster   
 right scapula Past Surgical History:  
Procedure Laterality Date  HX CATARACT REMOVAL    
 HX ORTHOPAEDIC    
 left arm  HX WISDOM TEETH EXTRACTION    
 HX WRIST FRACTURE TX    
 
Current Outpatient Medications Medication Sig  
 aspirin (ASPIRIN) 325 mg tablet Take 1 Tab by mouth daily.  atorvastatin (LIPITOR) 10 mg tablet TAKE 1 TABLET BY MOUTH EVERY DAY  hydroCHLOROthiazide (HYDRODIURIL) 25 mg tablet TAKE 1 TABLET BY MOUTH DAILY  lisinopril (PRINIVIL, ZESTRIL) 10 mg tablet TAKE 1 TABLET BY MOUTH DAILY  crisaborole (EUCRISA) 2 % oint by Apply Externally route.  alendronate (FOSAMAX) 70 mg tablet TAKE 1 TABLET BY MOUTH EVERY 7 DAYS  docusate sodium (COLACE) 100 mg capsule Take 100 mg by mouth daily as needed for Constipation.  co-enzyme Q-10 (CO Q-10) 100 mg capsule Take 100 mg by mouth daily.  biotin 2,500 mcg Tab Take 2,500 mcg by mouth.  cholecalciferol (VITAMIN D3) 1,000 unit cap Take 1 Tab by mouth daily.  fexofenadine (ALLEGRA) 60 mg tablet Take 60 mg by mouth daily.  CALCIUM CARBONATE/VITAMIN D3 (CALCIUM + D PO) Take 1 Tab by mouth two (2) times a day.  nitrofurantoin, macrocrystal-monohydrate, (MACROBID) 100 mg capsule Take 1 Cap by mouth two (2) times a day.  ondansetron hcl (ZOFRAN) 4 mg tablet Take 1 Tab by mouth every eight (8) hours as needed for Nausea.  polyethylene glycol (MIRALAX) 17 gram packet Take 1 Packet by mouth daily.  triamcinolone acetonide (KENALOG) 0.025 % topical cream Apply  to affected area two (2) times a day.  clotrimazole-betamethasone (LOTRISONE) topical cream Apply  to affected area two (2) times a day. No current facility-administered medications for this visit. Allergies and Intolerances: Allergies Allergen Reactions  Codeine Nausea Only  Hibiclens [Chlorhexidine Gluconate] Rash Family History: Her sister has breast cancer and her mother had pancreatic cancer. No history of colon cancer. Family History Problem Relation Age of Onset  Cancer Mother   
     pancreatic  Heart Disease Mother  Heart Disease Father  Cancer Sister   
     breast  
 MS Sister  Breast Cancer Sister 62 Social History: She  reports that she has never smoked. She has never used smokeless tobacco. She has about 2 glasses of wine each night. She is  with one adult daughter who lives with her family in [place], [place]. She is retired, and was a 4th- for 25 years. Social History Substance and Sexual Activity Alcohol Use Yes  Alcohol/week: 0.0 oz  
 Comment: occasionally Immunization History: 
Immunization History Administered Date(s) Administered  (RETIRED) Pneumococcal Vaccine (Unspecified Type) 05/04/2010  Influenza High Dose Vaccine PF 12/13/2016, 10/03/2017  Influenza Vaccine 10/24/2014  Influenza Vaccine (Tri) Adjuvanted 10/09/2018  Influenza Vaccine PF 11/05/2013  Influenza Vaccine Split 11/01/2011, 11/06/2012  Pneumococcal Conjugate (PCV-13) 05/05/2015  TD Vaccine 05/05/2009  Tdap 12/12/2016  Zoster Vaccine, Live 05/07/2013 Review of Systems: As above included in HPI. Otherwise 11 point review of systems negative including constitutional, skin, HENT, eyes, respiratory, cardiovascular, gastrointestinal, genitourinary, musculoskeletal, endo/heme/aller, neurological. 
 
Physical:  
Vitals:  
BP: 118/68 HR: 86 
WT:  NWB 
BMI:   NWB Exam:  
Patient appears in no apparent distress. Affect is appropriate. HEENT --Anicteric sclerae, tympanic membranes normal,  ear canals normal. 
PERRL, EOMI, conjunctiva and lids normal.  
Sinuses were nontender, turbinates normal, hearing normal.  Oropharynx without Erythema or ulcers, normal tongue, oral mucosa and tonsils. No cervical lymphadenopathy. No thyromegaly, JVD, or bruits. Lungs --Clear to auscultation. No wheezing or rales. Heart --Regular rate and rhythm, no murmurs, rubs, gallops, or clicks. Abdomen -- Soft and nontender, no hepatosplenomegaly or masses. Extremities -- Without edema. CAM boot in place on right lower extremity, normal looking digits, ROM intact Neuro -- CN 2-12 intact, strength 5/5 with intact soft touch in all extremities Derm  no obvious abnormalities noted, no rash Musculoskeletal -- Left 5th MCP joint with mild erythema and swelling, no warmth and range of motion intact without pain; otherwise bilateral hands and wrists without evidence of active synovitis,  no pain currently elicited on flexion and range of motion intact. Left shoulder without erythema, warmth, or swelling and ROM intact. Otherwise no other joint abnormalities noted. Review of Data: 
Labs: Hospital Outpatient Visit on 05/15/2019 Component Date Value Ref Range Status  Sed rate, automated 05/15/2019 19  0 - 30 mm/hr Final  
 C-Reactive protein 05/15/2019 9.1* 0 - 0.3 mg/dL Final  
 Uric acid 05/15/2019 3.8  2.6 - 7.2 MG/DL Final  
 Rheumatoid factor 05/15/2019 <10  <15 IU/mL Final  
 Anti-DNA (DS) Ab, QT 05/15/2019 <1  0 - 9 IU/mL Final  
 RNP Abs 05/15/2019 <0.2  0.0 - 0.9 AI Final  
 Sanchez Abs 05/15/2019 <0.2  0.0 - 0.9 AI Final  
 Scleroderma-70 Ab 05/15/2019 <0.2  0.0 - 0.9 AI Final  
 Sjogren's Anti-SS-A 05/15/2019 <0.2  0.0 - 0.9 AI Final  
 Sjogren's Anti-SS-B 05/15/2019 <0.2  0.0 - 0.9 AI Final  
 Antichromatin Ab 05/15/2019 <0.2  0.0 - 0.9 AI Final  
 Anti-Taylor-1 05/15/2019 <0.2  0.0 - 0.9 AI Final  
 Centromere B Ab 05/15/2019 <0.2  0.0 - 0.9 AI Final  
 See below 05/15/2019 Comment    Final  
 WBC 05/15/2019 7.9  4.6 - 13.2 K/uL Final  
 RBC 05/15/2019 4.22  4.20 - 5.30 M/uL Final  
 HGB 05/15/2019 11.7* 12.0 - 16.0 g/dL Final  
 HCT 05/15/2019 38.3  35.0 - 45.0 % Final  
 MCV 05/15/2019 90.8  74.0 - 97.0 FL Final  
 MCH 05/15/2019 27.7  24.0 - 34.0 PG Final  
 MCHC 05/15/2019 30.5* 31.0 - 37.0 g/dL Final  
 RDW 05/15/2019 14.6* 11.6 - 14.5 % Final  
 PLATELET 96/46/4297 976  135 - 420 K/uL Final  
 MPV 05/15/2019 10.3  9.2 - 11.8 FL Final  
 NEUTROPHILS 05/15/2019 65  40 - 73 % Final  
 LYMPHOCYTES 05/15/2019 23  21 - 52 % Final  
 MONOCYTES 05/15/2019 9  3 - 10 % Final  
 EOSINOPHILS 05/15/2019 2  0 - 5 % Final  
 BASOPHILS 05/15/2019 1  0 - 2 % Final  
 ABS. NEUTROPHILS 05/15/2019 5.2  1.8 - 8.0 K/UL Final  
 ABS. LYMPHOCYTES 05/15/2019 1.8  0.9 - 3.6 K/UL Final  
 ABS.  MONOCYTES 05/15/2019 0.7  0.05 - 1.2 K/UL Final  
  ABS. EOSINOPHILS 05/15/2019 0.2  0.0 - 0.4 K/UL Final  
 ABS. BASOPHILS 05/15/2019 0.1  0.0 - 0.1 K/UL Final  
 DF 05/15/2019 AUTOMATED    Final  
 Sodium 05/15/2019 136  136 - 145 mmol/L Final  
 Potassium 05/15/2019 4.6  3.5 - 5.5 mmol/L Final  
 Chloride 05/15/2019 99* 100 - 108 mmol/L Final  
 CO2 05/15/2019 25  21 - 32 mmol/L Final  
 Anion gap 05/15/2019 12  3.0 - 18 mmol/L Final  
 Glucose 05/15/2019 42* 74 - 99 mg/dL Final  
 BUN 05/15/2019 18  7.0 - 18 MG/DL Final  
 Creatinine 05/15/2019 0.70  0.6 - 1.3 MG/DL Final  
 BUN/Creatinine ratio 05/15/2019 26* 12 - 20   Final  
 GFR est AA 05/15/2019 >60  >60 ml/min/1.73m2 Final  
 GFR est non-AA 05/15/2019 >60  >60 ml/min/1.73m2 Final  
 Calcium 05/15/2019 9.2  8.5 - 10.1 MG/DL Final  
 Bilirubin, total 05/15/2019 0.6  0.2 - 1.0 MG/DL Final  
 ALT (SGPT) 05/15/2019 38  13 - 56 U/L Final  
 AST (SGOT) 05/15/2019 28  15 - 37 U/L Final  
 Alk. phosphatase 05/15/2019 99  45 - 117 U/L Final  
 Protein, total 05/15/2019 7.6  6.4 - 8.2 g/dL Final  
 Albumin 05/15/2019 3.9  3.4 - 5.0 g/dL Final  
 Globulin 05/15/2019 3.7  2.0 - 4.0 g/dL Final  
 A-G Ratio 05/15/2019 1.1  0.8 - 1.7   Final  
 
Health Maintenance: 
Screening:  
 Mammogram: negative (7/2018) PAP smear: S/P ALMAS Colorectal: colonoscopy (10/2010) normal. Dr. Danuta Jonas. Due 2020. Depression: none DM (HbA1c/FPG): HbA1c 5.7 (12/2018) Hepatitis C: negative (5/2016) Falls: none DEXA: osteopenia (7/2016). On alendronate since 12/2016. Glaucoma: regular eye exams with Dr. Naa Navarro (last 11/2018) Smoking: none Vitamin D: 32.9 (6/2018) Medicare Wellness: 6/14/2018 Impression: 
Patient Active Problem List  
Diagnosis Code  Essential hypertension I10  
 FH: breast cancer Z80.3  Hyperlipidemia E78.5  S/P radiation therapy to right shoulder for birthmark Z92.3  Tinnitus H93.19  
 Microhematuria R31.29  
 Eczema L30.9  Osteopenia M85.80  Abnormal glucose R73.09  
 Candidal intertrigo B37.2 Plan: 
Bilateral hand and wrist pain. Patient s/p ORIF right third metatarsal with putty bone grafting on 4/24/2019. Non weight bearing and wearing a CAM boot. Developed acute onset of bilateral hand and wrist pain over last two days, noted upon awakening in the morning and gradually improving throughout the day with use. Admits to sleeping at night with hands tucked beneath her in flexed position. Also using walker for ambulation since must be non-weight bearing on right foot. Informed by physical therapy that gripping walker too tightly. Exam without evidence of active synovitis, although mild redness and swelling of left 5th MCP joint. Will obtain labs to rule out inflammatory cause for joint pains, including MANOLO, RF, CCP, ESR, CRP, and uric acid. However, based on presentation, symptoms, and physical exam findings, feel most consistent with bilateral carpal tunnel syndrome. Instructed patient that may take ibuprofen 400 mg bid and advised to take with food since also on aspirin 325 mg for DVT prophylaxis. Also advised patient to purchase neutral wrist splints and instructed to wear at night. Advised to avoid sleeping with wrists in flexed position, and gripping walker too tightly. Further recommendations after reviewing labs. Will call if no improvement. Other issues: 1. Hypertension. Appears well controlled on current regimen of lisinopril 10 mg daily and hydrochlorothiazide 25 mg daily. Renal function normal with creatinine 0.65/ eGFR >60. Encouraged to drink plenty of fluids. Continue to follow. 2. Hyperlipidemia. On moderate intensity dose atorvastatin with LDL 74 and HDL 75, which is excellent control in this patient. Emphasized importance of lifestyle modifications, including diet, exercise, and weight loss. Will continue to follow. 3. Osteopenia. History of radial and toe fracture in 2009.  Treated from 11/2009 to 11/2014 with alendronate. Repeat bone density scan in 7/2016 with FRAX 10 year risk of a major osteoporetic fracture at 14 % and hip fracture at 3.4 %. Restarted Fosamax in 12/2016. Now with new right metatarsal displaced fracture without known injury. Patient overdue for bone density study, but wished to wait until next mammogram which is due in 8/2019. Will need to assess if having response to treatment particularly given new fracture. Continue calcium and Vitamin D supplement. Encouraged exercise, particularly weight bearing activities. Vitamin D and calcium levels normal.  
4. Family history of breast cancer. Patient with family history of breast cancer in first degree relative (sister). She is currently being screened with annual mammograms. Discussed recommendations for breast cancer prevention for high risk women. 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 No other family member with breast cancer. Will continue with annual mammograms and breast exams. 5. Abnormal glucose. Repeat normal with mildly elevated HbA1c today. Emphasized importance of lifestyle modifications, including diet, exercise, and weight loss. 6. Chronic microhematuria. Work-up reportedly negative in past. Urinalysis negative for blood today. Follow. 7. Eczema. Found good response with [place]. Followed by Dr. Laney Bello. 8. Candidal intertrigo. Improved with Lotrisone. Discussed preventative treatment with mycostatin powder and loose clothing. 9. Overweight. BMI mildly increased. Emphasized importance of lifestyle modifications, including diet, exercise, and weight loss. Will continue to follow. 10. Health maintenance. Given another script for Shingrix vaccine. Other immunizations up to date. Mammogram up to date. Will order bone density scan with next mammogram. Colorectal cancer screening up to date. Continue regular eye exams with Dr. Gerardo Salvador.  Vitamin D level normal. Continue maintenance dose supplement. Medicare wellness visit up to date. Total time: 40 minutes spent with the patient in face-to-face consultation of which greater than 50% was spent on counseling, answering questions and/or coordination of care. Complex medical review and management performed. Patient understands recommendations and agrees with plan. Follow-up as previously scheduled.

## 2019-05-21 NOTE — TELEPHONE ENCOUNTER
Spoke with patient, she was given message below. She said the pain is practically gone with the hand splints.  She said she thinks she is allergic to the  to her walker, she is ordering new  now from [facility], she had no further questions or concerns

## 2019-05-21 NOTE — TELEPHONE ENCOUNTER
No visits with results within 2 Day(s) from this visit. Latest known visit with results is:   Hospital Outpatient Visit on 05/15/2019   Component Date Value Ref Range Status    Sed rate, automated 05/15/2019 19  0 - 30 mm/hr Final    CCP Antibodies IgG/IgA 05/15/2019 7  0 - 19 units Final    C-Reactive protein 05/15/2019 9.1* 0 - 0.3 mg/dL Final    Uric acid 05/15/2019 3.8  2.6 - 7.2 MG/DL Final    Rheumatoid factor 05/15/2019 <10  <15 IU/mL Final    Anti-DNA (DS) Ab, QT 05/15/2019 <1  0 - 9 IU/mL Final    RNP Abs 05/15/2019 <0.2  0.0 - 0.9 AI Final    Sanchez Abs 05/15/2019 <0.2  0.0 - 0.9 AI Final    Scleroderma-70 Ab 05/15/2019 <0.2  0.0 - 0.9 AI Final    Sjogren's Anti-SS-A 05/15/2019 <0.2  0.0 - 0.9 AI Final    Sjogren's Anti-SS-B 05/15/2019 <0.2  0.0 - 0.9 AI Final    Antichromatin Ab 05/15/2019 <0.2  0.0 - 0.9 AI Final    Anti-Taylor-1 05/15/2019 <0.2  0.0 - 0.9 AI Final    Centromere B Ab 05/15/2019 <0.2  0.0 - 0.9 AI Final    See below 05/15/2019 Comment    Final    WBC 05/15/2019 7.9  4.6 - 13.2 K/uL Final    RBC 05/15/2019 4.22  4.20 - 5.30 M/uL Final    HGB 05/15/2019 11.7* 12.0 - 16.0 g/dL Final    HCT 05/15/2019 38.3  35.0 - 45.0 % Final    MCV 05/15/2019 90.8  74.0 - 97.0 FL Final    MCH 05/15/2019 27.7  24.0 - 34.0 PG Final    MCHC 05/15/2019 30.5* 31.0 - 37.0 g/dL Final    RDW 05/15/2019 14.6* 11.6 - 14.5 % Final    PLATELET 50/05/1811 158  135 - 420 K/uL Final    MPV 05/15/2019 10.3  9.2 - 11.8 FL Final    NEUTROPHILS 05/15/2019 65  40 - 73 % Final    LYMPHOCYTES 05/15/2019 23  21 - 52 % Final    MONOCYTES 05/15/2019 9  3 - 10 % Final    EOSINOPHILS 05/15/2019 2  0 - 5 % Final    BASOPHILS 05/15/2019 1  0 - 2 % Final    ABS. NEUTROPHILS 05/15/2019 5.2  1.8 - 8.0 K/UL Final    ABS. LYMPHOCYTES 05/15/2019 1.8  0.9 - 3.6 K/UL Final    ABS. MONOCYTES 05/15/2019 0.7  0.05 - 1.2 K/UL Final    ABS. EOSINOPHILS 05/15/2019 0.2  0.0 - 0.4 K/UL Final    ABS.  BASOPHILS 05/15/2019 0.1  0.0 - 0.1 K/UL Final    DF 05/15/2019 AUTOMATED    Final    Sodium 05/15/2019 136  136 - 145 mmol/L Final    Potassium 05/15/2019 4.6  3.5 - 5.5 mmol/L Final    Chloride 05/15/2019 99* 100 - 108 mmol/L Final    CO2 05/15/2019 25  21 - 32 mmol/L Final    Anion gap 05/15/2019 12  3.0 - 18 mmol/L Final    Glucose 05/15/2019 42* 74 - 99 mg/dL Final    BUN 05/15/2019 18  7.0 - 18 MG/DL Final    Creatinine 05/15/2019 0.70  0.6 - 1.3 MG/DL Final    BUN/Creatinine ratio 05/15/2019 26* 12 - 20   Final    GFR est AA 05/15/2019 >60  >60 ml/min/1.73m2 Final    GFR est non-AA 05/15/2019 >60  >60 ml/min/1.73m2 Final    Calcium 05/15/2019 9.2  8.5 - 10.1 MG/DL Final    Bilirubin, total 05/15/2019 0.6  0.2 - 1.0 MG/DL Final    ALT (SGPT) 05/15/2019 38  13 - 56 U/L Final    AST (SGOT) 05/15/2019 28  15 - 37 U/L Final    Alk. phosphatase 05/15/2019 99  45 - 117 U/L Final    Protein, total 05/15/2019 7.6  6.4 - 8.2 g/dL Final    Albumin 05/15/2019 3.9  3.4 - 5.0 g/dL Final    Globulin 05/15/2019 3.7  2.0 - 4.0 g/dL Final    A-G Ratio 05/15/2019 1.1  0.8 - 1.7   Final     Reviewed labs from visit. Please let the patient know that her blood work did NOT show that her hand pain was due to joint inflammation or an inflammatory arthritis. Please ask if her pain is continuing to improve with use of the carpal tunnel hand splints. Thanks.

## 2019-05-22 ENCOUNTER — PATIENT MESSAGE (OUTPATIENT)
Dept: INTERNAL MEDICINE CLINIC | Age: 74
End: 2019-05-22

## 2019-06-04 ENCOUNTER — OFFICE VISIT (OUTPATIENT)
Dept: ORTHOPEDIC SURGERY | Age: 74
End: 2019-06-04

## 2019-06-04 ENCOUNTER — HOSPITAL ENCOUNTER (OUTPATIENT)
Dept: LAB | Age: 74
Discharge: HOME | End: 2019-06-04
Payer: MEDICARE

## 2019-06-04 VITALS
HEART RATE: 82 BPM | WEIGHT: 151 LBS | BODY MASS INDEX: 25.16 KG/M2 | SYSTOLIC BLOOD PRESSURE: 133 MMHG | DIASTOLIC BLOOD PRESSURE: 79 MMHG | OXYGEN SATURATION: 99 % | RESPIRATION RATE: 16 BRPM | HEIGHT: 65 IN | TEMPERATURE: 97.4 F

## 2019-06-04 DIAGNOSIS — M79.671 RIGHT FOOT PAIN: ICD-10-CM

## 2019-06-04 DIAGNOSIS — S92.331G: ICD-10-CM

## 2019-06-04 DIAGNOSIS — S92.331D CLOSED DISPLACED FRACTURE OF THIRD METATARSAL BONE OF RIGHT FOOT WITH ROUTINE HEALING, SUBSEQUENT ENCOUNTER: ICD-10-CM

## 2019-06-04 DIAGNOSIS — M85.80 OSTEOPENIA, UNSPECIFIED LOCATION: ICD-10-CM

## 2019-06-04 DIAGNOSIS — Z98.890 POST-OPERATIVE STATE: ICD-10-CM

## 2019-06-04 DIAGNOSIS — S92.331G: Primary | ICD-10-CM

## 2019-06-04 LAB — 25(OH)D3 SERPL-MCNC: 39.8 NG/ML (ref 30–100)

## 2019-06-04 PROCEDURE — 36415 COLL VENOUS BLD VENIPUNCTURE: CPT

## 2019-06-04 PROCEDURE — 82306 VITAMIN D 25 HYDROXY: CPT

## 2019-06-04 RX ORDER — IBUPROFEN 200 MG
TABLET ORAL
COMMUNITY
End: 2019-08-31

## 2019-06-04 NOTE — PROGRESS NOTES
Patient: Loc Martínez                MRN: [id number]       SSN: [id number]  YOB: 1945              AGE: 76 y.o. SEX: female    Sridevi Calderon MD    POST OP OFFICE NOTE  DOS: 4/24/19    Chief Complaint:   Chief Complaint   Patient presents with    Foot Pain     right foot pain, post op appt. HPI:     The patient is a 76 y.o. female who presents today for follow up 41 days s/p:   OPEN REDUCTION RIGHT THIRD METATARSAL  DBX PUTTY BONE GRAFTING BONE GRAFT/C-ARM/ARTHREX/NERVE BLOCK    Patient has been NWB to the right lower extremity. Patient reports that her episode of weakness following her last office visit has resolved. She states that she was seen by Dr. Blaze Sandoval who ordered a panel of labs which were all WNL. Dr. Blaze Sandoval thinks that the patient was applying too much pressure to her wrist on the walker and hand rails in her home and instructed her to loosen her  which was helped. Patient denies any fever, chills, chest pain, shortness of breath or calf pain. There are no other illness or injuries to report since last seen in the office. Patient is on ASA  for DVT prophylaxis. Patient has no pain to her right foot. She has had a history of Vitamin D deficiency and osteopenia in the past.       PHYSICAL EXAM:     Visit Vitals  /79 (BP 1 Location: Left arm, BP Patient Position: Sitting)   Pulse 82   Temp 97.4 °F (36.3 °C) (Oral)   Resp 16   Ht 5' 5\" (1.651 m)   Wt 151 lb (68.5 kg)   SpO2 99%   BMI 25.13 kg/m²         Pain Scale: 0 - No pain/10      GEN:  Alert, well developed, well nourished, well appearing 76 y.o. female in no acute distress. PSYCH:  Normal affect, mood, and conduct. alert, oriented x 3 alert, oriented x 3, no dementia  M/S EXAMINATION OF: right foot  DRESSINGS: CDI   DRAINAGE: none  INCISION: Incision looks good, skin well approximated, no dehiscence. Slight escar over incision. Dry skin.    SKIN: no edema , no erythema, no ecchymosis TENDERNESS:  mild tenderness to palpation   NEUROVASCULAR:  grossly intact. Positive distal pulses and capillary refill. DVT ASSESSMENT:  The calf is not tender to palpation. No evidence of DVT seen on physical exam.  ROM: not tested       RADIOGRAPHS & DIAGNOSTIC STUDIES       X-rays, 3 views of the right foot reveals post op changes s/p ORIF third metatarsal. No significant healing noted at this point in time. However, overall alignment looks good and the hardware remains in good position. Mineralization suggests osteopenia. IMPRESSION:     Encounter Diagnoses     ICD-10-CM ICD-9-CM   1. Closed fracture of third metatarsal bone of right foot with delayed healing, physeal involvement unspecified, subsequent encounter S92.331G V54.19   2. Right foot pain M79.671 729.5   3. Closed displaced fracture of third metatarsal bone of right foot with routine healing, subsequent encounter S92.331D V54.19   4. Post-operative state Z98.890 V45.89   5. Osteopenia, unspecified location M85.80 733.90       PLAN:         Orders Placed This Encounter    Generic Supply Order    [38260] Foot Min 3V    VITAMIN D, 25 HYDROXY    ibuprofen (ADVIL) 200 mg tablet            · Continue activity modification    · Lab order provided to check vitamin D level    · Tubigrip provided for comfort    · Weight bearing status:  no weight bearing to the surgical extremity (weight on heel only)    · No lifting, twisting, squatting, deep bending, driving or strenuous activity. · Perform ankle pumps with non-surgical/non-injured extremity to help reduce the risk of blood clots    · Please follow up in 3 weeks     · Continue taking ASA as directed          Patient has been discussed with Dr. Mg Burrell during this visit and he agrees with the assessment and plan    Patient expresses understanding of the plan. Patient education provided on post surgical care.       REVIEW OF SYSTEMS:     Otherwise as noted in HPI      PAST MEDICAL HISTORY: Past Medical History:   Diagnosis Date    Eczema     Essential hypertension     FH: breast cancer     Fibroids     uterine    Fracture of radius 11/2009    with ulna, left, closed    History of cataract     History of foot fracture     right and left    Hyperlipidemia     Microhematuria     chronic    Osteopenia     S/P radiation therapy     as child, to right shoulder for birthmark    Tinnitus     Zoster     right scapula       MEDICATIONS:     Current Outpatient Medications   Medication Sig    ibuprofen (ADVIL) 200 mg tablet Take  by mouth.  alendronate (FOSAMAX) 70 mg tablet TAKE 1 TABLET BY MOUTH EVERY 7 DAYS    nitrofurantoin, macrocrystal-monohydrate, (MACROBID) 100 mg capsule Take 1 Cap by mouth two (2) times a day.  ondansetron hcl (ZOFRAN) 4 mg tablet Take 1 Tab by mouth every eight (8) hours as needed for Nausea.  polyethylene glycol (MIRALAX) 17 gram packet Take 1 Packet by mouth daily.  aspirin (ASPIRIN) 325 mg tablet Take 1 Tab by mouth daily.  atorvastatin (LIPITOR) 10 mg tablet TAKE 1 TABLET BY MOUTH EVERY DAY    hydroCHLOROthiazide (HYDRODIURIL) 25 mg tablet TAKE 1 TABLET BY MOUTH DAILY    lisinopril (PRINIVIL, ZESTRIL) 10 mg tablet TAKE 1 TABLET BY MOUTH DAILY    crisaborole (EUCRISA) 2 % oint by Apply Externally route.  triamcinolone acetonide (KENALOG) 0.025 % topical cream Apply  to affected area two (2) times a day.  clotrimazole-betamethasone (LOTRISONE) topical cream Apply  to affected area two (2) times a day.  docusate sodium (COLACE) 100 mg capsule Take 100 mg by mouth daily as needed for Constipation.  co-enzyme Q-10 (CO Q-10) 100 mg capsule Take 100 mg by mouth daily.  biotin 2,500 mcg Tab Take 2,500 mcg by mouth.  cholecalciferol (VITAMIN D3) 1,000 unit cap Take 1 Tab by mouth daily.  fexofenadine (ALLEGRA) 60 mg tablet Take 60 mg by mouth daily.     CALCIUM CARBONATE/VITAMIN D3 (CALCIUM + D PO) Take 1 Tab by mouth two (2) times a day.     No current facility-administered medications for this visit. ALLERGIES:     Allergies   Allergen Reactions    Codeine Nausea Only    Hibiclens [Chlorhexidine Gluconate] Rash         PAST SURGICAL HISTORY:     Past Surgical History:   Procedure Laterality Date    HX CATARACT REMOVAL      HX ORTHOPAEDIC      left arm    HX WISDOM TEETH EXTRACTION      HX WRIST FRACTURE TX         SOCIAL HISTORY:     Social History     Socioeconomic History    Marital status:      Spouse name: Not on file    Number of children: Not on file    Years of education: Not on file    Highest education level: Not on file   Occupational History    Not on file   Social Needs    Financial resource strain: Not on file    Food insecurity:     Worry: Not on file     Inability: Not on file    Transportation needs:     Medical: Not on file     Non-medical: Not on file   Tobacco Use    Smoking status: Never Smoker    Smokeless tobacco: Never Used   Substance and Sexual Activity    Alcohol use:  Yes     Alcohol/week: 0.0 oz     Comment: occasionally    Drug use: No    Sexual activity: Yes     Partners: Male   Lifestyle    Physical activity:     Days per week: Not on file     Minutes per session: Not on file    Stress: Not on file   Relationships    Social connections:     Talks on phone: Not on file     Gets together: Not on file     Attends Religion service: Not on file     Active member of club or organization: Not on file     Attends meetings of clubs or organizations: Not on file     Relationship status: Not on file    Intimate partner violence:     Fear of current or ex partner: Not on file     Emotionally abused: Not on file     Physically abused: Not on file     Forced sexual activity: Not on file   Other Topics Concern    Not on file   Social History Narrative    Not on file       FAMILY HISTORY:     Family History   Problem Relation Age of Onset    Cancer Mother         pancreatic    Heart Disease Mother     Heart Disease Father     Cancer Sister         breast    MS Sister     Breast Cancer Sister [address], [place]  6/4/2019

## 2019-06-04 NOTE — PROGRESS NOTES
1. Have you been to the ER, urgent care clinic since your last visit? Hospitalized since your last visit? NO    2. Have you seen or consulted any other health care providers outside of the [address] [place] [address] since your last visit? Include any pap smears or colon screening.  NO

## 2019-06-04 NOTE — PATIENT INSTRUCTIONS
· Continue activity modification    · Lab order provided to check vitamin D level    · Weight bearing status:  no weight bearing to the surgical extremity (weight on heel only)    · No lifting, twisting, squatting, deep bending, driving or strenuous activity.     · Perform ankle pumps with non-surgical/non-injured extremity to help reduce the risk of blood clots    · Please follow up in 3 weeks     · Continue taking ASA as directed

## 2019-06-11 ENCOUNTER — TELEPHONE (OUTPATIENT)
Dept: ORTHOPEDIC SURGERY | Age: 74
End: 2019-06-11

## 2019-06-11 RX ORDER — AMOXICILLIN 500 MG/1
TABLET, FILM COATED ORAL
Qty: 10 TAB | Refills: 1 | Status: SHIPPED | OUTPATIENT
Start: 2019-06-11

## 2019-06-11 NOTE — TELEPHONE ENCOUNTER
Rx for Amoxicillin has been e-scribed to patients pharmacy.     Maikol Castellanos PA-C  6/11/2019   12:24 PM

## 2019-06-11 NOTE — TELEPHONE ENCOUNTER
Patient called stating she has a dental procedure within the next week and a half, and she said she was told she needed antibiotics prior to any dental work she has done. She did confirm her preferred pharmacy as  [facility], [address] [place] [address]. Please advise patient at [phone] when completed.

## 2019-06-18 ENCOUNTER — PATIENT MESSAGE (OUTPATIENT)
Dept: INTERNAL MEDICINE CLINIC | Age: 74
End: 2019-06-18

## 2019-06-21 ENCOUNTER — APPOINTMENT (OUTPATIENT)
Dept: INTERNAL MEDICINE CLINIC | Age: 74
End: 2019-06-21

## 2019-06-21 ENCOUNTER — HOSPITAL ENCOUNTER (OUTPATIENT)
Dept: LAB | Age: 74
Discharge: HOME | End: 2019-06-21
Payer: MEDICARE

## 2019-06-21 DIAGNOSIS — H93.19 TINNITUS, UNSPECIFIED LATERALITY: ICD-10-CM

## 2019-06-21 DIAGNOSIS — R31.29 MICROHEMATURIA: ICD-10-CM

## 2019-06-21 DIAGNOSIS — M85.9 DISORDER OF BONE DENSITY AND STRUCTURE, UNSPECIFIED: ICD-10-CM

## 2019-06-21 DIAGNOSIS — M85.89 OSTEOPENIA OF MULTIPLE SITES: ICD-10-CM

## 2019-06-21 DIAGNOSIS — I10 ESSENTIAL HYPERTENSION: ICD-10-CM

## 2019-06-21 DIAGNOSIS — R73.09 ABNORMAL GLUCOSE: ICD-10-CM

## 2019-06-21 DIAGNOSIS — E78.5 HYPERLIPIDEMIA, UNSPECIFIED HYPERLIPIDEMIA TYPE: ICD-10-CM

## 2019-06-21 DIAGNOSIS — L30.9 ECZEMA, UNSPECIFIED TYPE: ICD-10-CM

## 2019-06-21 LAB
ALBUMIN SERPL-MCNC: 4.1 G/DL (ref 3.4–5)
ALBUMIN/GLOB SERPL: 1.3 {RATIO} (ref 0.8–1.7)
ALP SERPL-CCNC: 68 U/L (ref 45–117)
ALT SERPL-CCNC: 46 U/L (ref 13–56)
ANION GAP SERPL CALC-SCNC: 9 MMOL/L (ref 3–18)
APPEARANCE UR: ABNORMAL
AST SERPL-CCNC: 35 U/L (ref 15–37)
BACTERIA URNS QL MICRO: ABNORMAL /HPF
BASOPHILS # BLD: 0 K/UL (ref 0–0.1)
BASOPHILS NFR BLD: 1 % (ref 0–2)
BILIRUB SERPL-MCNC: 0.4 MG/DL (ref 0.2–1)
BILIRUB UR QL: NEGATIVE
BUN SERPL-MCNC: 27 MG/DL (ref 7–18)
BUN/CREAT SERPL: 42 (ref 12–20)
CALCIUM SERPL-MCNC: 9.2 MG/DL (ref 8.5–10.1)
CAOX CRY URNS QL MICRO: ABNORMAL
CHLORIDE SERPL-SCNC: 100 MMOL/L (ref 100–108)
CHOLEST SERPL-MCNC: 215 MG/DL
CO2 SERPL-SCNC: 26 MMOL/L (ref 21–32)
COLOR UR: ABNORMAL
CREAT SERPL-MCNC: 0.64 MG/DL (ref 0.6–1.3)
DIFFERENTIAL METHOD BLD: ABNORMAL
EOSINOPHIL # BLD: 0.2 K/UL (ref 0–0.4)
EOSINOPHIL NFR BLD: 4 % (ref 0–5)
EPITH CASTS URNS QL MICRO: ABNORMAL /LPF (ref 0–5)
ERYTHROCYTE [DISTWIDTH] IN BLOOD BY AUTOMATED COUNT: 15.1 % (ref 11.6–14.5)
EST. AVERAGE GLUCOSE BLD GHB EST-MCNC: 114 MG/DL
GLOBULIN SER CALC-MCNC: 3.1 G/DL (ref 2–4)
GLUCOSE SERPL-MCNC: 94 MG/DL (ref 74–99)
GLUCOSE UR STRIP.AUTO-MCNC: NEGATIVE MG/DL
HBA1C MFR BLD: 5.6 % (ref 4.2–5.6)
HCT VFR BLD AUTO: 39.9 % (ref 35–45)
HDLC SERPL-MCNC: 81 MG/DL (ref 40–60)
HDLC SERPL: 2.7 {RATIO} (ref 0–5)
HGB BLD-MCNC: 12.4 G/DL (ref 12–16)
HGB UR QL STRIP: NEGATIVE
HYALINE CASTS URNS QL MICRO: ABNORMAL /LPF (ref 0–2)
KETONES UR QL STRIP.AUTO: ABNORMAL MG/DL
LDLC SERPL CALC-MCNC: 117.8 MG/DL (ref 0–100)
LEUKOCYTE ESTERASE UR QL STRIP.AUTO: ABNORMAL
LIPID PROFILE,FLP: ABNORMAL
LYMPHOCYTES # BLD: 1.9 K/UL (ref 0.9–3.6)
LYMPHOCYTES NFR BLD: 33 % (ref 21–52)
MCH RBC QN AUTO: 27.7 PG (ref 24–34)
MCHC RBC AUTO-ENTMCNC: 31.1 G/DL (ref 31–37)
MCV RBC AUTO: 89.3 FL (ref 74–97)
MONOCYTES # BLD: 0.5 K/UL (ref 0.05–1.2)
MONOCYTES NFR BLD: 8 % (ref 3–10)
NEUTS SEG # BLD: 3 K/UL (ref 1.8–8)
NEUTS SEG NFR BLD: 54 % (ref 40–73)
NITRITE UR QL STRIP.AUTO: NEGATIVE
PH UR STRIP: 5 [PH] (ref 5–8)
PLATELET # BLD AUTO: 303 K/UL (ref 135–420)
PMV BLD AUTO: 9.8 FL (ref 9.2–11.8)
POTASSIUM SERPL-SCNC: 4.1 MMOL/L (ref 3.5–5.5)
PROT SERPL-MCNC: 7.2 G/DL (ref 6.4–8.2)
PROT UR STRIP-MCNC: NEGATIVE MG/DL
RBC # BLD AUTO: 4.47 M/UL (ref 4.2–5.3)
RBC #/AREA URNS HPF: NEGATIVE /HPF (ref 0–5)
SODIUM SERPL-SCNC: 135 MMOL/L (ref 136–145)
SP GR UR REFRACTOMETRY: >1.03 (ref 1–1.03)
TRIGL SERPL-MCNC: 81 MG/DL (ref ?–150)
TSH SERPL DL<=0.05 MIU/L-ACNC: 4.36 UIU/ML (ref 0.36–3.74)
UROBILINOGEN UR QL STRIP.AUTO: 0.2 EU/DL (ref 0.2–1)
VLDLC SERPL CALC-MCNC: 16.2 MG/DL
WBC # BLD AUTO: 5.6 K/UL (ref 4.6–13.2)
WBC URNS QL MICRO: ABNORMAL /HPF (ref 0–4)

## 2019-06-21 PROCEDURE — 81001 URINALYSIS AUTO W/SCOPE: CPT

## 2019-06-21 PROCEDURE — 85025 COMPLETE CBC W/AUTO DIFF WBC: CPT

## 2019-06-21 PROCEDURE — 82306 VITAMIN D 25 HYDROXY: CPT

## 2019-06-21 PROCEDURE — 83036 HEMOGLOBIN GLYCOSYLATED A1C: CPT

## 2019-06-21 PROCEDURE — 84443 ASSAY THYROID STIM HORMONE: CPT

## 2019-06-21 PROCEDURE — 80061 LIPID PANEL: CPT

## 2019-06-21 PROCEDURE — 36415 COLL VENOUS BLD VENIPUNCTURE: CPT

## 2019-06-21 PROCEDURE — 80053 COMPREHEN METABOLIC PANEL: CPT

## 2019-06-22 LAB — 25(OH)D3 SERPL-MCNC: 33 NG/ML (ref 30–100)

## 2019-06-25 ENCOUNTER — OFFICE VISIT (OUTPATIENT)
Dept: INTERNAL MEDICINE CLINIC | Age: 74
End: 2019-06-25

## 2019-06-25 VITALS
HEART RATE: 78 BPM | OXYGEN SATURATION: 94 % | BODY MASS INDEX: 25.13 KG/M2 | SYSTOLIC BLOOD PRESSURE: 128 MMHG | DIASTOLIC BLOOD PRESSURE: 68 MMHG | TEMPERATURE: 98 F | HEIGHT: 65 IN

## 2019-06-25 DIAGNOSIS — M85.89 OSTEOPENIA OF MULTIPLE SITES: ICD-10-CM

## 2019-06-25 DIAGNOSIS — B37.2 CANDIDAL INTERTRIGO: ICD-10-CM

## 2019-06-25 DIAGNOSIS — G56.03 CARPAL TUNNEL SYNDROME, BILATERAL: ICD-10-CM

## 2019-06-25 DIAGNOSIS — Z12.31 SCREENING MAMMOGRAM, ENCOUNTER FOR: ICD-10-CM

## 2019-06-25 DIAGNOSIS — R29.90 EPISODE OF TRANSIENT NEUROLOGIC SYMPTOMS: Primary | ICD-10-CM

## 2019-06-25 DIAGNOSIS — R47.9 SPEECH DISTURBANCE, UNSPECIFIED TYPE: ICD-10-CM

## 2019-06-25 DIAGNOSIS — I10 ESSENTIAL HYPERTENSION: ICD-10-CM

## 2019-06-25 DIAGNOSIS — L30.9 ECZEMA, UNSPECIFIED TYPE: ICD-10-CM

## 2019-06-25 DIAGNOSIS — Z00.00 MEDICARE ANNUAL WELLNESS VISIT, SUBSEQUENT: ICD-10-CM

## 2019-06-25 DIAGNOSIS — R79.89 ELEVATED TSH: ICD-10-CM

## 2019-06-25 DIAGNOSIS — E78.5 HYPERLIPIDEMIA, UNSPECIFIED HYPERLIPIDEMIA TYPE: ICD-10-CM

## 2019-06-25 DIAGNOSIS — Z71.89 ACP (ADVANCE CARE PLANNING): ICD-10-CM

## 2019-06-25 DIAGNOSIS — S92.331D CLOSED DISPLACED FRACTURE OF THIRD METATARSAL BONE OF RIGHT FOOT WITH ROUTINE HEALING: ICD-10-CM

## 2019-06-25 DIAGNOSIS — R73.09 ABNORMAL GLUCOSE: ICD-10-CM

## 2019-06-25 RX ORDER — ATORVASTATIN CALCIUM 20 MG/1
20 TABLET, FILM COATED ORAL DAILY
Qty: 1 TAB | Refills: 0
Start: 2019-06-25 | End: 2019-08-01 | Stop reason: SDUPTHER

## 2019-06-25 RX ORDER — HYDROCHLOROTHIAZIDE 25 MG/1
TABLET ORAL
Qty: 90 TAB | Refills: 2 | Status: SHIPPED | OUTPATIENT
Start: 2019-06-25 | End: 2020-03-30

## 2019-06-25 RX ORDER — ASPIRIN 81 MG/1
TABLET ORAL DAILY
COMMUNITY

## 2019-06-25 NOTE — PROGRESS NOTES
This is the Subsequent Medicare Annual Wellness Exam, performed 12 months or more after the Initial AWV or the last Subsequent AWV    I have reviewed the patient's medical history in detail and updated the computerized patient record. History     Past Medical History:   Diagnosis Date    Eczema     Essential hypertension     FH: breast cancer     Fibroids     uterine    Fracture of radius 11/2009    with ulna, left, closed    History of cataract     History of foot fracture     right and left    Hyperlipidemia     Microhematuria     chronic    Osteopenia     S/P radiation therapy     as child, to right shoulder for birthmark    Tinnitus     Zoster     right scapula      Past Surgical History:   Procedure Laterality Date    HX CATARACT REMOVAL      HX FRACTURE TX Right 04/24/2019    right foot 3rd metatarsal fracture    HX ORTHOPAEDIC      left arm    HX WISDOM TEETH EXTRACTION      HX WRIST FRACTURE TX       Current Outpatient Medications   Medication Sig Dispense Refill    hydroCHLOROthiazide (HYDRODIURIL) 25 mg tablet TAKE 1 TABLET BY MOUTH DAILY 90 Tab 2    aspirin delayed-release 81 mg tablet Take  by mouth daily.  atorvastatin (LIPITOR) 20 mg tablet Take 1 Tab by mouth daily. 1 Tab 0    alendronate (FOSAMAX) 70 mg tablet TAKE 1 TABLET BY MOUTH EVERY 7 DAYS 12 Tab 3    lisinopril (PRINIVIL, ZESTRIL) 10 mg tablet TAKE 1 TABLET BY MOUTH DAILY 90 Tab 3    crisaborole (EUCRISA) 2 % oint by Apply Externally route.  docusate sodium (COLACE) 100 mg capsule Take 100 mg by mouth daily as needed for Constipation.  co-enzyme Q-10 (CO Q-10) 100 mg capsule Take 100 mg by mouth daily.  biotin 2,500 mcg Tab Take 2,500 mcg by mouth.  cholecalciferol (VITAMIN D3) 1,000 unit cap Take 1 Tab by mouth daily.  fexofenadine (ALLEGRA) 60 mg tablet Take 60 mg by mouth daily.  CALCIUM CARBONATE/VITAMIN D3 (CALCIUM + D PO) Take 1 Tab by mouth two (2) times a day.       amoxicillin 500 mg tab TAKE ONE CAPSULE PO TID, START ONE DAY PRIOR TO PROCEDURE, DAY OF PROCEDURE AND DAY AFTER PROCEDURE 10 Tab 1    ibuprofen (ADVIL) 200 mg tablet Take  by mouth.  nitrofurantoin, macrocrystal-monohydrate, (MACROBID) 100 mg capsule Take 1 Cap by mouth two (2) times a day. 14 Cap 0    ondansetron hcl (ZOFRAN) 4 mg tablet Take 1 Tab by mouth every eight (8) hours as needed for Nausea. 30 Tab 0    polyethylene glycol (MIRALAX) 17 gram packet Take 1 Packet by mouth daily. 10 Packet 1    triamcinolone acetonide (KENALOG) 0.025 % topical cream Apply  to affected area two (2) times a day. 15 g 2    clotrimazole-betamethasone (LOTRISONE) topical cream Apply  to affected area two (2) times a day. 15 g 2     Allergies   Allergen Reactions    Codeine Nausea Only    Hibiclens [Chlorhexidine Gluconate] Rash     Family History   Problem Relation Age of Onset    Cancer Mother         pancreatic    Heart Disease Mother     Heart Disease Father     Cancer Sister         breast    MS Sister     Breast Cancer Sister 62     Social History     Tobacco Use    Smoking status: Never Smoker    Smokeless tobacco: Never Used   Substance Use Topics    Alcohol use:  Yes     Alcohol/week: 0.0 oz     Comment: occasionally     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    Tinnitus H93.19    Microhematuria R31.29    Eczema L30.9    Osteopenia M85.80    Elevated TSH R79.89    Abnormal glucose R73.09    Candidal intertrigo B37.2    Closed displaced fracture of third metatarsal bone of right foot with routine healing S92.331D    Episode of transient neurologic symptoms R29.90    Speech disturbance R47.9    Carpal tunnel syndrome, bilateral G56.03       Depression Risk Factor Screening:     3 most recent PHQ Screens 6/26/2019   Little interest or pleasure in doing things Not at all   Feeling down, depressed, irritable, or hopeless Not at all   Total Score PHQ 2 0     Alcohol Risk Factor Screening: You do not drink alcohol or very rarely. Functional Ability and Level of Safety:   Hearing Loss  Hearing is good. Activities of Daily Living  The home contains: no safety equipment. Patient does total self care    Fall Risk  Fall Risk Assessment, last 12 mths 6/26/2019   Able to walk? Yes   Fall in past 12 months? No       Abuse Screen  Patient is not abused    Cognitive Screening   Evaluation of Cognitive Function:  Has your family/caregiver stated any concerns about your memory: no  Normal    Patient Care Team   Patient Care Team:  Deborah Hightower MD as PCP - General (Internal Medicine)  Jay Catherine MD (Colon and Rectal Surgery)  Jesse Katz OD (Optometry)  Jeanette Arriaga MD (Ophthalmology)  Sachin Guzmán MD (Dermatology)  Karin Hernández MD (Orthopedic Surgery)  Analilia Kelly (Physician Assistant)    Assessment/Plan   Education and counseling provided:  Are appropriate based on today's review and evaluation  End-of-Life planning (with patient's consent)  Influenza Vaccine  Screening Mammography  Colorectal cancer screening tests  Cardiovascular screening blood test  Bone mass measurement (DEXA)  Screening for glaucoma  Diabetes screening test  Shingrix vaccine    Diagnoses and all orders for this visit:    1. Episode of transient neurologic symptoms  -     DUPLEX CAROTID BILATERAL; Future  -     MRI BRAIN WO CONT; Future  -     LIPID PANEL; Future  -     TSH 3RD GENERATION; Future  -     T4, FREE; Future  -     METABOLIC PANEL, COMPREHENSIVE; Future    2. Speech disturbance, unspecified type  -     DUPLEX CAROTID BILATERAL; Future  -     MRI BRAIN WO CONT; Future  -     LIPID PANEL; Future  -     TSH 3RD GENERATION; Future  -     T4, FREE; Future  -     METABOLIC PANEL, COMPREHENSIVE; Future    3.  Essential hypertension  -     LIPID PANEL; Future  -     TSH 3RD GENERATION; Future  -     T4, FREE; Future  -     METABOLIC PANEL, COMPREHENSIVE; Future    4. Hyperlipidemia, unspecified hyperlipidemia type  -     LIPID PANEL; Future  -     TSH 3RD GENERATION; Future  -     T4, FREE; Future  -     METABOLIC PANEL, COMPREHENSIVE; Future    5. Osteopenia of multiple sites  -     DEXA BONE DENSITY STUDY AXIAL; Future  -     LIPID PANEL; Future  -     TSH 3RD GENERATION; Future  -     T4, FREE; Future  -     METABOLIC PANEL, COMPREHENSIVE; Future    6. Closed displaced fracture of third metatarsal bone of right foot with routine healing  -     LIPID PANEL; Future  -     TSH 3RD GENERATION; Future  -     T4, FREE; Future  -     METABOLIC PANEL, COMPREHENSIVE; Future    7. Screening mammogram, encounter for  -     DONALD MAMMO BI SCREENING INCL CAD; Future  -     LIPID PANEL; Future  -     TSH 3RD GENERATION; Future  -     T4, FREE; Future  -     METABOLIC PANEL, COMPREHENSIVE; Future    8. Elevated TSH  -     LIPID PANEL; Future  -     TSH 3RD GENERATION; Future  -     T4, FREE; Future  -     METABOLIC PANEL, COMPREHENSIVE; Future    9. Abnormal glucose  -     LIPID PANEL; Future  -     TSH 3RD GENERATION; Future  -     T4, FREE; Future  -     METABOLIC PANEL, COMPREHENSIVE; Future    10. Carpal tunnel syndrome, bilateral  -     LIPID PANEL; Future  -     TSH 3RD GENERATION; Future  -     T4, FREE; Future  -     METABOLIC PANEL, COMPREHENSIVE; Future    11. Eczema, unspecified type  -     LIPID PANEL; Future  -     TSH 3RD GENERATION; Future  -     T4, FREE; Future  -     METABOLIC PANEL, COMPREHENSIVE; Future    12. Candidal intertrigo  -     LIPID PANEL; Future  -     TSH 3RD GENERATION; Future  -     T4, FREE; Future  -     METABOLIC PANEL, COMPREHENSIVE; Future    13. Medicare annual wellness visit, subsequent    14.  ACP (advance care planning)    Other orders  -     hydroCHLOROthiazide (HYDRODIURIL) 25 mg tablet; TAKE 1 TABLET BY MOUTH DAILY  - atorvastatin (LIPITOR) 20 mg tablet; Take 1 Tab by mouth daily. -     varicella-zoster recombinant, PF, (SHINGRIX) 50 mcg/0.5 mL susr injection; 0.5 mL by IntraMUSCular route once for 1 dose. Repeat x 1 dose in 2-6 months        There are no preventive care reminders to display for this patient.

## 2019-06-25 NOTE — ACP (ADVANCE CARE PLANNING)
Advance Care Planning (ACP) Provider Note - Comprehensive     Date of ACP Conversation: 06/25/19  Persons included in Conversation:  patient  Length of ACP Conversation in minutes:  16 minutes    Authorized Decision Maker (if patient is incapable of making informed decisions):  and son  This person is:  Healthcare Agent/Medical Power of  under Advance Directive          General ACP for ALL Patients with Decision Making Capacity:   Importance of advance care planning, including choosing a healthcare agent to communicate patient's healthcare decisions if patient lost the ability to make decisions, such as after a sudden illness or accident  Understanding of the healthcare agent role was assessed and information provided  Exploration of values, goals, and preferences if recovery is not expected, even with continued medical treatment in the event of: Imminent death  Severe, permanent brain injury  \"In these circumstances, what matters most to you? \"  Care focused more on comfort or quality of life. Review of Existing Advance Directive:  Patient has an existing advance directive completed previously with an  . It designates her  and son  as her healthcare agents and expresses that she does not wish life prolonging procedures for end of life care. It was recommended that she bring a copy to the office to be scanned into the chart. However, she requested to complete new paperwork as too difficult to obtain from safe deposit box. Paperwork provided and reviewed with her.        For Serious or Chronic Illness:  Understanding of medical condition      Interventions Provided:  Recommended completion of Advance Directive form after review of ACP materials and conversation with prospective healthcare agent   Recommended communicating the plan and making copies for the healthcare agent, personal physician, and others as appropriate (e.g., health system)  Recommended review of completed ACP document annually or upon change in health status   Recommended to complete advance directive and return completed form to office to be copied and scanned into chart. Paperwork provided and reviewed.

## 2019-06-25 NOTE — PATIENT INSTRUCTIONS
Increase Lipitor to 20 mg daily. Increase fluid intake. DASH Diet: Care Instructions  Your Care Instructions    The DASH diet is an eating plan that can help lower your blood pressure. DASH stands for Dietary Approaches to Stop Hypertension. Hypertension is high blood pressure. The DASH diet focuses on eating foods that are high in calcium, potassium, and magnesium. These nutrients can lower blood pressure. The foods that are highest in these nutrients are fruits, vegetables, low-fat dairy products, nuts, seeds, and legumes. But taking calcium, potassium, and magnesium supplements instead of eating foods that are high in those nutrients does not have the same effect. The DASH diet also includes whole grains, fish, and poultry. The DASH diet is one of several lifestyle changes your doctor may recommend to lower your high blood pressure. Your doctor may also want you to decrease the amount of sodium in your diet. Lowering sodium while following the DASH diet can lower blood pressure even further than just the DASH diet alone. Follow-up care is a key part of your treatment and safety. Be sure to make and go to all appointments, and call your doctor if you are having problems. It's also a good idea to know your test results and keep a list of the medicines you take. How can you care for yourself at home? Following the DASH diet  · Eat 4 to 5 servings of fruit each day. A serving is 1 medium-sized piece of fruit, ½ cup chopped or canned fruit, 1/4 cup dried fruit, or 4 ounces (½ cup) of fruit juice. Choose fruit more often than fruit juice. · Eat 4 to 5 servings of vegetables each day. A serving is 1 cup of lettuce or raw leafy vegetables, ½ cup of chopped or cooked vegetables, or 4 ounces (½ cup) of vegetable juice. Choose vegetables more often than vegetable juice. · Get 2 to 3 servings of low-fat and fat-free dairy each day.  A serving is 8 ounces of milk, 1 cup of yogurt, or 1 ½ ounces of cheese. · Eat 6 to 8 servings of grains each day. A serving is 1 slice of bread, 1 ounce of dry cereal, or ½ cup of cooked rice, pasta, or cooked cereal. Try to choose whole-grain products as much as possible. · Limit lean meat, poultry, and fish to 2 servings each day. A serving is 3 ounces, about the size of a deck of cards. · Eat 4 to 5 servings of nuts, seeds, and legumes (cooked dried beans, lentils, and split peas) each week. A serving is 1/3 cup of nuts, 2 tablespoons of seeds, or ½ cup of cooked beans or peas. · Limit fats and oils to 2 to 3 servings each day. A serving is 1 teaspoon of vegetable oil or 2 tablespoons of salad dressing. · Limit sweets and added sugars to 5 servings or less a week. A serving is 1 tablespoon jelly or jam, ½ cup sorbet, or 1 cup of lemonade. · Eat less than 2,300 milligrams (mg) of sodium a day. If you limit your sodium to 1,500 mg a day, you can lower your blood pressure even more. Tips for success  · Start small. Do not try to make dramatic changes to your diet all at once. You might feel that you are missing out on your favorite foods and then be more likely to not follow the plan. Make small changes, and stick with them. Once those changes become habit, add a few more changes. · Try some of the following:  ? Make it a goal to eat a fruit or vegetable at every meal and at snacks. This will make it easy to get the recommended amount of fruits and vegetables each day. ? Try yogurt topped with fruit and nuts for a snack or healthy dessert. ? Add lettuce, tomato, cucumber, and onion to sandwiches. ? Combine a ready-made pizza crust with low-fat mozzarella cheese and lots of vegetable toppings. Try using tomatoes, squash, spinach, broccoli, carrots, cauliflower, and onions. ? Have a variety of cut-up vegetables with a low-fat dip as an appetizer instead of chips and dip. ? Sprinkle sunflower seeds or chopped almonds over salads.  Or try adding chopped walnuts or almonds to cooked vegetables. ? Try some vegetarian meals using beans and peas. Add garbanzo or kidney beans to salads. Make burritos and tacos with mashed ho beans or black beans. Where can you learn more? Go to http://mallika-suzanne.info/. Enter M280 in the search box to learn more about \"DASH Diet: Care Instructions. \"  Current as of: July 22, 2018  Content Version: 11.9  © 2444-3630 [employer]. Care instructions adapted under license by [facility] (which disclaims liability or warranty for this information). If you have questions about a medical condition or this instruction, always ask your healthcare professional. [address] any warranty or liability for your use of this information. Medicare Wellness Visit, Female    The best way to improve and maintain good health is to have a healthy lifestyle by eating a well-balanced diet, exercising regularly, limiting alcohol and stopping smoking. Regular visits with your physician or non-physician health care provider also support your good health. Preventive screening tests can find health problems before they become diseases or illnesses. Preventive services such as immunizations prevent serious infections. All people over age 72 should have a Pneumovax and a Prevnar-13 shot to prevent potentially life threatening infections with the pneumococcus bacteria, a common cause of pneumonia. These are once in a lifetime unless you and your provider decide differently. All people over 65 should have a yearly influenza vaccine or \"flu\" shot. This does not prevent infection with cold viruses but has been proven to prevent hospitalization and death from influenza. Although Medicare part B \"regular Medicare\" currently only covers tetanus vaccination in the context of an injury, a tetanus vaccine (Tdap or Td) is recommended every 10 years.     A shingles vaccine is recommended once in a lifetime after age 61. The Shingles vaccine is also not covered by Medicare part B. Note, however, that both the Shingles vaccine and Tdap/Td are generally covered by secondary carriers. Please check your coverage and out of pocket expenses. Consider contacting your local health department because it may stock these vaccines for a reasonable charge. We currently have documentation of the following immunization history for you:  Immunization History   Administered Date(s) Administered    (RETIRED) Pneumococcal Vaccine (Unspecified Type) 05/04/2010    Influenza High Dose Vaccine PF 12/13/2016, 10/03/2017    Influenza Vaccine 10/24/2014    Influenza Vaccine (Tri) Adjuvanted 10/09/2018    Influenza Vaccine PF 11/05/2013    Influenza Vaccine Split 11/01/2011, 11/06/2012    Pneumococcal Conjugate (PCV-13) 05/05/2015    Pneumococcal Polysaccharide (PPSV-23) 05/04/2010    TD Vaccine 05/05/2009    Tdap 12/12/2016    Zoster Vaccine, Live 05/07/2013       Screening for infection with Hepatitis C is recommended for anyone born between [birth year] through [address]. The table at the bottom of this document indicates the status of this and other preventive services. A bone mass density test (DEXA) to screen for osteoporosis or thinning of the bones should be done at least once after age 72 and may be done up to every 2 years as determined by you and your health care provider. The most recent DEXA we have on file for you is:  DEXA Results (most recent):  Results from Hospital Encounter encounter on 07/25/16   DEXA BONE DENSITY STUDY AXIAL    Narrative DEXA BONE DENSITOMETRY, CENTRAL    CPT CODE: 21930    INDICATION: Postmenopausal. History of osteopenia. Hypertension. Taking calcium  and vitamin D. Alendronate and Fosamax use. TECHNIQUE: Using GE LUNAR Prodigy densitometer, bone density measurement was  performed in the lumbar spine the proximal left and right femora and the right  forearm.  T Score refers to standard deviations above or below average compared  to a young adult of the same sex. Z Score refers to standard deviations above or  below average compared to a patient of the same sex, age, race and weight. COMPARISON: June 1, 2005. June 21, 2012. July 21, 2014. FINDINGS:     Lumbar Spine Levels: L1 and L2  Mean Bone Mineral Density (BMD):  0.910 g/cm2    T Score: -2.2       Z Score: -0.6      BMD increased 0.2%, which is not statistically significant within a 95 percent  confidence interval compared to preceding study. BMD increased 0.8%, which is not statistically significant compared to baseline  study. On PA image of the lumbar spine obtained for localization of vertebral levels  (not a diagnostic quality radiograph), there is     apparent increased density  at L3 and L4. The density is not well characterized on the basis of this image,  but likely degenerative. Since this density spuriously increases measured bone  mineral density, this has been excluded. Distal1/3 Radius BMD:  0.769 g/cm2   T Score: -1.3       Z Score: 0.6   BMD increased 0.7%, which is not statistically significant within a 95 percent  confidence interval compared to preceding study. BMD decreased 6.6%, which is not statistically significant compared to baseline  study which at the forearm was the study of 2012. Left Total Proximal Femur BMD: 0.721 g/cm2    T Score:  -2.3       Z Score:  -0.8       BMD decreased 1.9%, which is not statistically significant within a 95 percent  confidence interval compared to preceding study. BMD decreased 3.9%, which is not statistically significant compared to baseline  study. Right Total Proximal Femur BMD: 0.749 g/cm2  T Score:  -2.0      Z Score:  -0.6       BMD decreased 1.4%, which is not statistically significant within a 95 percent  confidence interval compared to preceding study. BMD decreased 3.4%, which is not statistically significant compared to baseline  study.     Left Femoral Neck BMD:  0.739 g/cm2   T Score:  -2.1  Z Score:  -0.5    Right Femoral Neck BMD:  0.762 g/cm2   T Score:  -2.0  Z Score:  -0.3        Impression IMPRESSION:    1. BMD measures consistent with osteopenia. 2.  Compare to the preceding study, BMD is without statistically significant  interval change. 3.  Compare to the baseline study, BMD is without statistically significant  interval change. Based upon current ISCD guidelines, the patient's overall diagnostic category,  selected using WHO criteria in postmenopausal women and males aged 48 and above,  is selected based upon the lowest T Score from among the lumbar spine, total  femur, femoral neck, (or distal third radius if measured). WHO Definition of Osteoporosis and Osteopenia on DXA (specified for  post-menopausal  females):      Normal:                     T Score at or above -1 SD    Osteopenia:              T Score between -1 and -2.5 SD    Osteoporosis:           T Score at or below -2.5 SD    The risk of fracture approximately doubles for each 1 SD decrease in T Score. It is important to consider other factors in assessing a patient's risk of  fracture, including age, risk of falling/injury, history of fragility fracture,  family history of osteoporosis, smoking, low weight. Various fracture risk tools have been developed for adult patients and are  available online. For example, the FRAX tool developed by [facility] is widely used. Reference www.iscd.org. It is also important to note that DXA measures bone density but does not  distinguish among causes of decreased bone density, which include primary versus  secondary osteoporosis (such as metabolic bone disorders or possible effects of  medications) and also other conditions (such as osteomalacia). Clinical  considerations should determine what additional evaluation may be warranted to  exclude secondary conditions in a patient with low bone density.       Please note that reliable, valid comparisons can not be made between studies  which have been performed on different densitometers. If clinically warranted,  follow up study performed at this site would best permit assessment of trend for  possible change in bone mineral density over time in comparison to this study. Thank you for this referral.         Screening for diabetes mellitus with a blood sugar test (glucose) should be done at least every 3 years until age 79. You and your health care provider may decide whether to continue screening after age 79. The most recent blood glucose we have on file for you is:   Lab Results   Component Value Date/Time    Glucose 94 06/21/2019 07:54 AM         Glaucoma is a disease of the eye due to increased ocular pressure that can lead to blindness. People with risk factors for glaucoma ( race, diabetes, family history) should be screened at least every 2 years by an eye professional.     Cardiovascular screening tests that check for elevated lipids or cholesterol (fatty part of blood) which can lead to heart disease and strokes should be done every 4-6 years through age 79. You and your health care provider may decide whether to continue screening after age 79. The most recent lipid panel we have on file for you is:   Lab Results   Component Value Date/Time    Cholesterol, total 215 (H) 06/21/2019 07:54 AM    HDL Cholesterol 81 (H) 06/21/2019 07:54 AM    LDL, calculated 117.8 (H) 06/21/2019 07:54 AM    VLDL, calculated 16.2 06/21/2019 07:54 AM    Triglyceride 81 06/21/2019 07:54 AM    CHOL/HDL Ratio 2.7 06/21/2019 07:54 AM       Colorectal cancer screening that evaluates for blood or polyps in your colon for people with average risk should be done yearly as a stool test, every five years as a flexible sigmoidoscope or every 10 years as a colonoscopy up to age 76. You and your health care provider may decide whether to continue screening after age 76.     Breast cancer screening with a mammogram is recommended at least once every 2 years  for women age 54-69. You and your health care provider may decide whether to continue screening after age 76. The most recent mammogram we have on file for you is:   [facility] Results (most recent):  Results from [place] encounter on 08/03/18   DONALD 3D ROCÍO W MAMMO BI SCREENING INCL CAD    Narrative BIRADS: 1-NEGATIVE    BREAST DENSITY: C-The breasts are heterogeneously dense, which may obscure small  masses. SCREENING MAMMOGRAM BILATERAL  3D tomosynthesis    HISTORY: Screening. COMPARISON: 17-14    FINDINGS:   2D and 3D images were performed. Digital mammograms were performed. No suspicious microcalcifications, masses, or areas of architectural distortion. Interpretation performed in conjunction with computed assisted detection system. Impression IMPRESSION:      No evidence of malignancy. Suggest routine follow-up. Screening for cervical cancer with a pap smear is recommended for all women with a cervix until age 72. The frequency of this test is based on the details of her prior pap smear testing. You and your health care provider may decide whether to continue screening after age 72. People who have smoked the equivalent of 1 pack per day for 30 years or more may benefit from screening for lung cancer with a yearly low dose CT scan until they have been non smokers for 15 years or competing health conditions render this unlikely to be beneficial. Our records show: n/a    Your Medicare Wellness Exam is recommended annually.     Here is a list of your current Health Maintenance items with a due date:  Health Maintenance   Topic Date Due    Shingrix Vaccine Age 50> (1 of 2) 10/26/2019 (Originally 1/18/1995)    Influenza Age 5 to Adult  08/01/2019    MEDICARE YEARLY EXAM  06/25/2020    BREAST CANCER SCRN MAMMOGRAM  08/03/2020    COLONOSCOPY  10/25/2020    GLAUCOMA SCREENING Q2Y  10/29/2020    DTaP/Tdap/Td series (3 - Td) 12/12/2026    Hepatitis C Screening  Completed    Bone Densitometry (Dexa) Screening  Completed    Pneumococcal 65+ years  Completed

## 2019-06-25 NOTE — PROGRESS NOTES
Chief Complaint   Patient presents with    Hypertension     6 month follow up with lab review. Health Maintenance Due   Topic Date Due    Pneumococcal 65+ years (2 of 2 - PPSV23) 05/05/2016    MEDICARE YEARLY EXAM  06/15/2019     1. Have you been to the ER, urgent care clinic or hospitalized since your last visit? YES.     2. Have you seen or consulted any other health care providers outside of the [address] [place] [address] since your last visit (Include any pap smears or colon screening)? NO      Do you have an Advanced Directive? NO    Would you like information on Advanced Directives? NO, Patient wants to fill out a DNR today.     Learning Assessment 6/25/2019   PRIMARY LEARNER Patient   PRIMARY LANGUAGE ENGLISH   LEARNER PREFERENCE PRIMARY READING     DEMONSTRATION   ANSWERED BY patient   RELATIONSHIP SELF
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
## 2019-06-26 ENCOUNTER — OFFICE VISIT (OUTPATIENT)
Dept: ORTHOPEDIC SURGERY | Age: 74
End: 2019-06-26

## 2019-06-26 VITALS
DIASTOLIC BLOOD PRESSURE: 68 MMHG | WEIGHT: 151 LBS | HEIGHT: 65 IN | OXYGEN SATURATION: 98 % | TEMPERATURE: 96 F | HEART RATE: 91 BPM | SYSTOLIC BLOOD PRESSURE: 123 MMHG | BODY MASS INDEX: 25.16 KG/M2 | RESPIRATION RATE: 14 BRPM

## 2019-06-26 DIAGNOSIS — S92.331G: Primary | ICD-10-CM

## 2019-06-26 NOTE — PROGRESS NOTES
Patient: Clarice Wooten                MRN: [id number]       SSN: [id number]  YOB: 1945              AGE: 76 y.o. SEX: female    Tomer Freedman MD    POST OP OFFICE NOTE  DOS: 4/24/19    Chief Complaint:   No chief complaint on file. HPI:     The patient is a 76 y.o. female who presents today for follow up 61 days s/p:   OPEN REDUCTION RIGHT THIRD METATARSAL  DBX PUTTY BONE GRAFTING BONE GRAFT/C-ARM/ARTHREX/NERVE BLOCK    Patient has been PWB to the right lower extremity in CAM boot on heel. Patient reports that her episode of weakness following her last office visit has resolved. She states that she is doing well. Patient denies any fever, chills, chest pain, shortness of breath or calf pain. There are no other illness or injuries to report since last seen in the office. Patient has no pain to her right foot. She has had a history of Vitamin D deficiency and osteopenia in the past. Last Vitamin D level was WNL. PHYSICAL EXAM:     Visit Vitals  /68   Pulse 91   Temp 96 °F (35.6 °C) (Oral)   Resp 14   Ht 5' 5\" (1.651 m)   Wt 151 lb (68.5 kg)   SpO2 98%   BMI 25.13 kg/m²         Pain Scale: 0 - No pain/10      GEN:  Alert, well developed, well nourished, well appearing 76 y.o. female in no acute distress. PSYCH:  Normal affect, mood, and conduct. alert, oriented x 3 alert, oriented x 3, no dementia  M/S EXAMINATION OF: right foot  DRESSINGS: CDI   DRAINAGE: none  INCISION: Incision looks good, skin well approximated, no dehiscence. Slight escar over incision. SKIN: no edema , no erythema, no ecchymosis   TENDERNESS:  mild tenderness to palpation   NEUROVASCULAR:  grossly intact. Positive distal pulses and capillary refill. DVT ASSESSMENT:  The calf is not tender to palpation.  No evidence of DVT seen on physical exam.  ROM: not tested       RADIOGRAPHS & DIAGNOSTIC STUDIES       X-rays, 3 views of the right foot reveals post op changes s/p ORIF third metatarsal. No significant healing noted at this point in time. However, overall alignment looks good and the hardware remains in good position. Mineralization suggests osteopenia. IMPRESSION:     Encounter Diagnoses     ICD-10-CM ICD-9-CM   1. Closed fracture of third metatarsal bone of right foot with delayed healing, physeal involvement unspecified, subsequent encounter S92.331G V54.19       PLAN:         Orders Placed This Encounter    [79070] Foot Min 3V            · Continue activity modification    · Wear Tubigrip for swelling    · Weight bearing status:   weight bearing to the surgical extremity in CAM boot    · No lifting, twisting, squatting, deep bending, driving or strenuous activity. · Perform ankle pumps with non-surgical/non-injured extremity to help reduce the risk of blood clots    · Please follow up in the office on 7/16/19         Patient has been discussed with Dr. Eliceo Aviles during this visit and he agrees with the assessment and plan    Patient expresses understanding of the plan. Patient education provided on post surgical care. REVIEW OF SYSTEMS:     Otherwise as noted in HPI      PAST MEDICAL HISTORY:     Past Medical History:   Diagnosis Date    Eczema     Essential hypertension     FH: breast cancer     Fibroids     uterine    Fracture of radius 11/2009    with ulna, left, closed    History of cataract     History of foot fracture     right and left    Hyperlipidemia     Microhematuria     chronic    Osteopenia     S/P radiation therapy     as child, to right shoulder for birthmark    Tinnitus     Zoster     right scapula       MEDICATIONS:     Current Outpatient Medications   Medication Sig    hydroCHLOROthiazide (HYDRODIURIL) 25 mg tablet TAKE 1 TABLET BY MOUTH DAILY    aspirin delayed-release 81 mg tablet Take  by mouth daily.  atorvastatin (LIPITOR) 20 mg tablet Take 1 Tab by mouth daily.     amoxicillin 500 mg tab TAKE ONE CAPSULE PO TID, START ONE DAY PRIOR TO PROCEDURE, DAY OF PROCEDURE AND DAY AFTER PROCEDURE    ibuprofen (ADVIL) 200 mg tablet Take  by mouth.  alendronate (FOSAMAX) 70 mg tablet TAKE 1 TABLET BY MOUTH EVERY 7 DAYS    nitrofurantoin, macrocrystal-monohydrate, (MACROBID) 100 mg capsule Take 1 Cap by mouth two (2) times a day.  ondansetron hcl (ZOFRAN) 4 mg tablet Take 1 Tab by mouth every eight (8) hours as needed for Nausea.  polyethylene glycol (MIRALAX) 17 gram packet Take 1 Packet by mouth daily.  lisinopril (PRINIVIL, ZESTRIL) 10 mg tablet TAKE 1 TABLET BY MOUTH DAILY    crisaborole (EUCRISA) 2 % oint by Apply Externally route.  triamcinolone acetonide (KENALOG) 0.025 % topical cream Apply  to affected area two (2) times a day.  clotrimazole-betamethasone (LOTRISONE) topical cream Apply  to affected area two (2) times a day.  docusate sodium (COLACE) 100 mg capsule Take 100 mg by mouth daily as needed for Constipation.  co-enzyme Q-10 (CO Q-10) 100 mg capsule Take 100 mg by mouth daily.  biotin 2,500 mcg Tab Take 2,500 mcg by mouth.  cholecalciferol (VITAMIN D3) 1,000 unit cap Take 1 Tab by mouth daily.  fexofenadine (ALLEGRA) 60 mg tablet Take 60 mg by mouth daily.  CALCIUM CARBONATE/VITAMIN D3 (CALCIUM + D PO) Take 1 Tab by mouth two (2) times a day. No current facility-administered medications for this visit.         ALLERGIES:     Allergies   Allergen Reactions    Codeine Nausea Only    Hibiclens [Chlorhexidine Gluconate] Rash         PAST SURGICAL HISTORY:     Past Surgical History:   Procedure Laterality Date    HX CATARACT REMOVAL      HX FRACTURE TX Right 04/24/2019    right foot 3rd metatarsal fracture    HX ORTHOPAEDIC      left arm    HX WISDOM TEETH EXTRACTION      HX WRIST FRACTURE TX         SOCIAL HISTORY:     Social History     Socioeconomic History    Marital status:      Spouse name: Not on file    Number of children: Not on file    Years of education: Not on file  Highest education level: Not on file   Occupational History    Not on file   Social Needs    Financial resource strain: Not on file    Food insecurity:     Worry: Not on file     Inability: Not on file    Transportation needs:     Medical: Not on file     Non-medical: Not on file   Tobacco Use    Smoking status: Never Smoker    Smokeless tobacco: Never Used   Substance and Sexual Activity    Alcohol use:  Yes     Alcohol/week: 0.0 oz     Comment: occasionally    Drug use: No    Sexual activity: Yes     Partners: Male   Lifestyle    Physical activity:     Days per week: Not on file     Minutes per session: Not on file    Stress: Not on file   Relationships    Social connections:     Talks on phone: Not on file     Gets together: Not on file     Attends Scientologist service: Not on file     Active member of club or organization: Not on file     Attends meetings of clubs or organizations: Not on file     Relationship status: Not on file    Intimate partner violence:     Fear of current or ex partner: Not on file     Emotionally abused: Not on file     Physically abused: Not on file     Forced sexual activity: Not on file   Other Topics Concern    Not on file   Social History Narrative    Not on file       FAMILY HISTORY:     Family History   Problem Relation Age of Onset    Cancer Mother         pancreatic    Heart Disease Mother     Heart Disease Father     Cancer Sister         breast    MS Sister     Breast Cancer Sister [address] [place], [place]  6/26/2019

## 2019-06-26 NOTE — PATIENT INSTRUCTIONS
· Continue activity modification    · Wear Tubigrip for swelling    · Weight bearing status:   weight bearing to the surgical extremity in CAM boot    · No lifting, twisting, squatting, deep bending, driving or strenuous activity. · Perform ankle pumps with non-surgical/non-injured extremity to help reduce the risk of blood clots    · Please follow up in the office on 7/16/19          Metatarsal Fracture: Care Instructions  Your Care Instructions    A metatarsal fracture is a break or a thin, hairline crack in one of the metatarsal bones of the foot. This type of fracture usually happens from repeated stress on the bones of the foot. Or it can happen when a person jumps or changes direction quickly and twists his or her foot or ankle the wrong way. 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 Treatment depends on how bad the fracture is and where the fracture is on the bone. You may or may not have had surgery. Your doctor may have put your foot in a cast or splint to keep it stable. You may have been given crutches to use to keep weight off your foot. A metatarsal fracture may take from 6 weeks to several months to heal. It is important to give your foot time to heal completely, so that you do not hurt it again. Do not return to your usual activities until your doctor says you can. Your doctor may suggest that you get physical therapy to help regain strength and range of motion in your foot. You heal best when you take good care of yourself. Eat a variety of healthy foods, and don't smoke. Follow-up care is a key part of your treatment and safety. Be sure to make and go to all appointments, and call your doctor if you are having problems. It is also a good idea to know your test results and keep a list of the medicines you take. How can you care for yourself at home? · Be safe with medicines.  Read and follow all instructions on the label. ? If your doctor gave you a prescription medicine for pain, take it as prescribed. ? If you are not taking a prescription pain medicine, ask your doctor if you can take an over-the-counter medicine. · Follow your doctor's instructions about how much weight you can put on your foot and when you can go back to your usual activities. If you were given crutches, use them as directed. · Put ice or a cold pack on your foot for 10 to 20 minutes at a time. Try to do this every 1 to 2 hours for the next 3 days (when you are awake) or until the swelling goes down. Put a thin cloth between the ice and your skin. · Prop up your foot on a pillow when you ice it or anytime you sit or lie down for the next 3 days. Try to keep it above the level of your heart. This will help reduce swelling. Cast and splint care  · If your foot is in a cast or splint, follow the cast or splint care instructions your doctor gives you. If you have a removable fiberglass walking cast or a splint, do not take it off unless your doctor tells you to. · Keep your cast or splint dry. If you have a removable fiberglass walking cast or a splint, ask your doctor if it is okay to remove it to bathe. Your doctor may want you to keep it on as much as possible. · If you're told to keep your cast or splint on, tape a sheet of plastic to cover it when you bathe. Or ask your doctor about products that can help keep a cast or splint dry. Water under the cast or splint can cause your skin to itch and hurt. · Never cut your cast or stick anything down it to scratch an itch on your leg. When should you call for help? Call your doctor now or seek immediate medical care if:    · You have problems with your cast or splint. For example:  ? The skin under the cast or splint is burning or stinging. ? The cast or splint feels too tight. ? There is a lot of swelling near the cast or splint. (Some swelling is normal.)  ?  You have a new fever.  ? There is drainage or a bad smell coming from the cast or splint.     · You have increased or severe pain.     · You have tingling, weakness, or numbness in your foot and toes.     · You cannot move your toes.     · Your foot turns cold or changes color.    Watch closely for changes in your health, and be sure to contact your doctor if:    · The pain does not get better day by day.     · You do not get better as expected. Where can you learn more? Go to http://mallika-suzanne.info/. Enter ([phone] in the search box to learn more about \"Metatarsal Fracture: Care Instructions. \"  Current as of: September 20, 2018  Content Version: 11.9  © 2228-7547 [employer]. Care instructions adapted under license by [employer] (which disclaims liability or warranty for this information). If you have questions about a medical condition or this instruction, always ask your healthcare professional. [place] [address] any warranty or liability for your use of this information.

## 2019-06-28 NOTE — PROGRESS NOTES
HPI:   Theresa Bobby is a 76y.o. year old female who presents today for a physical exam and for evaluation of hypertension, hyperlipidemia, osteopenia, left sciatica, and chronic microhematuria. She was last seen on 5/15/2019 complaining of bilateral hand and wrist pain. She denied any fever, chills, sore throat, rashes, dyspnea, cough, abdominal pain, dysuria, focal weakness, visual changes, or headache. She did admit to sleeping with her bilateral hands tucked beneath her in a flexed position while lying on her back due to her limitations from her foot surgery. She also reported that she had been using a walker to ambulate and gripping it tightly with her hands. Lab evaluation including MANOLO, RF, CCP, ESR, uric acid were normal and CRP increased to 9.1. A diagnosis of presumed carpal tunnel syndrome was made, and her discomfort resolved with the use of wrist splints. She reports today that two weeks ago, while returning home from her granddaughter's graduation, she and her  stopped at a restaurant for lunch. She states that while she was chewing a mouth full of food, her  noticed that she suddenly tilted her head to the left side and seemed to look dazed. She states that she remained awake and was aware that he was talking to her, but she states that she could not respond. She states that the episode lasted approximately 30 seconds and resolved, and she proceeded to complete her chewing and swallow her food. She states that she did not lose consciousness, or notice any headache, visual changes, lightheadedness, tongue biting, incontinence, focal deficits, or seizure like activity. She denied any chest pain, shortness of breath, palpitations, pleuritic chest pain, hemoptysis, calf pain or swelling. She states that she has not had a recurrence of the symptoms.  She also states that she has been experiencing right shoulder pain with movement, which she attributes to supporting herself with the walker when ambulating. She is otherwise without complaints. On 4/15/2019, she underwent evaluation at [facility] for right foot pain. She denied known injury. Right foot x-ray showed a displaced third metatarsal fracture and she was referred to Dr. Christine Dougherty for evaluation. Given the displacement and risk of metatarsalgia, surgery was recommended, and on 4/24/2019, she underwent open reduction/internal fixation of the third metatarsal with putty bone grafting. She reports that she did well after surgery, and remains non-weight bearing in a CAM boot. She states that she has a follow-up visit with Dr. Christine Dougherty tomorrow to assess whether she may begin to ambulate. She has a history of hypertension, treated with lisinopril and hydrochlorothiazide. She has been checking her blood pressure intermittently and reports that most readings are with a systolic blood pressure <098. She remains quite active, although has not been having time to exercise. She reports that she has continued stress in her life, helping to care for her 's mother (dementia), two disabled cousins, and her sister. She denies any chest pain, shortness of breath at rest or with exertion, palpitations, lightheadedness, or edema. She also has a history of hyperlipidemia, treated with moderate intensity dose atorvastatin. She has no history of ASCVD. She has a history of osteopenia, and in 2009, she sustained a fracture of her left 3rd toe (3/2009) and her distal radius (12/2009). At that time, she was started on alendronate and was treated until 11/2014. She had a repeat bone density study in 7/2016 showing T-scores: femoral neck left -2.3/ right -2.0 and lumbar -2.2. She was restarted on alendronate in 12/2016. She continues to take calcium and Vitamin D supplements. She has no further history of pathologic fractures. In 2/2017, she was evaluated by DEREK Walter with left lower back pain and left sciatica.  She had been taking ibuprofen without relief, and was given a course of prednisone. She states that she did not find this helpful either, but reports resolution of symptoms after visiting a chiropractor. She states that she no longer is experiencing pain and is able to ambulate without difficulty. She will continue to visit a chiropractor periodically when she develops a recurrence of pain with improvement. She reports that she had an extensive workup for microhematuria in the past, which was negative (records unavailable). She denies any dysuria, gross hematuria, or flank pain. She has had screening colonoscopy with Dr Joan Teixeira in 10/2010, which was normal except for hemorrhoids. Recommendation for follow-up is for 10 years. She denies any abdominal pain, nausea, vomiting, melena, hematochezia, or change in bowel movements. She has a history of eczema and is followed by Dr. Dexter Jarquin. She states that she was recently started on Eucrisa with excellent response. She also has a history of candidal intertrigo, treated with lotrisone with good response. She also reports that she underwent radiation therapy to her right shoulder (as a child) for treatment of a birthmark. On 7/20/2018, she presented for evaluation to [facility] for a right upper back rash and was diagnosed with herpes zoster. She was treated with Valtrex with improvement.     Past Medical History:   Diagnosis Date    Eczema     Essential hypertension     FH: breast cancer     Fibroids     uterine    Fracture of radius 11/2009    with ulna, left, closed    History of cataract     History of foot fracture     right and left    Hyperlipidemia     Microhematuria     chronic    Osteopenia     S/P radiation therapy     as child, to right shoulder for birthmark    Tinnitus     Zoster     right scapula     Past Surgical History:   Procedure Laterality Date    HX CATARACT REMOVAL      HX FRACTURE TX Right 04/24/2019    right foot 3rd metatarsal fracture    HX ORTHOPAEDIC left arm    HX WISDOM TEETH EXTRACTION      HX WRIST FRACTURE TX       Current Outpatient Medications   Medication Sig    hydroCHLOROthiazide (HYDRODIURIL) 25 mg tablet TAKE 1 TABLET BY MOUTH DAILY    aspirin delayed-release 81 mg tablet Take  by mouth daily.  atorvastatin (LIPITOR) 20 mg tablet Take 1 Tab by mouth daily.  alendronate (FOSAMAX) 70 mg tablet TAKE 1 TABLET BY MOUTH EVERY 7 DAYS    lisinopril (PRINIVIL, ZESTRIL) 10 mg tablet TAKE 1 TABLET BY MOUTH DAILY    crisaborole (EUCRISA) 2 % oint by Apply Externally route.  docusate sodium (COLACE) 100 mg capsule Take 100 mg by mouth daily as needed for Constipation.  co-enzyme Q-10 (CO Q-10) 100 mg capsule Take 100 mg by mouth daily.  biotin 2,500 mcg Tab Take 2,500 mcg by mouth.  cholecalciferol (VITAMIN D3) 1,000 unit cap Take 1 Tab by mouth daily.  fexofenadine (ALLEGRA) 60 mg tablet Take 60 mg by mouth daily.  CALCIUM CARBONATE/VITAMIN D3 (CALCIUM + D PO) Take 1 Tab by mouth two (2) times a day.  amoxicillin 500 mg tab TAKE ONE CAPSULE PO TID, START ONE DAY PRIOR TO PROCEDURE, DAY OF PROCEDURE AND DAY AFTER PROCEDURE    ibuprofen (ADVIL) 200 mg tablet Take  by mouth.  nitrofurantoin, macrocrystal-monohydrate, (MACROBID) 100 mg capsule Take 1 Cap by mouth two (2) times a day.  ondansetron hcl (ZOFRAN) 4 mg tablet Take 1 Tab by mouth every eight (8) hours as needed for Nausea.  polyethylene glycol (MIRALAX) 17 gram packet Take 1 Packet by mouth daily.  triamcinolone acetonide (KENALOG) 0.025 % topical cream Apply  to affected area two (2) times a day.  clotrimazole-betamethasone (LOTRISONE) topical cream Apply  to affected area two (2) times a day. No current facility-administered medications for this visit. Allergies and Intolerances:    Allergies   Allergen Reactions    Codeine Nausea Only    Hibiclens [Chlorhexidine Gluconate] Rash     Family History: Her sister has breast cancer and her mother had pancreatic cancer. No history of colon cancer. Family History   Problem Relation Age of Onset   [facility] Cancer Mother         pancreatic    Heart Disease Mother     Heart Disease Father     Cancer Sister         breast    MS Sister     Breast Cancer Sister 62     Social History:   She  reports that she has never smoked. She has never used smokeless tobacco. She has about 2 glasses of wine each night. She is  with one adult daughter who lives with her family on [place], [place]. She is retired, and was a 4th- for 25 years. Social History     Substance and Sexual Activity   Alcohol Use Yes    Alcohol/week: 0.0 oz    Comment: occasionally     Immunization History:  Immunization History   Administered Date(s) Administered    (RETIRED) Pneumococcal Vaccine (Unspecified Type) 05/04/2010    Influenza High Dose Vaccine PF 12/13/2016, 10/03/2017    Influenza Vaccine 10/24/2014    Influenza Vaccine (Tri) Adjuvanted 10/09/2018    Influenza Vaccine PF 11/05/2013    Influenza Vaccine Split 11/01/2011, 11/06/2012    Pneumococcal Conjugate (PCV-13) 05/05/2015    Pneumococcal Polysaccharide (PPSV-23) 05/04/2010    TD Vaccine 05/05/2009    Tdap 12/12/2016    Zoster Vaccine, Live 05/07/2013       Review of Systems:   As above included in HPI. Otherwise 11 point review of systems negative including constitutional, skin, HENT, eyes, respiratory, cardiovascular, gastrointestinal, genitourinary, musculoskeletal, endo/heme/aller, neurological.    Physical:   Vitals:   BP: 128/68  HR: 78  WT:  151 lb  BMI:   25.13 kg/m2    Exam:   Patient appears in no apparent distress. Affect is appropriate. HEENT --Anicteric sclerae, tympanic membranes normal,  ear canals normal.  PERRL, EOMI, conjunctiva and lids normal.   Sinuses were nontender, turbinates normal, hearing normal.  Oropharynx without  Erythema or ulcers, normal tongue, oral mucosa and tonsils. No cervical lymphadenopathy.   No thyromegaly, JVD, or bruits. Breast -- deferred until next visit    Lungs --Clear to auscultation. No wheezing or rales. Heart --Regular rate and rhythm, no murmurs, rubs, gallops, or clicks. Abdomen -- Soft and nontender, no hepatosplenomegaly or masses. Extremities -- Without edema. CAM boot in place on right lower extremity, normal looking digits, ROM intact  Neuro -- CN 2-12 intact, strength 5/5 with intact soft touch in all extremities. Finger to nose test normal.  Derm - no obvious abnormalities noted, no rash  Musculoskeletal --Right shoulder without erythema, warmth, or swelling and ROM intact. Otherwise no other joint abnormalities noted. Review of Data:  Labs:  Hospital Outpatient Visit on 06/21/2019   Component Date Value Ref Range Status    WBC 06/21/2019 5.6  4.6 - 13.2 K/uL Final    RBC 06/21/2019 4.47  4.20 - 5.30 M/uL Final    HGB 06/21/2019 12.4  12.0 - 16.0 g/dL Final    HCT 06/21/2019 39.9  35.0 - 45.0 % Final    MCV 06/21/2019 89.3  74.0 - 97.0 FL Final    MCH 06/21/2019 27.7  24.0 - 34.0 PG Final    MCHC 06/21/2019 31.1  31.0 - 37.0 g/dL Final    RDW 06/21/2019 15.1* 11.6 - 14.5 % Final    PLATELET 52/23/7646 909  135 - 420 K/uL Final    MPV 06/21/2019 9.8  9.2 - 11.8 FL Final    NEUTROPHILS 06/21/2019 54  40 - 73 % Final    LYMPHOCYTES 06/21/2019 33  21 - 52 % Final    MONOCYTES 06/21/2019 8  3 - 10 % Final    EOSINOPHILS 06/21/2019 4  0 - 5 % Final    BASOPHILS 06/21/2019 1  0 - 2 % Final    ABS. NEUTROPHILS 06/21/2019 3.0  1.8 - 8.0 K/UL Final    ABS. LYMPHOCYTES 06/21/2019 1.9  0.9 - 3.6 K/UL Final    ABS. MONOCYTES 06/21/2019 0.5  0.05 - 1.2 K/UL Final    ABS. EOSINOPHILS 06/21/2019 0.2  0.0 - 0.4 K/UL Final    ABS.  BASOPHILS 06/21/2019 0.0  0.0 - 0.1 K/UL Final    DF 06/21/2019 AUTOMATED    Final    Hemoglobin A1c 06/21/2019 5.6  4.2 - 5.6 % Final    Est. average glucose 06/21/2019 114  mg/dL Final    LIPID PROFILE 06/21/2019        Final    Cholesterol, total 06/21/2019 215* <200 MG/DL Final    Triglyceride 06/21/2019 81  <150 MG/DL Final    HDL Cholesterol 06/21/2019 81* 40 - 60 MG/DL Final    LDL, calculated 06/21/2019 117.8* 0 - 100 MG/DL Final    VLDL, calculated 06/21/2019 16.2  MG/DL Final    CHOL/HDL Ratio 06/21/2019 2.7  0 - 5.0   Final    Sodium 06/21/2019 135* 136 - 145 mmol/L Final    Potassium 06/21/2019 4.1  3.5 - 5.5 mmol/L Final    Chloride 06/21/2019 100  100 - 108 mmol/L Final    CO2 06/21/2019 26  21 - 32 mmol/L Final    Anion gap 06/21/2019 9  3.0 - 18 mmol/L Final    Glucose 06/21/2019 94  74 - 99 mg/dL Final    BUN 06/21/2019 27* 7.0 - 18 MG/DL Final    Creatinine 06/21/2019 0.64  0.6 - 1.3 MG/DL Final    BUN/Creatinine ratio 06/21/2019 42* 12 - 20   Final    GFR est AA 06/21/2019 >60  >60 ml/min/1.73m2 Final    GFR est non-AA 06/21/2019 >60  >60 ml/min/1.73m2 Final    Calcium 06/21/2019 9.2  8.5 - 10.1 MG/DL Final    Bilirubin, total 06/21/2019 0.4  0.2 - 1.0 MG/DL Final    ALT (SGPT) 06/21/2019 46  13 - 56 U/L Final    AST (SGOT) 06/21/2019 35  15 - 37 U/L Final    Alk.  phosphatase 06/21/2019 68  45 - 117 U/L Final    Protein, total 06/21/2019 7.2  6.4 - 8.2 g/dL Final    Albumin 06/21/2019 4.1  3.4 - 5.0 g/dL Final    Globulin 06/21/2019 3.1  2.0 - 4.0 g/dL Final    A-G Ratio 06/21/2019 1.3  0.8 - 1.7   Final    TSH 06/21/2019 4.36* 0.36 - 3.74 uIU/mL Final    Color 06/21/2019 DARK YELLOW    Final    Appearance 06/21/2019 CLOUDY    Final    Specific gravity 06/21/2019 >1.030* 1.005 - 1.030 Final    pH (UA) 06/21/2019 5.0  5.0 - 8.0   Final    Protein 06/21/2019 NEGATIVE   NEG mg/dL Final    Glucose 06/21/2019 NEGATIVE   NEG mg/dL Final    Ketone 06/21/2019 TRACE* NEG mg/dL Final    Bilirubin 06/21/2019 NEGATIVE   NEG   Final    Blood 06/21/2019 NEGATIVE   NEG   Final    Urobilinogen 06/21/2019 0.2  0.2 - 1.0 EU/dL Final    Nitrites 06/21/2019 NEGATIVE   NEG   Final    Leukocyte Esterase 06/21/2019 MODERATE* NEG   Final    WBC 06/21/2019 21 to 35  0 - 4 /hpf Final    RBC 06/21/2019 NEGATIVE   0 - 5 /hpf Final    Epithelial cells 06/21/2019 4+  0 - 5 /lpf Final    Bacteria 06/21/2019 2+* NEG /hpf Final    CA Oxalate crystals 06/21/2019 2+* NEG Final    Hyaline cast 06/21/2019 4 to 10  0 - 2 /lpf Final    Vitamin D 25-Hydroxy 06/21/2019 33.0  30 - 100 ng/mL Final     Health Maintenance:  Screening:    Mammogram: negative (7/2018)   PAP smear: S/P ALMAS   Colorectal: colonoscopy (10/2010) normal. Dr. Ayan Mancuso. Due 10/2020. Depression: none   DM (HbA1c/FPG): HbA1c 5.6 (6/2019)   Hepatitis C: negative (5/2016)   Falls: none   DEXA: osteopenia (7/2016). On alendronate since 12/2016. Overdue   Glaucoma: regular eye exams with Dr. Juanita Dodd (last 11/2018)   Smoking: none   Vitamin D: 33.0 (6/2019)   Medicare Wellness: today      Impression:  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    Tinnitus H93.19    Microhematuria R31.29    Eczema L30.9    Osteopenia M85.80    Elevated TSH R79.89    Abnormal glucose R73.09    Candidal intertrigo B37.2    Closed displaced fracture of third metatarsal bone of right foot with routine healing S92.331D    Episode of transient neurologic symptoms R29.90    Speech disturbance R47.9    Carpal tunnel syndrome, bilateral G56.03       Plan:  1. Hypertension. Appears well controlled on current regimen of lisinopril 10 mg daily and hydrochlorothiazide 25 mg daily. Renal function normal with creatinine 0.64/ eGFR >60, but BUN/creatinine ratio increased to 42 with trace ketones in urine. Patient admits to not drinking much water since it is difficult to ambulate to the bathroom with her CAM boot. Encouraged to drink plenty of fluids. Continue to follow. 2. Hyperlipidemia.  On moderate intensity dose atorvastatin with  and HDL 81, which is worsened control on atorvastatin 10 mg daily. Admits to poor eating and not exercising with foot issue. Will increase atorvastatin to 20 mg daily and recheck lipid panel at next visit. Emphasized importance of lifestyle modifications, including diet, exercise, and weight loss. Will continue to follow. 3. Transient neurologic symptoms. Experienced transient episode of head tilting to the left and inability to speak, lasting 30 seconds. Was traveling back from [place] and eating lunch when episode occurred. Reported drinking fluids, but also on diuretic. No other associated symptoms and no residual symptoms. Differential diagnosis includes possible TIA, seizure, or global hypoperfusion from arrhythmia or hypovolemia. Will begin evaluation with carotid duplex and brain MRI. On aspirin and increasing dose of statin for better control. Further recommendations pending results. 4. Osteopenia. History of radial and toe fracture in 2009. Treated from 11/2009 to 11/2014 with alendronate. Repeat bone density scan in 7/2016 with 10 year risk of a major osteoporetic fracture at 14 % and hip fracture at 3.4 %. Restarted Fosamax in 12/2016. Now with new right metatarsal displaced fracture without known injury. Patient overdue for bone density study, but wished to wait until next mammogram which is due in 8/2019. Will need to assess if having response to treatment particularly given new fracture. If worsened, will need to consider alternate therapy. Continue calcium and Vitamin D supplement. Vitamin D and calcium levels normal today. 5. Displaced right third metatarsal fracture, s/p open reduction/internal fixation with putty bone grafting. She reports that she did well after surgery, and remains non-weight bearing in CAM boot. Follow-up appointment with ortho tomorrow for reassessment of weight bearing status. 6. Family history of breast cancer.  Patient with family history of breast cancer in first degree relative (sister). She is currently being screened with annual mammograms. Discussed recommendations for breast cancer screening in women with lifetime risk >20%. 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 No other family member with breast cancer. Wishing to continue with annual mammograms and breast exams. 7. Abnormal glucose. Repeat normal with normal HbA1c today. Emphasized importance of lifestyle modifications, including diet, exercise, and weight loss. 8. Bilateral hand and wrist pain. Evaluation for inflammatory cause negative. Improved with changes in gripping walker and wearing of neutral wrist splints. No longer sleeping with wrists in flexed position at night. Continue to follow. 9. Chronic microhematuria. Work-up reportedly negative in past. Urinalysis negative for blood today. Follow. 10. Eczema. Found good response with [facility]. Followed by Dr. Steven Osuna. 11. Candidal intertrigo. Improved with Lotrisone. Discussed preventative treatment with mycostatin powder and loose clothing. 12. Overweight. BMI mildly increased. Emphasized importance of lifestyle modifications, including diet, exercise, and weight loss. Will continue to follow. 13. Elevated TSH. Will repeat with free T4 at next blood draw. Patient with history of radiation therapy of a birthmark on her right shoulder as a child. Follow. 14. Health maintenance. Will give another script for Shingrix vaccine. Other immunizations up to date. Mammogram due this summer. Will order bone density scan with next mammogram. Colorectal cancer screening up to date. Continue regular eye exams with Dr. Td Kulkarni. Vitamin D level normal. Continue maintenance dose supplement. In addition, an annual Medicare wellness visit was done today. Total time: 40 minutes spent with the patient in face-to-face consultation of which greater than 50% was spent on counseling, answering questions and/or coordination of care.  Complex medical review and management performed. Patient understands recommendations and agrees with plan. Follow-up in 8 weeks.

## 2019-06-29 PROBLEM — G56.03 CARPAL TUNNEL SYNDROME, BILATERAL: Status: ACTIVE | Noted: 2019-06-29

## 2019-06-29 PROBLEM — R29.90 EPISODE OF TRANSIENT NEUROLOGIC SYMPTOMS: Status: ACTIVE | Noted: 2019-06-29

## 2019-06-29 PROBLEM — R47.9 SPEECH DISTURBANCE: Status: ACTIVE | Noted: 2019-06-29

## 2019-07-09 ENCOUNTER — HOSPITAL ENCOUNTER (OUTPATIENT)
Age: 74
Discharge: HOME | End: 2019-07-09
Attending: INTERNAL MEDICINE
Payer: MEDICARE

## 2019-07-09 ENCOUNTER — HOSPITAL ENCOUNTER (OUTPATIENT)
Dept: VASCULAR SURGERY | Age: 74
Discharge: HOME | End: 2019-07-09
Attending: INTERNAL MEDICINE
Payer: MEDICARE

## 2019-07-09 DIAGNOSIS — R29.90 EPISODE OF TRANSIENT NEUROLOGIC SYMPTOMS: ICD-10-CM

## 2019-07-09 DIAGNOSIS — R47.9 SPEECH DISTURBANCE, UNSPECIFIED TYPE: ICD-10-CM

## 2019-07-09 LAB
LEFT CCA DIST DIAS: 17.6 CM/S
LEFT CCA DIST SYS: 90.3 CM/S
LEFT CCA MID DIAS: 24.07 CM/S
LEFT CCA MID SYS: 116.13 CM/S
LEFT CCA PROX DIAS: 19.2 CM/S
LEFT CCA PROX SYS: 106.4 CM/S
LEFT ECA DIAS: 7.43 CM/S
LEFT ECA SYS: 109.2 CM/S
LEFT ICA DIST DIAS: 23.4 CM/S
LEFT ICA DIST SYS: 79.9 CM/S
LEFT ICA MID DIAS: 21.1 CM/S
LEFT ICA MID SYS: 66.8 CM/S
LEFT ICA PROX DIAS: 11.6 CM/S
LEFT ICA PROX SYS: 66.6 CM/S
LEFT ICA/CCA SYS: 0.89
LEFT VERTEBRAL DIAS: 15.28 CM/S
LEFT VERTEBRAL SYS: 46.1 CM/S
RIGHT CCA DIST DIAS: 17.5 CM/S
RIGHT CCA DIST SYS: 60.9 CM/S
RIGHT CCA MID DIAS: 25.4 CM/S
RIGHT CCA MID SYS: 92.36 CM/S
RIGHT CCA PROX DIAS: 21.5 CM/S
RIGHT CCA PROX SYS: 98.3 CM/S
RIGHT ECA DIAS: 12.61 CM/S
RIGHT ECA SYS: 77.3 CM/S
RIGHT ICA DIST DIAS: 20 CM/S
RIGHT ICA DIST SYS: 80.7 CM/S
RIGHT ICA MID DIAS: 33.8 CM/S
RIGHT ICA MID SYS: 87.2 CM/S
RIGHT ICA PROX DIAS: 19.5 CM/S
RIGHT ICA PROX SYS: 62.8 CM/S
RIGHT ICA/CCA SYS: 1.4
RIGHT VERTEBRAL DIAS: 28.77 CM/S
RIGHT VERTEBRAL SYS: 77.3 CM/S

## 2019-07-09 PROCEDURE — 93880 EXTRACRANIAL BILAT STUDY: CPT

## 2019-07-09 PROCEDURE — 70551 MRI BRAIN STEM W/O DYE: CPT

## 2019-07-10 ENCOUNTER — TELEPHONE (OUTPATIENT)
Dept: INTERNAL MEDICINE CLINIC | Age: 74
End: 2019-07-10

## 2019-07-10 NOTE — TELEPHONE ENCOUNTER
MRI Results (most recent):  Results from [place] encounter on 07/09/19   MRI BRAIN WO CONT    Narrative Brain MR without contrast    HISTORY: Episode of transient neurologic symptoms, speech disturbance. COMPARISON: MRI 4/12/2018    TECHNIQUE: Brain scanned with sagittal and axial T1W scans, axial T2W , axial  FLAIR, axial diffusion weighted images , coronal MPRAGE and GRE. FINDINGS:     Stable fat intensity lesion in the quadrigeminal cistern extending into  supracerebellar cistern measuring 2.5 cm oblique craniocaudal by 1 cm AP  thickness by 2 cm oblique transverse. Some interdigitation in the cerebellar  folia. Mild mass effect on tectal plate. Small cavum vellum interpositum. Ventricles are nondilated. No acute stroke. Expected arterial flow voids are present at the base of brain. No intra-axial  mass lesion. Age unremarkable cortical atrophy. Impression IMPRESSION:    1. 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 2. No acute intracranial process. Brain is otherwise normal for age. Carotid duplex (6/25/2019) Mild stenosis noted within bilateral internal carotid arteries  No hemodynamically significant arterial disease is identified within bilateral vertebral and subclavian arteries      Called and discussed results with patient. No cause found on MRI or carotid duplex for transient neurologic episode. Reports no recurrence. Discussed possible referral to neurology for further evaluation, but wishing to continue to observe for now. Will readdress at next visit.

## 2019-07-16 ENCOUNTER — OFFICE VISIT (OUTPATIENT)
Dept: ORTHOPEDIC SURGERY | Age: 74
End: 2019-07-16

## 2019-07-16 VITALS
HEART RATE: 108 BPM | BODY MASS INDEX: 24.83 KG/M2 | SYSTOLIC BLOOD PRESSURE: 147 MMHG | OXYGEN SATURATION: 100 % | RESPIRATION RATE: 13 BRPM | WEIGHT: 149 LBS | HEIGHT: 65 IN | DIASTOLIC BLOOD PRESSURE: 64 MMHG | TEMPERATURE: 97.7 F

## 2019-07-16 DIAGNOSIS — S92.331K: Primary | ICD-10-CM

## 2019-07-16 DIAGNOSIS — Z98.890 POST-OPERATIVE STATE: ICD-10-CM

## 2019-07-16 DIAGNOSIS — S92.331G: ICD-10-CM

## 2019-07-16 RX ORDER — OMEPRAZOLE 20 MG/1
20 TABLET, DELAYED RELEASE ORAL DAILY
COMMUNITY
End: 2020-01-09 | Stop reason: ALTCHOICE

## 2019-07-16 NOTE — PROGRESS NOTES
1. Have you been to the ER, urgent care clinic since your last visit? Hospitalized since your last visit? No    2. Have you seen or consulted any other health care providers outside of the [address] [place] [address] since your last visit? Include any pap smears or colon screening.  No

## 2019-07-16 NOTE — PROGRESS NOTES
Patient: Genevieve Hyde                MRN: [id number]       SSN: [id number]  YOB: 1945              AGE: 76 y.o. SEX: female    Chencho Kolb MD    POST OP OFFICE NOTE  DOS: 4/24/19    Chief Complaint:   Chief Complaint   Patient presents with    Foot Pain     RIGHT FOOT PAIN       HPI:     The patient is a 76 y.o. female who presents today for follow up 80 days s/p:   OPEN REDUCTION RIGHT THIRD METATARSAL  DBX PUTTY BONE GRAFTING BONE GRAFT/C-ARM/ARTHREX/NERVE BLOCK    Patient has been WB to the right lower extremity in [facility]. Patient reports that she is doing well. Patient denies any fever, chills, chest pain, shortness of breath or calf pain. There are no other illness or injuries to report since last seen in the office. Patient has no pain to her right foot. She has had a history of Vitamin D deficiency and osteopenia in the past. Last Vitamin D level was WNL. PHYSICAL EXAM:     Visit Vitals  /64   Pulse (!) 108   Temp 97.7 °F (36.5 °C) (Oral)   Resp 13   Ht 5' 5\" (1.651 m)   Wt 149 lb (67.6 kg)   SpO2 100%   BMI 24.79 kg/m²         Pain Scale: 0 - No pain/10      GEN:  Alert, well developed, well nourished, well appearing 76 y.o. female in no acute distress. PSYCH:  Normal affect, mood, and conduct. alert, oriented x 3 alert, oriented x 3, no dementia  M/S EXAMINATION OF: right foot    INCISION: Incision looks good, skin well healed  SKIN: no edema , no erythema, no ecchymosis   TENDERNESS:  mild tenderness to palpation   NEUROVASCULAR:  grossly intact. Positive distal pulses and capillary refill. DVT ASSESSMENT:  The calf is not tender to palpation. No evidence of DVT seen on physical exam.  ROM: not tested       RADIOGRAPHS & DIAGNOSTIC STUDIES       X-rays, 3 views of the right foot reveals post op changes s/p ORIF third metatarsal. No significant pandey in healing noted at this point in time. Still one are this is not healed.  However, overall alignment looks good and the hardware remains in good position. Mineralization suggests osteopenia. IMPRESSION:     Encounter Diagnoses     ICD-10-CM ICD-9-CM   1. 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 Post-operative state Z98.890 V45.89       PLAN:         Orders Placed This Encounter    Generic Supply Order    AMB POC XRAY, FOOT; COMPLETE, 3+ VIEW            · Continue activity modification    · Wear Tubigrip for swelling    · Weight bearing status:   weight bearing as tolerated in supportive shoe or sneaker until custom orthotic is ready    · Order provided for custom orthotic for right foot to offload third metatarsal    · No lifting, twisting, squatting, deep bending, driving or strenuous activity. · You may practice driving in an empty lot prior to driving on open roads. You must be pain free and comfortable negotiating between the gas and brake. · Please follow up in the office in 4 weeks         Patient has been discussed with Dr. Judd Garduno during this visit and he agrees with the assessment and plan    Patient expresses understanding of the plan. Patient education provided on post surgical care. REVIEW OF SYSTEMS:     Otherwise as noted in HPI      PAST MEDICAL HISTORY:     Past Medical History:   Diagnosis Date    Eczema     Essential hypertension     FH: breast cancer     Fibroids     uterine    Fracture of radius 11/2009    with ulna, left, closed    History of cataract     History of foot fracture     right and left    Hyperlipidemia     Microhematuria     chronic    Osteopenia     S/P radiation therapy     as child, to right shoulder for birthmark    Tinnitus     Zoster     right scapula       MEDICATIONS:     Current Outpatient Medications   Medication Sig    omeprazole (PRILOSEC OTC) 20 mg tablet Take 20 mg by mouth daily.     hydroCHLOROthiazide (HYDRODIURIL) 25 mg tablet TAKE 1 TABLET BY MOUTH DAILY    aspirin delayed-release 81 mg tablet Take  by mouth daily.  atorvastatin (LIPITOR) 20 mg tablet Take 1 Tab by mouth daily.  amoxicillin 500 mg tab TAKE ONE CAPSULE PO TID, START ONE DAY PRIOR TO PROCEDURE, DAY OF PROCEDURE AND DAY AFTER PROCEDURE    ibuprofen (ADVIL) 200 mg tablet Take  by mouth.  alendronate (FOSAMAX) 70 mg tablet TAKE 1 TABLET BY MOUTH EVERY 7 DAYS    nitrofurantoin, macrocrystal-monohydrate, (MACROBID) 100 mg capsule Take 1 Cap by mouth two (2) times a day.  ondansetron hcl (ZOFRAN) 4 mg tablet Take 1 Tab by mouth every eight (8) hours as needed for Nausea.  polyethylene glycol (MIRALAX) 17 gram packet Take 1 Packet by mouth daily.  lisinopril (PRINIVIL, ZESTRIL) 10 mg tablet TAKE 1 TABLET BY MOUTH DAILY    crisaborole (EUCRISA) 2 % oint by Apply Externally route.  triamcinolone acetonide (KENALOG) 0.025 % topical cream Apply  to affected area two (2) times a day.  clotrimazole-betamethasone (LOTRISONE) topical cream Apply  to affected area two (2) times a day.  docusate sodium (COLACE) 100 mg capsule Take 100 mg by mouth daily as needed for Constipation.  co-enzyme Q-10 (CO Q-10) 100 mg capsule Take 100 mg by mouth daily.  biotin 2,500 mcg Tab Take 2,500 mcg by mouth.  cholecalciferol (VITAMIN D3) 1,000 unit cap Take 1 Tab by mouth daily.  fexofenadine (ALLEGRA) 60 mg tablet Take 60 mg by mouth daily.  CALCIUM CARBONATE/VITAMIN D3 (CALCIUM + D PO) Take 1 Tab by mouth two (2) times a day. No current facility-administered medications for this visit.         ALLERGIES:     Allergies   Allergen Reactions    Codeine Nausea Only    Hibiclens [Chlorhexidine Gluconate] Rash         PAST SURGICAL HISTORY:     Past Surgical History:   Procedure Laterality Date    HX CATARACT REMOVAL      HX FRACTURE TX Right 04/24/2019    right foot 3rd metatarsal fracture    HX ORTHOPAEDIC      left arm    HX WISDOM TEETH EXTRACTION      HX WRIST FRACTURE TX         SOCIAL HISTORY:     Social History     Socioeconomic History    Marital status:      Spouse name: Not on file    Number of children: Not on file    Years of education: Not on file    Highest education level: Not on file   Occupational History    Not on file   Social Needs    Financial resource strain: Not on file    Food insecurity:     Worry: Not on file     Inability: Not on file    Transportation needs:     Medical: Not on file     Non-medical: Not on file   Tobacco Use    Smoking status: Never Smoker    Smokeless tobacco: Never Used   Substance and Sexual Activity    Alcohol use:  Yes     Alcohol/week: 0.0 standard drinks     Comment: occasionally    Drug use: No    Sexual activity: Yes     Partners: Male   Lifestyle    Physical activity:     Days per week: Not on file     Minutes per session: Not on file    Stress: Not on file   Relationships    Social connections:     Talks on phone: Not on file     Gets together: Not on file     Attends Caodaism service: Not on file     Active member of club or organization: Not on file     Attends meetings of clubs or organizations: Not on file     Relationship status: Not on file    Intimate partner violence:     Fear of current or ex partner: Not on file     Emotionally abused: Not on file     Physically abused: Not on file     Forced sexual activity: Not on file   Other Topics Concern    Not on file   Social History Narrative    Not on file       FAMILY HISTORY:     Family History   Problem Relation Age of Onset    Cancer Mother         pancreatic    Heart Disease Mother     Heart Disease Father     Cancer Sister         breast    MS Sister     Breast Cancer Sister [address] [place], [place]  7/23/2019

## 2019-07-16 NOTE — PATIENT INSTRUCTIONS
· Continue activity modification    · Wear Tubigrip for swelling    · Weight bearing status:   weight bearing as tolerated in supportive shoe or sneaker until custom orthotic is ready    · Order provided for custom orthotic for right foot to offload third metatarsal    · No lifting, twisting, squatting, deep bending, driving or strenuous activity. · You may practice driving in an empty lot prior to driving on open roads. You must be pain free and comfortable negotiating between the gas and brake. · Please follow up in the office in 4 weeks         Metatarsal Fracture: Care Instructions  Your Care Instructions    A metatarsal fracture is a break or a thin, hairline crack in one of the metatarsal bones of the foot. This type of fracture usually happens from repeated stress on the bones of the foot. Or it can happen when a person jumps or changes direction quickly and twists his or her foot or ankle the wrong way. 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 Treatment depends on how bad the fracture is and where the fracture is on the bone. You may or may not have had surgery. Your doctor may have put your foot in a cast or splint to keep it stable. You may have been given crutches to use to keep weight off your foot. A metatarsal fracture may take from 6 weeks to several months to heal. It is important to give your foot time to heal completely, so that you do not hurt it again. Do not return to your usual activities until your doctor says you can. Your doctor may suggest that you get physical therapy to help regain strength and range of motion in your foot. You heal best when you take good care of yourself. Eat a variety of healthy foods, and don't smoke. Follow-up care is a key part of your treatment and safety. Be sure to make and go to all appointments, and call your doctor if you are having problems.  It is also a good idea to know your test results and keep a list of the medicines you take. How can you care for yourself at home? · Be safe with medicines. Read and follow all instructions on the label. ? If your doctor gave you a prescription medicine for pain, take it as prescribed. ? If you are not taking a prescription pain medicine, ask your doctor if you can take an over-the-counter medicine. · Follow your doctor's instructions about how much weight you can put on your foot and when you can go back to your usual activities. If you were given crutches, use them as directed. · Put ice or a cold pack on your foot for 10 to 20 minutes at a time. Try to do this every 1 to 2 hours for the next 3 days (when you are awake) or until the swelling goes down. Put a thin cloth between the ice and your skin. · Prop up your foot on a pillow when you ice it or anytime you sit or lie down for the next 3 days. Try to keep it above the level of your heart. This will help reduce swelling. Cast and splint care  · If your foot is in a cast or splint, follow the cast or splint care instructions your doctor gives you. If you have a removable fiberglass walking cast or a splint, do not take it off unless your doctor tells you to. · Keep your cast or splint dry. If you have a removable fiberglass walking cast or a splint, ask your doctor if it is okay to remove it to bathe. Your doctor may want you to keep it on as much as possible. · If you're told to keep your cast or splint on, tape a sheet of plastic to cover it when you bathe. Or ask your doctor about products that can help keep a cast or splint dry. Water under the cast or splint can cause your skin to itch and hurt. · Never cut your cast or stick anything down it to scratch an itch on your leg. When should you call for help? Call your doctor now or seek immediate medical care if:    · You have problems with your cast or splint. For example:  ?  The skin under the cast or splint is burning or stinging. ? The cast or splint feels too tight. ? There is a lot of swelling near the cast or splint. (Some swelling is normal.)  ? You have a new fever. ? There is drainage or a bad smell coming from the cast or splint.     · You have increased or severe pain.     · You have tingling, weakness, or numbness in your foot and toes.     · You cannot move your toes.     · Your foot turns cold or changes color.    Watch closely for changes in your health, and be sure to contact your doctor if:    · The pain does not get better day by day.     · You do not get better as expected. Where can you learn more? Go to http://mallika-suzanne.info/. Enter [phone] in the search box to learn more about \"Metatarsal Fracture: Care Instructions. \"  Current as of: September 20, 2018  Content Version: 11.9  © 6430-9814 [employer]. Care instructions adapted under license by [employer] (which disclaims liability or warranty for this information). If you have questions about a medical condition or this instruction, always ask your healthcare professional. [place] [address] any warranty or liability for your use of this information.

## 2019-07-17 ENCOUNTER — DOCUMENTATION ONLY (OUTPATIENT)
Dept: ORTHOPEDIC SURGERY | Age: 74
End: 2019-07-17

## 2019-07-17 NOTE — PROGRESS NOTES
Records Request from [address] [place] [address] [place] fulfilled and faxed back to sender 7-17-19.

## 2019-08-01 RX ORDER — ATORVASTATIN CALCIUM 20 MG/1
20 TABLET, FILM COATED ORAL DAILY
Qty: 90 TAB | Refills: 2 | Status: SHIPPED | OUTPATIENT
Start: 2019-08-01 | End: 2020-04-16

## 2019-08-05 ENCOUNTER — OFFICE VISIT (OUTPATIENT)
Dept: ORTHOPEDIC SURGERY | Age: 74
End: 2019-08-05

## 2019-08-05 VITALS
HEART RATE: 86 BPM | SYSTOLIC BLOOD PRESSURE: 130 MMHG | BODY MASS INDEX: 24.83 KG/M2 | WEIGHT: 149 LBS | OXYGEN SATURATION: 96 % | DIASTOLIC BLOOD PRESSURE: 56 MMHG | TEMPERATURE: 97 F | HEIGHT: 65 IN | RESPIRATION RATE: 13 BRPM

## 2019-08-05 DIAGNOSIS — S92.331K: Primary | ICD-10-CM

## 2019-08-05 NOTE — PROGRESS NOTES
1. Have you been to the ER, urgent care clinic since your last visit? Hospitalized since your last visit? No    2. Have you seen or consulted any other health care providers outside of the [address] [place] [address] since your last visit? Include any pap smears or colon screening.  No

## 2019-08-05 NOTE — PROGRESS NOTES
Patient: Wilder Julian                MRN: [id number]       SSN: [id number]  YOB: 1945              AGE: 76 y.o. SEX: female    Refugio Kehr, MD    POST OP OFFICE NOTE  DOS: 4/24/19    Chief Complaint:   Chief Complaint   Patient presents with    Foot Pain     RIGHT FOOT PAIN       HPI:     The patient is a 76 y.o. female who presents today for follow up 103 days s/p:   OPEN REDUCTION RIGHT THIRD METATARSAL  DBX PUTTY BONE GRAFTING BONE GRAFT/C-ARM/ARTHREX/NERVE BLOCK    Patient has been WBAT to the right lower extremity in [facility]. She will  her orthotic today and has been using the bone stimulator as instructed. She has no pain, but reports some mild tingling that last a second after using the bone stimulator. She has also had some off/on swelling that resolves when she rests. Otherwise, patient reports that she is doing well. There are no other illness or injuries to report since last seen in the office. Patient has no pain to her right foot. PHYSICAL EXAM:     Visit Vitals  /56   Pulse 86   Temp 97 °F (36.1 °C) (Oral)   Resp 13   Ht 5' 5\" (1.651 m)   Wt 149 lb (67.6 kg)   SpO2 96%   BMI 24.79 kg/m²         Pain Scale: 0 - No pain/10      GEN:  Alert, well developed, well nourished, well appearing 76 y.o. female in no acute distress. PSYCH:  Normal affect, mood, and conduct. alert, oriented x 3 alert, oriented x 3, no dementia  M/S EXAMINATION OF: right foot    INCISION: Incision looks good, skin well healed  SKIN: no edema , no erythema, no ecchymosis   TENDERNESS:  mild tenderness to palpation   NEUROVASCULAR:  grossly intact. Positive distal pulses and capillary refill. DVT ASSESSMENT:  The calf is not tender to palpation. No evidence of DVT seen on physical exam.  ROM: not tested       RADIOGRAPHS & DIAGNOSTIC STUDIES       X-rays, 3 views of the right foot reveals post op changes s/p ORIF third metatarsal. Healing noted at this point in time. However, overall alignment looks good and the hardware remains in good position. Mineralization suggests osteopenia. IMPRESSION:     Encounter Diagnoses     ICD-10-CM ICD-9-CM   1. 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 Post-operative state Z98.890 V45.89       PLAN:         Orders Placed This Encounter    Generic Supply Order    AMB POC XRAY, FOOT; COMPLETE, 3+ VIEW            · Progress activity as tolerated    · Continue to use bone stimulator as instructed    · Wear orthotic when ready. Continue to useTubigrip for swelling    · Weight bearing status:   weight bearing as tolerated in supportive shoe or sneaker until custom orthotic is ready    · You may walk on tread mill at low speed and no incline building up as tolerated. Wear sneakers and ortotic    · You may practice driving in an empty lot prior to driving on open roads. You must be pain free and comfortable negotiating between the gas and brake. · Please follow up in the office in 4-6 weeks         Patient has been discussed with Dr. Xuan Neff during this visit and he agrees with the assessment and plan    Patient expresses understanding of the plan. Patient education provided on post surgical care. REVIEW OF SYSTEMS:     Otherwise as noted in HPI      PAST MEDICAL HISTORY:     Past Medical History:   Diagnosis Date    Eczema     Essential hypertension     FH: breast cancer     Fibroids     uterine    Fracture of radius 11/2009    with ulna, left, closed    History of cataract     History of foot fracture     right and left    Hyperlipidemia     Microhematuria     chronic    Osteopenia     S/P radiation therapy     as child, to right shoulder for birthmark    Tinnitus     Zoster     right scapula       MEDICATIONS:     Current Outpatient Medications   Medication Sig    atorvastatin (LIPITOR) 20 mg tablet Take 1 Tab by mouth daily.     omeprazole (PRILOSEC OTC) 20 mg tablet Take 20 mg by mouth daily.  hydroCHLOROthiazide (HYDRODIURIL) 25 mg tablet TAKE 1 TABLET BY MOUTH DAILY    aspirin delayed-release 81 mg tablet Take  by mouth daily.  amoxicillin 500 mg tab TAKE ONE CAPSULE PO TID, START ONE DAY PRIOR TO PROCEDURE, DAY OF PROCEDURE AND DAY AFTER PROCEDURE    ibuprofen (ADVIL) 200 mg tablet Take  by mouth.  alendronate (FOSAMAX) 70 mg tablet TAKE 1 TABLET BY MOUTH EVERY 7 DAYS    nitrofurantoin, macrocrystal-monohydrate, (MACROBID) 100 mg capsule Take 1 Cap by mouth two (2) times a day.  ondansetron hcl (ZOFRAN) 4 mg tablet Take 1 Tab by mouth every eight (8) hours as needed for Nausea.  polyethylene glycol (MIRALAX) 17 gram packet Take 1 Packet by mouth daily.  lisinopril (PRINIVIL, ZESTRIL) 10 mg tablet TAKE 1 TABLET BY MOUTH DAILY    crisaborole (EUCRISA) 2 % oint by Apply Externally route.  triamcinolone acetonide (KENALOG) 0.025 % topical cream Apply  to affected area two (2) times a day.  clotrimazole-betamethasone (LOTRISONE) topical cream Apply  to affected area two (2) times a day.  docusate sodium (COLACE) 100 mg capsule Take 100 mg by mouth daily as needed for Constipation.  co-enzyme Q-10 (CO Q-10) 100 mg capsule Take 100 mg by mouth daily.  biotin 2,500 mcg Tab Take 2,500 mcg by mouth.  cholecalciferol (VITAMIN D3) 1,000 unit cap Take 1 Tab by mouth daily.  fexofenadine (ALLEGRA) 60 mg tablet Take 60 mg by mouth daily.  CALCIUM CARBONATE/VITAMIN D3 (CALCIUM + D PO) Take 1 Tab by mouth two (2) times a day. No current facility-administered medications for this visit.         ALLERGIES:     Allergies   Allergen Reactions    Codeine Nausea Only    Hibiclens [Chlorhexidine Gluconate] Rash         PAST SURGICAL HISTORY:     Past Surgical History:   Procedure Laterality Date    HX CATARACT REMOVAL      HX FRACTURE TX Right 04/24/2019    right foot 3rd metatarsal fracture    HX ORTHOPAEDIC left arm    HX WISDOM TEETH EXTRACTION      HX WRIST FRACTURE TX         SOCIAL HISTORY:     Social History     Socioeconomic History    Marital status:      Spouse name: Not on file    Number of children: Not on file    Years of education: Not on file    Highest education level: Not on file   Occupational History    Not on file   Social Needs    Financial resource strain: Not on file    Food insecurity:     Worry: Not on file     Inability: Not on file    Transportation needs:     Medical: Not on file     Non-medical: Not on file   Tobacco Use    Smoking status: Never Smoker    Smokeless tobacco: Never Used   Substance and Sexual Activity    Alcohol use:  Yes     Alcohol/week: 0.0 standard drinks     Comment: occasionally    Drug use: No    Sexual activity: Yes     Partners: Male   Lifestyle    Physical activity:     Days per week: Not on file     Minutes per session: Not on file    Stress: Not on file   Relationships    Social connections:     Talks on phone: Not on file     Gets together: Not on file     Attends Mandaeism service: Not on file     Active member of club or organization: Not on file     Attends meetings of clubs or organizations: Not on file     Relationship status: Not on file    Intimate partner violence:     Fear of current or ex partner: Not on file     Emotionally abused: Not on file     Physically abused: Not on file     Forced sexual activity: Not on file   Other Topics Concern    Not on file   Social History Narrative    Not on file       FAMILY HISTORY:     Family History   Problem Relation Age of Onset    Cancer Mother         pancreatic    Heart Disease Mother     Heart Disease Father     Cancer Sister         breast    MS Sister     Breast Cancer Sister [address] [place], [place]  8/5/2019

## 2019-08-06 ENCOUNTER — HOSPITAL ENCOUNTER (OUTPATIENT)
Dept: MAMMOGRAPHY | Age: 74
Discharge: HOME | End: 2019-08-06
Attending: INTERNAL MEDICINE
Payer: MEDICARE

## 2019-08-06 ENCOUNTER — HOSPITAL ENCOUNTER (OUTPATIENT)
Dept: BONE DENSITY | Age: 74
Discharge: HOME | End: 2019-08-06
Attending: INTERNAL MEDICINE
Payer: MEDICARE

## 2019-08-06 DIAGNOSIS — M85.89 OSTEOPENIA OF MULTIPLE SITES: ICD-10-CM

## 2019-08-06 DIAGNOSIS — Z12.31 SCREENING MAMMOGRAM, ENCOUNTER FOR: ICD-10-CM

## 2019-08-06 PROCEDURE — 77080 DXA BONE DENSITY AXIAL: CPT

## 2019-08-06 PROCEDURE — 77063 BREAST TOMOSYNTHESIS BI: CPT

## 2019-08-12 ENCOUNTER — TELEPHONE (OUTPATIENT)
Dept: INTERNAL MEDICINE CLINIC | Age: 74
End: 2019-08-12

## 2019-08-12 DIAGNOSIS — M81.0 OSTEOPOROSIS OF LUMBAR SPINE: ICD-10-CM

## 2019-08-12 DIAGNOSIS — S92.331D CLOSED DISPLACED FRACTURE OF THIRD METATARSAL BONE OF RIGHT FOOT WITH ROUTINE HEALING: ICD-10-CM

## 2019-08-12 DIAGNOSIS — M85.89 OSTEOPENIA OF MULTIPLE SITES: Primary | ICD-10-CM

## 2019-08-13 NOTE — TELEPHONE ENCOUNTER
Reviewed bone density study from 8/6/2019 showing T-scores:  femoral neck left -2.2  /right -1.9 and lumbar -2.6 . Calculated FRAX score estimates her 10 year risk of a major osteoporetic fracture at 14 % and hip fracture at 3.9 %, which are an indication for biphosphonate treatment based on hip fracture risk of >3%. Treated with alendronate from 2009-11/2014 and 12/2016 - present. 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 In comparison to her prior study in 7/2016, her T-scores have remained relatively stable in her bilateral hips, but there has been some worsening in her lumbar spine. She should continue taking alendronate, calcium and Vitamin D. Given recent fracture, will discuss possible referral to rheumatology to assess whether she is on optimal treatment. Also, will encourage her to exercise, particularly weight bearing activities. Will add labs for evaluation of causes of secondary osteoporosis to upcoming labs.

## 2019-08-20 ENCOUNTER — APPOINTMENT (OUTPATIENT)
Dept: INTERNAL MEDICINE CLINIC | Age: 74
End: 2019-08-20

## 2019-08-20 ENCOUNTER — HOSPITAL ENCOUNTER (OUTPATIENT)
Dept: LAB | Age: 74
Discharge: HOME | End: 2019-08-20
Payer: MEDICARE

## 2019-08-20 DIAGNOSIS — G56.03 CARPAL TUNNEL SYNDROME, BILATERAL: ICD-10-CM

## 2019-08-20 DIAGNOSIS — R73.09 ABNORMAL GLUCOSE: ICD-10-CM

## 2019-08-20 DIAGNOSIS — M85.89 OSTEOPENIA OF MULTIPLE SITES: ICD-10-CM

## 2019-08-20 DIAGNOSIS — M81.0 OSTEOPOROSIS OF LUMBAR SPINE: ICD-10-CM

## 2019-08-20 DIAGNOSIS — I10 ESSENTIAL HYPERTENSION: ICD-10-CM

## 2019-08-20 DIAGNOSIS — B37.2 CANDIDAL INTERTRIGO: ICD-10-CM

## 2019-08-20 DIAGNOSIS — E78.5 HYPERLIPIDEMIA, UNSPECIFIED HYPERLIPIDEMIA TYPE: ICD-10-CM

## 2019-08-20 DIAGNOSIS — Z12.31 SCREENING MAMMOGRAM, ENCOUNTER FOR: ICD-10-CM

## 2019-08-20 DIAGNOSIS — S92.331D CLOSED DISPLACED FRACTURE OF THIRD METATARSAL BONE OF RIGHT FOOT WITH ROUTINE HEALING: ICD-10-CM

## 2019-08-20 DIAGNOSIS — R79.89 ELEVATED TSH: ICD-10-CM

## 2019-08-20 DIAGNOSIS — L30.9 ECZEMA, UNSPECIFIED TYPE: ICD-10-CM

## 2019-08-20 DIAGNOSIS — R29.90 EPISODE OF TRANSIENT NEUROLOGIC SYMPTOMS: ICD-10-CM

## 2019-08-20 DIAGNOSIS — R47.9 SPEECH DISTURBANCE, UNSPECIFIED TYPE: ICD-10-CM

## 2019-08-20 LAB
ALBUMIN SERPL-MCNC: 4.2 G/DL (ref 3.4–5)
ALBUMIN/GLOB SERPL: 1.4 {RATIO} (ref 0.8–1.7)
ALP SERPL-CCNC: 61 U/L (ref 45–117)
ALT SERPL-CCNC: 67 U/L (ref 13–56)
ANION GAP SERPL CALC-SCNC: 8 MMOL/L (ref 3–18)
AST SERPL-CCNC: 50 U/L (ref 10–38)
BILIRUB SERPL-MCNC: 0.5 MG/DL (ref 0.2–1)
BUN SERPL-MCNC: 22 MG/DL (ref 7–18)
BUN/CREAT SERPL: 31 (ref 12–20)
CALCIUM SERPL-MCNC: 9 MG/DL (ref 8.5–10.1)
CHLORIDE SERPL-SCNC: 104 MMOL/L (ref 100–111)
CO2 SERPL-SCNC: 27 MMOL/L (ref 21–32)
CREAT SERPL-MCNC: 0.71 MG/DL (ref 0.6–1.3)
GLOBULIN SER CALC-MCNC: 3 G/DL (ref 2–4)
GLUCOSE SERPL-MCNC: 84 MG/DL (ref 74–99)
POTASSIUM SERPL-SCNC: 4 MMOL/L (ref 3.5–5.5)
PROT SERPL-MCNC: 7.2 G/DL (ref 6.4–8.2)
SODIUM SERPL-SCNC: 139 MMOL/L (ref 136–145)
TSH SERPL DL<=0.05 MIU/L-ACNC: 2.68 UIU/ML (ref 0.36–3.74)

## 2019-08-20 PROCEDURE — 80053 COMPREHEN METABOLIC PANEL: CPT

## 2019-08-20 PROCEDURE — 83970 ASSAY OF PARATHORMONE: CPT

## 2019-08-20 PROCEDURE — 84443 ASSAY THYROID STIM HORMONE: CPT

## 2019-08-20 PROCEDURE — 80061 LIPID PANEL: CPT

## 2019-08-20 PROCEDURE — 82306 VITAMIN D 25 HYDROXY: CPT

## 2019-08-20 PROCEDURE — 82784 ASSAY IGA/IGD/IGG/IGM EACH: CPT

## 2019-08-20 PROCEDURE — 84439 ASSAY OF FREE THYROXINE: CPT

## 2019-08-20 PROCEDURE — 36415 COLL VENOUS BLD VENIPUNCTURE: CPT

## 2019-08-21 LAB
25(OH)D3 SERPL-MCNC: 38.9 NG/ML (ref 30–100)
CALCIUM SERPL-MCNC: 9.3 MG/DL (ref 8.5–10.1)
CHOLEST SERPL-MCNC: 171 MG/DL
HDLC SERPL-MCNC: 77 MG/DL (ref 40–60)
HDLC SERPL: 2.2 {RATIO} (ref 0–5)
LDLC SERPL CALC-MCNC: 80.6 MG/DL (ref 0–100)
LIPID PROFILE,FLP: ABNORMAL
PTH-INTACT SERPL-MCNC: 37.8 PG/ML (ref 18.4–88)
T4 FREE SERPL-MCNC: 1 NG/DL (ref 0.7–1.5)
TRIGL SERPL-MCNC: 67 MG/DL (ref ?–150)
VLDLC SERPL CALC-MCNC: 13.4 MG/DL

## 2019-08-23 LAB
ALBUMIN SERPL ELPH-MCNC: 4.1 G/DL (ref 2.9–4.4)
ALBUMIN/GLOB SERPL: 1.3 {RATIO} (ref 0.7–1.7)
ALPHA1 GLOB SERPL ELPH-MCNC: 0.2 G/DL (ref 0–0.4)
ALPHA2 GLOB SERPL ELPH-MCNC: 0.6 G/DL (ref 0.4–1)
B-GLOBULIN SERPL ELPH-MCNC: 1.2 G/DL (ref 0.7–1.3)
GAMMA GLOB SERPL ELPH-MCNC: 1 G/DL (ref 0.4–1.8)
GLOBULIN SER-MCNC: 3.2 G/DL (ref 2.2–3.9)
IGA SERPL-MCNC: 290 MG/DL (ref 64–422)
IGG SERPL-MCNC: 1080 MG/DL (ref 700–1600)
IGM SERPL-MCNC: 67 MG/DL (ref 26–217)
INTERPRETATION SERPL IEP-IMP: ABNORMAL
KAPPA LC FREE SER-MCNC: 20.5 MG/L (ref 3.3–19.4)
KAPPA LC FREE/LAMBDA FREE SER: 1.63 {RATIO} (ref 0.26–1.65)
LAMBDA LC FREE SERPL-MCNC: 12.6 MG/L (ref 5.7–26.3)
M PROTEIN SERPL ELPH-MCNC: ABNORMAL G/DL
PROT SERPL-MCNC: 7.3 G/DL (ref 6–8.5)

## 2019-08-27 ENCOUNTER — OFFICE VISIT (OUTPATIENT)
Dept: INTERNAL MEDICINE CLINIC | Age: 74
End: 2019-08-27

## 2019-08-27 VITALS
HEIGHT: 65 IN | HEART RATE: 77 BPM | WEIGHT: 150 LBS | RESPIRATION RATE: 12 BRPM | TEMPERATURE: 98.2 F | BODY MASS INDEX: 24.99 KG/M2 | SYSTOLIC BLOOD PRESSURE: 122 MMHG | DIASTOLIC BLOOD PRESSURE: 74 MMHG | OXYGEN SATURATION: 98 %

## 2019-08-27 DIAGNOSIS — Z11.59 ENCOUNTER FOR SCREENING FOR OTHER VIRAL DISEASES: ICD-10-CM

## 2019-08-27 DIAGNOSIS — I10 ESSENTIAL HYPERTENSION: ICD-10-CM

## 2019-08-27 DIAGNOSIS — R79.89 ELEVATED TSH: ICD-10-CM

## 2019-08-27 DIAGNOSIS — R31.29 MICROHEMATURIA: ICD-10-CM

## 2019-08-27 DIAGNOSIS — M85.89 OSTEOPENIA OF MULTIPLE SITES: ICD-10-CM

## 2019-08-27 DIAGNOSIS — S92.331D CLOSED DISPLACED FRACTURE OF THIRD METATARSAL BONE OF RIGHT FOOT WITH ROUTINE HEALING: ICD-10-CM

## 2019-08-27 DIAGNOSIS — R29.90 EPISODE OF TRANSIENT NEUROLOGIC SYMPTOMS: Primary | ICD-10-CM

## 2019-08-27 DIAGNOSIS — L30.9 ECZEMA, UNSPECIFIED TYPE: ICD-10-CM

## 2019-08-27 DIAGNOSIS — R73.09 ABNORMAL GLUCOSE: ICD-10-CM

## 2019-08-27 DIAGNOSIS — E78.5 HYPERLIPIDEMIA, UNSPECIFIED HYPERLIPIDEMIA TYPE: ICD-10-CM

## 2019-08-27 DIAGNOSIS — G56.03 CARPAL TUNNEL SYNDROME, BILATERAL: ICD-10-CM

## 2019-08-27 DIAGNOSIS — M81.0 OSTEOPOROSIS OF LUMBAR SPINE: ICD-10-CM

## 2019-08-27 DIAGNOSIS — Z80.3 FH: BREAST CANCER: ICD-10-CM

## 2019-08-27 DIAGNOSIS — R79.89 ELEVATED LFTS: ICD-10-CM

## 2019-08-27 DIAGNOSIS — R47.9 SPEECH DISTURBANCE, UNSPECIFIED TYPE: ICD-10-CM

## 2019-08-27 NOTE — PROGRESS NOTES
Chief Complaint   Patient presents with    Hypertension     8 week follow up with lab results.  Neurologic Problem     8 week follow up. Health Maintenance Due   Topic Date Due    Influenza Age 5 to Adult  08/01/2019     1. Have you been to the ER, urgent care clinic or hospitalized since your last visit? NO.     2. Have you seen or consulted any other health care providers outside of the [address] [place] [address] since your last visit (Include any pap smears or colon screening)? NO      Learning Assessment 6/25/2019   PRIMARY LEARNER Patient   PRIMARY LANGUAGE ENGLISH   LEARNER PREFERENCE PRIMARY READING     DEMONSTRATION   ANSWERED BY patient   RELATIONSHIP SELF     Abuse Screening Questionnaire 8/27/2019   Do you ever feel afraid of your partner? N   Are you in a relationship with someone who physically or mentally threatens you? N   Is it safe for you to go home? Y     3 most recent PHQ Screens 8/27/2019   Little interest or pleasure in doing things Not at all   Feeling down, depressed, irritable, or hopeless Not at all   Total Score PHQ 2 0     Fall Risk Assessment, last 12 mths 8/27/2019   Able to walk? Yes   Fall in past 12 months?  No

## 2019-08-31 PROBLEM — R79.89 ELEVATED LFTS: Status: ACTIVE | Noted: 2019-08-31

## 2019-09-01 NOTE — PROGRESS NOTES
HPI:   Cherylene Genet is a 76y.o. year old female who presents today for a routine visit and for evaluation of hypertension, hyperlipidemia, osteopenia, left sciatica, and chronic microhematuria. She was last seen on 6/25/2019, and reported that two weeks prior, while returning home from her granddaughter's graduation, she and her  stopped at a restaurant for lunch. She states that while she was chewing a mouth full of food, her  noticed that she suddenly tilted her head to the left side and seemed to look dazed. She states that she remained awake and was aware that he was talking to her, but she states that she could not respond. She states that the episode lasted approximately 30 seconds and resolved, and she proceeded to complete her chewing and swallow her food. She states that she did not lose consciousness, or notice any headache, visual changes, lightheadedness, tongue biting, incontinence, focal deficits, or seizure like activity. She denied any chest pain, shortness of breath, palpitations, pleuritic chest pain, hemoptysis, calf pain or swelling. 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 She also had a carotid duplex scan (7/9/2019) showing only mild (< 50%) stenosis of the bilateral internal carotid arteries. She states today that she has not had a recurrence of these symptoms, but remains concerned as to the cause. She is otherwise without complaints. On 4/15/2019, she underwent evaluation at [facility] for right foot pain. She denied known injury. Right foot x-ray showed a displaced third metatarsal fracture and she was referred to Dr. Ty Otero for evaluation.  Given the displacement and risk of metatarsalgia, surgery was recommended, and on 4/24/2019, she underwent open reduction/internal fixation of the third metatarsal with putty bone grafting. She reports that she did well after surgery, and and was non-weight bearing in a CAM boot for several months. On 5/15/2019, she presented complaining of bilateral hand and wrist pain. She denied any fever, chills, sore throat, rashes, dyspnea, cough, abdominal pain, dysuria, focal weakness, visual changes, or headache. She did admit to sleeping with her bilateral hands tucked beneath her in a flexed position while lying on her back due to her limitations from her foot surgery. She also reported that she had been using a walker to ambulate and gripping it tightly with her hands. Lab evaluation including MANOLO, RF, CCP, ESR, uric acid were normal, but CRP was increased to 9.1. A diagnosis of presumed carpal tunnel syndrome was made, and her discomfort resolved with the use of wrist splints. She was released to weight bear as tolerate in 7/2019, and has not had any significant pain although has been experiencing some swelling which resolves with elevation. She has a history of osteopenia, and in 2009, she sustained a fracture of her left 3rd toe (3/2009) and her distal radius (12/2009). At that time, she was started on alendronate and was treated until 11/2014. She had a repeat bone density study in 7/2016 showing T-scores: femoral neck left -2.3/ right -2.0 and lumbar -2.2. She was restarted on alendronate in 12/2016. She continues to take calcium and Vitamin D supplements. She has a history of hypertension, treated with lisinopril and hydrochlorothiazide. She has been checking her blood pressure intermittently and reports that most readings are with a systolic blood pressure <978. She remains quite active, although has not been having time to exercise. She reports that she has continued stress in her life, helping to care for her 's mother (dementia), two disabled cousins, and her sister.  She denies any chest pain, shortness of breath at rest or with exertion, palpitations, lightheadedness, or edema. She also has a history of hyperlipidemia, treated with moderate intensity dose atorvastatin. She has no history of ASCVD. In 2/2017, she was evaluated by DERE[facility] Walter with left lower back pain and left sciatica. She had been taking ibuprofen without relief, and was given a course of prednisone. She states that she did not find this helpful either, but reports resolution of symptoms after visiting a chiropractor. She states that she no longer is experiencing pain and is able to ambulate without difficulty. She will continue to visit a chiropractor periodically when she develops a recurrence of pain with improvement. She reports that she had an extensive workup for microhematuria in the past, which was negative (records unavailable). She denies any dysuria, gross hematuria, or flank pain. She has had screening colonoscopy with Dr Flora Goldmann in 10/2010, which was normal except for hemorrhoids. Recommendation for follow-up is for 10 years. She denies any abdominal pain, nausea, vomiting, melena, hematochezia, or change in bowel movements. She has a history of eczema and is followed by Dr. Mildred Freedman. She states that she was recently started on Eucrisa with excellent response. She also has a history of candidal intertrigo, treated with lotrisone with good response. She also reports that she underwent radiation therapy to her right shoulder (as a child) for treatment of a birthmark. On 7/20/2018, she presented for evaluation to [facility] for a right upper back rash and was diagnosed with herpes zoster. She was treated with Valtrex with improvement.     Past Medical History:   Diagnosis Date    Eczema     Essential hypertension     FH: breast cancer     Fibroids     uterine    Fracture of radius 11/2009    with ulna, left, closed    History of cataract     History of foot fracture     right and left    Hyperlipidemia     Microhematuria     chronic    Osteopenia  S/P radiation therapy     as child, to right shoulder for birthmark    Tinnitus     Zoster     right scapula     Past Surgical History:   Procedure Laterality Date    HX CATARACT REMOVAL      HX FRACTURE TX Right 04/24/2019    right foot 3rd metatarsal fracture    HX ORTHOPAEDIC      left arm    HX WISDOM TEETH EXTRACTION      HX WRIST FRACTURE TX       Current Outpatient Medications   Medication Sig    lisinopril (PRINIVIL, ZESTRIL) 10 mg tablet TAKE 1 TABLET BY MOUTH DAILY    atorvastatin (LIPITOR) 20 mg tablet Take 1 Tab by mouth daily.  hydroCHLOROthiazide (HYDRODIURIL) 25 mg tablet TAKE 1 TABLET BY MOUTH DAILY    aspirin delayed-release 81 mg tablet Take  by mouth daily.  alendronate (FOSAMAX) 70 mg tablet TAKE 1 TABLET BY MOUTH EVERY 7 DAYS    crisaborole (EUCRISA) 2 % oint by Apply Externally route.  docusate sodium (COLACE) 100 mg capsule Take 100 mg by mouth daily as needed for Constipation.  co-enzyme Q-10 (CO Q-10) 100 mg capsule Take 100 mg by mouth daily.  biotin 2,500 mcg Tab Take 2,500 mcg by mouth.  cholecalciferol (VITAMIN D3) 1,000 unit cap Take 1 Tab by mouth daily.  fexofenadine (ALLEGRA) 60 mg tablet Take 60 mg by mouth daily.  CALCIUM CARBONATE/VITAMIN D3 (CALCIUM + D PO) Take 1 Tab by mouth two (2) times a day.  omeprazole (PRILOSEC OTC) 20 mg tablet Take 20 mg by mouth daily.  amoxicillin 500 mg tab TAKE ONE CAPSULE PO TID, START ONE DAY PRIOR TO PROCEDURE, DAY OF PROCEDURE AND DAY AFTER PROCEDURE    ibuprofen (ADVIL) 200 mg tablet Take  by mouth.  nitrofurantoin, macrocrystal-monohydrate, (MACROBID) 100 mg capsule Take 1 Cap by mouth two (2) times a day.  ondansetron hcl (ZOFRAN) 4 mg tablet Take 1 Tab by mouth every eight (8) hours as needed for Nausea.  polyethylene glycol (MIRALAX) 17 gram packet Take 1 Packet by mouth daily.  triamcinolone acetonide (KENALOG) 0.025 % topical cream Apply  to affected area two (2) times a day.     clotrimazole-betamethasone (LOTRISONE) topical cream Apply  to affected area two (2) times a day. No current facility-administered medications for this visit. Allergies and Intolerances: Allergies   Allergen Reactions    Codeine Nausea Only    Hibiclens [Chlorhexidine Gluconate] Rash     Family History: Her sister has breast cancer and her mother had pancreatic cancer. No history of colon cancer. Family History   Problem Relation Age of Onset   Eppalannah Soren Cancer Mother         pancreatic    Heart Disease Mother     Heart Disease Father     Cancer Sister         breast    MS Sister     Breast Cancer Sister 62     Social History:   She  reports that she has never smoked. She has never used smokeless tobacco. She has about 2 glasses of wine each night. She is  with one adult daughter who lives with her family on [place], [place]. She is retired, and was a 4th- for 25 years. Social History     Substance and Sexual Activity   Alcohol Use Yes    Alcohol/week: 0.0 standard drinks    Comment: occasionally     Immunization History:  Immunization History   Administered Date(s) Administered    (RETIRED) Pneumococcal Vaccine (Unspecified Type) 05/04/2010    Influenza High Dose Vaccine PF 12/13/2016, 10/03/2017    Influenza Vaccine 10/24/2014    Influenza Vaccine (Tri) Adjuvanted 10/09/2018    Influenza Vaccine PF 11/05/2013    Influenza Vaccine Split 11/01/2011, 11/06/2012    Pneumococcal Conjugate (PCV-13) 05/05/2015    Pneumococcal Polysaccharide (PPSV-23) 05/04/2010    TD Vaccine 05/05/2009    Tdap 12/12/2016    Zoster Vaccine, Live 05/07/2013       Review of Systems:   As above included in HPI.   Otherwise 11 point review of systems negative including constitutional, skin, HENT, eyes, respiratory, cardiovascular, gastrointestinal, genitourinary, musculoskeletal, endo/heme/aller, neurological.    Physical:   Vitals:   BP: 122/74  HR: 77  WT: 150 lb (68 kg)151 lb  BMI:  24.96 kg/m2    Exam:   Patient appears in no apparent distress. Affect is appropriate. HEENT: PERRLA, anicteric, oropharynx clear, no JVD, adenopathy or thyromegaly. No carotid bruits or radiated murmur. Lungs: clear to auscultation, no wheezes, rhonchi, or rales. Heart: regular rate and rhythm. No murmur, rubs, gallops  Abdomen: soft, nontender, nondistended, normal bowel sounds, no hepatosplenomegaly or masses. Extremities: without edema. Pulses 1-2+ bilaterally. Neuro -- CN 2-12 intact, strength 5/5 with intact soft touch in all extremities. Finger to nose test normal.  Derm - a new 2-3 mm brown mole on anterior chest with irregular border      Review of Data:    DEXA Results (most recent):  Results from Hospital Encounter encounter on 08/06/19   DEXA BONE DENSITY STUDY AXIAL    Narrative DEXA BONE DENSITOMETRY, CENTRAL    CLINICAL INDICATION/HISTORY: Postmenopausal. History of breast cancer and  osteopenia. Hypertension. High cholesterol. Family history of hip fracture. Taking Fosamax, calcium and vitamin D.       CLINICAL INDICATION/HISTORY: Using GE LUNAR Prodigy densitometer, bone density  measurement was performed in the lumbar spine in addition to the proximal left  and right femora and the right forearm. T score refers to standard deviations  above or below average compared to a young adult of the same sex. Z score  refers to standard deviations above or below average compared to a patient of  the same sex, age, race and weight. COMPARISON: The patient's prior studies are no longer available on the bone  densitometry unit for comparison trending due to a prior computer issue on the  densitometry unit. The data from the prior study of 25 July 2016 has been  included on this study.     FINDINGS:     Lumbar Spine Levels: L1 and L2  Mean Bone Mineral Density (BMD):  0.864 g/cm2  (0.910 BMD on previous study)  T Score: -2.6     (-2.2 T score on previous)  Z Score: -0.9    On PA image of the lumbar spine obtained for localization of vertebral levels  (not a diagnostic quality radiograph), it is noted that there is apparent  increased density at L3 and L4. The density is not well characterized on the  basis of this image, and may be due to underlying degenerative changes, prior  surgery, trauma, or other osseous process. Since this density spuriously  increases measured bone mineral density, this level has been excluded. Right distal 1/3 Radius BMD:  0.781 g/cm2  (0.769 BMD on previous study)  T Score: -1.2     (-1.3 T score on previous)  Z Score: 1.0    Left Total Proximal Femur BMD: 0.738  (0.721 BMD on previous study)  T Score: -2.1     (-2.3 T score on previous)  Z Score: -0.5    Right Total Proximal Femur BMD: 0.748  (0.749 BMD on previous study)  T Score: -2.1     (-2.0 T score on previous)  Z Score: -0.4    Left Femoral Neck BMD:  0.734 g/cm2    T Score: -2.2  Z Score: -0.3    Right Femoral Neck BMD:  0.769 g/cm2    T Score: -1.9  Z Score: -0.1       Impression IMPRESSION:    1. BMD measures consistent with osteoporosis. 2.  This will serve as the new baseline study at this institution. Based upon current ISCD guidelines, the patient's overall diagnostic category,  selected using WHO criteria in postmenopausal women and males aged 48 and above,  is selected based upon the lowest T score from among the lumbar spine, total  femur, femoral neck, (or distal third radius if measured). WHO Definition of Osteoporosis and Osteopenia on DXA (specified for post  menopausal  females):      Normal:                     T Score at or above -1 SD    Osteopenia:               T Score between -1 and -2.5 SD    Osteoporosis:             T Score at or below -2.5 SD    The risk of fracture approximately doubles for each 1 SD decrease in T score.    It is important to consider other factors in assessing a patient's risk of  fracture, including age, risk of falling/injury, history of fragility fracture,  family history of osteoporosis, smoking, low weight. Various fracture risk tools have been developed for adult patients and are  available online. For example, the FRAX tool developed by [facility] is widely used. Reference www.iscd.org. It is also important to note that DXA measures bone density but does not  distinguish among causes of decreased bone density, which include primary vs.  secondary osteoporosis (such as metabolic bone disorders or possible effects of  medications) and also other conditions (such as osteomalacia). Clinical  considerations should determine what additional evaluation may be warranted to  exclude secondary conditions in a patient with low bone density. It is  suggested that secondary causes of low bone density be considered in patients  with Z score lower than -2.0, and patients who are not responding to therapy for  osteoporosis on followup DXA despite compliance with medications. Please note that reliable, valid comparisons can not be made between studies  which have been performed on different densitometers. If clinically warranted,  follow up study performed at this site would best permit assessment of trend for  possible change in bone mineral density over time in comparison to this study.     Thank you for this referral.       Labs:  Hospital Outpatient Visit on 08/20/2019   Component Date Value Ref Range Status    Immunoglobulin G, Qt. 08/20/2019 1,080  700 - 1,600 mg/dL Final    Immunoglobulin A, Qt. 08/20/2019 290  64 - 422 mg/dL Final    Immunoglobulin M, Qt. 08/20/2019 67  26 - 217 mg/dL Final    Protein, total 08/20/2019 7.3  6.0 - 8.5 g/dL Final    Albumin 08/20/2019 4.1  2.9 - 4.4 g/dL Final    Alpha-1-Globulin 08/20/2019 0.2  0.0 - 0.4 g/dL Final    Alpha-2-Globulin 08/20/2019 0.6  0.4 - 1.0 g/dL Final    Beta Globulin 08/20/2019 1.2  0.7 - 1.3 g/dL Final    Gamma Globulin 08/20/2019 1.0  0.4 - 1.8 g/dL Final    M-Erik 08/20/2019 Not Observed Not Observed g/dL Final    Globulin, total 08/20/2019 3.2  2.2 - 3.9 g/dL Final    A/G ratio 08/20/2019 1.3  0.7 - 1.7   Final    Immunofixation Result 08/20/2019 Comment    Final    Free Kappa Lt Chains, serum 08/20/2019 20.5* 3.3 - 19.4 mg/L Final    Free Lambda Lt Chains, serum 08/20/2019 12.6  5.7 - 26.3 mg/L Final    Kappa/Lambda ratio, serum 08/20/2019 1.63  0.26 - 1.65   Final    Calcium 08/20/2019 9.3  8.5 - 10.1 MG/DL Final    PTH, Intact 08/20/2019 37.8  18.4 - 88.0 pg/mL Final    Vitamin D 25-Hydroxy 08/20/2019 38.9  30 - 100 ng/mL Final    LIPID PROFILE 08/20/2019        Final    Cholesterol, total 08/20/2019 171  <200 MG/DL Final    Triglyceride 08/20/2019 67  <150 MG/DL Final    HDL Cholesterol 08/20/2019 77* 40 - 60 MG/DL Final    LDL, calculated 08/20/2019 80.6  0 - 100 MG/DL Final    VLDL, calculated 08/20/2019 13.4  MG/DL Final    CHOL/HDL Ratio 08/20/2019 2.2  0 - 5.0   Final    TSH 08/20/2019 2.68  0.36 - 3.74 uIU/mL Final    T4, Free 08/20/2019 1.0  0.7 - 1.5 NG/DL Final    Sodium 08/20/2019 139  136 - 145 mmol/L Final    Potassium 08/20/2019 4.0  3.5 - 5.5 mmol/L Final    Chloride 08/20/2019 104  100 - 111 mmol/L Final    CO2 08/20/2019 27  21 - 32 mmol/L Final    Anion gap 08/20/2019 8  3.0 - 18 mmol/L Final    Glucose 08/20/2019 84  74 - 99 mg/dL Final    BUN 08/20/2019 22* 7.0 - 18 MG/DL Final    Creatinine 08/20/2019 0.71  0.6 - 1.3 MG/DL Final    BUN/Creatinine ratio 08/20/2019 31* 12 - 20   Final    GFR est AA 08/20/2019 >60  >60 ml/min/1.73m2 Final    GFR est non-AA 08/20/2019 >60  >60 ml/min/1.73m2 Final    Calcium 08/20/2019 9.0  8.5 - 10.1 MG/DL Final    Bilirubin, total 08/20/2019 0.5  0.2 - 1.0 MG/DL Final    ALT (SGPT) 08/20/2019 67* 13 - 56 U/L Final    AST (SGOT) 08/20/2019 50* 10 - 38 U/L Final    Alk.  phosphatase 08/20/2019 61  45 - 117 U/L Final    Protein, total 08/20/2019 7.2  6.4 - 8.2 g/dL Final    Albumin 08/20/2019 4.2  3.4 - 5.0 g/dL Final    Globulin 08/20/2019 3.0  2.0 - 4.0 g/dL Final    A-G Ratio 08/20/2019 1.4  0.8 - 1.7   Final       Health Maintenance:  Screening:    Mammogram: negative (7/2018)   PAP smear: S/P ALMAS   Colorectal: colonoscopy (10/2010) normal. Dr. Debbie Caceres. Due 10/2020. Depression: none   DM (HbA1c/FPG): HbA1c 5.6 (6/2019)   Hepatitis C: negative (5/2016)   Falls: none   DEXA: osteopenia (8/2019). On alendronate since 12/2016. Glaucoma: regular eye exams with Dr. Yannick Medellin (last 11/2018)   Smoking: none   Vitamin D: 38.9 (8/2019)   Medicare Wellness: 6/25/2019      Impression:  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    Tinnitus H93.19    Microhematuria R31.29    Eczema L30.9    Osteopenia M85.80    Elevated TSH R79.89    Abnormal glucose R73.09    Candidal intertrigo B37.2    Closed displaced fracture of third metatarsal bone of right foot with routine healing S92.331D    Episode of transient neurologic symptoms R29.90    Speech disturbance R47.9    Carpal tunnel syndrome, bilateral G56.03    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 Hypertension. Appears well controlled on current regimen of lisinopril 10 mg daily and hydrochlorothiazide 25 mg daily. Renal function remains normal with creatinine 0.71/ eGFR >60, but BUN/creatinine ratio remains increased to 31. Encouraged to continue to drink plenty of fluids. Continue to follow. 2. Hyperlipidemia. On moderate intensity dose atorvastatin with  and HDL 81, which is worsened control on atorvastatin 10 mg daily. Admitted to poor eating and not exercising with foot issue. Dose of atorvastatin increased to 20 mg daily and repeat lipid panel today with LDL 80 and HDL 77, indicative of much improved control.   Emphasized importance of lifestyle modifications, including diet, exercise, and weight loss. Will continue to follow. 3. Transient neurologic symptoms. Experienced transient episode of head tilting to the left and inability to speak, lasting 30 seconds. Was traveling back from [place] and eating lunch when episode occurred. Reported drinking fluids, but also on diuretic. No other associated symptoms and no residual symptoms remain. Differential diagnosis includes possible TIA, seizure, or global hypoperfusion from arrhythmia or hypovolemia. Carotid duplex scan without significant ICA stenosis, and brain MRI without significant abnormality except for a stable appearing lipoma creating mild mass effect. On aspirin and statin. Has not had any recurrence of symptoms, but remains concerned regarding cause. Will refer to neurology for assistance with diagnosis. Referral placed for Dr. Luke Dozier. .   4. Osteopenia. History of radial and toe fracture in 2009. Treated from 11/2009 to 11/2014 with alendronate. Repeat bone density scan in 7/2016 with 10 year risk of a major osteoporetic fracture at 14 % and hip fracture at 3.4 %. Restarted Fosamax in 12/2016. Now with new right metatarsal displaced fracture without known injury. Patient had repeat bone density on 8/6/2019 showing T-scores:  femoral neck left -2.2  /right -1.9 and lumbar -2.6 . Calculated FRAX score estimates her 10 year risk of a major osteoporetic fracture at 14 % and hip fracture at 3.9 %, which are an indication for continued biphosphonate treatment based on hip fracture risk of >3%. Continues to show osteopenia in her bilateral hips and osteoporosis of her lumbar spine, but in comparison to her prior study in 7/2016, it appears that her T-scores have remained relatively stable in her bilateral hips, but there has been some worsening in her lumbar spine.  Obtained lab evaluation for secondary osteoporosis, and TSH, intact PTH, calcium, vitamin D level, and gammopathy panel normal. Given recent fracture and some worsening of bone density scan despite treatment, will refer to rheumatology to assess whether she is on optimal ttherapy. Referral placed for Dr. Angelita Patel. Continue calcium and Vitamin D supplements, and encouraged her to exercise, particularly weight bearing activities. 5. Displaced right third metatarsal fracture, s/p open reduction/internal fixation with putty bone grafting. She reports that she did well after surgery, and now fully weight bearing with orthotic in shoes. No significant pain, although mild swelling with prolonged standing. Continue to follow. 6. Elevated transaminases. New onset mild elevation. Patient reports that has been taking NSAIDS frequently to help control foot pain. Also, dose of atorvastatin increased since last visit, but no problem previously. Advised to discontinue NSAIDS and will reassess in 4 weeks. Will also obtain labs to check for other etiologies at that time. If persists, will obtain ultrasound. 7. Family history of breast cancer. Patient with family history of breast cancer in first degree relative (sister). She is currently being screened with annual mammograms. Discussed recommendations for breast cancer screening in women with lifetime risk >20%. 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 No other family member with breast cancer. Wishing to continue with annual mammograms and breast exams. 8. Abnormal glucose. Repeat normal with normal HbA1c today. Emphasized importance of lifestyle modifications, including diet, exercise, and weight loss. 9. Bilateral hand and wrist pain. Evaluation for inflammatory cause negative. Improved with changes in gripping walker and wearing of neutral wrist splints. No longer sleeping with wrists in flexed position at night. Continue to follow. 10. Chronic microhematuria. Work-up reportedly negative in past. Recent urinalysis has been negative for blood. Follow. 11. Eczema. Found good response with [place]. Followed by Dr. Yuan Gonzalez. 12. Candidal intertrigo.  Improved with Lotrisone. Discussed preventative treatment with mycostatin powder and loose clothing. 13. Overweight. BMI mildly increased. Emphasized importance of lifestyle modifications, including diet, exercise, and weight loss. Will continue to follow. 14. Elevated TSH. Repeat with free T4 today normal. Patient with history of radiation therapy for a birthmark on her right shoulder as a child. Will continue to follow. 15. Health maintenance. Will give another script for Shingrix vaccine. Other immunizations up to date. Mammogram up to date. Colorectal cancer screening up to date. Continue regular eye exams with Dr. Izaiah Hahn. Vitamin D level normal. Continue maintenance dose supplement. Medicare wellness visit up to date. Will follow-up with dermatology regarding new mole on chest. Also, will discuss recommendations regarding aspirin use and primary prevention at her next visit. Total time: 40 minutes spent with the patient in face-to-face consultation of which greater than 50% was spent on counseling, answering questions and/or coordination of care. Complex medical review and management performed. Patient understands recommendations and agrees with plan. Follow-up in 4 weeks.

## 2019-09-16 ENCOUNTER — OFFICE VISIT (OUTPATIENT)
Dept: ORTHOPEDIC SURGERY | Age: 74
End: 2019-09-16

## 2019-09-16 VITALS
BODY MASS INDEX: 25.33 KG/M2 | RESPIRATION RATE: 14 BRPM | DIASTOLIC BLOOD PRESSURE: 61 MMHG | HEIGHT: 65 IN | WEIGHT: 152 LBS | TEMPERATURE: 96 F | OXYGEN SATURATION: 99 % | SYSTOLIC BLOOD PRESSURE: 126 MMHG | HEART RATE: 96 BPM

## 2019-09-16 DIAGNOSIS — S92.331K: Primary | ICD-10-CM

## 2019-09-16 DIAGNOSIS — M79.671 RIGHT FOOT PAIN: ICD-10-CM

## 2019-09-16 DIAGNOSIS — Z98.890 POST-OPERATIVE STATE: ICD-10-CM

## 2019-09-16 NOTE — PROGRESS NOTES
Patient: Ten Doherty                MRN: [id number]       SSN: [id number]  YOB: 1945              AGE: 76 y.o. SEX: female    Kacie Damian MD    POST OP OFFICE NOTE  DOS: 4/24/19    Chief Complaint:   Chief Complaint   Patient presents with    Foot Pain     RIGHT FOOT PAIN       HPI:     The patient is a 76 y.o. female who presents today for follow up 145 days s/p:     OPEN REDUCTION RIGHT THIRD METATARSAL  DBX PUTTY BONE GRAFTING BONE GRAFT/C-ARM/ARTHREX/NERVE BLOCK    Patient has been WBAT to the right lower extremity in [facility]. She has been wearing her orthotic and states that it helps. She received the bone stimulator and has been using it since mid-August. Patient states that she is doing very well other than some occasional mild discomfort (heavy feeling) when she has been on feet long period of time, but also has same sensation on the left foot. PHYSICAL EXAM:     Visit Vitals  /61   Pulse 96   Temp 96 °F (35.6 °C) (Oral)   Resp 14   Ht 5' 5\" (1.651 m)   Wt 152 lb (68.9 kg)   SpO2 99%   BMI 25.29 kg/m²         Pain Scale: 0 - No pain/10      GEN:  Alert, well developed, well nourished, well appearing 76 y.o. female in no acute distress. PSYCH:  Normal affect, mood, and conduct. alert, oriented x 3 alert, oriented x 3, no dementia  M/S EXAMINATION OF: right foot    INCISION: Incision looks good, skin well healed  SKIN: no edema , no erythema, no ecchymosis   TENDERNESS:  no tenderness to palpation   NEUROVASCULAR:  grossly intact. Positive distal pulses and capillary refill. DVT ASSESSMENT:  The calf is not tender to palpation. No evidence of DVT seen on physical exam.  ROM: not tested       RADIOGRAPHS & DIAGNOSTIC STUDIES       None      IMPRESSION:     Encounter Diagnoses     ICD-10-CM ICD-9-CM   1. 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  Post-operative state Z98.890 V45.89       PLAN:         Orders Placed This Encounter    Generic Supply Order    AMB POC XRAY, FOOT; COMPLETE, 3+ VIEW            · Progress activity as tolerated    · Continue to use bone stimulator as instructed    · Continue to wear orthotic and continue to useTubigrip for swelling    · Weight bearing status:   weight bearing as tolerated in supportive shoe or sneaker    · Please follow up in the office in 4-6 weeks - X-rays of right foot at [facility]         Patient has been discussed with Dr. Sonia Aguirre during this visit and he agrees with the assessment and plan    Patient expresses understanding of the plan. Patient education provided on post surgical care. REVIEW OF SYSTEMS:     Otherwise as noted in HPI      PAST MEDICAL HISTORY:     Past Medical History:   Diagnosis Date    Eczema     Essential hypertension     FH: breast cancer     Fibroids     uterine    Fracture of radius 11/2009    with ulna, left, closed    History of cataract     History of foot fracture     right and left    Hyperlipidemia     Microhematuria     chronic    Osteopenia     S/P radiation therapy     as child, to right shoulder for birthmark    Tinnitus     Zoster     right scapula       MEDICATIONS:     Current Outpatient Medications   Medication Sig    lisinopril (PRINIVIL, ZESTRIL) 10 mg tablet TAKE 1 TABLET BY MOUTH DAILY    atorvastatin (LIPITOR) 20 mg tablet Take 1 Tab by mouth daily.  omeprazole (PRILOSEC OTC) 20 mg tablet Take 20 mg by mouth daily.  hydroCHLOROthiazide (HYDRODIURIL) 25 mg tablet TAKE 1 TABLET BY MOUTH DAILY    aspirin delayed-release 81 mg tablet Take  by mouth daily.  amoxicillin 500 mg tab TAKE ONE CAPSULE PO TID, START ONE DAY PRIOR TO PROCEDURE, DAY OF PROCEDURE AND DAY AFTER PROCEDURE    alendronate (FOSAMAX) 70 mg tablet TAKE 1 TABLET BY MOUTH EVERY 7 DAYS    nitrofurantoin, macrocrystal-monohydrate, (MACROBID) 100 mg capsule Take 1 Cap by mouth two (2) times a day.     ondansetron hcl (ZOFRAN) 4 mg tablet Take 1 Tab by mouth every eight (8) hours as needed for Nausea.  polyethylene glycol (MIRALAX) 17 gram packet Take 1 Packet by mouth daily.  crisaborole (EUCRISA) 2 % oint by Apply Externally route.  triamcinolone acetonide (KENALOG) 0.025 % topical cream Apply  to affected area two (2) times a day.  clotrimazole-betamethasone (LOTRISONE) topical cream Apply  to affected area two (2) times a day.  docusate sodium (COLACE) 100 mg capsule Take 100 mg by mouth daily as needed for Constipation.  co-enzyme Q-10 (CO Q-10) 100 mg capsule Take 100 mg by mouth daily.  biotin 2,500 mcg Tab Take 2,500 mcg by mouth.  cholecalciferol (VITAMIN D3) 1,000 unit cap Take 1 Tab by mouth daily.  fexofenadine (ALLEGRA) 60 mg tablet Take 60 mg by mouth daily.  CALCIUM CARBONATE/VITAMIN D3 (CALCIUM + D PO) Take 1 Tab by mouth two (2) times a day. No current facility-administered medications for this visit.         ALLERGIES:     Allergies   Allergen Reactions    Codeine Nausea Only    Hibiclens [Chlorhexidine Gluconate] Rash         PAST SURGICAL HISTORY:     Past Surgical History:   Procedure Laterality Date    HX CATARACT REMOVAL      HX FRACTURE TX Right 04/24/2019    right foot 3rd metatarsal fracture    HX ORTHOPAEDIC      left arm    HX WISDOM TEETH EXTRACTION      HX WRIST FRACTURE TX         SOCIAL HISTORY:     Social History     Socioeconomic History    Marital status:      Spouse name: Not on file    Number of children: Not on file    Years of education: Not on file    Highest education level: Not on file   Occupational History    Not on file   Social Needs    Financial resource strain: Not on file    Food insecurity:     Worry: Not on file     Inability: Not on file    Transportation needs:     Medical: Not on file     Non-medical: Not on file   Tobacco Use    Smoking status: Never Smoker    Smokeless tobacco: Never Used   Substance and Sexual Activity    Alcohol use:  Yes     Alcohol/week: 0.0 standard drinks     Comment: occasionally    Drug use: No    Sexual activity: Yes     Partners: Male   Lifestyle    Physical activity:     Days per week: Not on file     Minutes per session: Not on file    Stress: Not on file   Relationships    Social connections:     Talks on phone: Not on file     Gets together: Not on file     Attends Baptism service: Not on file     Active member of club or organization: Not on file     Attends meetings of clubs or organizations: Not on file     Relationship status: Not on file    Intimate partner violence:     Fear of current or ex partner: Not on file     Emotionally abused: Not on file     Physically abused: Not on file     Forced sexual activity: Not on file   Other Topics Concern    Not on file   Social History Narrative    Not on file       FAMILY HISTORY:     Family History   Problem Relation Age of Onset    Cancer Mother         pancreatic    Heart Disease Mother     Heart Disease Father     Cancer Sister         breast    MS Sister     Breast Cancer Sister [address], [place]  9/16/2019

## 2019-09-16 NOTE — PROGRESS NOTES
1. Have you been to the ER, urgent care clinic since your last visit? Hospitalized since your last visit? No    2. Have you seen or consulted any other health care providers outside of the [address] [place] [address] since your last visit? Include any pap smears or colon screening.  No

## 2019-09-17 ENCOUNTER — APPOINTMENT (OUTPATIENT)
Dept: INTERNAL MEDICINE CLINIC | Age: 74
End: 2019-09-17

## 2019-09-17 ENCOUNTER — HOSPITAL ENCOUNTER (OUTPATIENT)
Dept: LAB | Age: 74
Discharge: HOME | End: 2019-09-17
Payer: MEDICARE

## 2019-09-17 DIAGNOSIS — G56.03 CARPAL TUNNEL SYNDROME, BILATERAL: ICD-10-CM

## 2019-09-17 DIAGNOSIS — L30.9 ECZEMA, UNSPECIFIED TYPE: ICD-10-CM

## 2019-09-17 DIAGNOSIS — Z11.59 ENCOUNTER FOR SCREENING FOR OTHER VIRAL DISEASES: ICD-10-CM

## 2019-09-17 DIAGNOSIS — R79.89 ELEVATED LFTS: ICD-10-CM

## 2019-09-17 DIAGNOSIS — M85.89 OSTEOPENIA OF MULTIPLE SITES: ICD-10-CM

## 2019-09-17 DIAGNOSIS — M81.0 OSTEOPOROSIS OF LUMBAR SPINE: ICD-10-CM

## 2019-09-17 DIAGNOSIS — S92.331D CLOSED DISPLACED FRACTURE OF THIRD METATARSAL BONE OF RIGHT FOOT WITH ROUTINE HEALING: ICD-10-CM

## 2019-09-17 DIAGNOSIS — R29.90 EPISODE OF TRANSIENT NEUROLOGIC SYMPTOMS: ICD-10-CM

## 2019-09-17 DIAGNOSIS — R31.29 MICROHEMATURIA: ICD-10-CM

## 2019-09-17 DIAGNOSIS — Z80.3 FH: BREAST CANCER: ICD-10-CM

## 2019-09-17 DIAGNOSIS — R79.89 ELEVATED TSH: ICD-10-CM

## 2019-09-17 DIAGNOSIS — I10 ESSENTIAL HYPERTENSION: ICD-10-CM

## 2019-09-17 DIAGNOSIS — R47.9 SPEECH DISTURBANCE, UNSPECIFIED TYPE: ICD-10-CM

## 2019-09-17 DIAGNOSIS — E78.5 HYPERLIPIDEMIA, UNSPECIFIED HYPERLIPIDEMIA TYPE: ICD-10-CM

## 2019-09-17 DIAGNOSIS — R73.09 ABNORMAL GLUCOSE: ICD-10-CM

## 2019-09-17 LAB
ALBUMIN SERPL-MCNC: 4.3 G/DL (ref 3.4–5)
ALBUMIN/GLOB SERPL: 1.3 {RATIO} (ref 0.8–1.7)
ALP SERPL-CCNC: 70 U/L (ref 45–117)
ALT SERPL-CCNC: 41 U/L (ref 13–56)
ANION GAP SERPL CALC-SCNC: 8 MMOL/L (ref 3–18)
APPEARANCE UR: CLEAR
AST SERPL-CCNC: 32 U/L (ref 10–38)
BACTERIA URNS QL MICRO: ABNORMAL /HPF
BASOPHILS # BLD: 0 K/UL (ref 0–0.1)
BASOPHILS NFR BLD: 0 % (ref 0–2)
BILIRUB SERPL-MCNC: 0.7 MG/DL (ref 0.2–1)
BILIRUB UR QL: NEGATIVE
BUN SERPL-MCNC: 18 MG/DL (ref 7–18)
BUN/CREAT SERPL: 28 (ref 12–20)
CALCIUM SERPL-MCNC: 9.7 MG/DL (ref 8.5–10.1)
CHLORIDE SERPL-SCNC: 100 MMOL/L (ref 100–111)
CK SERPL-CCNC: 69 U/L (ref 26–192)
CO2 SERPL-SCNC: 28 MMOL/L (ref 21–32)
COLOR UR: ABNORMAL
CREAT SERPL-MCNC: 0.65 MG/DL (ref 0.6–1.3)
DIFFERENTIAL METHOD BLD: ABNORMAL
EOSINOPHIL # BLD: 0.1 K/UL (ref 0–0.4)
EOSINOPHIL NFR BLD: 2 % (ref 0–5)
EPITH CASTS URNS QL MICRO: ABNORMAL /LPF (ref 0–5)
ERYTHROCYTE [DISTWIDTH] IN BLOOD BY AUTOMATED COUNT: 13.8 % (ref 11.6–14.5)
ERYTHROCYTE [SEDIMENTATION RATE] IN BLOOD: 14 MM/HR (ref 0–30)
GLOBULIN SER CALC-MCNC: 3.3 G/DL (ref 2–4)
GLUCOSE SERPL-MCNC: 98 MG/DL (ref 74–99)
GLUCOSE UR STRIP.AUTO-MCNC: NEGATIVE MG/DL
HCT VFR BLD AUTO: 39.5 % (ref 35–45)
HGB BLD-MCNC: 12 G/DL (ref 12–16)
HGB UR QL STRIP: ABNORMAL
KETONES UR QL STRIP.AUTO: NEGATIVE MG/DL
LEUKOCYTE ESTERASE UR QL STRIP.AUTO: ABNORMAL
LYMPHOCYTES # BLD: 1.6 K/UL (ref 0.9–3.6)
LYMPHOCYTES NFR BLD: 26 % (ref 21–52)
MCH RBC QN AUTO: 26.9 PG (ref 24–34)
MCHC RBC AUTO-ENTMCNC: 30.4 G/DL (ref 31–37)
MCV RBC AUTO: 88.6 FL (ref 74–97)
MONOCYTES # BLD: 0.5 K/UL (ref 0.05–1.2)
MONOCYTES NFR BLD: 9 % (ref 3–10)
NEUTS SEG # BLD: 3.8 K/UL (ref 1.8–8)
NEUTS SEG NFR BLD: 63 % (ref 40–73)
NITRITE UR QL STRIP.AUTO: NEGATIVE
PH UR STRIP: 6 [PH] (ref 5–8)
PLATELET # BLD AUTO: 296 K/UL (ref 135–420)
PMV BLD AUTO: 9.5 FL (ref 9.2–11.8)
POTASSIUM SERPL-SCNC: 4.3 MMOL/L (ref 3.5–5.5)
PROT SERPL-MCNC: 7.6 G/DL (ref 6.4–8.2)
PROT UR STRIP-MCNC: NEGATIVE MG/DL
RBC # BLD AUTO: 4.46 M/UL (ref 4.2–5.3)
RBC #/AREA URNS HPF: ABNORMAL /HPF (ref 0–5)
SODIUM SERPL-SCNC: 136 MMOL/L (ref 136–145)
SP GR UR REFRACTOMETRY: 1.02 (ref 1–1.03)
UROBILINOGEN UR QL STRIP.AUTO: 1 EU/DL (ref 0.2–1)
WBC # BLD AUTO: 6 K/UL (ref 4.6–13.2)
WBC URNS QL MICRO: ABNORMAL /HPF (ref 0–4)

## 2019-09-17 PROCEDURE — 80053 COMPREHEN METABOLIC PANEL: CPT

## 2019-09-17 PROCEDURE — 83516 IMMUNOASSAY NONANTIBODY: CPT

## 2019-09-17 PROCEDURE — 87340 HEPATITIS B SURFACE AG IA: CPT

## 2019-09-17 PROCEDURE — 82728 ASSAY OF FERRITIN: CPT

## 2019-09-17 PROCEDURE — 36415 COLL VENOUS BLD VENIPUNCTURE: CPT

## 2019-09-17 PROCEDURE — 86225 DNA ANTIBODY NATIVE: CPT

## 2019-09-17 PROCEDURE — 86140 C-REACTIVE PROTEIN: CPT

## 2019-09-17 PROCEDURE — 86803 HEPATITIS C AB TEST: CPT

## 2019-09-17 PROCEDURE — 82550 ASSAY OF CK (CPK): CPT

## 2019-09-17 PROCEDURE — 86704 HEP B CORE ANTIBODY TOTAL: CPT

## 2019-09-17 PROCEDURE — 85025 COMPLETE CBC W/AUTO DIFF WBC: CPT

## 2019-09-17 PROCEDURE — 83540 ASSAY OF IRON: CPT

## 2019-09-17 PROCEDURE — 81001 URINALYSIS AUTO W/SCOPE: CPT

## 2019-09-17 PROCEDURE — 85652 RBC SED RATE AUTOMATED: CPT

## 2019-09-18 LAB
CRP SERPL-MCNC: <0.3 MG/DL (ref 0–0.3)
FERRITIN SERPL-MCNC: 16 NG/ML (ref 8–388)
HBV SURFACE AG SER QL: <0.1 INDEX
HBV SURFACE AG SER QL: NEGATIVE
HCV AB SER IA-ACNC: 0.03 INDEX
HCV AB SERPL QL IA: NEGATIVE
HCV COMMENT,HCGAC: NORMAL
IRON SATN MFR SERPL: 11 %
IRON SERPL-MCNC: 47 UG/DL (ref 50–175)
TIBC SERPL-MCNC: 434 UG/DL (ref 250–450)

## 2019-09-19 LAB
ACTIN IGG SERPL-ACNC: 6 UNITS (ref 0–19)
CENTROMERE B AB SER-ACNC: <0.2 AI (ref 0–0.9)
CHROMATIN AB SERPL-ACNC: <0.2 AI (ref 0–0.9)
DSDNA AB SER-ACNC: <1 IU/ML (ref 0–9)
ENA JO1 AB SER-ACNC: <0.2 AI (ref 0–0.9)
ENA RNP AB SER-ACNC: <0.2 AI (ref 0–0.9)
ENA SCL70 AB SER-ACNC: <0.2 AI (ref 0–0.9)
ENA SM AB SER-ACNC: <0.2 AI (ref 0–0.9)
ENA SS-A AB SER-ACNC: <0.2 AI (ref 0–0.9)
ENA SS-B AB SER-ACNC: <0.2 AI (ref 0–0.9)
HBV CORE AB SERPL QL IA: NEGATIVE
MITOCHONDRIA M2 IGG SER-ACNC: <20 UNITS (ref 0–20)
SEE BELOW, 164869: NORMAL

## 2019-09-20 LAB
ENDOMYSIUM IGA SER QL: NEGATIVE
GLIADIN PEPTIDE IGA SER-ACNC: 7 UNITS (ref 0–19)
GLIADIN PEPTIDE IGG SER-ACNC: 3 UNITS (ref 0–19)
IGA SERPL-MCNC: 292 MG/DL (ref 64–422)
TTG IGA SER-ACNC: <2 U/ML (ref 0–3)
TTG IGG SER-ACNC: <2 U/ML (ref 0–5)

## 2019-09-25 ENCOUNTER — OFFICE VISIT (OUTPATIENT)
Dept: INTERNAL MEDICINE CLINIC | Age: 74
End: 2019-09-25

## 2019-09-25 VITALS
BODY MASS INDEX: 24.99 KG/M2 | WEIGHT: 150 LBS | HEIGHT: 65 IN | HEART RATE: 68 BPM | RESPIRATION RATE: 14 BRPM | OXYGEN SATURATION: 96 % | SYSTOLIC BLOOD PRESSURE: 110 MMHG | DIASTOLIC BLOOD PRESSURE: 64 MMHG | TEMPERATURE: 98.2 F

## 2019-09-25 DIAGNOSIS — Z80.3 FH: BREAST CANCER: ICD-10-CM

## 2019-09-25 DIAGNOSIS — E61.1 DIETARY IRON DEFICIENCY WITHOUT ANEMIA: ICD-10-CM

## 2019-09-25 DIAGNOSIS — M81.0 OSTEOPOROSIS OF LUMBAR SPINE: ICD-10-CM

## 2019-09-25 DIAGNOSIS — R29.90 EPISODE OF TRANSIENT NEUROLOGIC SYMPTOMS: ICD-10-CM

## 2019-09-25 DIAGNOSIS — R79.89 ELEVATED TSH: ICD-10-CM

## 2019-09-25 DIAGNOSIS — R73.09 ABNORMAL GLUCOSE: ICD-10-CM

## 2019-09-25 DIAGNOSIS — M85.89 OSTEOPENIA OF MULTIPLE SITES: ICD-10-CM

## 2019-09-25 DIAGNOSIS — I10 ESSENTIAL HYPERTENSION: Primary | ICD-10-CM

## 2019-09-25 DIAGNOSIS — E78.5 HYPERLIPIDEMIA, UNSPECIFIED HYPERLIPIDEMIA TYPE: ICD-10-CM

## 2019-09-25 DIAGNOSIS — S92.331D CLOSED DISPLACED FRACTURE OF THIRD METATARSAL BONE OF RIGHT FOOT WITH ROUTINE HEALING: ICD-10-CM

## 2019-09-25 DIAGNOSIS — L30.9 ECZEMA, UNSPECIFIED TYPE: ICD-10-CM

## 2019-09-25 DIAGNOSIS — R79.89 ELEVATED LFTS: ICD-10-CM

## 2019-09-25 NOTE — PROGRESS NOTES
Chief Complaint   Patient presents with    Neurologic Problem     4 week follow up with lab [facility]. Health Maintenance Due   Topic Date Due    Influenza Age 5 to Adult  08/01/2019     Patient states she doesn't want to the flu vaccine right now until after shingrix vaccine has calmed down. She received it on 9/21/2019.    1. Have you been to the ER, urgent care clinic or hospitalized since your last visit? NO.     2. Have you seen or consulted any other health care providers outside of the [address] [place] [address] since your last visit (Include any pap smears or colon screening)? NO          Learning Assessment 6/25/2019   PRIMARY LEARNER Patient   PRIMARY LANGUAGE ENGLISH   LEARNER PREFERENCE PRIMARY READING     DEMONSTRATION   ANSWERED BY patient   RELATIONSHIP SELF     Abuse Screening Questionnaire 9/25/2019   Do you ever feel afraid of your partner? N   Are you in a relationship with someone who physically or mentally threatens you? N   Is it safe for you to go home? Y     3 most recent PHQ Screens 9/25/2019   Little interest or pleasure in doing things Not at all   Feeling down, depressed, irritable, or hopeless Not at all   Total Score PHQ 2 0     Fall Risk Assessment, last 12 mths 9/25/2019   Able to walk? Yes   Fall in past 12 months?  No

## 2019-09-25 NOTE — PATIENT INSTRUCTIONS
Iron-Rich Diet: Care Instructions  Your Care Instructions    Your body needs iron to make hemoglobin. Hemoglobin is a substance in red blood cells that carries oxygen from the lungs to cells all through your body. If you do not get enough iron, your body makes fewer and smaller red blood cells. As a result, your body's cells may not get enough oxygen. Adult men need 8 milligrams of iron a day; adult women need 18 milligrams of iron a day. After menopause, women need 8 milligrams of iron a day. A pregnant woman needs 27 milligrams of iron a day. Infants and young children have higher iron needs relative to their size than other age groups. People who have lost blood because of ulcers or heavy menstrual periods may become very low in iron and may develop anemia. Most people can get the iron their bodies need by eating enough of certain iron-rich foods. Your doctor may recommend that you take an iron supplement along with eating an iron-rich diet. Follow-up care is a key part of your treatment and safety. Be sure to make and go to all appointments, and call your doctor if you are having problems. It's also a good idea to know your test results and keep a list of the medicines you take. How can you care for yourself at home? · Make iron-rich foods a part of your daily diet. Iron-rich foods include:  ? All meats, such as chicken, beef, lamb, pork, fish, and shellfish. Liver is especially high in iron. ? Leafy green vegetables. ? Raisins, peas, beans, lentils, barley, and eggs. ? Iron-fortified breakfast cereals. · Eat foods with vitamin C along with iron-rich foods. Vitamin C helps you absorb more iron from food. Drink a glass of orange juice or another citrus juice with your food. · Eat meat and vegetables or grains together. The iron in meat helps your body absorb the iron in other foods. Where can you learn more? Go to http://mallika-suzanne.info/.   Enter [id number] [id number] in the search box to learn more about \"Iron-Rich Diet: Care Instructions. \"  Current as of: November 7, 2018  Content Version: 12.2  © 1941-7370 [employer]. Care instructions adapted under license by [employer] (which disclaims liability or warranty for this information). If you have questions about a medical condition or this instruction, always ask your healthcare professional. [address] any warranty or liability for your use of this information.

## 2019-09-30 PROBLEM — E61.1 DIETARY IRON DEFICIENCY WITHOUT ANEMIA: Status: ACTIVE | Noted: 2019-09-30

## 2019-09-30 NOTE — PROGRESS NOTES
HPI:   Iglesia Devine is a 76y.o. year old female who presents today for a routine visit and for evaluation of hypertension, hyperlipidemia, osteopenia, left sciatica, and chronic microhematuria. She was last seen on 8/27/2019, and was noted to have elevated transaminases on routine labs. She was instructed to discontinue all NSAIDS which she had been taking for her right foot pain. She returns today and states that she is feeling well. She confirms that she has not had a recurrence of the neurologic symptoms, and she states that she has an appointment scheduled with Dr. Nhan Bunn on 10/9/2019. She reports being under a significant amount of stress caring for her sister and mother in law. She is otherwise without new complaints. On 6/25/2019, she reported that two weeks prior, while returning home from her granddaughter's graduation, she and her  stopped at a restaurant for lunch. She states that while she was chewing a mouth full of food, her  noticed that she suddenly tilted her head to the left side and seemed to look dazed. She states that she remained awake and was aware that he was talking to her, but she states that she could not respond. She states that the episode lasted approximately 30 seconds and resolved, and she proceeded to complete her chewing and swallow her food. She states that she did not lose consciousness, or notice any headache, visual changes, lightheadedness, tongue biting, incontinence, focal deficits, or seizure like activity. She denied any chest pain, shortness of breath, palpitations, pleuritic chest pain, hemoptysis, calf pain or swelling. 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  She also had a carotid duplex scan (7/9/2019) showing only mild (< 50%) stenosis of the bilateral internal carotid arteries. She has not had a recurrence of these symptoms. On 4/15/2019, she underwent evaluation at [facility] for right foot pain. She denied known injury. Right foot x-ray showed a displaced third metatarsal fracture and she was referred to Dr. Emily Fabian for evaluation. Given the displacement and risk of metatarsalgia, surgery was recommended, and on 4/24/2019, she underwent open reduction/internal fixation of the third metatarsal with putty bone grafting. She reports that she did well after surgery, and and was non-weight bearing in a CAM boot for several months. On 5/15/2019, she presented complaining of bilateral hand and wrist pain. She denied any fever, chills, sore throat, rashes, dyspnea, cough, abdominal pain, dysuria, focal weakness, visual changes, or headache. She did admit to sleeping with her bilateral hands tucked beneath her in a flexed position while lying on her back due to her limitations from her foot surgery. She also reported that she had been using a walker to ambulate and gripping it tightly with her hands. Lab evaluation including MANOLO, RF, CCP, ESR, uric acid were normal, but CRP was increased to 9.1. A diagnosis of presumed carpal tunnel syndrome was made, and her discomfort resolved with the use of wrist splints. She was released to weight bear as tolerate in 7/2019, and has not had any significant pain although has been experiencing some swelling which resolves with elevation. She has a history of osteopenia, and in 2009, she sustained a fracture of her left 3rd toe (3/2009) and her distal radius (12/2009). At that time, she was started on alendronate and was treated until 11/2014. She had a repeat bone density study in 7/2016 showing T-scores: femoral neck left -2.3/ right -2.0 and lumbar -2.2. She was restarted on alendronate in 12/2016. She continues to take calcium and Vitamin D supplements.       She has a history of hypertension, treated with lisinopril and hydrochlorothiazide. She has been checking her blood pressure intermittently and reports that most readings are with a systolic blood pressure <700. She remains quite active, although has not been having time to exercise. She reports that she has continued stress in her life, helping to care for her 's mother (dementia), two disabled cousins, and her sister. She denies any chest pain, shortness of breath at rest or with exertion, palpitations, lightheadedness, or edema. She also has a history of hyperlipidemia, treated with moderate intensity dose atorvastatin. She has no history of ASCVD. In 2/2017, she was evaluated by DEREK Walter with left lower back pain and left sciatica. She had been taking ibuprofen without relief, and was given a course of prednisone. She states that she did not find this helpful either, but reports resolution of symptoms after visiting a chiropractor. She states that she no longer is experiencing pain and is able to ambulate without difficulty. She will continue to visit a chiropractor periodically when she develops a recurrence of pain with improvement. She reports that she had an extensive workup for microhematuria in the past, which was negative (records unavailable). She denies any dysuria, gross hematuria, or flank pain. She has had screening colonoscopy with Dr Cipriano Elam in 10/2010, which was normal except for hemorrhoids. Recommendation for follow-up is for 10 years. She denies any abdominal pain, nausea, vomiting, melena, hematochezia, or change in bowel movements. She has a history of eczema and is followed by Dr. Claire Byrne. She states that she was recently started on Eucrisa with excellent response. She also has a history of candidal intertrigo, treated with lotrisone with good response. She also reports that she underwent radiation therapy to her right shoulder (as a child) for treatment of a birthmark.      On 7/20/2018, she presented for evaluation to [facility] for a right upper back rash and was diagnosed with herpes zoster. She was treated with Valtrex with improvement. Past Medical History:   Diagnosis Date    Eczema     Essential hypertension     FH: breast cancer     Fibroids     uterine    Fracture of radius 11/2009    with ulna, left, closed    History of cataract     History of foot fracture     right and left    Hyperlipidemia     Microhematuria     chronic    Osteopenia     S/P radiation therapy     as child, to right shoulder for birthmark    Tinnitus     Zoster     right scapula     Past Surgical History:   Procedure Laterality Date    HX CATARACT REMOVAL      HX FRACTURE TX Right 04/24/2019    right foot 3rd metatarsal fracture    HX ORTHOPAEDIC      left arm    HX WISDOM TEETH EXTRACTION      HX WRIST FRACTURE TX       Current Outpatient Medications   Medication Sig    lisinopril (PRINIVIL, ZESTRIL) 10 mg tablet TAKE 1 TABLET BY MOUTH DAILY    atorvastatin (LIPITOR) 20 mg tablet Take 1 Tab by mouth daily.  omeprazole (PRILOSEC OTC) 20 mg tablet Take 20 mg by mouth daily.  hydroCHLOROthiazide (HYDRODIURIL) 25 mg tablet TAKE 1 TABLET BY MOUTH DAILY    aspirin delayed-release 81 mg tablet Take  by mouth daily.  alendronate (FOSAMAX) 70 mg tablet TAKE 1 TABLET BY MOUTH EVERY 7 DAYS    crisaborole (EUCRISA) 2 % oint by Apply Externally route.  docusate sodium (COLACE) 100 mg capsule Take 100 mg by mouth daily as needed for Constipation.  co-enzyme Q-10 (CO Q-10) 100 mg capsule Take 100 mg by mouth daily.  biotin 2,500 mcg Tab Take 2,500 mcg by mouth.  cholecalciferol (VITAMIN D3) 1,000 unit cap Take 1 Tab by mouth daily.  fexofenadine (ALLEGRA) 60 mg tablet Take 60 mg by mouth daily.  CALCIUM CARBONATE/VITAMIN D3 (CALCIUM + D PO) Take 1 Tab by mouth two (2) times a day.     amoxicillin 500 mg tab TAKE ONE CAPSULE PO TID, START ONE DAY PRIOR TO PROCEDURE, DAY OF PROCEDURE AND DAY AFTER PROCEDURE    ondansetron hcl (ZOFRAN) 4 mg tablet Take 1 Tab by mouth every eight (8) hours as needed for Nausea.  polyethylene glycol (MIRALAX) 17 gram packet Take 1 Packet by mouth daily.  triamcinolone acetonide (KENALOG) 0.025 % topical cream Apply  to affected area two (2) times a day.  clotrimazole-betamethasone (LOTRISONE) topical cream Apply  to affected area two (2) times a day. No current facility-administered medications for this visit. Allergies and Intolerances: Allergies   Allergen Reactions    Codeine Nausea Only    Hibiclens [Chlorhexidine Gluconate] Rash     Family History: Her sister has breast cancer and her mother had pancreatic cancer. No history of colon cancer. Family History   Problem Relation Age of Onset   [de-identified] Cancer Mother         pancreatic    Heart Disease Mother     Heart Disease Father     Cancer Sister         breast    MS Sister     Breast Cancer Sister 62     Social History:   She  reports that she has never smoked. She has never used smokeless tobacco. She has about 2 glasses of wine each night. She is  with one adult daughter who lives with her family on [place], [place]. She is retired, and was a 4th- for 25 years.   Social History     Substance and Sexual Activity   Alcohol Use Yes    Alcohol/week: 0.0 standard drinks    Comment: occasionally     Immunization History:  Immunization History   Administered Date(s) Administered    (RETIRED) Pneumococcal Vaccine (Unspecified Type) 05/04/2010    Influenza High Dose Vaccine PF 12/13/2016, 10/03/2017    Influenza Vaccine 10/24/2014    Influenza Vaccine (Tri) Adjuvanted 10/09/2018    Influenza Vaccine PF 11/05/2013    Influenza Vaccine Split 11/01/2011, 11/06/2012    Pneumococcal Conjugate (PCV-13) 05/05/2015    Pneumococcal Polysaccharide (PPSV-23) 05/04/2010    TD Vaccine 05/05/2009    Tdap 12/12/2016    Zoster Recombinant 09/21/2019    Zoster Vaccine, Live 05/07/2013       Review of Systems:   As above included in HPI. Otherwise 11 point review of systems negative including constitutional, skin, HENT, eyes, respiratory, cardiovascular, gastrointestinal, genitourinary, musculoskeletal, endo/heme/aller, neurological.    Physical:   Vitals:   BP: 110/64  HR: 68  WT: 150 lb (68 kg)151 lb  BMI:  24.96 kg/m2    Exam:   Patient appears in no apparent distress. Affect is appropriate. HEENT: PERRLA, anicteric, oropharynx clear, no JVD, adenopathy or thyromegaly. No carotid bruits or radiated murmur. Lungs: clear to auscultation, no wheezes, rhonchi, or rales. Heart: regular rate and rhythm. No murmur, rubs, gallops  Abdomen: soft, nontender, nondistended, normal bowel sounds, no hepatosplenomegaly or masses. Extremities: without edema. Pulses 1-2+ bilaterally. Neuro -- CN 2-12 intact, strength 5/5 with intact soft touch in all extremities. Review of Data:  Labs:  Hospital Outpatient Visit on 09/17/2019   Component Date Value Ref Range Status    WBC 09/17/2019 6.0  4.6 - 13.2 K/uL Final    RBC 09/17/2019 4.46  4.20 - 5.30 M/uL Final    HGB 09/17/2019 12.0  12.0 - 16.0 g/dL Final    HCT 09/17/2019 39.5  35.0 - 45.0 % Final    MCV 09/17/2019 88.6  74.0 - 97.0 FL Final    MCH 09/17/2019 26.9  24.0 - 34.0 PG Final    MCHC 09/17/2019 30.4* 31.0 - 37.0 g/dL Final    RDW 09/17/2019 13.8  11.6 - 14.5 % Final    PLATELET 37/63/1286 940  135 - 420 K/uL Final    MPV 09/17/2019 9.5  9.2 - 11.8 FL Final    NEUTROPHILS 09/17/2019 63  40 - 73 % Final    LYMPHOCYTES 09/17/2019 26  21 - 52 % Final    MONOCYTES 09/17/2019 9  3 - 10 % Final    EOSINOPHILS 09/17/2019 2  0 - 5 % Final    BASOPHILS 09/17/2019 0  0 - 2 % Final    ABS. NEUTROPHILS 09/17/2019 3.8  1.8 - 8.0 K/UL Final    ABS. LYMPHOCYTES 09/17/2019 1.6  0.9 - 3.6 K/UL Final    ABS. MONOCYTES 09/17/2019 0.5  0.05 - 1.2 K/UL Final    ABS. EOSINOPHILS 09/17/2019 0.1  0.0 - 0.4 K/UL Final    ABS.  BASOPHILS 09/17/2019 0.0  0.0 - 0.1 K/UL Final    DF 09/17/2019 AUTOMATED    Final    Sodium 09/17/2019 136  136 - 145 mmol/L Final    Potassium 09/17/2019 4.3  3.5 - 5.5 mmol/L Final    Chloride 09/17/2019 100  100 - 111 mmol/L Final    CO2 09/17/2019 28  21 - 32 mmol/L Final    Anion gap 09/17/2019 8  3.0 - 18 mmol/L Final    Glucose 09/17/2019 98  74 - 99 mg/dL Final    BUN 09/17/2019 18  7.0 - 18 MG/DL Final    Creatinine 09/17/2019 0.65  0.6 - 1.3 MG/DL Final    BUN/Creatinine ratio 09/17/2019 28* 12 - 20   Final    GFR est AA 09/17/2019 >60  >60 ml/min/1.73m2 Final    GFR est non-AA 09/17/2019 >60  >60 ml/min/1.73m2 Final    Calcium 09/17/2019 9.7  8.5 - 10.1 MG/DL Final    Bilirubin, total 09/17/2019 0.7  0.2 - 1.0 MG/DL Final    ALT (SGPT) 09/17/2019 41  13 - 56 U/L Final    AST (SGOT) 09/17/2019 32  10 - 38 U/L Final    Alk.  phosphatase 09/17/2019 70  45 - 117 U/L Final    Protein, total 09/17/2019 7.6  6.4 - 8.2 g/dL Final    Albumin 09/17/2019 4.3  3.4 - 5.0 g/dL Final    Globulin 09/17/2019 3.3  2.0 - 4.0 g/dL Final    A-G Ratio 09/17/2019 1.3  0.8 - 1.7   Final    Actin (Smooth Muscle) Ab 09/17/2019 6  0 - 19 Units Final    Deamidated Gliadin Ab, IgA 09/17/2019 7  0 - 19 units Final    Deamidated Gliadin Ab, IgG 09/17/2019 3  0 - 19 units Final    t-Transglutaminase, IgA 09/17/2019 <2  0 - 3 U/mL Final    t-Transglutaminase, IgG 09/17/2019 <2  0 - 5 U/mL Final    Endomysial Ab, IgA 09/17/2019 NEGATIVE   NEGATIVE   Final    Immunoglobulin A, Qt. 09/17/2019 292  64 - 422 mg/dL Final    Ferritin 09/17/2019 16  8 - 388 NG/ML Final    Michochondrial (M2) Ab 09/17/2019 <20.0  0.0 - 20.0 Units Final    Iron 09/17/2019 47* 50 - 175 ug/dL Final    TIBC 09/17/2019 434  250 - 450 ug/dL Final    Iron % saturation 09/17/2019 11  % Final    Hepatitis C virus Ab 09/17/2019 0.03  <0.80 Index Final    Hep C  virus Ab Interp. 09/17/2019 NEGATIVE   NEG   Final    Hep C  virus Ab comment 09/17/2019        Final    Hepatitis B surface Ag 09/17/2019 <0.10  <1.00 Index Final    Hep B surface Ag Interp. 09/17/2019 NEGATIVE   NEG   Final    Hep B Core Ab, total 09/17/2019 NEGATIVE   NEGATIVE   Final    CK 09/17/2019 69  26 - 192 U/L Final    Anti-DNA (DS) Ab, QT 09/17/2019 <1  0 - 9 IU/mL Final    RNP Abs 09/17/2019 <0.2  0.0 - 0.9 AI Final    Smith Abs 09/17/2019 <0.2  0.0 - 0.9 AI Final    Scleroderma-70 Ab 09/17/2019 <0.2  0.0 - 0.9 AI Final    Sjogren's Anti-SS-A 09/17/2019 <0.2  0.0 - 0.9 AI Final    Sjogren's Anti-SS-B 09/17/2019 <0.2  0.0 - 0.9 AI Final    Antichromatin Ab 09/17/2019 <0.2  0.0 - 0.9 AI Final    Anti-Taylor-1 09/17/2019 <0.2  0.0 - 0.9 AI Final    Centromere B Ab 09/17/2019 <0.2  0.0 - 0.9 AI Final    See below 09/17/2019 Comment    Final    Sed rate, automated 09/17/2019 14  0 - 30 mm/hr Final    C-Reactive protein 09/17/2019 <0.3  0 - 0.3 mg/dL Final    Color 09/17/2019 DARK YELLOW    Final    Appearance 09/17/2019 CLEAR    Final    Specific gravity 09/17/2019 1.018  1.005 - 1.030   Final    pH (UA) 09/17/2019 6.0  5.0 - 8.0   Final    Protein 09/17/2019 NEGATIVE   NEG mg/dL Final    Glucose 09/17/2019 NEGATIVE   NEG mg/dL Final    Ketone 09/17/2019 NEGATIVE   NEG mg/dL Final    Bilirubin 09/17/2019 NEGATIVE   NEG   Final    Blood 09/17/2019 TRACE* NEG   Final    Urobilinogen 09/17/2019 1.0  0.2 - 1.0 EU/dL Final    Nitrites 09/17/2019 NEGATIVE   NEG   Final    Leukocyte Esterase 09/17/2019 SMALL* NEG   Final    WBC 09/17/2019 4 to 6  0 - 4 /hpf Final    RBC 09/17/2019 2 to 3  0 - 5 /hpf Final    Epithelial cells 09/17/2019 1+  0 - 5 /lpf Final    Bacteria 09/17/2019 FEW* NEG /hpf Final       Health Maintenance:  Screening:    Mammogram: negative (7/2018)   PAP smear: S/P ALMAS   Colorectal: colonoscopy (10/2010) normal. Dr. Samara Dugan. Due 10/2020. Depression: none   DM (HbA1c/FPG): HbA1c 5.6 (6/2019)   Hepatitis C: negative (5/2016)   Falls: none   DEXA: osteopenia (8/2019).  On alendronate since 12/2016. Glaucoma: regular eye exams with Dr. Jose Garduno (last 11/2018)   Smoking: none   Vitamin D: 38.9 (8/2019)   Medicare Wellness: 6/25/2019      Impression:  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    Tinnitus H93.19    Microhematuria R31.29    Eczema L30.9    Osteopenia M85.80    Elevated TSH R79.89    Abnormal glucose R73.09    Candidal intertrigo B37.2    Closed displaced fracture of third metatarsal bone of right foot with routine healing S92.331D    Episode of transient neurologic symptoms R29.90    Speech disturbance R47.9    Carpal tunnel syndrome, bilateral G56.03    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 Hypertension. Appears well controlled on current regimen of lisinopril 10 mg daily and hydrochlorothiazide 25 mg daily. Renal function remains normal with creatinine 0.65/ eGFR >60, but BUN/creatinine ratio remains increased to 28. Encouraged to continue to drink plenty of fluids. Continue to follow. 2. Hyperlipidemia. On moderate intensity dose atorvastatin with  and HDL 81, which is worsened control on atorvastatin 10 mg daily. Admitted to poor eating and not exercising with foot issue. Dose of atorvastatin increased to 20 mg daily and repeat lipid panel today with LDL 80 and HDL 77, indicative of much improved control. Emphasized importance of lifestyle modifications, including diet, exercise, and weight loss. Will continue to follow. 3. Transient neurologic symptoms. Experienced transient episode of head tilting to the left and inability to speak, lasting 30 seconds. Was traveling back from [place] and eating lunch when episode occurred. Reported drinking fluids, but also on diuretic.  No other associated symptoms and no residual symptoms remain. Differential diagnosis includes possible TIA, seizure, or global hypoperfusion from arrhythmia or hypovolemia. Carotid duplex scan without significant ICA stenosis, and brain MRI without significant abnormality except for a stable appearing lipoma creating mild mass effect. On aspirin and statin. Has not had any recurrence of symptoms, but remains concerned regarding cause. Referral placed for Dr. Carolina Peralta, and has upcoming appointment on 10/9/2019.   4. Osteopenia. History of radial and toe fracture in 2009. Treated from 11/2009 to 11/2014 with alendronate. Repeat bone density scan in 7/2016 with 10 year risk of a major osteoporetic fracture at 14 % and hip fracture at 3.4 %. Restarted Fosamax in 12/2016. Now with new right metatarsal displaced fracture without known injury. Patient had repeat bone density on 8/6/2019 showing T-scores:  femoral neck left -2.2  /right -1.9 and lumbar -2.6 . Calculated FRAX score estimates her 10 year risk of a major osteoporetic fracture at 14 % and hip fracture at 3.9 %, which are an indication for continued biphosphonate treatment based on hip fracture risk of >3%. Continues to show osteopenia in her bilateral hips and osteoporosis of her lumbar spine, but in comparison to her prior study in 7/2016, it appears that her T-scores have remained relatively stable in her bilateral hips, but there has been some worsening in her lumbar spine. Obtained lab evaluation for secondary osteoporosis, and TSH, intact PTH, calcium, vitamin D level, and gammopathy panel normal. Given recent fracture and some worsening of bone density scan despite treatment with biphosphonate, referred to Dr. Ana Rosas to assess whether she is on optimal ttherapy. Continue calcium and Vitamin D supplements, and encouraged her to exercise, particularly weight bearing activities. 5. Displaced right third metatarsal fracture, s/p open reduction/internal fixation with putty bone grafting.  She reports that she did well after surgery, and now fully weight bearing with orthotic in shoes. No significant pain, although mild swelling with prolonged standing. Continue to follow. 6. Elevated transaminases. New onset mild elevation noted at last visit. Patient reported that she had been taking NSAIDS frequently to help control foot pain. Advised to discontinue NSAIDS and repeated today with normalization. Lab evaluation for cause negative, but did identify low iron stores. Given normalization, no further evaluation needed currently, but will continue to monitor. 7. Iron deficiency without anemia. May be related to recent foot surgery. Advised to begin MVI with iron supplement. Last colonoscopy 9 years ago which was normal. Agreeable to proceed with repeat early next year. Will continue to monitor. 8. Family history of breast cancer. Patient with family history of breast cancer in first degree relative (sister). She is currently being screened with annual mammograms. Discussed recommendations for breast cancer screening in women with lifetime risk >20%. 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 No other family member with breast cancer. Wishing to continue with annual mammograms and breast exams. 9. Abnormal glucose. Repeat normal with normal HbA1c today. Emphasized importance of lifestyle modifications, including diet, exercise, and weight loss. 10. Bilateral hand and wrist pain. Evaluation for inflammatory cause negative. Improved with changes in gripping walker and wearing of neutral wrist splints. No longer sleeping with wrists in flexed position at night. Continue to follow. 11. Chronic microhematuria. Work-up reportedly negative in past. Recent urinalysis has been negative for blood. Follow. 12. Eczema. Found good response with [place]. Followed by Dr. Kortney Elam. 13. Candidal intertrigo. Improved with Lotrisone. Discussed preventative treatment with mycostatin powder and loose clothing. 14. Overweight.  BMI now in normal range. Emphasized importance of continued lifestyle modifications. Will continue to follow. 15. Elevated TSH. Repeat with free T4 today normal. Patient with history of radiation therapy for a birthmark on her right shoulder as a child. Will continue to follow. 16. Health maintenance. Received 1/2 doses of Shingrix vaccine. Wishing to wait on influenza vaccine. Other immunizations up to date. Mammogram up to date. Colorectal cancer screening up to date. Continue regular eye exams with Dr. Olga Mcmahon. Vitamin D level normal. Continue maintenance dose supplement. Medicare wellness visit up to date. Patient understands recommendations and agrees with plan. Follow-up in 3 months.

## 2019-10-09 ENCOUNTER — OFFICE VISIT (OUTPATIENT)
Dept: NEUROLOGY | Age: 74
End: 2019-10-09

## 2019-10-09 VITALS
BODY MASS INDEX: 24.99 KG/M2 | OXYGEN SATURATION: 99 % | RESPIRATION RATE: 16 BRPM | SYSTOLIC BLOOD PRESSURE: 118 MMHG | WEIGHT: 150 LBS | HEIGHT: 65 IN | TEMPERATURE: 97.7 F | DIASTOLIC BLOOD PRESSURE: 92 MMHG | HEART RATE: 99 BPM

## 2019-10-09 DIAGNOSIS — I10 ESSENTIAL HYPERTENSION: ICD-10-CM

## 2019-10-09 DIAGNOSIS — R29.3 POSTURE ABNORMALITY: Primary | ICD-10-CM

## 2019-10-09 NOTE — PROGRESS NOTES
Jung Serrano presents today for   Chief Complaint   Patient presents with   Mata Establish Care    Other     Random head movement. Is someone accompanying this pt? No    Is the patient using any DME equipment during OV? No    Depression Screening:  3 most recent PHQ Screens 9/25/2019   Little interest or pleasure in doing things Not at all   Feeling down, depressed, irritable, or hopeless Not at all   Total Score PHQ 2 0       Learning Assessment:  Learning Assessment 6/25/2019   PRIMARY LEARNER Patient   PRIMARY LANGUAGE ENGLISH   LEARNER PREFERENCE PRIMARY READING     DEMONSTRATION   ANSWERED BY patient   RELATIONSHIP SELF       Abuse Screening:  Abuse Screening Questionnaire 9/25/2019   Do you ever feel afraid of your partner? N   Are you in a relationship with someone who physically or mentally threatens you? N   Is it safe for you to go home? Y         Coordination of Care:  1. Have you been to the ER, urgent care clinic since your last visit? Hospitalized since your last visit? No    2. Have you seen or consulted any other health care providers outside of the [address] [place] [address] since your last visit? Include any pap smears or colon screening.  No

## 2019-10-09 NOTE — LETTER
10/9/19 Patient: Mojgan Gonzales YOB: 1945 Date of Visit: 10/9/2019 Tashi Alonso MD 
[address] [place] [address] VIA In Basket Dear Tashi Alonso MD, Thank you for referring Ms. Zahira Liu to i  for evaluation. My notes for this consultation are attached. If you have questions, please do not hesitate to call me. I look forward to following your patient along with you.  
 
 
Sincerely, 
 
Eric Cook MD

## 2019-10-09 NOTE — PROGRESS NOTES
HPI:  75 y/o female referred by Dr Janeen Villeda for evaluation of an involuntary movement of the head. One day in June when having lunch with family she was noticed that for 15 secs her head/neck leaned to the left. This has not happened again. She did not have any loss of consciousness at all. There no slurred speech, weakness, or droopiness. Reportedly she had a different look in her face which was the strange. This has not happened again. When pressed she adds she had some sort of weird feeling in her face. She got concerned because she has family history of strokes. She does report at times when she gets really nervous she has had a funny type sensation in her head without any shortness of breath, chest pains, or dizziness. 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 She had a normal carotid ultrasound in July. Her last LDL in August was 80.6. She had a June hemoglobin A1c of 5.6. She rarely drinks and she does not smoke. Social History     Socioeconomic History    Marital status:      Spouse name: Not on file    Number of children: Not on file    Years of education: Not on file    Highest education level: Not on file   Occupational History    Not on file   Social Needs    Financial resource strain: Not on file    Food insecurity:     Worry: Not on file     Inability: Not on file    Transportation needs:     Medical: Not on file     Non-medical: Not on file   Tobacco Use    Smoking status: Never Smoker    Smokeless tobacco: Never Used   Substance and Sexual Activity    Alcohol use:  Yes     Alcohol/week: 0.0 standard drinks     Comment: occasionally    Drug use: No    Sexual activity: Yes     Partners: Male   Lifestyle    Physical activity:     Days per week: Not on file     Minutes per session: Not on file    Stress: Not on file   Relationships    Social connections:     Talks on phone: Not on file     Gets together: Not on file     Attends Cheondoism service: Not on file     Active member of club or organization: Not on file     Attends meetings of clubs or organizations: Not on file     Relationship status: Not on file    Intimate partner violence:     Fear of current or ex partner: Not on file     Emotionally abused: Not on file     Physically abused: Not on file     Forced sexual activity: Not on file   Other Topics Concern    Not on file   Social History Narrative    Not on file       Family History   Problem Relation Age of Onset    Cancer Mother         pancreatic    Heart Disease Mother     Heart Disease Father     Cancer Sister         breast    MS Sister     Breast Cancer Sister 62       Current Outpatient Medications   Medication Sig Dispense Refill    lisinopril (PRINIVIL, ZESTRIL) 10 mg tablet TAKE 1 TABLET BY MOUTH DAILY 90 Tab 2    atorvastatin (LIPITOR) 20 mg tablet Take 1 Tab by mouth daily. 90 Tab 2    omeprazole (PRILOSEC OTC) 20 mg tablet Take 20 mg by mouth daily.  hydroCHLOROthiazide (HYDRODIURIL) 25 mg tablet TAKE 1 TABLET BY MOUTH DAILY 90 Tab 2    aspirin delayed-release 81 mg tablet Take  by mouth daily.  alendronate (FOSAMAX) 70 mg tablet TAKE 1 TABLET BY MOUTH EVERY 7 DAYS 12 Tab 3    crisaborole (EUCRISA) 2 % oint by Apply Externally route.  triamcinolone acetonide (KENALOG) 0.025 % topical cream Apply  to affected area two (2) times a day. 15 g 2    clotrimazole-betamethasone (LOTRISONE) topical cream Apply  to affected area two (2) times a day. 15 g 2    docusate sodium (COLACE) 100 mg capsule Take 100 mg by mouth daily as needed for Constipation.  co-enzyme Q-10 (CO Q-10) 100 mg capsule Take 100 mg by mouth daily.  biotin 2,500 mcg Tab Take 2,500 mcg by mouth.       cholecalciferol (VITAMIN D3) 1,000 unit cap Take 1 Tab by mouth daily.  fexofenadine (ALLEGRA) 60 mg tablet Take 60 mg by mouth daily.  CALCIUM CARBONATE/VITAMIN D3 (CALCIUM + D PO) Take 1 Tab by mouth two (2) times a day.  amoxicillin 500 mg tab TAKE ONE CAPSULE PO TID, START ONE DAY PRIOR TO PROCEDURE, DAY OF PROCEDURE AND DAY AFTER PROCEDURE 10 Tab 1    ondansetron hcl (ZOFRAN) 4 mg tablet Take 1 Tab by mouth every eight (8) hours as needed for Nausea. 30 Tab 0    polyethylene glycol (MIRALAX) 17 gram packet Take 1 Packet by mouth daily.  10 Packet 1       Past Medical History:   Diagnosis Date    Eczema     Essential hypertension     FH: breast cancer     Fibroids     uterine    Fracture of radius 11/2009    with ulna, left, closed    History of cataract     History of foot fracture     right and left    Hyperlipidemia     Microhematuria     chronic    Osteopenia     S/P radiation therapy     as child, to right shoulder for birthmark    Tinnitus     Zoster     right scapula       Past Surgical History:   Procedure Laterality Date    HX CATARACT REMOVAL      HX FRACTURE TX Right 04/24/2019    right foot 3rd metatarsal fracture    HX ORTHOPAEDIC      left arm    HX WISDOM TEETH EXTRACTION      HX WRIST FRACTURE TX         Allergies   Allergen Reactions    Codeine Nausea Only    Hibiclens [Chlorhexidine Gluconate] Rash       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    Tinnitus H93.19    Microhematuria R31.29    Eczema L30.9    Osteopenia M85.80    Elevated TSH R79.89    Abnormal glucose R73.09    Candidal intertrigo B37.2    Closed displaced fracture of third metatarsal bone of right foot with routine healing S92.331D    Episode of transient neurologic symptoms R29.90    Speech disturbance R47.9    Carpal tunnel syndrome, bilateral G56.03    Osteoporosis of lumbar spine M81.8    Elevated LFTs R94.5    Dietary iron deficiency without anemia E61.1         Review of Systems:   Constitutional: no fever or chills  Skin denies rash or itching  HEENT:  Denies tinnitus, hearing loss, or visual changes  Respiratory: denies shortness of breath  Cardiovascular: denies chest pain, dyspnea on exertion  Gastrointestinal: does not report nausea or vomiting  Genitourinary: does not report dysuria or incontinence  Musculoskeletal: does not report joint pain or swelling  Endocrine: denies weight change  Hematology: denies easy bruising or bleeding   Neurological: as above in HPI      PHYSICAL EXAMINATION:         Vital signs:    Visit Vitals  BP (!) 118/92 (BP 1 Location: Left arm, BP Patient Position: Sitting)   Pulse 99   Temp 97.7 °F (36.5 °C) (Oral)   Resp 16   Ht 5' 5\" (1.651 m)   Wt 68 kg (150 lb)   LMP  (LMP Unknown)   SpO2 99%   BMI 24.96 kg/m²         GENERAL:                  Well developed, well nourished, in no apparent distress. HEART:                       RR, no murmurs  EXTREMITIES:           No edema is identified. Pulses are +2. HEAD:                         Normocephalic, atraumatic. NEUROLOGIC EXAMINATION       MENTAL STATUS:     Awake, alert, and oriented x 4. Attention and STM are grossly normal. There is no aphasia. Fund of knowledge is adequate. Mood and affect are appropriate  CRANIAL NERVES:   Visual fields are full to confrontation. Pupils are reactive to light and accommodation. Fundi are normal.   Extraocular movements are intact and there is no nystagmus. Facial sensation is normal  Face is symmetrical.   Hearing is present. SCM/TPZ 5/5  Tongue protrudes midline, palate elevates symmetrically. MOTOR:          5/5 strength throughout, normal tone. CEREBELLAR:           No tremors or dysmetria     SENSORY:     Normal distal pinprick, proprioception, and vibration. Romberg is negative. DTR's:                         +2 throughout, no long tract signs                 GAIT:                           Normal gait    I have personally reviewed imaging studies. Impression: Her episode was very fleeting, translating to make much of it, so I am not even sure this was necessarily pathological, as what she is describing is a posture of her neck without any alterations of consciousness or any other focal neurological findings to be highly suspect that it could be from a primary neurological process such as a TIA or a seizure. Plan: 1reassured her that the combination of her presentation, her normal physical examination, and the imaging studies as well as serology are not suggesting that she had some type of honors nervous system process such as stroke or a seizure. 2counseled her in detail about continued monitoring of stroke risk factors, which in her case include hypertension and hyperlipidemia. 3 advised her to return if symptoms return. For now there is no need for additional neurological work-up or interventions. 25 out of 45 minutes were spent counseling on the above issues. PLEASE NOTE:   Portions of this document may have been produced using voice recognition software. Unrecognized errors in transcription may be present. This note will not be viewable in [address]

## 2019-11-04 ENCOUNTER — OFFICE VISIT (OUTPATIENT)
Dept: ORTHOPEDIC SURGERY | Age: 74
End: 2019-11-04

## 2019-11-04 VITALS
HEART RATE: 90 BPM | BODY MASS INDEX: 23.95 KG/M2 | OXYGEN SATURATION: 99 % | DIASTOLIC BLOOD PRESSURE: 84 MMHG | SYSTOLIC BLOOD PRESSURE: 131 MMHG | WEIGHT: 149 LBS | HEIGHT: 66 IN | TEMPERATURE: 97.1 F

## 2019-11-04 DIAGNOSIS — Z98.890 POST-OPERATIVE STATE: ICD-10-CM

## 2019-11-04 DIAGNOSIS — S92.331K: Primary | ICD-10-CM

## 2019-11-04 NOTE — PROGRESS NOTES
Patient: Jung Serrano                MRN: [id number]       SSN: [id number]  YOB: 1945              AGE: 76 y.o. SEX: female    Lindsey Vasquez MD    POST OP OFFICE NOTE  DOS: 4/24/19    Chief Complaint:   Chief Complaint   Patient presents with    Foot Pain     RIGHT       HPI:     The patient is a 76 y.o. female who presents today for follow up 6 months s/p:     OPEN REDUCTION RIGHT THIRD METATARSAL  DBX PUTTY BONE GRAFTING BONE GRAFT/C-ARM/ARTHREX/NERVE BLOCK    Patient has been WBAT to the right lower extremity in [facility]. She has been wearing her orthotic and states that it helps. She has been using the bone stimulator as instructed. Patient states that she is doing very well with no pain with or without the custom orthotic. She can wear a variety of shoes with no problem. She has misplaced her implant card and would like a replacement as she is planning to travel in the near future. PHYSICAL EXAM:     Visit Vitals  /84   Pulse 90   Temp 97.1 °F (36.2 °C) (Oral)   Ht 5' 5.5\" (1.664 m)   Wt 149 lb (67.6 kg)   LMP  (LMP Unknown)   SpO2 99%   BMI 24.42 kg/m²         Pain Scale: 0 - No pain/10      GEN:  Alert, well developed, well nourished, well appearing 76 y.o. female in no acute distress. PSYCH:  Normal affect, mood, and conduct. alert, oriented x 3 alert, oriented x 3, no dementia  M/S EXAMINATION OF: right foot    INCISION: Incision looks good, skin well healed  SKIN: no edema , no erythema, no ecchymosis   TENDERNESS:  no tenderness to palpation   NEUROVASCULAR:  grossly intact. Positive distal pulses and capillary refill. DVT ASSESSMENT:  The calf is not tender to palpation. No evidence of DVT seen on physical exam.  ROM: WNL       RADIOGRAPHS & DIAGNOSTIC STUDIES       X-rays, 3 views of the right foot reveals post op changes s/p ORIF third metatarsal. Fracture is healed and alignment looks good. The hardware remains in good position.   Mineralization suggests osteopenia. IMPRESSION:         ICD-10-CM ICD-9-CM    1. Closed displaced fracture of third metatarsal bone of right foot with nonunion, subsequent encounter S92.331K 733.82    2. Post-operative state Z98.890 V45.89 AMB POC XRAY, FOOT; COMPLETE, 3+ VIEW         PLAN:         Orders Placed This Encounter    [20426] Foot Min 3V     Order Specific Question:   Weight bearing? Answer: No              · Progress activity as tolerated    · Discontinue to use bone stimulator     · Continue to wear orthotic as needed     · Implant card provided    · Please follow up as needed       Patient has been discussed with Dr. Jordyn Zarate during this visit and he agrees with the assessment and plan    Patient expresses understanding of the plan. Patient education provided on post surgical care. REVIEW OF SYSTEMS:     Otherwise as noted in HPI      PAST MEDICAL HISTORY:     Past Medical History:   Diagnosis Date    Eczema     Essential hypertension     FH: breast cancer     Fibroids     uterine    Fracture of radius 11/2009    with ulna, left, closed    History of cataract     History of foot fracture     right and left    Hyperlipidemia     Microhematuria     chronic    Osteopenia     S/P radiation therapy     as child, to right shoulder for birthmark    Tinnitus     Zoster     right scapula       MEDICATIONS:     Current Outpatient Medications   Medication Sig    lisinopril (PRINIVIL, ZESTRIL) 10 mg tablet TAKE 1 TABLET BY MOUTH DAILY    atorvastatin (LIPITOR) 20 mg tablet Take 1 Tab by mouth daily.  omeprazole (PRILOSEC OTC) 20 mg tablet Take 20 mg by mouth daily.  hydroCHLOROthiazide (HYDRODIURIL) 25 mg tablet TAKE 1 TABLET BY MOUTH DAILY    aspirin delayed-release 81 mg tablet Take  by mouth daily.     amoxicillin 500 mg tab TAKE ONE CAPSULE PO TID, START ONE DAY PRIOR TO PROCEDURE, DAY OF PROCEDURE AND DAY AFTER PROCEDURE    alendronate (FOSAMAX) 70 mg tablet TAKE 1 TABLET BY MOUTH EVERY 7 DAYS    ondansetron hcl (ZOFRAN) 4 mg tablet Take 1 Tab by mouth every eight (8) hours as needed for Nausea.  polyethylene glycol (MIRALAX) 17 gram packet Take 1 Packet by mouth daily.  crisaborole (EUCRISA) 2 % oint by Apply Externally route.  triamcinolone acetonide (KENALOG) 0.025 % topical cream Apply  to affected area two (2) times a day.  clotrimazole-betamethasone (LOTRISONE) topical cream Apply  to affected area two (2) times a day.  docusate sodium (COLACE) 100 mg capsule Take 100 mg by mouth daily as needed for Constipation.  co-enzyme Q-10 (CO Q-10) 100 mg capsule Take 100 mg by mouth daily.  biotin 2,500 mcg Tab Take 2,500 mcg by mouth.  cholecalciferol (VITAMIN D3) 1,000 unit cap Take 1 Tab by mouth daily.  fexofenadine (ALLEGRA) 60 mg tablet Take 60 mg by mouth daily.  CALCIUM CARBONATE/VITAMIN D3 (CALCIUM + D PO) Take 1 Tab by mouth two (2) times a day. No current facility-administered medications for this visit.         ALLERGIES:     Allergies   Allergen Reactions    Codeine Nausea Only    Hibiclens [Chlorhexidine Gluconate] Rash         PAST SURGICAL HISTORY:     Past Surgical History:   Procedure Laterality Date    HX CATARACT REMOVAL      HX FRACTURE TX Right 04/24/2019    right foot 3rd metatarsal fracture    HX ORTHOPAEDIC      left arm    HX WISDOM TEETH EXTRACTION      HX WRIST FRACTURE TX         SOCIAL HISTORY:     Social History     Socioeconomic History    Marital status:      Spouse name: Not on file    Number of children: Not on file    Years of education: Not on file    Highest education level: Not on file   Occupational History    Not on file   Social Needs    Financial resource strain: Not on file    Food insecurity:     Worry: Not on file     Inability: Not on file    Transportation needs:     Medical: Not on file     Non-medical: Not on file   Tobacco Use    Smoking status: Never Smoker    Smokeless tobacco: Never Used   Substance and Sexual Activity    Alcohol use:  Yes     Alcohol/week: 0.0 standard drinks     Comment: occasionally    Drug use: No    Sexual activity: Yes     Partners: Male   Lifestyle    Physical activity:     Days per week: Not on file     Minutes per session: Not on file    Stress: Not on file   Relationships    Social connections:     Talks on phone: Not on file     Gets together: Not on file     Attends Confucianist service: Not on file     Active member of club or organization: Not on file     Attends meetings of clubs or organizations: Not on file     Relationship status: Not on file    Intimate partner violence:     Fear of current or ex partner: Not on file     Emotionally abused: Not on file     Physically abused: Not on file     Forced sexual activity: Not on file   Other Topics Concern    Not on file   Social History Narrative    Not on file       FAMILY HISTORY:     Family History   Problem Relation Age of Onset    Cancer Mother         pancreatic    Heart Disease Mother     Heart Disease Father     Cancer Sister         breast    MS Sister     Breast Cancer Sister [address], PA-C  11/4/2019

## 2019-11-13 ENCOUNTER — CLINICAL SUPPORT (OUTPATIENT)
Dept: INTERNAL MEDICINE CLINIC | Age: 74
End: 2019-11-13

## 2019-11-13 DIAGNOSIS — Z23 ENCOUNTER FOR IMMUNIZATION: ICD-10-CM

## 2019-11-13 NOTE — PROGRESS NOTES
Margaret Hsieh 1945 female who presents for routine immunizations. Patient denies any symptoms , reactions or allergies that would exclude them from being immunized today. Risks and adverse reactions were discussed and the VIS was given to them. All questions were addressed. Order placed for hd flu vaccine,  per Verbal Order from DR. Pacheco with read back. Patient declined to stay for observation.     Annabelle Smith LPN

## 2019-11-13 NOTE — PATIENT INSTRUCTIONS
Vaccine Information Statement    Influenza (Flu) Vaccine (Inactivated or Recombinant): What You Need to Know    Many Vaccine Information Statements are available in Syriac and other languages. See www.immunize.org/vis  Hojas de información sobre vacunas están disponibles en español y en muchos otros idiomas. Visite www.immunize.org/vis    1. Why get vaccinated? Influenza vaccine can prevent influenza (flu). Flu is a contagious disease that spreads around the [facility] every year, usually between October and May. Anyone can get the flu, but it is more dangerous for some people. Infants and young children, people 72years of age and older, pregnant women, and people with certain health conditions or a weakened immune system are at greatest risk of flu complications. Pneumonia, bronchitis, sinus infections and ear infections are examples of flu-related complications. If you have a medical condition, such as heart disease, cancer or diabetes, flu can make it worse. Flu can cause fever and chills, sore throat, muscle aches, fatigue, cough, headache, and runny or stuffy nose. Some people may have vomiting and diarrhea, though this is more common in children than adults. Each year thousands of people in the [facility] die from flu, and many more are hospitalized. Flu vaccine prevents millions of illnesses and flu-related visits to the doctor each year. 2. Influenza vaccines     CDC recommends everyone 10months of age and older get vaccinated every flu season. Children 6 months through 6years of age may need 2 doses during a single flu season. Everyone else needs only 1 dose each flu season. It takes about 2 weeks for protection to develop after vaccination. There are many flu viruses, and they are always changing. Each year a new flu vaccine is made to protect against three or four viruses that are likely to cause disease in the upcoming flu season.  Even when the vaccine doesnt exactly match these viruses, it may still provide some protection. Influenza vaccine does not cause flu. Influenza vaccine may be given at the same time as other vaccines. 3. Talk with your health care provider    Tell your vaccine provider if the person getting the vaccine:   Has had an allergic reaction after a previous dose of influenza vaccine, or has any severe, life-threatening allergies.  Has ever had Guillain-Barré Syndrome (also called GBS). In some cases, your health care provider may decide to postpone influenza vaccination to a future visit. People with minor illnesses, such as a cold, may be vaccinated. People who are moderately or severely ill should usually wait until they recover before getting influenza vaccine. Your health care provider can give you more information. 4. Risks of a reaction     Soreness, redness, and swelling where shot is given, fever, muscle aches, and headache can happen after influenza vaccine.  There may be a very small increased risk of Guillain-Barré Syndrome (GBS) after inactivated influenza vaccine (the flu shot). Ulysses Ricker children who get the flu shot along with pneumococcal vaccine (PCV13), and/or DTaP vaccine at the same time might be slightly more likely to have a seizure caused by fever. Tell your health care provider if a child who is getting flu vaccine has ever had a seizure. People sometimes faint after medical procedures, including vaccination. Tell your provider if you feel dizzy or have vision changes or ringing in the ears. As with any medicine, there is a very remote chance of a vaccine causing a severe allergic reaction, other serious injury, or death. 5. What if there is a serious problem? An allergic reaction could occur after the vaccinated person leaves the clinic.  If you see signs of a severe allergic reaction (hives, swelling of the face and throat, difficulty breathing, a fast heartbeat, dizziness, or weakness), call 9-1-1 and get the person to the nearest hospital.    For other signs that concern you, call your health care provider. Adverse reactions should be reported to the Vaccine Adverse Event Reporting System (VAERS). Your health care provider will usually file this report, or you can do it yourself. Visit the VAERS website at www.vaers. hhs.gov or call 2-735.592.1320. VAERS is only for reporting reactions, and VAERS staff do not give medical advice. 6. The National Vaccine Injury Compensation Program    The [facility] Vaccine Injury Compensation Program (VICP) is a federal program that was created to compensate people who may have been injured by certain vaccines. Visit the VICP website at www.hrsa.gov/vaccinecompensation or call 6-436.234.2106 to learn about the program and about filing a claim. There is a time limit to file a claim for compensation. 7. How can I learn more?  Ask your health care provider.  Call your local or state health department.  Contact the Centers for Disease Control and Prevention (CDC):  - Call 6-526.782.9242 (1-800-CDC-INFO) or  - Visit CDCs influenza website at www.cdc.gov/flu    Vaccine Information Statement (Interim)  Inactivated Influenza Vaccine   8/15/2019  [address] [id number]   Department of Health and Human Services  Centers for Disease Control and Prevention    Office Use Only

## 2020-01-02 ENCOUNTER — HOSPITAL ENCOUNTER (OUTPATIENT)
Dept: LAB | Age: 75
Discharge: HOME | End: 2020-01-02
Payer: MEDICARE

## 2020-01-02 ENCOUNTER — APPOINTMENT (OUTPATIENT)
Dept: INTERNAL MEDICINE CLINIC | Age: 75
End: 2020-01-02

## 2020-01-02 DIAGNOSIS — R73.09 ABNORMAL GLUCOSE: ICD-10-CM

## 2020-01-02 DIAGNOSIS — I10 ESSENTIAL HYPERTENSION: ICD-10-CM

## 2020-01-02 DIAGNOSIS — E61.1 DIETARY IRON DEFICIENCY WITHOUT ANEMIA: ICD-10-CM

## 2020-01-02 DIAGNOSIS — S92.331D CLOSED DISPLACED FRACTURE OF THIRD METATARSAL BONE OF RIGHT FOOT WITH ROUTINE HEALING: ICD-10-CM

## 2020-01-02 DIAGNOSIS — R29.90 EPISODE OF TRANSIENT NEUROLOGIC SYMPTOMS: ICD-10-CM

## 2020-01-02 DIAGNOSIS — L30.9 ECZEMA, UNSPECIFIED TYPE: ICD-10-CM

## 2020-01-02 DIAGNOSIS — R79.89 ELEVATED LFTS: ICD-10-CM

## 2020-01-02 DIAGNOSIS — Z80.3 FH: BREAST CANCER: ICD-10-CM

## 2020-01-02 DIAGNOSIS — M85.89 OSTEOPENIA OF MULTIPLE SITES: ICD-10-CM

## 2020-01-02 DIAGNOSIS — M81.0 OSTEOPOROSIS OF LUMBAR SPINE: ICD-10-CM

## 2020-01-02 DIAGNOSIS — E78.5 HYPERLIPIDEMIA, UNSPECIFIED HYPERLIPIDEMIA TYPE: ICD-10-CM

## 2020-01-02 LAB
25(OH)D3 SERPL-MCNC: 33.5 NG/ML (ref 30–100)
ALBUMIN SERPL-MCNC: 4.2 G/DL (ref 3.4–5)
ALBUMIN/GLOB SERPL: 1.3 {RATIO} (ref 0.8–1.7)
ALP SERPL-CCNC: 70 U/L (ref 45–117)
ALT SERPL-CCNC: 67 U/L (ref 13–56)
ANION GAP SERPL CALC-SCNC: 6 MMOL/L (ref 3–18)
AST SERPL-CCNC: 50 U/L (ref 10–38)
BASOPHILS # BLD: 0 K/UL (ref 0–0.1)
BASOPHILS NFR BLD: 0 % (ref 0–2)
BILIRUB SERPL-MCNC: 0.5 MG/DL (ref 0.2–1)
BUN SERPL-MCNC: 19 MG/DL (ref 7–18)
BUN/CREAT SERPL: 27 (ref 12–20)
CALCIUM SERPL-MCNC: 9.3 MG/DL (ref 8.5–10.1)
CHLORIDE SERPL-SCNC: 103 MMOL/L (ref 100–111)
CHOLEST SERPL-MCNC: 202 MG/DL
CO2 SERPL-SCNC: 27 MMOL/L (ref 21–32)
CREAT SERPL-MCNC: 0.71 MG/DL (ref 0.6–1.3)
DIFFERENTIAL METHOD BLD: ABNORMAL
EOSINOPHIL # BLD: 0.3 K/UL (ref 0–0.4)
EOSINOPHIL NFR BLD: 4 % (ref 0–5)
ERYTHROCYTE [DISTWIDTH] IN BLOOD BY AUTOMATED COUNT: 16.2 % (ref 11.6–14.5)
EST. AVERAGE GLUCOSE BLD GHB EST-MCNC: 105 MG/DL
FERRITIN SERPL-MCNC: 47 NG/ML (ref 8–388)
GLOBULIN SER CALC-MCNC: 3.3 G/DL (ref 2–4)
GLUCOSE SERPL-MCNC: 95 MG/DL (ref 74–99)
HBA1C MFR BLD: 5.3 % (ref 4.2–5.6)
HCT VFR BLD AUTO: 43.1 % (ref 35–45)
HDLC SERPL-MCNC: 82 MG/DL (ref 40–60)
HDLC SERPL: 2.5 {RATIO} (ref 0–5)
HGB BLD-MCNC: 13.7 G/DL (ref 12–16)
IRON SATN MFR SERPL: 21 %
IRON SERPL-MCNC: 80 UG/DL (ref 50–175)
LDLC SERPL CALC-MCNC: 94.2 MG/DL (ref 0–100)
LIPID PROFILE,FLP: ABNORMAL
LYMPHOCYTES # BLD: 1.8 K/UL (ref 0.9–3.6)
LYMPHOCYTES NFR BLD: 25 % (ref 21–52)
MAGNESIUM SERPL-MCNC: 2 MG/DL (ref 1.6–2.6)
MCH RBC QN AUTO: 28.6 PG (ref 24–34)
MCHC RBC AUTO-ENTMCNC: 31.8 G/DL (ref 31–37)
MCV RBC AUTO: 90 FL (ref 74–97)
MONOCYTES # BLD: 0.7 K/UL (ref 0.05–1.2)
MONOCYTES NFR BLD: 10 % (ref 3–10)
NEUTS SEG # BLD: 4.4 K/UL (ref 1.8–8)
NEUTS SEG NFR BLD: 61 % (ref 40–73)
PLATELET # BLD AUTO: 292 K/UL (ref 135–420)
PMV BLD AUTO: 9.8 FL (ref 9.2–11.8)
POTASSIUM SERPL-SCNC: 4.3 MMOL/L (ref 3.5–5.5)
PROT SERPL-MCNC: 7.5 G/DL (ref 6.4–8.2)
RBC # BLD AUTO: 4.79 M/UL (ref 4.2–5.3)
SODIUM SERPL-SCNC: 136 MMOL/L (ref 136–145)
T4 FREE SERPL-MCNC: 1 NG/DL (ref 0.7–1.5)
TIBC SERPL-MCNC: 375 UG/DL (ref 250–450)
TRIGL SERPL-MCNC: 129 MG/DL (ref ?–150)
TSH SERPL DL<=0.05 MIU/L-ACNC: 4.95 UIU/ML (ref 0.36–3.74)
VLDLC SERPL CALC-MCNC: 25.8 MG/DL
WBC # BLD AUTO: 7.2 K/UL (ref 4.6–13.2)

## 2020-01-02 PROCEDURE — 80061 LIPID PANEL: CPT

## 2020-01-02 PROCEDURE — 83036 HEMOGLOBIN GLYCOSYLATED A1C: CPT

## 2020-01-02 PROCEDURE — 83540 ASSAY OF IRON: CPT

## 2020-01-02 PROCEDURE — 36415 COLL VENOUS BLD VENIPUNCTURE: CPT

## 2020-01-02 PROCEDURE — 82306 VITAMIN D 25 HYDROXY: CPT

## 2020-01-02 PROCEDURE — 80053 COMPREHEN METABOLIC PANEL: CPT

## 2020-01-02 PROCEDURE — 83735 ASSAY OF MAGNESIUM: CPT

## 2020-01-02 PROCEDURE — 85025 COMPLETE CBC W/AUTO DIFF WBC: CPT

## 2020-01-02 PROCEDURE — 84439 ASSAY OF FREE THYROXINE: CPT

## 2020-01-02 PROCEDURE — 84443 ASSAY THYROID STIM HORMONE: CPT

## 2020-01-02 PROCEDURE — 82728 ASSAY OF FERRITIN: CPT

## 2020-01-03 ENCOUNTER — HOSPITAL ENCOUNTER (OUTPATIENT)
Dept: LAB | Age: 75
Discharge: HOME | End: 2020-01-03
Payer: MEDICARE

## 2020-01-03 DIAGNOSIS — M81.0 OSTEOPOROSIS: ICD-10-CM

## 2020-01-03 LAB
CALCIUM SERPL-MCNC: 9.7 MG/DL (ref 8.5–10.1)
CREAT SERPL-MCNC: 0.67 MG/DL (ref 0.6–1.3)
MAGNESIUM SERPL-MCNC: 2.1 MG/DL (ref 1.6–2.6)
PHOSPHATE SERPL-MCNC: 4.2 MG/DL (ref 2.5–4.9)

## 2020-01-03 PROCEDURE — 82310 ASSAY OF CALCIUM: CPT

## 2020-01-03 PROCEDURE — 83735 ASSAY OF MAGNESIUM: CPT

## 2020-01-03 PROCEDURE — 36415 COLL VENOUS BLD VENIPUNCTURE: CPT

## 2020-01-03 PROCEDURE — 82565 ASSAY OF CREATININE: CPT

## 2020-01-03 PROCEDURE — 84100 ASSAY OF PHOSPHORUS: CPT

## 2020-01-09 ENCOUNTER — OFFICE VISIT (OUTPATIENT)
Dept: INTERNAL MEDICINE CLINIC | Age: 75
End: 2020-01-09

## 2020-01-09 VITALS
HEART RATE: 85 BPM | OXYGEN SATURATION: 93 % | HEIGHT: 66 IN | TEMPERATURE: 98 F | RESPIRATION RATE: 14 BRPM | SYSTOLIC BLOOD PRESSURE: 128 MMHG | WEIGHT: 150.4 LBS | BODY MASS INDEX: 24.17 KG/M2 | DIASTOLIC BLOOD PRESSURE: 66 MMHG

## 2020-01-09 DIAGNOSIS — R73.09 ABNORMAL GLUCOSE: ICD-10-CM

## 2020-01-09 DIAGNOSIS — E78.5 HYPERLIPIDEMIA, UNSPECIFIED HYPERLIPIDEMIA TYPE: ICD-10-CM

## 2020-01-09 DIAGNOSIS — G56.03 CARPAL TUNNEL SYNDROME, BILATERAL: ICD-10-CM

## 2020-01-09 DIAGNOSIS — M81.0 OSTEOPOROSIS OF LUMBAR SPINE: ICD-10-CM

## 2020-01-09 DIAGNOSIS — M85.89 OSTEOPENIA OF MULTIPLE SITES: ICD-10-CM

## 2020-01-09 DIAGNOSIS — R79.89 ELEVATED TSH: ICD-10-CM

## 2020-01-09 DIAGNOSIS — R29.90 EPISODE OF TRANSIENT NEUROLOGIC SYMPTOMS: ICD-10-CM

## 2020-01-09 DIAGNOSIS — L30.9 ECZEMA, UNSPECIFIED TYPE: ICD-10-CM

## 2020-01-09 DIAGNOSIS — S92.331D CLOSED DISPLACED FRACTURE OF THIRD METATARSAL BONE OF RIGHT FOOT WITH ROUTINE HEALING: ICD-10-CM

## 2020-01-09 DIAGNOSIS — R79.89 ELEVATED LFTS: Primary | ICD-10-CM

## 2020-01-09 DIAGNOSIS — E61.1 DIETARY IRON DEFICIENCY WITHOUT ANEMIA: ICD-10-CM

## 2020-01-09 DIAGNOSIS — I10 ESSENTIAL HYPERTENSION: ICD-10-CM

## 2020-01-09 NOTE — PROGRESS NOTES
Chief Complaint   Patient presents with    Cholesterol Problem     3 month follow up with lab reivew.  Hypertension     3 month follow up with lab review. There are no preventive care reminders to display for this patient. 1. Have you been to the ER, urgent care clinic or hospitalized since your last visit? NO.     2. Have you seen or consulted any other health care providers outside of the [address] [place] [address] since your last visit (Include any pap smears or colon screening)? YES, rheumatologist, last seen within the last 3 months. Learning Assessment 6/25/2019   PRIMARY LEARNER Patient   PRIMARY LANGUAGE ENGLISH   LEARNER PREFERENCE PRIMARY READING     DEMONSTRATION   ANSWERED BY patient   RELATIONSHIP SELF     Abuse Screening Questionnaire 1/9/2020   Do you ever feel afraid of your partner? N   Are you in a relationship with someone who physically or mentally threatens you? N   Is it safe for you to go home? Y     3 most recent PHQ Screens 1/9/2020   Little interest or pleasure in doing things Not at all   Feeling down, depressed, irritable, or hopeless Not at all   Total Score PHQ 2 0     Fall Risk Assessment, last 12 mths 1/9/2020   Able to walk? Yes   Fall in past 12 months?  No

## 2020-01-13 PROBLEM — R47.9 SPEECH DISTURBANCE: Status: RESOLVED | Noted: 2019-06-29 | Resolved: 2020-01-13

## 2020-01-13 NOTE — PROGRESS NOTES
HPI:   Errol Glover is a 76y.o. year old female who presents today for a routine visit and for evaluation of hypertension, hyperlipidemia, osteopenia, left sciatica, and chronic microhematuria. She was seen on 8/27/2019 and was noted to have elevated transaminases on routine labs (AST 50 and ALT 67). She was instructed to discontinue all NSAIDS which she had been taking for her right foot pain. She was seen again on 9/25/2019 and repeat testing showed normalization of her transaminases. She returns today for follow-up and reports being under a significant amount of stress caring for her sister and mother in law. She states that she also is burdened with the recent news that her niece was diagnosed with an ocular melanoma at the age of 40. She states that she has been taking two Excedrin almost every morning due to a headache, and she is also continuing to drink approximately two glasses of wine each night. She is otherwise without new complaints. On 6/25/2019, she reported that two weeks prior, while returning home from her granddaughter's graduation, she and her  stopped at a restaurant for lunch. She states that while she was chewing a mouth full of food, her  noticed that she suddenly tilted her head to the left side and seemed to look dazed. She states that she remained awake and was aware that he was talking to her, but she states that she could not respond. She states that the episode lasted approximately 30 seconds and resolved, and she proceeded to complete her chewing and swallow her food. She states that she did not lose consciousness, or notice any headache, visual changes, lightheadedness, tongue biting, incontinence, focal deficits, or seizure like activity. She denied any chest pain, shortness of breath, palpitations, pleuritic chest pain, hemoptysis, calf pain or swelling.  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 She also had a carotid duplex scan (7/9/2019) showing only mild (< 50%) stenosis of the bilateral internal carotid arteries. She underwent evaluation by Dr. Allison Block in 10/2019, and he did not feel that the episode represented a TIA or other neurologic episode. He felt that no further evaluation was needed. She has not had a recurrence of these symptoms. On 4/15/2019, she underwent evaluation at [facility] for right foot pain. She denied known injury. Right foot x-ray showed a displaced third metatarsal fracture and she was referred to Dr. Polina Morgan for evaluation. Given the displacement and risk of metatarsalgia, surgery was recommended, and on 4/24/2019, she underwent open reduction/internal fixation of the third metatarsal with putty bone grafting. She reports that she did well after surgery, and and was non-weight bearing in a CAM boot for several months. On 5/15/2019, she presented complaining of bilateral hand and wrist pain. She denied any fever, chills, sore throat, rashes, dyspnea, cough, abdominal pain, dysuria, focal weakness, visual changes, or headache. She did admit to sleeping with her bilateral hands tucked beneath her in a flexed position while lying on her back due to her limitations from her foot surgery. She also reported that she had been using a walker to ambulate and gripping it tightly with her hands. Lab evaluation including MANOLO, RF, CCP, ESR, uric acid were normal, but CRP was increased to 9.1. A diagnosis of presumed carpal tunnel syndrome was made, and her discomfort resolved with the use of wrist splints. She was released to weight bear as tolerate in 7/2019, and has not had any significant pain although has been experiencing some swelling which resolves with elevation.  She has a history of osteopenia, and in 2009, she sustained a fracture of her left 3rd toe (3/2009) and her distal radius (12/2009). At that time, she was started on alendronate and was treated until 11/2014. She had a repeat bone density study in 7/2016 showing T-scores: femoral neck left -2.3/ right -2.0 and lumbar -2.2. She was restarted on alendronate in 12/2016. She underwent repeat bone density study in 8/2019 showing T-scores: femoral neck left -2.2/ right -1.9 and lumbar -2.6. Due to lack of improvement, she underwent evaluation by Dr. Herman Arroyo in 11/2019 and recommendation was that she change to Prolia. She continues to take calcium and Vitamin D supplements. She has a history of hypertension, treated with lisinopril and hydrochlorothiazide. She has been checking her blood pressure intermittently and reports that most readings are with a systolic blood pressure <169. She remains quite active, although has not been having time to exercise. She reports that she has continued stress in her life, helping to care for her 's mother (dementia), two disabled cousins, and her sister. She denies any chest pain, shortness of breath at rest or with exertion, palpitations, lightheadedness, or edema. She also has a history of hyperlipidemia, treated with moderate intensity dose atorvastatin. She has no history of ASCVD. In 2/2017, she was evaluated by DEREK Walter with left lower back pain and left sciatica. She had been taking ibuprofen without relief, and was given a course of prednisone. She states that she did not find this helpful either, but reports resolution of symptoms after visiting a chiropractor. She states that she no longer is experiencing pain and is able to ambulate without difficulty. She will continue to visit a chiropractor periodically when she develops a recurrence of pain with improvement. She reports that she had an extensive workup for microhematuria in the past, which was negative (records unavailable). She denies any dysuria, gross hematuria, or flank pain.     She has had screening colonoscopy with Dr Theo Joshi in 10/2010, which was normal except for hemorrhoids. Recommendation for follow-up is for 10 years. She denies any abdominal pain, nausea, vomiting, melena, hematochezia, or change in bowel movements. She has a history of eczema and is followed by Dr. Rafael Rodriguez. She states that she was recently started on Eucrisa with excellent response. She also has a history of candidal intertrigo, treated with lotrisone with good response. She also reports that she underwent radiation therapy to her right shoulder (as a child) for treatment of a birthmark. On 7/20/2018, she presented for evaluation to [facility] for a right upper back rash and was diagnosed with herpes zoster. She was treated with Valtrex with improvement. Past Medical History:   Diagnosis Date    Eczema     Essential hypertension     FH: breast cancer     Fibroids     uterine    Fracture of radius 11/2009    with ulna, left, closed    History of cataract     History of foot fracture     right and left    Hyperlipidemia     Microhematuria     chronic    Osteopenia     S/P radiation therapy     as child, to right shoulder for birthmark    Tinnitus     Zoster     right scapula     Past Surgical History:   Procedure Laterality Date    HX CATARACT REMOVAL      HX FRACTURE TX Right 04/24/2019    right foot 3rd metatarsal fracture    HX ORTHOPAEDIC      left arm    HX WISDOM TEETH EXTRACTION      HX WRIST FRACTURE TX       Current Outpatient Medications   Medication Sig    denosumab (PROLIA) 60 mg/mL injection 60 mg by SubCUTAneous route.  Lactobacillus acidophilus (PROBIOTIC PO) Take  by mouth.  folic acid/multivit-min/lutein (CENTRUM SILVER PO) Take  by mouth.  lisinopril (PRINIVIL, ZESTRIL) 10 mg tablet TAKE 1 TABLET BY MOUTH DAILY    atorvastatin (LIPITOR) 20 mg tablet Take 1 Tab by mouth daily.     hydroCHLOROthiazide (HYDRODIURIL) 25 mg tablet TAKE 1 TABLET BY MOUTH DAILY    aspirin delayed-release 81 mg tablet Take  by mouth daily.  amoxicillin 500 mg tab TAKE ONE CAPSULE PO TID, START ONE DAY PRIOR TO PROCEDURE, DAY OF PROCEDURE AND DAY AFTER PROCEDURE    crisaborole (EUCRISA) 2 % oint by Apply Externally route.  docusate sodium (COLACE) 100 mg capsule Take 100 mg by mouth daily as needed for Constipation.  co-enzyme Q-10 (CO Q-10) 100 mg capsule Take 100 mg by mouth daily.  biotin 2,500 mcg Tab Take 2,500 mcg by mouth.  cholecalciferol (VITAMIN D3) 1,000 unit cap Take 1 Tab by mouth daily.  fexofenadine (ALLEGRA) 60 mg tablet Take 60 mg by mouth daily.  CALCIUM CARBONATE/VITAMIN D3 (CALCIUM + D PO) Take 1 Tab by mouth two (2) times a day.  triamcinolone acetonide (KENALOG) 0.025 % topical cream Apply  to affected area two (2) times a day.  clotrimazole-betamethasone (LOTRISONE) topical cream Apply  to affected area two (2) times a day. No current facility-administered medications for this visit. Allergies and Intolerances: Allergies   Allergen Reactions    Codeine Nausea Only    Hibiclens [Chlorhexidine Gluconate] Rash     Family History: Her sister has breast cancer and her mother had pancreatic cancer. No history of colon cancer. Family History   Problem Relation Age of Onset   [facility] Cancer Mother         pancreatic    Heart Disease Mother     Heart Disease Father     Cancer Sister         breast    MS Sister     Breast Cancer Sister 62     Social History:   She  reports that she has never smoked. She has never used smokeless tobacco. She has about 2 glasses of wine each night. She is  with one adult daughter who lives with her family on [place], [place]. She is retired, and was a 4th- for 25 years.   Social History     Substance and Sexual Activity   Alcohol Use Yes    Alcohol/week: 0.0 standard drinks    Comment: occasionally     Immunization History:  Immunization History   Administered Date(s) Administered   [facility] (RETIRED) Pneumococcal Vaccine (Unspecified Type) 05/04/2010    Influenza High Dose Vaccine PF 12/13/2016, 10/03/2017    Influenza Vaccine 10/24/2014    Influenza Vaccine (Tri) Adjuvanted 10/09/2018, 11/13/2019    Influenza Vaccine PF 11/05/2013    Influenza Vaccine Split 11/01/2011, 11/06/2012    Pneumococcal Conjugate (PCV-13) 05/05/2015    Pneumococcal Polysaccharide (PPSV-23) 05/04/2010    TD Vaccine 05/05/2009    Tdap 12/12/2016    Zoster Recombinant 09/21/2019, 12/28/2019    Zoster Vaccine, Live 05/07/2013       Review of Systems:   As above included in HPI. Otherwise 11 point review of systems negative including constitutional, skin, HENT, eyes, respiratory, cardiovascular, gastrointestinal, genitourinary, musculoskeletal, endo/heme/aller, neurological.    Physical:   Vitals:   BP: 128/66  HR: 85  WT: 150 lb 6.4 oz (68.2 kg)  BMI:  24.65 kg/m2    Exam:   Patient appears in no apparent distress. Affect is appropriate. HEENT: PERRLA, anicteric, oropharynx clear, no JVD, adenopathy or thyromegaly. No carotid bruits or radiated murmur. Lungs: clear to auscultation, no wheezes, rhonchi, or rales. Heart: regular rate and rhythm. No murmur, rubs, gallops  Abdomen: soft, nontender, nondistended, normal bowel sounds, no hepatosplenomegaly or masses. Extremities: without edema. Pulses 1-2+ bilaterally. Neuro -- CN 2-12 intact, strength 5/5 with intact soft touch in all extremities.      Review of Data:  Labs:  Hospital Outpatient Visit on 01/03/2020   Component Date Value Ref Range Status    Calcium 01/03/2020 9.7  8.5 - 10.1 MG/DL Final    Phosphorus 01/03/2020 4.2  2.5 - 4.9 MG/DL Final    Creatinine 01/03/2020 0.67  0.6 - 1.3 MG/DL Final    GFR est AA 01/03/2020 >60  >60 ml/min/1.73m2 Final    GFR est non-AA 01/03/2020 >60  >60 ml/min/1.73m2 Final    Magnesium 01/03/2020 2.1  1.6 - 2.6 mg/dL Final   Hospital Outpatient Visit on 01/02/2020   Component Date Value Ref Range Status    WBC 01/02/2020 7.2  4.6 - 13.2 K/uL Final    RBC 01/02/2020 4.79  4.20 - 5.30 M/uL Final    HGB 01/02/2020 13.7  12.0 - 16.0 g/dL Final    HCT 01/02/2020 43.1  35.0 - 45.0 % Final    MCV 01/02/2020 90.0  74.0 - 97.0 FL Final    MCH 01/02/2020 28.6  24.0 - 34.0 PG Final    MCHC 01/02/2020 31.8  31.0 - 37.0 g/dL Final    RDW 01/02/2020 16.2* 11.6 - 14.5 % Final    PLATELET 00/58/4603 303  135 - 420 K/uL Final    MPV 01/02/2020 9.8  9.2 - 11.8 FL Final    NEUTROPHILS 01/02/2020 61  40 - 73 % Final    LYMPHOCYTES 01/02/2020 25  21 - 52 % Final    MONOCYTES 01/02/2020 10  3 - 10 % Final    EOSINOPHILS 01/02/2020 4  0 - 5 % Final    BASOPHILS 01/02/2020 0  0 - 2 % Final    ABS. NEUTROPHILS 01/02/2020 4.4  1.8 - 8.0 K/UL Final    ABS. LYMPHOCYTES 01/02/2020 1.8  0.9 - 3.6 K/UL Final    ABS. MONOCYTES 01/02/2020 0.7  0.05 - 1.2 K/UL Final    ABS. EOSINOPHILS 01/02/2020 0.3  0.0 - 0.4 K/UL Final    ABS.  BASOPHILS 01/02/2020 0.0  0.0 - 0.1 K/UL Final    DF 01/02/2020 AUTOMATED    Final    Hemoglobin A1c 01/02/2020 5.3  4.2 - 5.6 % Final    Est. average glucose 01/02/2020 105  mg/dL Final    LIPID PROFILE 01/02/2020        Final    Cholesterol, total 01/02/2020 202* <200 MG/DL Final    Triglyceride 01/02/2020 129  <150 MG/DL Final    HDL Cholesterol 01/02/2020 82* 40 - 60 MG/DL Final    LDL, calculated 01/02/2020 94.2  0 - 100 MG/DL Final    VLDL, calculated 01/02/2020 25.8  MG/DL Final    CHOL/HDL Ratio 01/02/2020 2.5  0 - 5.0   Final    Magnesium 01/02/2020 2.0  1.6 - 2.6 mg/dL Final    Sodium 01/02/2020 136  136 - 145 mmol/L Final    Potassium 01/02/2020 4.3  3.5 - 5.5 mmol/L Final    Chloride 01/02/2020 103  100 - 111 mmol/L Final    CO2 01/02/2020 27  21 - 32 mmol/L Final    Anion gap 01/02/2020 6  3.0 - 18 mmol/L Final    Glucose 01/02/2020 95  74 - 99 mg/dL Final    BUN 01/02/2020 19* 7.0 - 18 MG/DL Final    Creatinine 01/02/2020 0.71  0.6 - 1.3 MG/DL Final    BUN/Creatinine ratio 01/02/2020 27* 12 - 20   Final    GFR est AA 01/02/2020 >60  >60 ml/min/1.73m2 Final    GFR est non-AA 01/02/2020 >60  >60 ml/min/1.73m2 Final    Calcium 01/02/2020 9.3  8.5 - 10.1 MG/DL Final    Bilirubin, total 01/02/2020 0.5  0.2 - 1.0 MG/DL Final    ALT (SGPT) 01/02/2020 67* 13 - 56 U/L Final    AST (SGOT) 01/02/2020 50* 10 - 38 U/L Final    Alk. phosphatase 01/02/2020 70  45 - 117 U/L Final    Protein, total 01/02/2020 7.5  6.4 - 8.2 g/dL Final    Albumin 01/02/2020 4.2  3.4 - 5.0 g/dL Final    Globulin 01/02/2020 3.3  2.0 - 4.0 g/dL Final    A-G Ratio 01/02/2020 1.3  0.8 - 1.7   Final    Vitamin D 25-Hydroxy 01/02/2020 33.5  30 - 100 ng/mL Final    TSH 01/02/2020 4.95* 0.36 - 3.74 uIU/mL Final    T4, Free 01/02/2020 1.0  0.7 - 1.5 NG/DL Final    Ferritin 01/02/2020 47  8 - 388 NG/ML Final    Iron 01/02/2020 80  50 - 175 ug/dL Final    TIBC 01/02/2020 375  250 - 450 ug/dL Final    Iron % saturation 01/02/2020 21  % Final       Health Maintenance:  Screening:    Mammogram: negative (8/2019)   PAP smear: S/P ALMAS   Colorectal: colonoscopy (10/2010) normal. Dr. Damari Freitas. Due 10/2020. Depression: none   DM (HbA1c/FPG): HbA1c 5.3 (1/2020)   Hepatitis C: negative (5/2016)   Falls: none   DEXA: osteopenia (8/2019). On alendronate since 12/2016.     Glaucoma: regular eye exams with Dr. Pal Baldwin (last 11/2018)   Smoking: none   Vitamin D: 33.5 (1/2020)   Medicare Wellness: 6/25/2019      Impression:  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    Tinnitus H93.19    Microhematuria R31.29    Eczema L30.9    Osteopenia M85.80    Elevated TSH R79.89    Abnormal glucose R73.09    Candidal intertrigo B37.2    Closed displaced fracture of third metatarsal bone of right foot with routine healing S92.331D    Episode of transient neurologic symptoms R29.90  Speech disturbance R47.9    Carpal tunnel syndrome, bilateral G56.03    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 Hypertension. Appears well controlled on current regimen of lisinopril 10 mg daily and hydrochlorothiazide 25 mg daily. Renal function remains normal with creatinine 0.71/ eGFR >60, but BUN/creatinine ratio remains increased to 27. Encouraged to continue to drink plenty of fluids. Continue to follow. 2. Hyperlipidemia. On moderate intensity dose atorvastatin (20 mg) with LDL 94 and HDL 82, indicative of reasonable control. Emphasized importance of lifestyle modifications, including diet, exercise, and weight loss. Will continue to follow. 3. Transient neurologic symptoms. Experienced transient episode of head tilting to the left and inability to speak, lasting 30 seconds. Was traveling back from [place] and eating lunch when episode occurred. Reported drinking fluids, but also on diuretic. No other associated symptoms and no residual symptoms remain. Differential diagnosis includes possible TIA, seizure, or global hypoperfusion from arrhythmia or hypovolemia. Carotid duplex scan without significant ICA stenosis, and brain MRI without significant abnormality except for a stable appearing lipoma creating mild mass effect. On aspirin and statin. Has not had any recurrence of symptoms. Evaluated by Dr. Kaela Lubin and not felt to be due to neurologic cause. No further evaluation felt to be needed. Continue to monitor. 4. Osteopenia. History of radial and toe fracture in 2009. Treated from 11/2009 to 11/2014 with alendronate. Repeat bone density scan in 7/2016 with 10 year risk of a major osteoporetic fracture at 14 % and hip fracture at 3.4 %. Restarted Fosamax in 12/2016. Now with new right metatarsal displaced fracture without known injury.  Patient had repeat bone density on 8/6/2019 showing T-scores:  femoral neck left -2.2  /right -1.9 and lumbar -2.6 . Calculated FRAX score estimates her 10 year risk of a major osteoporetic fracture at 14 % and hip fracture at 3.9 %, which are an indication for continued biphosphonate treatment based on hip fracture risk of >3%. Continues to show osteopenia in her bilateral hips and osteoporosis of her lumbar spine, but in comparison to her prior study in 7/2016, it appears that her T-scores have remained relatively stable in her bilateral hips, but there has been some worsening in her lumbar spine. Obtained lab evaluation for secondary osteoporosis, and TSH, intact PTH, calcium, vitamin D level, and gammopathy panel normal. Given recent fracture and some worsening of bone density scan despite treatment with biphosphonate, she was referred to Dr. Ryan Tran in 11/2019 and recommendation was that she change to Prolia. Continue calcium and Vitamin D supplements, and encouraged her to exercise, particularly weight bearing activities. 5. Displaced right third metatarsal fracture, s/p open reduction/internal fixation with putty bone grafting. She reports that she did well after surgery, and now fully weight bearing with orthotic in shoes. No significant pain, although mild swelling with prolonged standing. Continue to follow. 6. Elevated transaminases. New onset mild elevation noted in 8/2019. Patient reported that she had been taking NSAIDS frequently to help control foot pain. Advised to discontinue NSAIDS and repeated in 9/2019 with normalization. Lab evaluation for cause, including AMA, actin, hepatitis profile, CK, aldolase, celiac panel, ESR, CRP, and MANOLO, were negative, but did identify mildly low iron stores. Given normalization, no further evaluation was performed. Repeat today again shows mild elevation of transaminases with otherwise normal LFT's. Patient has been taking Excedrin and does admit to 2 glasses of wine each night.  Advised to discontinue both, and will also proceed with abdominal ultrasound to evaluate the liver. Will follow-up in 4 week to reassess liver function tests, and if do not normalize, will refer to GI for further evaluation. 7. Iron deficiency without anemia. May be related to recent foot surgery. Advised to begin MVI with iron supplement. Repeat today with normalization of levels. Last colonoscopy 9 years ago which was normal. Will address follow-up with GI after reevaluate liver function tests in 4 weeks. Will continue to monitor. 8. Family history of breast cancer. Patient with family history of breast cancer in first degree relative (sister). She is currently being screened with annual mammograms. Discussed recommendations for breast cancer screening in women with lifetime risk >20%. 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 No other family member with breast cancer. Wishing to continue with annual mammograms and breast exams. 9. Abnormal glucose. Repeat normal with normal HbA1c today. Emphasized importance of lifestyle modifications, including diet, exercise, and weight loss. 10. Bilateral hand and wrist pain. Evaluation for inflammatory cause negative. Improved with changes in gripping walker and wearing of neutral wrist splints. No longer sleeping with wrists in flexed position at night. Continue to follow. 11. Chronic microhematuria. Work-up reportedly negative in past. Recent urinalysis has been negative for blood. Follow. 12. Eczema. Found good response with [place]. Followed by Dr. Omar Castellano. 13. Candidal intertrigo. Improved with Lotrisone. Discussed preventative treatment with mycostatin powder and loose clothing. 14. Overweight. BMI now in normal range. Emphasized importance of continued lifestyle modifications. Will continue to follow. 15. Elevated TSH. Repeat with free T4 today normal. Patient with history of radiation therapy for a birthmark on her right shoulder as a child. Will continue to follow. 16. Health maintenance.  Received 2/2 doses of Shingrix vaccine. Already received influenza vaccine. Other immunizations up to date. Mammogram up to date. Colorectal cancer screening up to date. Continue regular eye exams with Dr. Pal Baldwin. Vitamin D level normal. Continue maintenance dose supplement. Medicare wellness visit up to date. Patient understands recommendations and agrees with plan. Follow-up in 4 weeks.

## 2020-01-22 ENCOUNTER — HOSPITAL ENCOUNTER (OUTPATIENT)
Dept: ULTRASOUND IMAGING | Age: 75
Discharge: HOME | End: 2020-01-22
Attending: INTERNAL MEDICINE
Payer: MEDICARE

## 2020-01-22 DIAGNOSIS — R79.89 ELEVATED LFTS: ICD-10-CM

## 2020-01-22 PROCEDURE — 76700 US EXAM ABDOM COMPLETE: CPT

## 2020-01-27 ENCOUNTER — TELEPHONE (OUTPATIENT)
Dept: INTERNAL MEDICINE CLINIC | Age: 75
End: 2020-01-27

## 2020-01-27 NOTE — TELEPHONE ENCOUNTER
US Results (most recent):  Results from [place] encounter on 01/22/20   US ABD COMP    Narrative ULTRASOUND ABDOMEN COMPLETE    HISTORY: Elevated liver function tests. COMPARISON: None. FINDINGS:     The liver is normal in size without mass. There is mild diffuse coarsening of  the echotexture of the liver. The liver measures 12.7 cm length. The main portal  vein is patent measuring 0.6 cm transverse with normal direction of flow and  Doppler waveform. There is no intrahepatic or extrahepatic biliary ductal dilatation. The common  bile duct measures 0.3 cm. The common duct could not be followed to the head of  the pancreas due to overlapping bowel gas. The gallbladder is physiologically distended without evidence of stones, wall  thickening, pericholecystic fluid, or sonographic Tiwari sign. The right kidney is normal in size and echogenicity measuring 10.7 x 4.0 x 4.9  cm without evidence of mass, hydronephrosis, or calculi. The left kidney is normal in size and echogenicity measuring 11.2 x 4.2 x 4.3 cm  without evidence of mass, hydronephrosis, or calculi. The spleen is normal in size measuring 8.1 x 3.2 x 8.7 cm and appears  unremarkable. Visualized portions of the head and body of the pancreas are normal in size and  echogenicity without mass or ductal dilatation. The tail of the pancreas as well  as some of the head of the pancreas is obscured by bowel gas. .    The abdominal aorta is without aneurysm. The proximal aorta measures 1.8 cm AP. The mid aorta measures 1.67 m AP. The distal aorta measures 1.5 cm AP. Only a small portion of the IVC is visualized posterior to the liver measuring  1.3 cm AP. More inferiorly is obscured by bowel gas. No free fluid in the abdomen. Impression IMPRESSION:    No evidence of cholelithiasis or biliary ductal dilatation. The liver is normal in size and appears mildly diffusely coarsened in  echogenicity.     Unremarkable appearance of the gallbladder, spleen, kidneys and visualized  pancreas. Called and discussed above results with patient. Mild coarse echogenicity of liver, but no hepatomegaly. Otherwise normal. Will await repeat LFT's at upcoming visit and decide on need for referral to GI for evaluation.

## 2020-01-30 ENCOUNTER — HOSPITAL ENCOUNTER (OUTPATIENT)
Dept: LAB | Age: 75
Discharge: HOME | End: 2020-01-30
Payer: MEDICARE

## 2020-01-30 LAB
ALBUMIN SERPL-MCNC: 3.9 G/DL (ref 3.4–5)
ALBUMIN/GLOB SERPL: 1.2 {RATIO} (ref 0.8–1.7)
ALP SERPL-CCNC: 55 U/L (ref 45–117)
ALT SERPL-CCNC: 32 U/L (ref 13–56)
ANION GAP SERPL CALC-SCNC: 5 MMOL/L (ref 3–18)
AST SERPL-CCNC: 25 U/L (ref 10–38)
BILIRUB SERPL-MCNC: 0.7 MG/DL (ref 0.2–1)
BUN SERPL-MCNC: 20 MG/DL (ref 7–18)
BUN/CREAT SERPL: 33 (ref 12–20)
CALCIUM SERPL-MCNC: 9.2 MG/DL (ref 8.5–10.1)
CHLORIDE SERPL-SCNC: 107 MMOL/L (ref 100–111)
CO2 SERPL-SCNC: 29 MMOL/L (ref 21–32)
CREAT SERPL-MCNC: 0.6 MG/DL (ref 0.6–1.3)
GLOBULIN SER CALC-MCNC: 3.3 G/DL (ref 2–4)
GLUCOSE SERPL-MCNC: 86 MG/DL (ref 74–99)
POTASSIUM SERPL-SCNC: 4.3 MMOL/L (ref 3.5–5.5)
PROT SERPL-MCNC: 7.2 G/DL (ref 6.4–8.2)
SODIUM SERPL-SCNC: 141 MMOL/L (ref 136–145)

## 2020-01-30 PROCEDURE — 36415 COLL VENOUS BLD VENIPUNCTURE: CPT

## 2020-01-30 PROCEDURE — 80053 COMPREHEN METABOLIC PANEL: CPT

## 2020-02-06 ENCOUNTER — OFFICE VISIT (OUTPATIENT)
Dept: INTERNAL MEDICINE CLINIC | Age: 75
End: 2020-02-06

## 2020-02-06 VITALS
WEIGHT: 157 LBS | BODY MASS INDEX: 25.23 KG/M2 | RESPIRATION RATE: 16 BRPM | HEIGHT: 66 IN | OXYGEN SATURATION: 96 % | TEMPERATURE: 98.1 F | HEART RATE: 75 BPM | SYSTOLIC BLOOD PRESSURE: 116 MMHG | DIASTOLIC BLOOD PRESSURE: 74 MMHG

## 2020-02-06 DIAGNOSIS — M81.0 OSTEOPOROSIS OF LUMBAR SPINE: ICD-10-CM

## 2020-02-06 DIAGNOSIS — R79.89 ELEVATED LFTS: Primary | ICD-10-CM

## 2020-02-06 DIAGNOSIS — G56.03 CARPAL TUNNEL SYNDROME, BILATERAL: ICD-10-CM

## 2020-02-06 DIAGNOSIS — E61.1 DIETARY IRON DEFICIENCY WITHOUT ANEMIA: ICD-10-CM

## 2020-02-06 DIAGNOSIS — E78.5 HYPERLIPIDEMIA, UNSPECIFIED HYPERLIPIDEMIA TYPE: ICD-10-CM

## 2020-02-06 DIAGNOSIS — R29.90 EPISODE OF TRANSIENT NEUROLOGIC SYMPTOMS: ICD-10-CM

## 2020-02-06 DIAGNOSIS — I10 ESSENTIAL HYPERTENSION: ICD-10-CM

## 2020-02-06 DIAGNOSIS — M85.89 OSTEOPENIA OF MULTIPLE SITES: ICD-10-CM

## 2020-02-06 DIAGNOSIS — R73.09 ABNORMAL GLUCOSE: ICD-10-CM

## 2020-02-06 NOTE — PATIENT INSTRUCTIONS
DASH Diet: Care Instructions  Your Care Instructions    The DASH diet is an eating plan that can help lower your blood pressure. DASH stands for Dietary Approaches to Stop Hypertension. Hypertension is high blood pressure. The DASH diet focuses on eating foods that are high in calcium, potassium, and magnesium. These nutrients can lower blood pressure. The foods that are highest in these nutrients are fruits, vegetables, low-fat dairy products, nuts, seeds, and legumes. But taking calcium, potassium, and magnesium supplements instead of eating foods that are high in those nutrients does not have the same effect. The DASH diet also includes whole grains, fish, and poultry. The DASH diet is one of several lifestyle changes your doctor may recommend to lower your high blood pressure. Your doctor may also want you to decrease the amount of sodium in your diet. Lowering sodium while following the DASH diet can lower blood pressure even further than just the DASH diet alone. Follow-up care is a key part of your treatment and safety. Be sure to make and go to all appointments, and call your doctor if you are having problems. It's also a good idea to know your test results and keep a list of the medicines you take. How can you care for yourself at home? Following the DASH diet  · Eat 4 to 5 servings of fruit each day. A serving is 1 medium-sized piece of fruit, ½ cup chopped or canned fruit, 1/4 cup dried fruit, or 4 ounces (½ cup) of fruit juice. Choose fruit more often than fruit juice. · Eat 4 to 5 servings of vegetables each day. A serving is 1 cup of lettuce or raw leafy vegetables, ½ cup of chopped or cooked vegetables, or 4 ounces (½ cup) of vegetable juice. Choose vegetables more often than vegetable juice. · Get 2 to 3 servings of low-fat and fat-free dairy each day. A serving is 8 ounces of milk, 1 cup of yogurt, or 1 ½ ounces of cheese. · Eat 6 to 8 servings of grains each day.  A serving is 1 slice of bread, 1 ounce of dry cereal, or ½ cup of cooked rice, pasta, or cooked cereal. Try to choose whole-grain products as much as possible. · Limit lean meat, poultry, and fish to 2 servings each day. A serving is 3 ounces, about the size of a deck of cards. · Eat 4 to 5 servings of nuts, seeds, and legumes (cooked dried beans, lentils, and split peas) each week. A serving is 1/3 cup of nuts, 2 tablespoons of seeds, or ½ cup of cooked beans or peas. · Limit fats and oils to 2 to 3 servings each day. A serving is 1 teaspoon of vegetable oil or 2 tablespoons of salad dressing. · Limit sweets and added sugars to 5 servings or less a week. A serving is 1 tablespoon jelly or jam, ½ cup sorbet, or 1 cup of lemonade. · Eat less than 2,300 milligrams (mg) of sodium a day. If you limit your sodium to 1,500 mg a day, you can lower your blood pressure even more. Tips for success  · Start small. Do not try to make dramatic changes to your diet all at once. You might feel that you are missing out on your favorite foods and then be more likely to not follow the plan. Make small changes, and stick with them. Once those changes become habit, add a few more changes. · Try some of the following:  ? Make it a goal to eat a fruit or vegetable at every meal and at snacks. This will make it easy to get the recommended amount of fruits and vegetables each day. ? Try yogurt topped with fruit and nuts for a snack or healthy dessert. ? Add lettuce, tomato, cucumber, and onion to sandwiches. ? Combine a ready-made pizza crust with low-fat mozzarella cheese and lots of vegetable toppings. Try using tomatoes, squash, spinach, broccoli, carrots, cauliflower, and onions. ? Have a variety of cut-up vegetables with a low-fat dip as an appetizer instead of chips and dip. ? Sprinkle sunflower seeds or chopped almonds over salads. Or try adding chopped walnuts or almonds to cooked vegetables.   ? Try some vegetarian meals using beans and peas. Add garbanzo or kidney beans to salads. Make burritos and tacos with mashed ho beans or black beans. Where can you learn more? Go to http://mallika-suzanne.info/. Enter W584 in the search box to learn more about \"DASH Diet: Care Instructions. \"  Current as of: April 9, 2019  Content Version: 12.2  © 6278-8109 [employer]. Care instructions adapted under license by [employer] (which disclaims liability or warranty for this information). If you have questions about a medical condition or this instruction, always ask your healthcare professional. [address] any warranty or liability for your use of this information.

## 2020-02-06 NOTE — PROGRESS NOTES
Nickolas Mckeon presents today for   Chief Complaint   Patient presents with    Follow-up     4 week follow up               Depression Screening:  3 most recent PHQ Screens 2/6/2020   Little interest or pleasure in doing things Not at all   Feeling down, depressed, irritable, or hopeless Not at all   Total Score PHQ 2 0       Learning Assessment:  Learning Assessment 6/25/2019   PRIMARY LEARNER Patient   PRIMARY LANGUAGE ENGLISH   LEARNER PREFERENCE PRIMARY READING     DEMONSTRATION   ANSWERED BY patient   RELATIONSHIP SELF       Abuse Screening:  Abuse Screening Questionnaire 1/9/2020   Do you ever feel afraid of your partner? N   Are you in a relationship with someone who physically or mentally threatens you? N   Is it safe for you to go home? Y       Fall Risk  Fall Risk Assessment, last 12 mths 1/9/2020   Able to walk? Yes   Fall in past 12 months? No           Coordination of Care:  1. Have you been to the ER, urgent care clinic since your last visit? Hospitalized since your last visit? no    2. Have you seen or consulted any other health care providers outside of the [address] [place] [address] since your last visit? Include any pap smears or colon screening.  no

## 2020-02-10 NOTE — PROGRESS NOTES
HPI:   Anali Funez is a 76y.o. year old female who presents today for a routine visit and for evaluation of hypertension, hyperlipidemia, osteopenia, left sciatica, and chronic microhematuria. She was seen on 8/27/2019 and was noted to have elevated transaminases with AST 50 and ALT 67. She was instructed to discontinue all NSAIDS which she had been taking for her right foot pain. She was seen again on 9/25/2019 and repeat testing showed normalization of her transaminases. She was last seen on 1/9/2020 and reported that she had been under a significant amount of stress caring for her sister and mother in law. She states that she also is burdened with the recent news that her niece was diagnosed with an ocular melanoma at the age of 40. She reported that she had been taking two Excedrin almost every morning due to a headache, and she also reported continuing to drink approximately two glasses of wine each night. Repeat labs showed repeat elevation of transaminases with AST 50 and ALT 67, while other liver function tests remained normal. She was instructed to discontinue Excedrin and alcohol, and underwent an abdominal ultrasound which showed normal sized liver with mildly diffusely coarsened echogenicity. She returns today for follow-up and reports that she continues to feel well. She states that she has abstained from drinking any alcohol and has not taken any NSAIDS or Excedrin. Her weight has increased 7 pounds since her last visit. She is otherwise without new complaints. On 6/25/2019, she reported that two weeks prior, while returning home from her granddaughter's graduation, she and her  stopped at a restaurant for lunch. She states that while she was chewing a mouth full of food, her  noticed that she suddenly tilted her head to the left side and seemed to look dazed. She states that she remained awake and was aware that he was talking to her, but she states that she could not respond. She states that the episode lasted approximately 30 seconds and resolved, and she proceeded to complete her chewing and swallow her food. She states that she did not lose consciousness, or notice any headache, visual changes, lightheadedness, tongue biting, incontinence, focal deficits, or seizure like activity. She denied any chest pain, shortness of breath, palpitations, pleuritic chest pain, hemoptysis, calf pain or swelling. 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 She also had a carotid duplex scan (7/9/2019) showing only mild (< 50%) stenosis of the bilateral internal carotid arteries. She underwent evaluation by Dr. Donnell Dueñas in 10/2019, and he did not feel that the episode represented a TIA or other neurologic episode. He felt that no further evaluation was needed. She has not had a recurrence of these symptoms. On 4/15/2019, she underwent evaluation at [facility] for right foot pain. She denied known injury. Right foot x-ray showed a displaced third metatarsal fracture and she was referred to Dr. Gurmeet Zaragoza for evaluation. Given the displacement and risk of metatarsalgia, surgery was recommended, and on 4/24/2019, she underwent open reduction/internal fixation of the third metatarsal with putty bone grafting. She reports that she did well after surgery, and and was non-weight bearing in a CAM boot for several months. On 5/15/2019, she presented complaining of bilateral hand and wrist pain. She denied any fever, chills, sore throat, rashes, dyspnea, cough, abdominal pain, dysuria, focal weakness, visual changes, or headache. She did admit to sleeping with her bilateral hands tucked beneath her in a flexed position while lying on her back due to her limitations from her foot surgery.  She also reported that she had been using a walker to ambulate and gripping it tightly with her hands. Lab evaluation including MANOLO, RF, CCP, ESR, uric acid were normal, but CRP was increased to 9.1. A diagnosis of presumed carpal tunnel syndrome was made, and her discomfort resolved with the use of wrist splints. She was released to weight bear as tolerate in 7/2019, and has not had any significant pain although has been experiencing some swelling which resolves with elevation. She has a history of osteopenia, and in 2009, she sustained a fracture of her left 3rd toe (3/2009) and her distal radius (12/2009). At that time, she was started on alendronate and was treated until 11/2014. She had a repeat bone density study in 7/2016 showing T-scores: femoral neck left -2.3/ right -2.0 and lumbar -2.2. She was restarted on alendronate in 12/2016. She underwent repeat bone density study in 8/2019 showing T-scores: femoral neck left -2.2/ right -1.9 and lumbar -2.6. Due to lack of improvement, she underwent evaluation by Dr. Bertha Koehler in 11/2019 and recommendation was that she change to Prolia. She continues to take calcium and Vitamin D supplements. She has a history of hypertension, treated with lisinopril and hydrochlorothiazide. She has been checking her blood pressure intermittently and reports that most readings are with a systolic blood pressure <687. She remains quite active, although has not been having time to exercise. She reports that she has continued stress in her life, helping to care for her 's mother (dementia), two disabled cousins, and her sister. She denies any chest pain, shortness of breath at rest or with exertion, palpitations, lightheadedness, or edema. She also has a history of hyperlipidemia, treated with moderate intensity dose atorvastatin. She has no history of ASCVD. In 2/2017, she was evaluated by DEREK Walter with left lower back pain and left sciatica.  She had been taking ibuprofen without relief, and was given a course of prednisone. She states that she did not find this helpful either, but reports resolution of symptoms after visiting a chiropractor. She states that she no longer is experiencing pain and is able to ambulate without difficulty. She will continue to visit a chiropractor periodically when she develops a recurrence of pain with improvement. She reports that she had an extensive workup for microhematuria in the past, which was negative (records unavailable). She denies any dysuria, gross hematuria, or flank pain. She has had screening colonoscopy with Dr Marianne Braun in 10/2010, which was normal except for hemorrhoids. Recommendation for follow-up is for 10 years. She denies any abdominal pain, nausea, vomiting, melena, hematochezia, or change in bowel movements. She has a history of eczema and is followed by Dr. Randy Tillman. She states that she was recently started on Eucrisa with excellent response. She also has a history of candidal intertrigo, treated with lotrisone with good response. She also reports that she underwent radiation therapy to her right shoulder (as a child) for treatment of a birthmark. On 7/20/2018, she presented for evaluation to [facility] for a right upper back rash and was diagnosed with herpes zoster. She was treated with Valtrex with improvement.     Past Medical History:   Diagnosis Date    Eczema     Essential hypertension     FH: breast cancer     Fibroids     uterine    Fracture of radius 11/2009    with ulna, left, closed    History of cataract     History of foot fracture     right and left    Hyperlipidemia     Microhematuria     chronic    Osteopenia     S/P radiation therapy     as child, to right shoulder for birthmark    Tinnitus     Zoster     right scapula     Past Surgical History:   Procedure Laterality Date    HX CATARACT REMOVAL      HX FRACTURE TX Right 04/24/2019    right foot 3rd metatarsal fracture    HX ORTHOPAEDIC      left arm    HX WISDOM TEETH EXTRACTION      HX WRIST FRACTURE TX       Current Outpatient Medications   Medication Sig    denosumab (PROLIA) 60 mg/mL injection 60 mg by SubCUTAneous route.  Lactobacillus acidophilus (PROBIOTIC PO) Take  by mouth.  folic acid/multivit-min/lutein (CENTRUM SILVER PO) Take  by mouth.  lisinopril (PRINIVIL, ZESTRIL) 10 mg tablet TAKE 1 TABLET BY MOUTH DAILY    atorvastatin (LIPITOR) 20 mg tablet Take 1 Tab by mouth daily.  hydroCHLOROthiazide (HYDRODIURIL) 25 mg tablet TAKE 1 TABLET BY MOUTH DAILY    aspirin delayed-release 81 mg tablet Take  by mouth daily.  amoxicillin 500 mg tab TAKE ONE CAPSULE PO TID, START ONE DAY PRIOR TO PROCEDURE, DAY OF PROCEDURE AND DAY AFTER PROCEDURE    crisaborole (EUCRISA) 2 % oint by Apply Externally route.  triamcinolone acetonide (KENALOG) 0.025 % topical cream Apply  to affected area two (2) times a day.  clotrimazole-betamethasone (LOTRISONE) topical cream Apply  to affected area two (2) times a day.  docusate sodium (COLACE) 100 mg capsule Take 100 mg by mouth daily as needed for Constipation.  co-enzyme Q-10 (CO Q-10) 100 mg capsule Take 100 mg by mouth daily.  biotin 2,500 mcg Tab Take 2,500 mcg by mouth.  cholecalciferol (VITAMIN D3) 1,000 unit cap Take 1 Tab by mouth daily.  fexofenadine (ALLEGRA) 60 mg tablet Take 60 mg by mouth daily.  CALCIUM CARBONATE/VITAMIN D3 (CALCIUM + D PO) Take 1 Tab by mouth two (2) times a day. No current facility-administered medications for this visit. Allergies and Intolerances: Allergies   Allergen Reactions    Codeine Nausea Only    Hibiclens [Chlorhexidine Gluconate] Rash     Family History: Her sister has breast cancer and her mother had pancreatic cancer. No history of colon cancer.   Family History   Problem Relation Age of Onset   [facility] Cancer Mother         pancreatic    Heart Disease Mother     Heart Disease Father     Cancer Sister         breast    MS Sister     Breast Cancer Sister 62     Social History:   She  reports that she has never smoked. She has never used smokeless tobacco. She has about 2 glasses of wine each night. She is  with one adult daughter who lives with her family on [place], [place]. She is retired, and was a 4th- for 25 years. Social History     Substance and Sexual Activity   Alcohol Use Yes    Alcohol/week: 0.0 standard drinks    Comment: occasionally     Immunization History:  Immunization History   Administered Date(s) Administered    (RETIRED) Pneumococcal Vaccine (Unspecified Type) 05/04/2010    Influenza High Dose Vaccine PF 12/13/2016, 10/03/2017    Influenza Vaccine 10/24/2014    Influenza Vaccine (Tri) Adjuvanted 10/09/2018, 11/13/2019    Influenza Vaccine PF 11/05/2013    Influenza Vaccine Split 11/01/2011, 11/06/2012    Pneumococcal Conjugate (PCV-13) 05/05/2015    Pneumococcal Polysaccharide (PPSV-23) 05/04/2010    TD Vaccine 05/05/2009    Tdap 12/12/2016    Zoster Recombinant 09/21/2019, 12/28/2019    Zoster Vaccine, Live 05/07/2013       Review of Systems:   As above included in HPI. Otherwise 11 point review of systems negative including constitutional, skin, HENT, eyes, respiratory, cardiovascular, gastrointestinal, genitourinary, musculoskeletal, endo/heme/aller, neurological.    Physical:   Vitals:   BP: 116/74  HR: 75  WT: 157 lb (71.2 kg)  BMI:  25.73 kg/m2    Exam:   Patient appears in no apparent distress. Affect is appropriate. HEENT: PERRLA, anicteric, oropharynx clear, no JVD, adenopathy or thyromegaly. No carotid bruits or radiated murmur. Lungs: clear to auscultation, no wheezes, rhonchi, or rales. Heart: regular rate and rhythm. No murmur, rubs, gallops  Abdomen: soft, nontender, nondistended, normal bowel sounds, no hepatosplenomegaly or masses. Extremities: without edema. Pulses 1-2+ bilaterally.       Review of Data:  Labs:  Hospital Outpatient Visit on 01/30/2020   Component Date Value Ref Range Status    Sodium 01/30/2020 141  136 - 145 mmol/L Final    Potassium 01/30/2020 4.3  3.5 - 5.5 mmol/L Final    Chloride 01/30/2020 107  100 - 111 mmol/L Final    CO2 01/30/2020 29  21 - 32 mmol/L Final    Anion gap 01/30/2020 5  3.0 - 18 mmol/L Final    Glucose 01/30/2020 86  74 - 99 mg/dL Final    BUN 01/30/2020 20* 7.0 - 18 MG/DL Final    Creatinine 01/30/2020 0.60  0.6 - 1.3 MG/DL Final    BUN/Creatinine ratio 01/30/2020 33* 12 - 20   Final    GFR est AA 01/30/2020 >60  >60 ml/min/1.73m2 Final    GFR est non-AA 01/30/2020 >60  >60 ml/min/1.73m2 Final    Calcium 01/30/2020 9.2  8.5 - 10.1 MG/DL Final    Bilirubin, total 01/30/2020 0.7  0.2 - 1.0 MG/DL Final    ALT (SGPT) 01/30/2020 32  13 - 56 U/L Final    AST (SGOT) 01/30/2020 25  10 - 38 U/L Final    Alk. phosphatase 01/30/2020 55  45 - 117 U/L Final    Protein, total 01/30/2020 7.2  6.4 - 8.2 g/dL Final    Albumin 01/30/2020 3.9  3.4 - 5.0 g/dL Final    Globulin 01/30/2020 3.3  2.0 - 4.0 g/dL Final    A-G Ratio 01/30/2020 1.2  0.8 - 1.7   Final       Health Maintenance:  Screening:    Mammogram: negative (8/2019)   PAP smear: S/P ALMAS   Colorectal: colonoscopy (10/2010) normal. Dr. Joan Teixeira. Due 10/2020. Depression: none   DM (HbA1c/FPG): HbA1c 5.3 (1/2020)   Hepatitis C: negative (5/2016)   Falls: none   DEXA: osteopenia (8/2019). On alendronate since 12/2016.     Glaucoma: regular eye exams with Dr. Amie Suazo (last 11/2018)   Smoking: none   Vitamin D: 33.5 (1/2020)   Medicare Wellness: 6/25/2019      Impression:  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    Tinnitus H93.19    Microhematuria R31.29    Eczema L30.9    Osteopenia M85.80    Elevated TSH R79.89    Abnormal glucose R73.09    Candidal intertrigo B37.2    Closed displaced fracture of third metatarsal bone of right foot with routine healing S92.331D    Episode of transient neurologic symptoms R29.90    Carpal tunnel syndrome, bilateral G56.03    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 Elevated transaminases. New onset mild elevation noted in 8/2019. Patient reported that she had been taking NSAIDS frequently to help control foot pain. Advised to discontinue NSAIDS and repeated in 9/2019 with normalization. Lab evaluation for cause, including AMA, actin, hepatitis profile, CK, aldolase, celiac panel, ESR, CRP, and MANOLO, were negative, but did identify mildly low iron stores. Given normalization, no further evaluation was performed. Repeat last visit again showed mild elevation of transaminases with otherwise normal LFT's. Patient had been taking Excedrin and did admit to 2 glasses of wine each night. Advised to discontinue both. Abdominal ultrasound was obtained and showed mild increased echogenicity of liver, but otherwise normal. Repeat liver function tests today again show normalization of transaminases. Discussed likely due to hepatic steatosis. Advised to avoid alcohol and NSAIDS. Also, emphasized importance of lifestyle modifications, including diet, exercise, and weight loss. Due for colonoscopy in 10/2020, so will refer to GI at next visit and will ask to address at that time. 2. Hypertension. Appears well controlled on current regimen of lisinopril 10 mg daily and hydrochlorothiazide 25 mg daily. Renal function remains normal with creatinine 0.71/ eGFR >60, but BUN/creatinine ratio remains increased to 27. Encouraged to continue to drink plenty of fluids. Continue to follow. 3. Hyperlipidemia. On moderate intensity dose atorvastatin (20 mg) with LDL 94 and HDL 82, indicative of reasonable control. Emphasized importance of lifestyle modifications, including diet, exercise, and weight loss. Will continue to follow.   4. Transient neurologic symptoms. Experienced transient episode of head tilting to the left and inability to speak, lasting 30 seconds. Was traveling back from [place] and eating lunch when episode occurred. Reported drinking fluids, but also on diuretic. No other associated symptoms and no residual symptoms remain. Differential diagnosis includes possible TIA, seizure, or global hypoperfusion from arrhythmia or hypovolemia. Carotid duplex scan without significant ICA stenosis, and brain MRI without significant abnormality except for a stable appearing lipoma creating mild mass effect. On aspirin and statin. Has not had any recurrence of symptoms. Evaluated by Dr. Bonita Herring and not felt to be due to neurologic cause. No further evaluation felt to be needed. Continue to monitor. 5. Osteopenia. History of radial and toe fracture in 2009. Treated from 11/2009 to 11/2014 with alendronate. Repeat bone density scan in 7/2016 with 10 year risk of a major osteoporetic fracture at 14 % and hip fracture at 3.4 %. Restarted Fosamax in 12/2016. Now with new right metatarsal displaced fracture without known injury. Patient had repeat bone density on 8/6/2019 showing T-scores:  femoral neck left -2.2  /right -1.9 and lumbar -2.6 . Calculated FRAX score estimates her 10 year risk of a major osteoporetic fracture at 14 % and hip fracture at 3.9 %, which are an indication for continued biphosphonate treatment based on hip fracture risk of >3%. Continues to show osteopenia in her bilateral hips and osteoporosis of her lumbar spine, but in comparison to her prior study in 7/2016, it appears that her T-scores have remained relatively stable in her bilateral hips, but there has been some worsening in her lumbar spine.  Obtained lab evaluation for secondary osteoporosis, and TSH, intact PTH, calcium, vitamin D level, and gammopathy panel normal. Given recent fracture and some worsening of bone density scan despite treatment with biphosphonate, she was referred to Dr. Milton Traore in 11/2019 and recommendation was that she change to Prolia. Continue calcium and Vitamin D supplements, and encouraged her to exercise, particularly weight bearing activities. 6. Displaced right third metatarsal fracture, s/p open reduction/internal fixation with putty bone grafting. She reports that she did well after surgery, and now fully weight bearing with orthotic in shoes. No significant pain, although mild swelling with prolonged standing. Continue to follow. 7. Iron deficiency without anemia. May be related to recent foot surgery. Advised to begin MVI with iron supplement. Repeat today with normalization of levels. Last colonoscopy 9 years ago which was normal. Wishing to wait until next visit for follow-up with GI for colonscopy. Will continue to monitor. 8. Family history of breast cancer. Patient with family history of breast cancer in first degree relative (sister). She is currently being screened with annual mammograms. Discussed recommendations for breast cancer screening in women with lifetime risk >20%. 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 No other family member with breast cancer. Wishing to continue with annual mammograms and breast exams. 9. Abnormal glucose. Repeat normal with normal HbA1c today. Emphasized importance of lifestyle modifications, including diet, exercise, and weight loss. 10. Bilateral hand and wrist pain. Evaluation for inflammatory cause negative. Improved with changes in gripping walker and wearing of neutral wrist splints. No longer sleeping with wrists in flexed position at night. Continue to follow. 11. Chronic microhematuria. Work-up reportedly negative in past. Recent urinalysis has been negative for blood. Follow. 12. Eczema. Found good response with [facility]. Followed by Dr. Swati Celestin. 13. Candidal intertrigo. Improved with Lotrisone. Discussed preventative treatment with mycostatin powder and loose clothing.    14. Overweight. BMI now in normal range. Emphasized importance of continued lifestyle modifications. Will continue to follow. 15. Elevated TSH. Repeat with free T4 today normal. Patient with history of radiation therapy for a birthmark on her right shoulder as a child. Will continue to follow. 16. Health maintenance. Received 2/2 doses of Shingrix vaccine. Already received influenza vaccine. Other immunizations up to date. Mammogram up to date. Colorectal cancer screening up to date. Continue regular eye exams with Dr. Judith Taveras. Vitamin D level normal. Continue maintenance dose supplement. Medicare wellness visit up to date. Patient understands recommendations and agrees with plan. Follow-up in 4 months for physical. .

## 2020-03-30 RX ORDER — HYDROCHLOROTHIAZIDE 25 MG/1
TABLET ORAL
Qty: 90 TAB | Refills: 2 | Status: SHIPPED | OUTPATIENT
Start: 2020-03-30 | End: 2020-12-21 | Stop reason: SDUPTHER

## 2020-04-16 RX ORDER — ATORVASTATIN CALCIUM 20 MG/1
TABLET, FILM COATED ORAL
Qty: 90 TAB | Refills: 2 | Status: SHIPPED | OUTPATIENT
Start: 2020-04-16 | End: 2021-01-13 | Stop reason: SDUPTHER

## 2020-05-14 RX ORDER — LISINOPRIL 10 MG/1
TABLET ORAL
Qty: 90 TAB | Refills: 2 | Status: SHIPPED | OUTPATIENT
Start: 2020-05-14 | End: 2021-02-08 | Stop reason: SDUPTHER

## 2020-06-09 ENCOUNTER — HOSPITAL ENCOUNTER (OUTPATIENT)
Dept: LAB | Age: 75
Discharge: HOME | End: 2020-06-09
Payer: MEDICARE

## 2020-06-09 ENCOUNTER — APPOINTMENT (OUTPATIENT)
Dept: INTERNAL MEDICINE CLINIC | Age: 75
End: 2020-06-09

## 2020-06-09 DIAGNOSIS — R73.09 ABNORMAL GLUCOSE: ICD-10-CM

## 2020-06-09 DIAGNOSIS — M85.89 OSTEOPENIA OF MULTIPLE SITES: ICD-10-CM

## 2020-06-09 DIAGNOSIS — I10 ESSENTIAL HYPERTENSION: ICD-10-CM

## 2020-06-09 DIAGNOSIS — E78.5 HYPERLIPIDEMIA, UNSPECIFIED HYPERLIPIDEMIA TYPE: ICD-10-CM

## 2020-06-09 DIAGNOSIS — M81.0 OSTEOPOROSIS OF LUMBAR SPINE: ICD-10-CM

## 2020-06-09 DIAGNOSIS — G56.03 CARPAL TUNNEL SYNDROME, BILATERAL: ICD-10-CM

## 2020-06-09 DIAGNOSIS — R29.90 EPISODE OF TRANSIENT NEUROLOGIC SYMPTOMS: ICD-10-CM

## 2020-06-09 DIAGNOSIS — E61.1 DIETARY IRON DEFICIENCY WITHOUT ANEMIA: ICD-10-CM

## 2020-06-09 DIAGNOSIS — R79.89 ELEVATED LFTS: ICD-10-CM

## 2020-06-09 DIAGNOSIS — M81.0 OSTEOPOROSIS OF LUMBAR SPINE: Primary | ICD-10-CM

## 2020-06-09 LAB
25(OH)D3 SERPL-MCNC: 43.6 NG/ML (ref 30–100)
ALBUMIN SERPL-MCNC: 4.3 G/DL (ref 3.4–5)
ALBUMIN/GLOB SERPL: 1.3 {RATIO} (ref 0.8–1.7)
ALP SERPL-CCNC: 59 U/L (ref 45–117)
ALT SERPL-CCNC: 33 U/L (ref 13–56)
ANION GAP SERPL CALC-SCNC: 8 MMOL/L (ref 3–18)
APPEARANCE UR: CLEAR
AST SERPL-CCNC: 29 U/L (ref 10–38)
BASOPHILS # BLD: 0 K/UL (ref 0–0.1)
BASOPHILS NFR BLD: 0 % (ref 0–2)
BILIRUB SERPL-MCNC: 0.6 MG/DL (ref 0.2–1)
BILIRUB UR QL: NEGATIVE
BUN SERPL-MCNC: 19 MG/DL (ref 7–18)
BUN/CREAT SERPL: 28 (ref 12–20)
CALCIUM SERPL-MCNC: 9.5 MG/DL (ref 8.5–10.1)
CHLORIDE SERPL-SCNC: 103 MMOL/L (ref 100–111)
CHOLEST SERPL-MCNC: 177 MG/DL
CO2 SERPL-SCNC: 28 MMOL/L (ref 21–32)
COLOR UR: YELLOW
CREAT SERPL-MCNC: 0.67 MG/DL (ref 0.6–1.3)
DIFFERENTIAL METHOD BLD: NORMAL
EOSINOPHIL # BLD: 0.1 K/UL (ref 0–0.4)
EOSINOPHIL NFR BLD: 2 % (ref 0–5)
ERYTHROCYTE [DISTWIDTH] IN BLOOD BY AUTOMATED COUNT: 13.6 % (ref 11.6–14.5)
EST. AVERAGE GLUCOSE BLD GHB EST-MCNC: 120 MG/DL
GLOBULIN SER CALC-MCNC: 3.4 G/DL (ref 2–4)
GLUCOSE SERPL-MCNC: 94 MG/DL (ref 74–99)
GLUCOSE UR STRIP.AUTO-MCNC: NEGATIVE MG/DL
HBA1C MFR BLD: 5.8 % (ref 4.2–5.6)
HCT VFR BLD AUTO: 42.3 % (ref 35–45)
HDLC SERPL-MCNC: 76 MG/DL (ref 40–60)
HDLC SERPL: 2.3 {RATIO} (ref 0–5)
HGB BLD-MCNC: 13.4 G/DL (ref 12–16)
HGB UR QL STRIP: NEGATIVE
KETONES UR QL STRIP.AUTO: NEGATIVE MG/DL
LDLC SERPL CALC-MCNC: 86.6 MG/DL (ref 0–100)
LEUKOCYTE ESTERASE UR QL STRIP.AUTO: NEGATIVE
LIPID PROFILE,FLP: ABNORMAL
LYMPHOCYTES # BLD: 1.4 K/UL (ref 0.9–3.6)
LYMPHOCYTES NFR BLD: 23 % (ref 21–52)
MAGNESIUM SERPL-MCNC: 2.1 MG/DL (ref 1.6–2.6)
MCH RBC QN AUTO: 28.8 PG (ref 24–34)
MCHC RBC AUTO-ENTMCNC: 31.7 G/DL (ref 31–37)
MCV RBC AUTO: 90.8 FL (ref 74–97)
MONOCYTES # BLD: 0.6 K/UL (ref 0.05–1.2)
MONOCYTES NFR BLD: 10 % (ref 3–10)
NEUTS SEG # BLD: 4.1 K/UL (ref 1.8–8)
NEUTS SEG NFR BLD: 65 % (ref 40–73)
NITRITE UR QL STRIP.AUTO: NEGATIVE
PH UR STRIP: 8.5 [PH] (ref 5–8)
PHOSPHATE SERPL-MCNC: 3.6 MG/DL (ref 2.5–4.9)
PLATELET # BLD AUTO: 278 K/UL (ref 135–420)
PMV BLD AUTO: 10.1 FL (ref 9.2–11.8)
POTASSIUM SERPL-SCNC: 4 MMOL/L (ref 3.5–5.5)
PROT SERPL-MCNC: 7.7 G/DL (ref 6.4–8.2)
PROT UR STRIP-MCNC: NEGATIVE MG/DL
RBC # BLD AUTO: 4.66 M/UL (ref 4.2–5.3)
SODIUM SERPL-SCNC: 139 MMOL/L (ref 136–145)
SP GR UR REFRACTOMETRY: 1.02 (ref 1–1.03)
T4 FREE SERPL-MCNC: 1.2 NG/DL (ref 0.7–1.5)
TRIGL SERPL-MCNC: 72 MG/DL (ref ?–150)
TSH SERPL DL<=0.05 MIU/L-ACNC: 3.57 UIU/ML (ref 0.36–3.74)
UROBILINOGEN UR QL STRIP.AUTO: 1 EU/DL (ref 0.2–1)
VLDLC SERPL CALC-MCNC: 14.4 MG/DL
WBC # BLD AUTO: 6.2 K/UL (ref 4.6–13.2)

## 2020-06-09 PROCEDURE — 83036 HEMOGLOBIN GLYCOSYLATED A1C: CPT

## 2020-06-09 PROCEDURE — 80061 LIPID PANEL: CPT

## 2020-06-09 PROCEDURE — 84100 ASSAY OF PHOSPHORUS: CPT

## 2020-06-09 PROCEDURE — 84443 ASSAY THYROID STIM HORMONE: CPT

## 2020-06-09 PROCEDURE — 84439 ASSAY OF FREE THYROXINE: CPT

## 2020-06-09 PROCEDURE — 36415 COLL VENOUS BLD VENIPUNCTURE: CPT

## 2020-06-09 PROCEDURE — 85025 COMPLETE CBC W/AUTO DIFF WBC: CPT

## 2020-06-09 PROCEDURE — 83735 ASSAY OF MAGNESIUM: CPT

## 2020-06-09 PROCEDURE — 81003 URINALYSIS AUTO W/O SCOPE: CPT

## 2020-06-09 PROCEDURE — 82306 VITAMIN D 25 HYDROXY: CPT

## 2020-06-09 PROCEDURE — 80053 COMPREHEN METABOLIC PANEL: CPT

## 2020-06-12 ENCOUNTER — TELEPHONE (OUTPATIENT)
Dept: INTERNAL MEDICINE CLINIC | Age: 75
End: 2020-06-12

## 2020-06-18 ENCOUNTER — VIRTUAL VISIT (OUTPATIENT)
Dept: INTERNAL MEDICINE CLINIC | Age: 75
End: 2020-06-18

## 2020-06-18 ENCOUNTER — TELEPHONE (OUTPATIENT)
Dept: INTERNAL MEDICINE CLINIC | Age: 75
End: 2020-06-18

## 2020-06-18 DIAGNOSIS — M81.0 OSTEOPOROSIS OF LUMBAR SPINE: ICD-10-CM

## 2020-06-18 DIAGNOSIS — E61.1 DIETARY IRON DEFICIENCY WITHOUT ANEMIA: ICD-10-CM

## 2020-06-18 DIAGNOSIS — E78.5 HYPERLIPIDEMIA, UNSPECIFIED HYPERLIPIDEMIA TYPE: ICD-10-CM

## 2020-06-18 DIAGNOSIS — Z12.11 COLON CANCER SCREENING: ICD-10-CM

## 2020-06-18 DIAGNOSIS — Z00.00 MEDICARE ANNUAL WELLNESS VISIT, SUBSEQUENT: ICD-10-CM

## 2020-06-18 DIAGNOSIS — Z71.89 ACP (ADVANCE CARE PLANNING): ICD-10-CM

## 2020-06-18 DIAGNOSIS — I10 ESSENTIAL HYPERTENSION: Primary | ICD-10-CM

## 2020-06-18 DIAGNOSIS — R79.89 ELEVATED LFTS: ICD-10-CM

## 2020-06-18 DIAGNOSIS — S92.331D CLOSED DISPLACED FRACTURE OF THIRD METATARSAL BONE OF RIGHT FOOT WITH ROUTINE HEALING: ICD-10-CM

## 2020-06-18 DIAGNOSIS — M85.89 OSTEOPENIA OF MULTIPLE SITES: ICD-10-CM

## 2020-06-18 DIAGNOSIS — L30.9 ECZEMA, UNSPECIFIED TYPE: ICD-10-CM

## 2020-06-18 DIAGNOSIS — R79.89 ELEVATED TSH: ICD-10-CM

## 2020-06-18 DIAGNOSIS — R73.01 IMPAIRED FASTING GLUCOSE: ICD-10-CM

## 2020-06-18 NOTE — PATIENT INSTRUCTIONS
Medicare Wellness Visit, Female The best way to improve and maintain good health is to have a healthy lifestyle by eating a well-balanced diet, exercising regularly, limiting alcohol and stopping smoking. Regular visits with your physician or non-physician health care provider also support your good health. Preventive screening tests can find health problems before they become diseases or illnesses. Preventive services such as immunizations prevent serious infections. All people over age 72 should have a Pneumovax and a Prevnar-13 shot to prevent potentially life threatening infections with the pneumococcus bacteria, a common cause of pneumonia. These are once in a lifetime unless you and your provider decide differently. All people over 65 should have a yearly influenza vaccine or \"flu\" shot. This does not prevent infection with cold viruses but has been proven to prevent hospitalization and death from influenza. Although Medicare part B \"regular Medicare\" currently only covers tetanus vaccination in the context of an injury, a tetanus vaccine (Tdap or Td) is recommended every 10 years. A shingles vaccine is recommended once in a lifetime after age 61. The Shingles vaccine is also not covered by Medicare part B. Note, however, that both the Shingles vaccine and Tdap/Td are generally covered by secondary carriers. Please check your coverage and out of pocket expenses. Consider contacting your local health department because it may stock these vaccines for a reasonable charge. We currently have documentation of the following immunization history for you: 
Immunization History Administered Date(s) Administered  (RETIRED) Pneumococcal Vaccine (Unspecified Type) 05/04/2010  Influenza High Dose Vaccine PF 12/13/2016, 10/03/2017  Influenza Vaccine 10/24/2014  Influenza Vaccine (Tri) Adjuvanted 10/09/2018, 11/13/2019  Influenza Vaccine PF 11/05/2013  Influenza Vaccine Split 11/01/2011, 11/06/2012  Pneumococcal Conjugate (PCV-13) 05/05/2015  Pneumococcal Polysaccharide (PPSV-23) 05/04/2010  TD Vaccine 05/05/2009  Tdap 12/12/2016  Zoster Recombinant 09/21/2019, 12/28/2019  Zoster Vaccine, Live 05/07/2013 Screening for infection with Hepatitis C is recommended for anyone born between [birth year] through [address]. The table at the bottom of this document indicates the status of this and other preventive services. A bone mass density test (DEXA) to screen for osteoporosis or thinning of the bones should be done at least once after age 72 and may be done up to every 2 years as determined by you and your health care provider. The most recent DEXA we have on file for you is: DEXA Results (most recent): 
Results from Hospital Encounter encounter on 08/06/19 DEXA BONE DENSITY STUDY AXIAL Narrative DEXA BONE DENSITOMETRY, CENTRAL 
 
CLINICAL INDICATION/HISTORY: Postmenopausal. History of breast cancer and 
osteopenia. Hypertension. High cholesterol. Family history of hip fracture. Taking Fosamax, calcium and vitamin D.    
 
CLINICAL INDICATION/HISTORY: Using GE LUNAR Prodigy densitometer, bone density 
measurement was performed in the lumbar spine in addition to the proximal left 
and right femora and the right forearm. T score refers to standard deviations 
above or below average compared to a young adult of the same sex. Z score 
refers to standard deviations above or below average compared to a patient of 
the same sex, age, race and weight. COMPARISON: The patient's prior studies are no longer available on the bone 
densitometry unit for comparison trending due to a prior computer issue on the 
densitometry unit. The data from the prior study of 25 July 2016 has been 
included on this study. FINDINGS:  
 
Lumbar Spine Levels: L1 and L2 Mean Bone Mineral Density (BMD):  0.864 g/cm2  (0.910 BMD on previous study) T Score: -2.6     (-2.2 T score on previous) Z Score: -0.9 On PA image of the lumbar spine obtained for localization of vertebral levels (not a diagnostic quality radiograph), it is noted that there is apparent 
increased density at L3 and L4. The density is not well characterized on the 
basis of this image, and may be due to underlying degenerative changes, prior 
surgery, trauma, or other osseous process. Since this density spuriously 
increases measured bone mineral density, this level has been excluded. Right distal 1/3 Radius BMD:  0.781 g/cm2  (0.769 BMD on previous study) T Score: -1.2     (-1.3 T score on previous) Z Score: 1.0 Left Total Proximal Femur BMD: 0.738  (0.721 BMD on previous study) T Score: -2.1     (-2.3 T score on previous) Z Score: -0.5 Right Total Proximal Femur BMD: 0.748  (0.749 BMD on previous study) T Score: -2.1     (-2.0 T score on previous) Z Score: -0.4 Left Femoral Neck BMD:  0.734 g/cm2 T Score: -2.2 Z Score: -0.3 Right Femoral Neck BMD:  0.769 g/cm2 T Score: -1.9 Z Score: -0.1 Impression IMPRESSION: 
 
1. BMD measures consistent with osteoporosis. 2.  This will serve as the new baseline study at this institution. Based upon current ISCD guidelines, the patient's overall diagnostic category, 
selected using WHO criteria in postmenopausal women and males aged 48 and above, 
is selected based upon the lowest T score from among the lumbar spine, total 
femur, femoral neck, (or distal third radius if measured). WHO Definition of Osteoporosis and Osteopenia on DXA (specified for post 
menopausal  females): 
 
  Normal:                     T Score at or above -1 SD Osteopenia:               T Score between -1 and -2.5 SD Osteoporosis:             T Score at or below -2.5 SD The risk of fracture approximately doubles for each 1 SD decrease in T score. It is important to consider other factors in assessing a patient's risk of 
fracture, including age, risk of falling/injury, history of fragility fracture, 
family history of osteoporosis, smoking, low weight. Various fracture risk tools have been developed for adult patients and are 
available online. For example, the FRAX tool developed by [facility] is widely used. Reference www.iscd.org. It is also important to note that DXA measures bone density but does not 
distinguish among causes of decreased bone density, which include primary vs. 
secondary osteoporosis (such as metabolic bone disorders or possible effects of 
medications) and also other conditions (such as osteomalacia). Clinical 
considerations should determine what additional evaluation may be warranted to 
exclude secondary conditions in a patient with low bone density. It is 
suggested that secondary causes of low bone density be considered in patients 
with Z score lower than -2.0, and patients who are not responding to therapy for 
osteoporosis on followup DXA despite compliance with medications. Please note that reliable, valid comparisons can not be made between studies 
which have been performed on different densitometers. If clinically warranted, 
follow up study performed at this site would best permit assessment of trend for 
possible change in bone mineral density over time in comparison to this study. Thank you for this referral.  
 
 
Screening for diabetes mellitus with a blood sugar test (glucose) should be done at least every 3 years until age 79. You and your health care provider may decide whether to continue screening after age 79. The most recent blood glucose we have on file for you is:  
Lab Results Component Value Date/Time Glucose 94 06/09/2020 09:01 AM  
 
 
 
Glaucoma is a disease of the eye due to increased ocular pressure that can lead to blindness.  People with risk factors for glaucoma ( race, diabetes, family history) should be screened at least every 2 years by an eye professional.  
 
Cardiovascular screening tests that check for elevated lipids or cholesterol (fatty part of blood) which can lead to heart disease and strokes should be done every 4-6 years through age 79. You and your health care provider may decide whether to continue screening after age 79. The most recent lipid panel we have on file for you is:  
Lab Results Component Value Date/Time Cholesterol, total 177 06/09/2020 09:01 AM  
 HDL Cholesterol 76 (H) 06/09/2020 09:01 AM  
 LDL, calculated 86.6 06/09/2020 09:01 AM  
 VLDL, calculated 14.4 06/09/2020 09:01 AM  
 Triglyceride 72 06/09/2020 09:01 AM  
 CHOL/HDL Ratio 2.3 06/09/2020 09:01 AM  
 
 
Colorectal cancer screening that evaluates for blood or polyps in your colon for people with average risk should be done yearly as a stool test, every five years as a flexible sigmoidoscope or every 10 years as a colonoscopy up to age 76. You and your health care provider may decide whether to continue screening after age 76. Breast cancer screening with a mammogram is recommended at least once every 2 years  for women age 54-69. You and your health care provider may decide whether to continue screening after age 76. The most recent mammogram we have on file for you is: [facility] Results (most recent): 
Results from Hospital Encounter encounter on 08/06/19 [facility] 3D ROCÍO W MAMMO BI SCREENING INCL CAD Narrative Digital Screening Mammogram with Tomosynthesis History: Screening. Comparison: [id number] Technique: Digital mammography (2D and 3D Tomosynthesis) with CAD was performed. Routine views obtained. Findings:  
No developing masses or suspicious calcifications are seen. Impression Impression: No evidence for malignancy. Suggest routine follow-up with annual mammographic screening. BIRADS 1 : Negative The breasts are heterogenously dense, which may obscure small masses. Thank you for this referral.  
  
 
 
Screening for cervical cancer with a pap smear is recommended for all women with a cervix until age 72. The frequency of this test is based on the details of her prior pap smear testing. You and your health care provider may decide whether to continue screening after age 72. People who have smoked the equivalent of 1 pack per day for 30 years or more may benefit from screening for lung cancer with a yearly low dose CT scan until they have been non smokers for 15 years or competing health conditions render this unlikely to be beneficial. Our records show: n/a Your Medicare Wellness Exam is recommended annually. Here is a list of your current Health Maintenance items with a due date: 
Health Maintenance Topic Date Due  Influenza Age 5 to Adult  08/01/2020  GLAUCOMA SCREENING Q2Y  10/29/2020  A1C test (Diabetic or Prediabetic)  06/09/2021  Lipid Screen  06/09/2021  Medicare Yearly Exam  06/19/2021  
 DTaP/Tdap/Td series (3 - Td) 12/12/2026  Hepatitis C Screening  Completed  Bone Densitometry (Dexa) Screening  Completed  Shingrix Vaccine Age 50>  Completed  Pneumococcal 65+ years  Completed Learning About Meal Planning for Diabetes Why plan your meals? Meal planning can be a key part of managing diabetes. Planning meals and snacks with the right balance of carbohydrate, protein, and fat can help you keep your blood sugar at the target level you set with your doctor. You don't have to eat special foods. You can eat what your family eats, including sweets once in a while. But you do have to pay attention to how often you eat and how much you eat of certain foods. You may want to work with a dietitian or a certified diabetes educator.  He or she can give you tips and meal ideas and can answer your questions about meal planning. This health professional can also help you reach a healthy weight if that is one of your goals. What plan is right for you? Your dietitian or diabetes educator may suggest that you start with the plate format or carbohydrate counting. The plate format The plate format is a simple way to help you manage how you eat. You plan meals by learning how much space each food should take on a plate. Using the plate format helps you spread carbohydrate throughout the day. It can make it easier to keep your blood sugar level within your target range. It also helps you see if you're eating healthy portion sizes. To use the plate format, you put non-starchy vegetables on half your plate. Add meat or meat substitutes on one-quarter of the plate. Put a grain or starchy vegetable (such as brown rice or a potato) on the final quarter of the plate. You can add a small piece of fruit and some low-fat or fat-free milk or yogurt, depending on your carbohydrate goal for each meal. 
Here are some tips for using the plate format: · Make sure that you are not using an oversized plate. A 9-inch plate is best. Many restaurants use larger plates. · Get used to using the plate format at home. Then you can use it when you eat out. · Write down your questions about using the plate format. Talk to your doctor, a dietitian, or a diabetes educator about your concerns. Carbohydrate counting With carbohydrate counting, you plan meals based on the amount of carbohydrate in each food. Carbohydrate raises blood sugar higher and more quickly than any other nutrient. It is found in desserts, breads and cereals, and fruit. It's also found in starchy vegetables such as potatoes and corn, grains such as rice and pasta, and milk and yogurt. Spreading carbohydrate throughout the day helps keep your blood sugar levels within your target range.  
Your daily amount depends on several things, including your weight, how active you are, which diabetes medicines you take, and what your goals are for your blood sugar levels. A registered dietitian or diabetes educator can help you plan how much carbohydrate to include in each meal and snack. A guideline for your daily amount of carbohydrate is: · 45 to 60 grams at each meal. That's about the same as 3 to 4 carbohydrate servings. · 15 to 20 grams at each snack. That's about the same as 1 carbohydrate serving. The Nutrition Facts label on packaged foods tells you how much carbohydrate is in a serving of the food. First, look at the serving size on the food label. Is that the amount you eat in a serving? All of the nutrition information on a food label is based on that serving size. So if you eat more or less than that, you'll need to adjust the other numbers. Total carbohydrate is the next thing you need to look for on the label. If you count carbohydrate servings, one serving of carbohydrate is 15 grams. For foods that don't come with labels, such as fresh fruits and vegetables, you'll need a guide that lists carbohydrate in these foods. Ask your doctor, dietitian, or diabetes educator about books or other nutrition guides you can use. If you take insulin, you need to know how many grams of carbohydrate are in a meal. This lets you know how much rapid-acting insulin to take before you eat. If you use an insulin pump, you get a constant rate of insulin during the day. So the pump must be programmed at meals to give you extra insulin to cover the rise in blood sugar after meals. When you know how much carbohydrate you will eat, you can take the right amount of insulin. Or, if you always use the same amount of insulin, you need to make sure that you eat the same amount of carbohydrate at meals. If you need more help to understand carbohydrate counting and food labels, ask your doctor, dietitian, or diabetes educator. How do you get started with meal planning? Here are some tips to get started: 
· Plan your meals a week at a time. Don't forget to include snacks too. · Use cookbooks or online recipes to plan several main meals. Plan some quick meals for busy nights. You also can double some recipes that freeze well. Then you can save half for other busy nights when you don't have time to cook. · Make sure you have the ingredients you need for your recipes. If you're running low on basic items, put these items on your shopping list too. · List foods that you use to make breakfasts, lunches, and snacks. List plenty of fruits and vegetables. · Post this list on the refrigerator. Add to it as you think of more things you need. · Take the list to the store to do your weekly shopping. Follow-up care is a key part of your treatment and safety. Be sure to make and go to all appointments, and call your doctor if you are having problems. It's also a good idea to know your test results and keep a list of the medicines you take. Where can you learn more? Go to http://mallika-suzanne.info/ Idris Black in the search box to learn more about \"Learning About Meal Planning for Diabetes. \" Current as of: December 20, 2019               Content Version: 12.5 © 5851-3102 [employer]wise, [employer]. Care instructions adapted under license by [employer] (which disclaims liability or warranty for this information). If you have questions about a medical condition or this instruction, always ask your healthcare professional. [place] [address] any warranty or liability for your use of this information. Learning About Diabetes Food Guidelines Your Care Instructions Meal planning is important to manage diabetes. It helps keep your blood sugar at a target level (which you set with your doctor). You don't have to eat special foods.  You can eat what your family eats, including sweets once in a while. But you do have to pay attention to how often you eat and how much you eat of certain foods. You may want to work with a dietitian or a certified diabetes educator (CDE) to help you plan meals and snacks. A dietitian or CDE can also help you lose weight if that is one of your goals. What should you know about eating carbs? Managing the amount of carbohydrate (carbs) you eat is an important part of healthy meals when you have diabetes. Carbohydrate is found in many foods. · Learn which foods have carbs. And learn the amounts of carbs in different foods. ? Bread, cereal, pasta, and rice have about 15 grams of carbs in a serving. A serving is 1 slice of bread (1 ounce), ½ cup of cooked cereal, or 1/3 cup of cooked pasta or rice. ? Fruits have 15 grams of carbs in a serving. A serving is 1 small fresh fruit, such as an apple or orange; ½ of a banana; ½ cup of cooked or canned fruit; ½ cup of fruit juice; 1 cup of melon or raspberries; or 2 tablespoons of dried fruit. ? Milk and no-sugar-added yogurt have 15 grams of carbs in a serving. A serving is 1 cup of milk or 2/3 cup of no-sugar-added yogurt. ? Starchy vegetables have 15 grams of carbs in a serving. A serving is ½ cup of mashed potatoes or sweet potato; 1 cup winter squash; ½ of a small baked potato; ½ cup of cooked beans; or ½ cup cooked corn or green peas. · Learn how much carbs to eat each day and at each meal. A dietitian or CDE can teach you how to keep track of the amount of carbs you eat. This is called carbohydrate counting. · If you are not sure how to count carbohydrate grams, use the Plate Method to plan meals. It is a good, quick way to make sure that you have a balanced meal. It also helps you spread carbs throughout the day. ? Divide your plate by types of foods.  Put non-starchy vegetables on half the plate, meat or other protein food on one-quarter of the plate, and a grain or starchy vegetable in the final quarter of the plate. To this you can add a small piece of fruit and 1 cup of milk or yogurt, depending on how many carbs you are supposed to eat at a meal. 
· Try to eat about the same amount of carbs at each meal. Do not \"save up\" your daily allowance of carbs to eat at one meal. 
· Proteins have very little or no carbs per serving. Examples of proteins are beef, chicken, turkey, fish, eggs, tofu, cheese, cottage cheese, and peanut butter. A serving size of meat is 3 ounces, which is about the size of a deck of cards. Examples of meat substitute serving sizes (equal to 1 ounce of meat) are 1/4 cup of cottage cheese, 1 egg, 1 tablespoon of peanut butter, and ½ cup of tofu. How can you eat out and still eat healthy? · Learn to estimate the serving sizes of foods that have carbohydrate. If you measure food at home, it will be easier to estimate the amount in a serving of restaurant food. · If the meal you order has too much carbohydrate (such as potatoes, corn, or baked beans), ask to have a low-carbohydrate food instead. Ask for a salad or green vegetables. · If you use insulin, check your blood sugar before and after eating out to help you plan how much to eat in the future. · If you eat more carbohydrate at a meal than you had planned, take a walk or do other exercise. This will help lower your blood sugar. What else should you know? · Limit saturated fat, such as the fat from meat and dairy products. This is a healthy choice because people who have diabetes are at higher risk of heart disease. So choose lean cuts of meat and nonfat or low-fat dairy products. Use olive or canola oil instead of butter or shortening when cooking. · Don't skip meals. Your blood sugar may drop too low if you skip meals and take insulin or certain medicines for diabetes. · Check with your doctor before you drink alcohol.  Alcohol can cause your blood sugar to drop too low. Alcohol can also cause a bad reaction if you take certain diabetes medicines. Follow-up care is a key part of your treatment and safety. Be sure to make and go to all appointments, and call your doctor if you are having problems. It's also a good idea to know your test results and keep a list of the medicines you take. Where can you learn more? Go to http://mallika-suzanne.info/ Enter N848 in the search box to learn more about \"Learning About Diabetes Food Guidelines. \" Current as of: December 20, 2019               Content Version: 12.5 © 3295-4787 Healthwise, Incorporated. Care instructions adapted under license by "[employer] (which disclaims liability or warranty for this information). If you have questions about a medical condition or this instruction, always ask your healthcare professional. [address] any warranty or liability for your use of this information.

## 2020-06-18 NOTE — PROGRESS NOTES
This is the Subsequent Medicare Annual Wellness Exam, performed 12 months or more after the Initial AWV or the last Subsequent AWV    Consent: Annie Hernandez, who was seen by synchronous (real-time) audio-video technology, and/or her healthcare decision maker, is aware that this patient-initiated, Telehealth encounter on 6/18/2020 is a billable service. While AWVs are fully covered by Medicare, any services rendered on this date that are not included in an AWV are subject to additional billing, with coverage as determined by her insurance carrier. She is aware that she may receive a bill for any such additional services and has provided verbal consent to proceed: Yes. I have reviewed the patient's medical history in detail and updated the computerized patient record.      History     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    Tinnitus H93.19    Microhematuria R31.29    Eczema L30.9    Osteopenia M85.80    Elevated TSH R79.89    Impaired fasting glucose R73.01    Candidal intertrigo B37.2    Closed displaced fracture of third metatarsal bone of right foot with routine healing S92.331D    Carpal tunnel syndrome, bilateral G56.03    Osteoporosis of lumbar spine M81.0    Elevated LFTs R79.89    Dietary iron deficiency without anemia E61.1     Past Medical History:   Diagnosis Date    Eczema     Essential hypertension     FH: breast cancer     Fibroids     uterine    Fracture of radius 11/2009    with ulna, left, closed    History of cataract     History of foot fracture     right and left    Hyperlipidemia     Microhematuria     chronic    Osteopenia     S/P radiation therapy     as child, to right shoulder for birthmark    Tinnitus     Zoster     right scapula      Past Surgical History:   Procedure Laterality Date    HX CATARACT REMOVAL  HX FRACTURE TX Right 04/24/2019    right foot 3rd metatarsal fracture    HX ORTHOPAEDIC      left arm    HX WISDOM TEETH EXTRACTION      HX WRIST FRACTURE TX       Current Outpatient Medications   Medication Sig Dispense Refill    lisinopriL (PRINIVIL, ZESTRIL) 10 mg tablet TAKE 1 TABLET BY MOUTH DAILY 90 Tab 2    atorvastatin (LIPITOR) 20 mg tablet TAKE 1 TABLET BY MOUTH DAILY 90 Tab 2    hydroCHLOROthiazide (HYDRODIURIL) 25 mg tablet TAKE 1 TABLET BY MOUTH DAILY 90 Tab 2    denosumab (PROLIA) 60 mg/mL injection 60 mg by SubCUTAneous route.  Lactobacillus acidophilus (PROBIOTIC PO) Take  by mouth.  folic acid/multivit-min/lutein (CENTRUM SILVER PO) Take  by mouth.  aspirin delayed-release 81 mg tablet Take  by mouth daily.  amoxicillin 500 mg tab TAKE ONE CAPSULE PO TID, START ONE DAY PRIOR TO PROCEDURE, DAY OF PROCEDURE AND DAY AFTER PROCEDURE 10 Tab 1    crisaborole (EUCRISA) 2 % oint by Apply Externally route.  triamcinolone acetonide (KENALOG) 0.025 % topical cream Apply  to affected area two (2) times a day. 15 g 2    clotrimazole-betamethasone (LOTRISONE) topical cream Apply  to affected area two (2) times a day. 15 g 2    docusate sodium (COLACE) 100 mg capsule Take 100 mg by mouth daily as needed for Constipation.  co-enzyme Q-10 (CO Q-10) 100 mg capsule Take 100 mg by mouth daily.  biotin 2,500 mcg Tab Take 2,500 mcg by mouth.  cholecalciferol (VITAMIN D3) 1,000 unit cap Take 1 Tab by mouth daily.  fexofenadine (ALLEGRA) 60 mg tablet Take 60 mg by mouth daily.  CALCIUM CARBONATE/VITAMIN D3 (CALCIUM + D PO) Take 1 Tab by mouth two (2) times a day.        Allergies   Allergen Reactions    Codeine Nausea Only    Hibiclens [Chlorhexidine Gluconate] Rash       Family History   Problem Relation Age of Onset   [address] [facility] Cancer Mother         pancreatic    Heart Disease Mother     Heart Disease Father     Cancer Sister         breast    MS Sister    [address] [facility] Breast Cancer Sister 62     Social History     Tobacco Use    Smoking status: Never Smoker    Smokeless tobacco: Never Used   Substance Use Topics    Alcohol use: Yes     Alcohol/week: 0.0 standard drinks     Comment: occasionally       Depression Risk Factor Screening:     3 most recent PHQ Screens 6/18/2020   Little interest or pleasure in doing things Not at all   Feeling down, depressed, irritable, or hopeless Not at all   Total Score PHQ 2 0       Alcohol Risk Factor Screening:   Do you average 1 drink per night or more than 7 drinks a week:  No    On any one occasion in the past three months have you have had more than 3 drinks containing alcohol:  No      Functional Ability and Level of Safety:   Hearing: Hearing is good. Activities of Daily Living: The home contains: no safety equipment. Patient does total self care     Ambulation: with no difficulty     Fall Risk:  Fall Risk Assessment, last 12 mths 6/18/2020   Able to walk? Yes   Fall in past 12 months?  No     Abuse Screen:  Patient is not abused       Cognitive Screening   Has your family/caregiver stated any concerns about your memory: no    Cognitive Screening: none performed    Patient Care Team   Patient Care Team:  Juanita Chan MD as PCP - General (Internal Medicine)  Juanita Chan [facility]  Charles Fierro MD (Colon and Rectal Surgery)  Ankita Meeks OD (Optometry)  Larence Sandifer, MD (Ophthalmology)  Manya Aase, MD (Dermatology)  Florinda Zohng MD (Orthopedic Surgery)  Pipo Pulliam (Physician Assistant)  Uriel Rowe MD as Consulting Provider (Neurology)    Assessment/Plan   Education and counseling provided:  Are appropriate based on today's review and evaluation  End-of-Life planning (with patient's consent)  Influenza Vaccine  Screening Mammography  Colorectal cancer screening tests  Cardiovascular screening blood test  Bone mass measurement (DEXA)  Screening for glaucoma  Diabetes screening test  Medical nutrition therapy for individuals with diabetes or renal disease    Diagnoses and all orders for this visit:    1. Essential hypertension  -     CBC WITH AUTOMATED DIFF; Future  -     LIPID PANEL; Future  -     METABOLIC PANEL, COMPREHENSIVE; Future  -     VITAMIN D, 25 HYDROXY; Future  -     MAGNESIUM; Future  -     HEMOGLOBIN A1C WITH EAG; Future    2. Hyperlipidemia, unspecified hyperlipidemia type  -     CBC WITH AUTOMATED DIFF; Future  -     LIPID PANEL; Future  -     METABOLIC PANEL, COMPREHENSIVE; Future  -     VITAMIN D, 25 HYDROXY; Future  -     MAGNESIUM; Future  -     HEMOGLOBIN A1C WITH EAG; Future    3. Impaired fasting glucose  -     CBC WITH AUTOMATED DIFF; Future  -     LIPID PANEL; Future  -     METABOLIC PANEL, COMPREHENSIVE; Future  -     VITAMIN D, 25 HYDROXY; Future  -     MAGNESIUM; Future  -     HEMOGLOBIN A1C WITH EAG; Future    4. Osteoporosis of lumbar spine  -     CBC WITH AUTOMATED DIFF; Future  -     LIPID PANEL; Future  -     METABOLIC PANEL, COMPREHENSIVE; Future  -     VITAMIN D, 25 HYDROXY; Future  -     MAGNESIUM; Future  -     HEMOGLOBIN A1C WITH EAG; Future    5. Osteopenia of multiple sites  -     CBC WITH AUTOMATED DIFF; Future  -     LIPID PANEL; Future  -     METABOLIC PANEL, COMPREHENSIVE; Future  -     VITAMIN D, 25 HYDROXY; Future  -     MAGNESIUM; Future  -     HEMOGLOBIN A1C WITH EAG; Future    6. Closed displaced fracture of third metatarsal bone of right foot with routine healing  -     CBC WITH AUTOMATED DIFF; Future  -     LIPID PANEL; Future  -     METABOLIC PANEL, COMPREHENSIVE; Future  -     VITAMIN D, 25 HYDROXY; Future  -     MAGNESIUM; Future  -     HEMOGLOBIN A1C WITH EAG; Future    7. Eczema, unspecified type  -     CBC WITH AUTOMATED DIFF; Future  -     LIPID PANEL; Future  -     METABOLIC PANEL, COMPREHENSIVE; Future  -     VITAMIN D, 25 HYDROXY; Future  -     MAGNESIUM;  Future  -     HEMOGLOBIN A1C WITH EAG; Future    8. Elevated LFTs  -     CBC WITH AUTOMATED DIFF; Future  -     LIPID PANEL; Future  -     METABOLIC PANEL, COMPREHENSIVE; Future  -     VITAMIN D, 25 HYDROXY; Future  -     MAGNESIUM; Future  -     HEMOGLOBIN A1C WITH EAG; Future    9. Elevated TSH  -     CBC WITH AUTOMATED DIFF; Future  -     LIPID PANEL; Future  -     METABOLIC PANEL, COMPREHENSIVE; Future  -     VITAMIN D, 25 HYDROXY; Future  -     MAGNESIUM; Future  -     HEMOGLOBIN A1C WITH EAG; Future    10. Dietary iron deficiency without anemia  -     CBC WITH AUTOMATED DIFF; Future  -     LIPID PANEL; Future  -     METABOLIC PANEL, COMPREHENSIVE; Future  -     VITAMIN D, 25 HYDROXY; Future  -     MAGNESIUM; Future  -     HEMOGLOBIN A1C WITH EAG; Future    11. Colon cancer screening  -     REFERRAL TO GASTROENTEROLOGY  -     CBC WITH AUTOMATED DIFF; Future  -     LIPID PANEL; Future  -     METABOLIC PANEL, COMPREHENSIVE; Future  -     VITAMIN D, 25 HYDROXY; Future  -     MAGNESIUM; Future  -     HEMOGLOBIN A1C WITH EAG; Future    12. Medicare annual wellness visit, subsequent    13. ACP (advance care planning)        There are no preventive care reminders to display for this patient. Brendan Brown is a 76 y.o. female who was evaluated by an audio-video encounter for concerns as above. Patient identification was verified prior to start of the visit. A caregiver was present when appropriate. Due to this being a TeleHealth encounter   (During [facility]- public [facility] emergency), evaluation of the following organ systems was limited: Vitals/Constitutional/EENT/Resp/CV/GI//MS/Neuro/Skin/Heme-Lymph-Imm.   Pursuant to the emergency declaration under the [facility], [facility]5 waiver authority and the [employer] Act, this Virtual Visit was conducted, with patient's (and/or legal guardian's) consent, to reduce the patient's risk of exposure to COVID-19 and provide necessary medical care. Services were provided through a synchronous discussion virtually to substitute for in-person clinic visit. I was in the office. The patient was at home.       Jules Nagel MD

## 2020-06-18 NOTE — ACP (ADVANCE CARE PLANNING)
Advance Care Planning       Advance Care Planning (ACP) Physician/NP/PA (Provider) Conversation        Date of ACP Conversation: 6/18/2020    Conversation Conducted with:   Patient with Decision Making Jaskaran Briseno Maker:    Current Designated Health Care Decision Maker:   (If there is a valid [place] named in the [address]" box in the ACP activity, but it is not visible above, be sure to open that field and then select the health care decision maker relationship (ie \"primary\") in the blank space to the right of the name.)    Note: Assess and validate information in current ACP documents, as indicated. If no Authorized Decision Maker has previously been identified, then patient chooses [place]:  \"Who would you like to name as your primary health care decision-maker? \"    Name: Tracey Diez   Relationship:  Phone number: [phone]  Rajiv Wood this person be reached easily? \" YES  \"Who would you like to name as your back-up decision maker? \"   Name: Gerhardt Low  Relationship: son Phone number:   Rajiv Wood this person be reached easily? \" YES    Note: If the relationship of these Decision-Makers to the patient does NOT follow your state's Next of Kin hierarchy, recommend that patient complete ACP document that meets state-specific requirements to allow them to act on the patient's behalf when appropriate. Care Preferences:    Hospitalization: \"If your health worsens and it becomes clear that your chance of recovery is unlikely, what would your preference be regarding hospitalization? \"  If the patient would want hospitalization, answer \"yes\". If the patient would prefer comfort-focused treatment without hospitalization, answer \"no\". yes      Ventilation:   \"If you were in your present state of health and suddenly became very ill and were unable to breathe on your own, what would your preference be about the use of a ventilator (breathing machine) if it was available to you? \"    If patient would desire the use of a ventilator (breathing machine), answer \"yes\", if not answer \"no\":yes    \"If your health worsens and it becomes clear that your chance of recovery is unlikely, what would your preference be about the use of a ventilator (breathing machine) if it was available to you? \"   yes      Resuscitation:  \"CPR works best to restart the heart when there is a sudden event, like a heart attack, in someone who is otherwise healthy. Unfortunately, CPR does not typically restart the heart for people who have serious health conditions or who are very sick. \"    \"In the event your heart stopped as a result of an underlying serious health condition, would you want attempts to be made to restart your heart (answer \"yes\" for attempt to resuscitate) or would you prefer a natural death (answer \"no\" for do not attempt to resuscitate)? \"   yes    NOTE: If the patient has a valid advance directive AND provides care preference(s) that are inconsistent with that prior directive, advise the patient to consider either: creating a new advance directive that complies with state-specific requirements; or, if that is not possible, orally revoking that prior directive in accordance with state-specific requirements, which must be documented in the EHR. Conversation Outcomes / Follow-Up Plan: Patient has an existing advance directive completed previously with an . It designates her  and son  as her healthcare agents and expresses that she does not wish life prolonging procedures for end of life care. It was recommended that she bring a copy to the office to be scanned into the chart. With regard to elfego COVID-19, the patient states that she would wish the interventions as outlined above.             Length of Voluntary ACP Conversation in minutes:  16 minutes      Samanta Rogel MD

## 2020-06-21 PROBLEM — R29.90 EPISODE OF TRANSIENT NEUROLOGIC SYMPTOMS: Status: RESOLVED | Noted: 2019-06-29 | Resolved: 2020-06-21

## 2020-06-21 NOTE — PROGRESS NOTES
Brigida Ge is a 76 y.o. female who was seen by synchronous (real-time) audio-video technology on 6/18/2020. Consent: Brigida Ge, who was seen by synchronous (real-time) audio-video technology, and/or her healthcare decision maker, is aware that this patient-initiated, Telehealth encounter on 6/18/2020 is a billable service, with coverage as determined by her insurance carrier. She is aware that she may receive a bill and has provided verbal consent to proceed: Yes. HPI:   Brigida Ge is a 76y.o. year old female who has a history of hypertension, hyperlipidemia, impaired fasting glucose, osteoporosis, left sciatica, and chronic microhematuria. She reports that she is doing reasonably well. She has been strictly following social distancing guidelines, and will wear a mask when going outside her home. She states that she has been walking for exercise. She does report an increase in eczema on her hand and states that she ran out of Eucrisa samples. Since this is not covered by her insurance, she changed to Eucerin cream, but reports only variable response. She is otherwise without new complaints. On 8/27/2019 and was noted to have elevated transaminases with AST 50 and ALT 67. She was instructed to discontinue all NSAIDS which she had been taking for her right foot pain. She was seen again on 9/25/2019 and repeat testing showed normalization of her transaminases. Repeat evaluation on 1/9/2020 and reported that she had been taking two Excedrin almost every morning due to a headache, and she also reported continuing to drink approximately two glasses of wine each night. Repeat labs showed repeat elevation of transaminases with AST 50 and ALT 67, while other liver function tests remained normal. She was instructed to discontinue Excedrin and alcohol, and underwent an abdominal ultrasound which showed normal sized liver with mildly diffusely coarsened echogenicity.      On 6/25/2019, she reported that two weeks prior, while returning home from her granddaughter's graduation, she and her  stopped at a restaurant for lunch. She states that while she was chewing a mouth full of food, her  noticed that she suddenly tilted her head to the left side and seemed to look dazed. She states that she remained awake and was aware that he was talking to her, but she states that she could not respond. She states that the episode lasted approximately 30 seconds and resolved, and she proceeded to complete her chewing and swallow her food. She states that she did not lose consciousness, or notice any headache, visual changes, lightheadedness, tongue biting, incontinence, focal deficits, or seizure like activity. She denied any chest pain, shortness of breath, palpitations, pleuritic chest pain, hemoptysis, calf pain or swelling. 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 She also had a carotid duplex scan (7/9/2019) showing only mild (< 50%) stenosis of the bilateral internal carotid arteries. She underwent evaluation by Dr. Ayse Lemus in 10/2019, and he did not feel that the episode represented a TIA or other neurologic episode. He felt that no further evaluation was needed. She has not had a recurrence of these symptoms. On 4/15/2019, she underwent [facility] foot pain. She denied known injury. Right foot x-ray showed a displaced third metatarsal fracture and she was referred to Dr. Jeremie Hendricks for evaluation. Given the displacement and risk of metatarsalgia, surgery was recommended, and on 4/24/2019, she underwent open reduction/internal fixation of the third metatarsal with putty bone grafting. She reports that she did well after surgery, and and was non-weight bearing in a CAM boot for several months.  On 5/15/2019, she presented complaining of bilateral hand and wrist pain. She denied any fever, chills, sore throat, rashes, dyspnea, cough, abdominal pain, dysuria, focal weakness, visual changes, or headache. She did admit to sleeping with her bilateral hands tucked beneath her in a flexed position while lying on her back due to her limitations from her foot surgery. She also reported that she had been using a walker to ambulate and gripping it tightly with her hands. Lab evaluation including MANOLO, RF, CCP, ESR, uric acid were normal, but CRP was increased to 9.1. A diagnosis of presumed carpal tunnel syndrome was made, and her discomfort resolved with the use of wrist splints. She was released to weight bear as tolerate in 7/2019, and has not had any significant pain although has been experiencing some swelling which resolves with elevation. She has a history of osteopenia, and in 2009, she sustained a fracture of her left 3rd toe (3/2009) and her distal radius (12/2009). At that time, she was started on alendronate and was treated until 11/2014. She had a repeat bone density study in 7/2016 showing T-scores: femoral neck left -2.3/ right -2.0 and lumbar -2.2. She was restarted on alendronate in 12/2016. She underwent repeat bone density study in 8/2019 showing T-scores: femoral neck left -2.2/ right -1.9 and lumbar -2.6. Due to lack of improvement, she underwent evaluation by Dr. Cierra Waldrop in 11/2019 and recommendation was that she change to Prolia. She continues to take calcium and Vitamin D supplements. She has a history of hypertension, treated with lisinopril and hydrochlorothiazide. She has been checking her blood pressure intermittently and reports that most readings are with a systolic blood pressure <222. She remains quite active, although has not been having time to exercise.  She reports that she has continued stress in her life, helping to care for her 's mother (dementia), two disabled cousins, and her sister. She denies any chest pain, shortness of breath at rest or with exertion, palpitations, lightheadedness, or edema. She also has a history of hyperlipidemia, treated with moderate intensity dose atorvastatin. She has no history of ASCVD. In 2/2017, she was evaluated by DEREK Walter with left lower back pain and left sciatica. She had been taking ibuprofen without relief, and was given a course of prednisone. She states that she did not find this helpful either, but reports resolution of symptoms after visiting a chiropractor. She states that she no longer is experiencing pain and is able to ambulate without difficulty. She will continue to visit a chiropractor periodically when she develops a recurrence of pain with improvement. She reports that she had an extensive workup for microhematuria in the past, which was negative (records unavailable). She denies any dysuria, gross hematuria, or flank pain. She has had screening colonoscopy with Dr America Cortez in 10/2010, which was normal except for hemorrhoids. Recommendation for follow-up is for 10 years. She denies any abdominal pain, nausea, vomiting, melena, hematochezia, or change in bowel movements. She has a history of eczema and is followed by Dr. Gabriel Warner. She states that she was recently started on Eucrisa with excellent response. She also has a history of candidal intertrigo, treated with lotrisone with good response. She also reports that she underwent radiation therapy to her right shoulder (as a child) for treatment of a birthmark. On 7/20/2018, she presented for evaluation to [facility] for a right upper back rash and was diagnosed with herpes zoster. She was treated with Valtrex with improvement.     Past Medical History:   Diagnosis Date    Eczema     Essential hypertension     FH: breast cancer     Fibroids     uterine    Fracture of radius 11/2009    with ulna, left, closed    History of cataract     History of foot fracture     right and left    Hyperlipidemia     Microhematuria     chronic    Osteopenia     S/P radiation therapy     as child, to right shoulder for birthmark    Tinnitus     Zoster     right scapula     Past Surgical History:   Procedure Laterality Date    HX CATARACT REMOVAL      HX FRACTURE TX Right 04/24/2019    right foot 3rd metatarsal fracture    HX ORTHOPAEDIC      left arm    HX WISDOM TEETH EXTRACTION      HX WRIST FRACTURE TX       Current Outpatient Medications   Medication Sig    lisinopriL (PRINIVIL, ZESTRIL) 10 mg tablet TAKE 1 TABLET BY MOUTH DAILY    atorvastatin (LIPITOR) 20 mg tablet TAKE 1 TABLET BY MOUTH DAILY    hydroCHLOROthiazide (HYDRODIURIL) 25 mg tablet TAKE 1 TABLET BY MOUTH DAILY    denosumab (PROLIA) 60 mg/mL injection 60 mg by SubCUTAneous route.  Lactobacillus acidophilus (PROBIOTIC PO) Take  by mouth.  folic acid/multivit-min/lutein (CENTRUM SILVER PO) Take  by mouth.  aspirin delayed-release 81 mg tablet Take  by mouth daily.  amoxicillin 500 mg tab TAKE ONE CAPSULE PO TID, START ONE DAY PRIOR TO PROCEDURE, DAY OF PROCEDURE AND DAY AFTER PROCEDURE    crisaborole (EUCRISA) 2 % oint by Apply Externally route.  triamcinolone acetonide (KENALOG) 0.025 % topical cream Apply  to affected area two (2) times a day.  clotrimazole-betamethasone (LOTRISONE) topical cream Apply  to affected area two (2) times a day.  docusate sodium (COLACE) 100 mg capsule Take 100 mg by mouth daily as needed for Constipation.  co-enzyme Q-10 (CO Q-10) 100 mg capsule Take 100 mg by mouth daily.  biotin 2,500 mcg Tab Take 2,500 mcg by mouth.  cholecalciferol (VITAMIN D3) 1,000 unit cap Take 1 Tab by mouth daily.  fexofenadine (ALLEGRA) 60 mg tablet Take 60 mg by mouth daily.  CALCIUM CARBONATE/VITAMIN D3 (CALCIUM + D PO) Take 1 Tab by mouth two (2) times a day. No current facility-administered medications for this visit.       Allergies and Intolerances: Allergies   Allergen Reactions    Codeine Nausea Only    Hibiclens [Chlorhexidine Gluconate] Rash     Family History: Her sister has breast cancer and her mother had pancreatic cancer. No history of colon cancer. Family History   Problem Relation Age of Onset   [facility] Cancer Mother         pancreatic    Heart Disease Mother     Heart Disease Father     Cancer Sister         breast    MS Sister     Breast Cancer Sister 62     Social History:   She  reports that she has never smoked. She has never used smokeless tobacco. She has about 2 glasses of wine each night. She is  with one adult daughter who lives with her family on [place], [place]. She is retired, and was a 4th- for 25 years. Social History     Substance and Sexual Activity   Alcohol Use Yes    Alcohol/week: 0.0 standard drinks    Comment: occasionally     Immunization History:  Immunization History   Administered Date(s) Administered    (RETIRED) Pneumococcal Vaccine (Unspecified Type) 05/04/2010    Influenza High Dose Vaccine PF 12/13/2016, 10/03/2017    Influenza Vaccine 10/24/2014    Influenza Vaccine (Tri) Adjuvanted 10/09/2018, 11/13/2019    Influenza Vaccine PF 11/05/2013    Influenza Vaccine Split 11/01/2011, 11/06/2012    Pneumococcal Conjugate (PCV-13) 05/05/2015    Pneumococcal Polysaccharide (PPSV-23) 05/04/2010    TD Vaccine 05/05/2009    Tdap 12/12/2016    Zoster Recombinant 09/21/2019, 12/28/2019    Zoster Vaccine, Live 05/07/2013       Review of Systems:   As above included in HPI.   Otherwise 11 point review of systems negative including constitutional, skin, HENT, eyes, respiratory, cardiovascular, gastrointestinal, genitourinary, musculoskeletal, endo/heme/aller, neurological.    Physical:   Vitals:   BP:  129/78  HR:    WT:    BMI:   kg/m2    Exam:   Objective:   Vital Signs: (As obtained by patient/caregiver at home)  Visit Vitals  LMP  (LMP Unknown)        Constitutional: [x] Appears well-developed and well-nourished [x] No apparent distress      [] Abnormal -     Mental status: [x] Alert and awake  [x] Oriented to person/place/time [x] Able to follow commands    [] Abnormal -     Eyes:   EOM    [x]  Normal    [] Abnormal -   Sclera  [x]  Normal    [] Abnormal -          Discharge [x]  None visible   [] Abnormal -     HENT: [x] Normocephalic, atraumatic  [] Abnormal -   [x] Mouth/Throat: Mucous membranes are moist    External Ears [x] Normal  [] Abnormal -    Neck: [x] No visualized mass [] Abnormal -     Pulmonary/Chest: [x] Respiratory effort normal   [x] No visualized signs of difficulty breathing or respiratory distress        [] Abnormal -      Musculoskeletal:   [x] Normal gait with no signs of ataxia         [x] Normal range of motion of neck        [] Abnormal -     Neurological:        [x] No Facial Asymmetry (Cranial nerve 7 motor function) (limited exam due to video visit)          [x] No gaze palsy        [] Abnormal -          Skin:        [x] No significant exanthematous lesions or discoloration noted on facial skin         [] Abnormal -            Psychiatric:       [x] Normal Affect [] Abnormal -        [x] No Hallucinations      Review of Data:  Labs:  Hospital Outpatient Visit on 06/09/2020   Component Date Value Ref Range Status    WBC 06/09/2020 6.2  4.6 - 13.2 K/uL Final    RBC 06/09/2020 4.66  4.20 - 5.30 M/uL Final    HGB 06/09/2020 13.4  12.0 - 16.0 g/dL Final    HCT 06/09/2020 42.3  35.0 - 45.0 % Final    MCV 06/09/2020 90.8  74.0 - 97.0 FL Final    MCH 06/09/2020 28.8  24.0 - 34.0 PG Final    MCHC 06/09/2020 31.7  31.0 - 37.0 g/dL Final    RDW 06/09/2020 13.6  11.6 - 14.5 % Final    PLATELET 51/48/1570 474  135 - 420 K/uL Final    MPV 06/09/2020 10.1  9.2 - 11.8 FL Final    NEUTROPHILS 06/09/2020 65  40 - 73 % Final    LYMPHOCYTES 06/09/2020 23  21 - 52 % Final    MONOCYTES 06/09/2020 10  3 - 10 % Final    EOSINOPHILS 06/09/2020 2  0 - 5 % Final  BASOPHILS 06/09/2020 0  0 - 2 % Final    ABS. NEUTROPHILS 06/09/2020 4.1  1.8 - 8.0 K/UL Final    ABS. LYMPHOCYTES 06/09/2020 1.4  0.9 - 3.6 K/UL Final    ABS. MONOCYTES 06/09/2020 0.6  0.05 - 1.2 K/UL Final    ABS. EOSINOPHILS 06/09/2020 0.1  0.0 - 0.4 K/UL Final    ABS. BASOPHILS 06/09/2020 0.0  0.0 - 0.1 K/UL Final    DF 06/09/2020 AUTOMATED    Final    Hemoglobin A1c 06/09/2020 5.8* 4.2 - 5.6 % Final    Est. average glucose 06/09/2020 120  mg/dL Final    LIPID PROFILE 06/09/2020        Final    Cholesterol, total 06/09/2020 177  <200 MG/DL Final    Triglyceride 06/09/2020 72  <150 MG/DL Final    HDL Cholesterol 06/09/2020 76* 40 - 60 MG/DL Final    LDL, calculated 06/09/2020 86.6  0 - 100 MG/DL Final    VLDL, calculated 06/09/2020 14.4  MG/DL Final    CHOL/HDL Ratio 06/09/2020 2.3  0 - 5.0   Final    Magnesium 06/09/2020 2.1  1.6 - 2.6 mg/dL Final    Sodium 06/09/2020 139  136 - 145 mmol/L Final    Potassium 06/09/2020 4.0  3.5 - 5.5 mmol/L Final    Chloride 06/09/2020 103  100 - 111 mmol/L Final    CO2 06/09/2020 28  21 - 32 mmol/L Final    Anion gap 06/09/2020 8  3.0 - 18 mmol/L Final    Glucose 06/09/2020 94  74 - 99 mg/dL Final    BUN 06/09/2020 19* 7.0 - 18 MG/DL Final    Creatinine 06/09/2020 0.67  0.6 - 1.3 MG/DL Final    BUN/Creatinine ratio 06/09/2020 28* 12 - 20   Final    GFR est AA 06/09/2020 >60  >60 ml/min/1.73m2 Final    GFR est non-AA 06/09/2020 >60  >60 ml/min/1.73m2 Final    Calcium 06/09/2020 9.5  8.5 - 10.1 MG/DL Final    Bilirubin, total 06/09/2020 0.6  0.2 - 1.0 MG/DL Final    ALT (SGPT) 06/09/2020 33  13 - 56 U/L Final    AST (SGOT) 06/09/2020 29  10 - 38 U/L Final    Alk.  phosphatase 06/09/2020 59  45 - 117 U/L Final    Protein, total 06/09/2020 7.7  6.4 - 8.2 g/dL Final    Albumin 06/09/2020 4.3  3.4 - 5.0 g/dL Final    Globulin 06/09/2020 3.4  2.0 - 4.0 g/dL Final    A-G Ratio 06/09/2020 1.3  0.8 - 1.7   Final    TSH 06/09/2020 3.57  0.36 - 3.74 uIU/mL Final    Vitamin D 25-Hydroxy 06/09/2020 43.6  30 - 100 ng/mL Final    T4, Free 06/09/2020 1.2  0.7 - 1.5 NG/DL Final    Phosphorus 06/09/2020 3.6  2.5 - 4.9 MG/DL Final    Color 06/09/2020 YELLOW    Final    Appearance 06/09/2020 CLEAR    Final    Specific gravity 06/09/2020 1.017  1.005 - 1.030   Final    pH (UA) 06/09/2020 8.5* 5.0 - 8.0   Final    Protein 06/09/2020 Negative  NEG mg/dL Final    Glucose 06/09/2020 Negative  NEG mg/dL Final    Ketone 06/09/2020 Negative  NEG mg/dL Final    Bilirubin 06/09/2020 Negative  NEG   Final    Blood 06/09/2020 Negative  NEG   Final    Urobilinogen 06/09/2020 1.0  0.2 - 1.0 EU/dL Final    Nitrites 06/09/2020 Negative  NEG   Final    Leukocyte Esterase 06/09/2020 Negative  NEG   Final       Health Maintenance:  Screening:    Mammogram: negative (8/2019)   PAP smear: S/P ALMAS   Colorectal: colonoscopy (10/2010) normal. Dr. Jerrod Andrade. Due 10/2020. Depression: none   DM (HbA1c/FPG): HbA1c 5.8 (6/2020)   Hepatitis C: negative (5/2016)   Falls: none   DEXA: osteopenia (8/2019). On Prolia. Dr. Izabella Hester. Glaucoma: regular eye exams with Dr. Loni Singh (last 4/2019)   Smoking: none   Vitamin D: 43.6 (6/2020)   Medicare Wellness: today      Impression:  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    Tinnitus H93.19    Microhematuria R31.29    Eczema L30.9    Osteopenia M85.80    Elevated TSH R79.89    Impaired fasting glucose R73.01    Candidal intertrigo B37.2    Closed displaced fracture of third metatarsal bone of right foot with routine healing S92.331D    Carpal tunnel syndrome, bilateral G56.03    Osteoporosis of lumbar spine M81.0    Elevated LFTs R79.89    Dietary iron deficiency without anemia E61.1       Plan:  1. Elevated transaminases. New onset mild elevation noted in 8/2019.  Patient reported that she had been taking NSAIDS frequently to help control foot pain. Advised to discontinue NSAIDS and repeated in 9/2019 with normalization. Lab evaluation for cause, including AMA, actin, hepatitis profile, CK, aldolase, celiac panel, ESR, CRP, and MANOLO, were negative, but did identify mildly low iron stores. Given normalization, no further evaluation was performed. Repeat last visit again showed mild elevation of transaminases with otherwise normal LFT's. Patient had been taking Excedrin and did admit to 2 glasses of wine each night. Advised to discontinue both. Abdominal ultrasound was obtained and showed mild increased echogenicity of liver, but otherwise normal. Repeat liver function tests today again show normalization of transaminases. Discussed likely due to hepatic steatosis. Advised to avoid alcohol and NSAIDS. Also, emphasized importance of lifestyle modifications, including diet, exercise, and weight loss. 2. Hypertension. Appears well controlled on current regimen of lisinopril 10 mg daily and hydrochlorothiazide 25 mg daily. Renal function remains normal with creatinine 0.67/ eGFR >60, but BUN/creatinine ratio remains increased to 28. Encouraged to continue to drink plenty of fluids. Continue to follow. 3. Hyperlipidemia. On moderate intensity dose atorvastatin (20 mg) with LDL 86 and HDL 76, indicative of reasonable control. Emphasized importance of lifestyle modifications, including diet, exercise, and weight loss. Will continue to follow. 4. Transient neurologic symptoms. Experienced transient episode of head tilting to the left and inability to speak, lasting 30 seconds. Was traveling back from [place] and eating lunch when episode occurred. Reported drinking fluids, but also on diuretic. No other associated symptoms and no residual symptoms remain. Differential diagnosis includes possible TIA, seizure, or global hypoperfusion from arrhythmia or hypovolemia.  Carotid duplex scan without significant ICA stenosis, and brain MRI without significant abnormality except for a stable appearing lipoma creating mild mass effect. On aspirin and statin. Has not had any recurrence of symptoms. Evaluated by Dr. Demar Aragon and not felt to be due to neurologic cause. No further evaluation felt to be needed. Continue to monitor. 5. Osteoporosis. History of radial and toe fracture in 2009. Treated from 11/2009 to 11/2014 with alendronate. Repeat bone density scan in 7/2016 with 10 year risk of a major osteoporetic fracture at 14 % and hip fracture at 3.4 %. Restarted Fosamax in 12/2016. Now with new right metatarsal displaced fracture without known injury. Patient had repeat bone density on 8/6/2019 showing T-scores:  femoral neck left -2.2  /right -1.9 and lumbar -2.6 . Calculated FRAX score estimates her 10 year risk of a major osteoporetic fracture at 14 % and hip fracture at 3.9 %, which are an indication for continued biphosphonate treatment based on hip fracture risk of >3%. Continues to show osteopenia in her bilateral hips and osteoporosis of her lumbar spine, but in comparison to her prior study in 7/2016, it appears that her T-scores have remained relatively stable in her bilateral hips, but there has been some worsening in her lumbar spine. Obtained lab evaluation for secondary osteoporosis, and TSH, intact PTH, calcium, vitamin D level, and gammopathy panel normal. Given recent fracture and some worsening of bone density scan despite treatment with biphosphonate, she was referred to Dr. Shay Kirby in 11/2019 and recommendation was that she change to Prolia. Tolerating well. Continue calcium and Vitamin D supplements, and encouraged her to exercise, particularly weight bearing activities. 6. Displaced right third metatarsal fracture, s/p open reduction/internal fixation with putty bone grafting. She reports that she did well after surgery, and now fully weight bearing with orthotic in shoes.  No significant pain, although mild swelling with prolonged standing. Doing well. 7. Iron deficiency without anemia. May be related to recent foot surgery. Advised to begin MVI with iron supplement. Repeat today with normalization of levels. Due for colon cancer screening in 10/2020. Will refer to Dr. Meg Knight. 8. Family history of breast cancer. Patient with family history of breast cancer in first degree relative (sister). She is currently being screened with annual mammograms. Discussed recommendations for breast cancer screening in women with lifetime risk >20%. 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 No other family member with breast cancer. Wishing to continue with annual mammograms and breast exams. 9. Impaired fasting glucose. Repeat normal today, but HbA1c mildly elevated to 5.8. Likely related to weight gain. Emphasized importance of lifestyle modifications, including diet, exercise, and weight loss. 10. Bilateral hand and wrist pain. Evaluation for inflammatory cause negative. Improved with changes in gripping walker and wearing of neutral wrist splints. No longer sleeping with wrists in flexed position at night. Continue to follow. 11. Chronic microhematuria. Work-up reportedly negative in past. Recent urinalysis has been negative for blood. Follow. 12. Eczema. Found good response with Eucrisa, but has no more samples and not covered by insurance. Using Eucerin cream with variable response. Advised that should use triamcinolone cream for flares and continue Eucerin. Being followed by Dr. Beverley Gutierrez. 13. Candidal intertrigo. Improved with Lotrisone. Discussed preventative treatment with mycostatin powder and loose clothing. 14. Overweight. Patient reports weight increased during pandemic. Emphasized importance of continued lifestyle modifications. Will continue to follow. 15. Elevated TSH.  Repeat with free T4 today normal. Patient with history of radiation therapy for a birthmark on her right shoulder as a child. Will continue to follow. 16. Health maintenance. Received 2/2 doses of Shingrix vaccine. Other immunizations up to date. Mammogram up to date. Colorectal cancer screening due 10/2020. Will refer to Dr. Shakira Awan for evaluation. Continue regular eye exams with Dr. Ritu Jeronimo. Vitamin D level normal. Continue maintenance dose supplement. Reports scheduled to have facial lesion removed by Dr. Breanna Orellana. Counseled patient regarding strict mitigation measures for COVID-19, including hand washing, social distancing and diligent hand washing, as recommended by the CDC. In addition, an annual Medicare wellness visit was done today. Patient understands recommendations and agrees with plan. Follow-up in 6 months. We discussed the expected course, resolution and complications of the diagnosis(es) in detail. Medication risks, benefits, costs, interactions, and alternatives were discussed as indicated. I advised her to contact the office if her condition worsens, changes or fails to improve as anticipated. She expressed understanding with the diagnosis(es) and plan. Pranay Richardson is a 76 y.o. female who was evaluated by a video visit encounter for concerns as above. Patient identification was verified prior to start of the visit. A caregiver was present when appropriate. Due to this being a TeleHealth encounter (During TNQAG-43 public health emergency), evaluation of the following organ systems was limited: Vitals/Constitutional/EENT/Resp/CV/GI//MS/Neuro/Skin/Heme-Lymph-Imm. Pursuant to the emergency declaration under the [facility], 1135 waiver authority and the [employer] and [employer] Act, this Virtual  Visit was conducted, with patient's (and/or legal guardian's) consent, to reduce the patient's risk of exposure to COVID-19 and provide necessary medical care.      Services were provided through a video synchronous discussion virtually to substitute for in-person clinic visit. Patient was at home and provider was located in the office.       Lou Escobar MD

## 2020-08-15 ENCOUNTER — HOSPITAL ENCOUNTER (OUTPATIENT)
Dept: MAMMOGRAPHY | Age: 75
Discharge: HOME | End: 2020-08-15
Attending: INTERNAL MEDICINE
Payer: MEDICARE

## 2020-08-15 DIAGNOSIS — Z12.31 VISIT FOR SCREENING MAMMOGRAM: ICD-10-CM

## 2020-08-15 PROCEDURE — 77063 BREAST TOMOSYNTHESIS BI: CPT

## 2020-09-16 ENCOUNTER — CLINICAL SUPPORT (OUTPATIENT)
Dept: INTERNAL MEDICINE CLINIC | Age: 75
End: 2020-09-16

## 2020-09-16 DIAGNOSIS — Z23 NEEDS FLU SHOT: Primary | ICD-10-CM

## 2020-10-06 DIAGNOSIS — Z01.812 ENCOUNTER FOR PREOPERATIVE SCREENING LABORATORY TESTING FOR COVID-19 VIRUS: Primary | ICD-10-CM

## 2020-10-06 DIAGNOSIS — Z20.822 ENCOUNTER FOR PREOPERATIVE SCREENING LABORATORY TESTING FOR COVID-19 VIRUS: Primary | ICD-10-CM

## 2020-11-12 ENCOUNTER — HOSPITAL ENCOUNTER (OUTPATIENT)
Dept: LAB | Age: 75
Discharge: HOME | End: 2020-11-12
Payer: MEDICARE

## 2020-11-12 PROCEDURE — 87635 SARS-COV-2 COVID-19 AMP PRB: CPT

## 2020-11-13 RX ORDER — BETAMETHASONE DIPROPIONATE 0.5 MG/G
CREAM TOPICAL
COMMUNITY

## 2020-11-15 LAB — SARS-COV-2, COV2NT: NOT DETECTED

## 2020-11-16 ENCOUNTER — ANESTHESIA EVENT (OUTPATIENT)
Dept: ENDOSCOPY | Age: 75
End: 2020-11-16
Payer: MEDICARE

## 2020-11-17 ENCOUNTER — HOSPITAL ENCOUNTER (OUTPATIENT)
Age: 75
Setting detail: OUTPATIENT SURGERY
Discharge: HOME | End: 2020-11-17
Attending: INTERNAL MEDICINE | Admitting: INTERNAL MEDICINE
Payer: MEDICARE

## 2020-11-17 ENCOUNTER — ANESTHESIA (OUTPATIENT)
Dept: ENDOSCOPY | Age: 75
End: 2020-11-17
Payer: MEDICARE

## 2020-11-17 VITALS
DIASTOLIC BLOOD PRESSURE: 73 MMHG | HEART RATE: 71 BPM | RESPIRATION RATE: 14 BRPM | HEIGHT: 65 IN | OXYGEN SATURATION: 96 % | TEMPERATURE: 96.8 F | SYSTOLIC BLOOD PRESSURE: 116 MMHG | BODY MASS INDEX: 25.16 KG/M2 | WEIGHT: 151 LBS

## 2020-11-17 PROCEDURE — 74011250636 HC RX REV CODE- 250/636: Performed by: NURSE ANESTHETIST, CERTIFIED REGISTERED

## 2020-11-17 PROCEDURE — 77030008565 HC TBNG SUC IRR ERBE -B: Performed by: INTERNAL MEDICINE

## 2020-11-17 PROCEDURE — 74011250637 HC RX REV CODE- 250/637: Performed by: NURSE ANESTHETIST, CERTIFIED REGISTERED

## 2020-11-17 PROCEDURE — 74011000250 HC RX REV CODE- 250: Performed by: NURSE ANESTHETIST, CERTIFIED REGISTERED

## 2020-11-17 PROCEDURE — 99100 ANES PT EXTEME AGE<1 YR&>70: CPT | Performed by: ANESTHESIOLOGY

## 2020-11-17 PROCEDURE — 76060000032 HC ANESTHESIA 0.5 TO 1 HR: Performed by: INTERNAL MEDICINE

## 2020-11-17 PROCEDURE — 00812 ANES LWR INTST SCR COLSC: CPT | Performed by: ANESTHESIOLOGY

## 2020-11-17 PROCEDURE — 76040000007: Performed by: INTERNAL MEDICINE

## 2020-11-17 PROCEDURE — 2709999900 HC NON-CHARGEABLE SUPPLY: Performed by: INTERNAL MEDICINE

## 2020-11-17 PROCEDURE — 99100 ANES PT EXTEME AGE<1 YR&>70: CPT | Performed by: NURSE ANESTHETIST, CERTIFIED REGISTERED

## 2020-11-17 PROCEDURE — 88305 TISSUE EXAM BY PATHOLOGIST: CPT

## 2020-11-17 PROCEDURE — 00812 ANES LWR INTST SCR COLSC: CPT | Performed by: NURSE ANESTHETIST, CERTIFIED REGISTERED

## 2020-11-17 RX ORDER — SODIUM CHLORIDE, SODIUM LACTATE, POTASSIUM CHLORIDE, CALCIUM CHLORIDE 600; 310; 30; 20 MG/100ML; MG/100ML; MG/100ML; MG/100ML
50 INJECTION, SOLUTION INTRAVENOUS CONTINUOUS
Status: DISCONTINUED | OUTPATIENT
Start: 2020-11-17 | End: 2020-11-17 | Stop reason: HOSPADM

## 2020-11-17 RX ORDER — LIDOCAINE HYDROCHLORIDE 20 MG/ML
INJECTION, SOLUTION EPIDURAL; INFILTRATION; INTRACAUDAL; PERINEURAL AS NEEDED
Status: DISCONTINUED | OUTPATIENT
Start: 2020-11-17 | End: 2020-11-17 | Stop reason: HOSPADM

## 2020-11-17 RX ORDER — SODIUM CHLORIDE 0.9 % (FLUSH) 0.9 %
5-40 SYRINGE (ML) INJECTION AS NEEDED
Status: CANCELLED | OUTPATIENT
Start: 2020-11-17

## 2020-11-17 RX ORDER — SODIUM CHLORIDE, SODIUM LACTATE, POTASSIUM CHLORIDE, CALCIUM CHLORIDE 600; 310; 30; 20 MG/100ML; MG/100ML; MG/100ML; MG/100ML
50 INJECTION, SOLUTION INTRAVENOUS CONTINUOUS
Status: CANCELLED | OUTPATIENT
Start: 2020-11-17

## 2020-11-17 RX ORDER — SODIUM CHLORIDE 0.9 % (FLUSH) 0.9 %
5-40 SYRINGE (ML) INJECTION EVERY 8 HOURS
Status: CANCELLED | OUTPATIENT
Start: 2020-11-17

## 2020-11-17 RX ORDER — FAMOTIDINE 20 MG/1
20 TABLET, FILM COATED ORAL ONCE
Status: COMPLETED | OUTPATIENT
Start: 2020-11-17 | End: 2020-11-17

## 2020-11-17 RX ORDER — LIDOCAINE HYDROCHLORIDE 10 MG/ML
0.1 INJECTION, SOLUTION EPIDURAL; INFILTRATION; INTRACAUDAL; PERINEURAL AS NEEDED
Status: DISCONTINUED | OUTPATIENT
Start: 2020-11-17 | End: 2020-11-17 | Stop reason: HOSPADM

## 2020-11-17 RX ORDER — PROPOFOL 10 MG/ML
INJECTION, EMULSION INTRAVENOUS AS NEEDED
Status: DISCONTINUED | OUTPATIENT
Start: 2020-11-17 | End: 2020-11-17 | Stop reason: HOSPADM

## 2020-11-17 RX ADMIN — PROPOFOL 40 MG: 10 INJECTION, EMULSION INTRAVENOUS at 07:38

## 2020-11-17 RX ADMIN — FAMOTIDINE 20 MG: 20 TABLET, FILM COATED ORAL at 07:08

## 2020-11-17 RX ADMIN — PROPOFOL 40 MG: 10 INJECTION, EMULSION INTRAVENOUS at 08:00

## 2020-11-17 RX ADMIN — PROPOFOL 40 MG: 10 INJECTION, EMULSION INTRAVENOUS at 07:44

## 2020-11-17 RX ADMIN — PROPOFOL 80 MG: 10 INJECTION, EMULSION INTRAVENOUS at 07:36

## 2020-11-17 RX ADMIN — PROPOFOL 40 MG: 10 INJECTION, EMULSION INTRAVENOUS at 07:55

## 2020-11-17 RX ADMIN — PROPOFOL 40 MG: 10 INJECTION, EMULSION INTRAVENOUS at 07:41

## 2020-11-17 RX ADMIN — LIDOCAINE HYDROCHLORIDE 100 MG: 20 INJECTION, SOLUTION EPIDURAL; INFILTRATION; INTRACAUDAL; PERINEURAL at 07:44

## 2020-11-17 RX ADMIN — PROPOFOL 40 MG: 10 INJECTION, EMULSION INTRAVENOUS at 07:51

## 2020-11-17 RX ADMIN — SODIUM CHLORIDE, SODIUM LACTATE, POTASSIUM CHLORIDE, AND CALCIUM CHLORIDE 50 ML/HR: 600; 310; 30; 20 INJECTION, SOLUTION INTRAVENOUS at 07:09

## 2020-11-17 NOTE — H&P
[email or web address]  [phone]      History and Physical    Patient: Joslyn Campo MRN: [id number]  SSN: [id number]    YOB: 1945  Age: 76 y.o. Sex: female      Subjective:      Joslyn Campo is a 76 y.o. female who presents for routine, average risk CRCS. Pt denies symptoms or concerns. .     Past Medical History:   Diagnosis Date    Eczema     Essential hypertension     FH: breast cancer     Fibroids     uterine    Fracture of radius 11/2009    with ulna, left, closed    History of cataract     History of foot fracture     right and left    Hyperlipidemia     Microhematuria     chronic    Osteopenia     Psoriasis     S/P radiation therapy     as child, to right shoulder for birthmark    Tinnitus     Zoster     right scapula     Past Surgical History:   Procedure Laterality Date    HX CATARACT REMOVAL      HX COLONOSCOPY      HX FRACTURE TX Right 04/24/2019    right foot 3rd metatarsal fracture, screws and a plate    HX ORTHOPAEDIC      left arm    HX WISDOM TEETH EXTRACTION      HX WRIST FRACTURE TX        Family History   Problem Relation Age of Onset    Cancer Mother         pancreatic    Heart Disease Mother     Heart Disease Father     Cancer Sister         breast    MS Sister     Breast Cancer Sister 62     Social History     Tobacco Use    Smoking status: Never Smoker    Smokeless tobacco: Never Used   Substance Use Topics    Alcohol use: Yes     Alcohol/week: 0.0 standard drinks     Comment: occasionally      Prior to Admission medications    Medication Sig Start Date End Date Taking? Authorizing Provider   betamethasone dipropionate (DIPROSONE) 0.05 % topical cream Apply  to affected area two (2) times daily as needed for Skin Irritation.    Yes Provider, Historical   lisinopriL (PRINIVIL, ZESTRIL) 10 mg tablet TAKE 1 TABLET BY MOUTH DAILY 5/14/20  Yes John Mckay MD   atorvastatin (LIPITOR) 20 mg tablet TAKE 1 TABLET BY MOUTH DAILY 4/16/20  Yes Tara Nely Peralta MD   hydroCHLOROthiazide (HYDRODIURIL) 25 mg tablet TAKE 1 TABLET BY MOUTH DAILY 3/30/20  Yes Ludy Perez MD   denosumab (PROLIA) 60 mg/mL injection 60 mg by SubCUTAneous route every 6 months. Yes Provider, Historical   Lactobacillus acidophilus (PROBIOTIC PO) Take  by mouth. Yes Provider, Historical   folic acid/multivit-min/lutein (CENTRUM SILVER PO) Take  by mouth. Yes Provider, Historical   aspirin delayed-release 81 mg tablet Take  by mouth daily. Yes Provider, Historical   amoxicillin 500 mg tab TAKE ONE CAPSULE PO TID, START ONE DAY PRIOR TO PROCEDURE, DAY OF PROCEDURE AND DAY AFTER PROCEDURE 6/11/19  Yes Steve Anderson PA-C   docusate sodium (COLACE) 100 mg capsule Take 100 mg by mouth daily as needed for Constipation. Yes Provider, Historical   co-enzyme Q-10 (CO Q-10) 100 mg capsule Take 100 mg by mouth daily. Yes Provider, Historical   biotin 2,500 mcg Tab Take 2,500 mcg by mouth. Yes Provider, Historical   cholecalciferol (VITAMIN D3) 1,000 unit cap Take 1 Tab by mouth daily. Yes Provider, Historical   fexofenadine (ALLEGRA) 60 mg tablet Take 60 mg by mouth daily. Yes Provider, Historical   CALCIUM CARBONATE/VITAMIN D3 (CALCIUM + D PO) Take 1 Tab by mouth two (2) times a day. Yes Provider, Historical   crisaborole (EUCRISA) 2 % oint by Apply Externally route as needed. Provider, Historical   triamcinolone acetonide (KENALOG) 0.025 % topical cream Apply  to affected area two (2) times a day. Patient taking differently: Apply  to affected area two (2) times daily as needed. 12/14/17   Ludy Perez MD   clotrimazole-betamethasone (LOTRISONE) topical cream Apply  to affected area two (2) times a day. Patient taking differently: Apply  to affected area two (2) times daily as needed.  12/14/17   Ludy Perez MD        Allergies   Allergen Reactions    Codeine Nausea Only    Hibiclens [Chlorhexidine Gluconate] Rash       Review of Systems:  A comprehensive review of systems was negative except for that written in the History of Present Illness. Objective:     Vitals:    11/13/20 1114 11/17/20 0706   BP:  (!) 144/79   Pulse:  98   Resp:  20   Temp:  97.6 °F (36.4 °C)   SpO2:  98%   Weight: 69.4 kg (153 lb) 68.5 kg (151 lb)   Height: 5' 5\" (1.651 m) 5' 5\" (1.651 m)        Physical Exam:  GENERAL: alert, cooperative, no distress, appears stated age  LUNG: clear to auscultation bilaterally  HEART: regular rate and rhythm, S1, S2 normal, no murmur, click, rub or gallop  ABDOMEN: soft, non-tender. Bowel sounds normal. No masses,  no organomegaly  NEUROLOGIC: alert & oriented x 3    Assessment:     1. Colorectal Cancer Screening    Plan:     1. Colonoscopy    Signed By: Gissell Miramontes MD     November 17, 2020      Gissell Miramontes MD  [facility] [address] - [phone]  [email or web address]

## 2020-11-17 NOTE — ANESTHESIA POSTPROCEDURE EVALUATION
Procedure(s):  COLONOSCOPY with polypectomies. MAC    Anesthesia Post Evaluation      Multimodal analgesia: multimodal analgesia used between 6 hours prior to anesthesia start to PACU discharge  Patient location during evaluation: bedside  Patient participation: complete - patient participated  Level of consciousness: awake  Pain management: adequate  Airway patency: patent  Anesthetic complications: no  Cardiovascular status: stable  Respiratory status: acceptable  Hydration status: acceptable  Post anesthesia nausea and vomiting:  controlled      INITIAL Post-op Vital signs:   Vitals Value Taken Time   /60 11/17/2020  8:18 AM   Temp 36.4 °C (97.6 °F) 11/17/2020  8:11 AM   Pulse 75 11/17/2020  8:25 AM   Resp 18 11/17/2020  8:25 AM   SpO2 99 % 11/17/2020  8:25 AM   Vitals shown include unvalidated device data.

## 2020-11-17 NOTE — PROCEDURES
[email or web address]  [phone]        Brief Procedure Note    Jake Liu  1945  [id number]    Date of Procedure: 11/17/2020    Preoperative diagnosis: Colon cancer Screening:   V76.51 - Z12.11    Postoperative diagnosis: external and internal hemorrhoids, transverse polyp x 1, descending polyps x 2, sigmoid polyp x 1, rectal polyps x 6     Description of Findings: same    Sedation/Anesthesia: Monitored Anesthesia Care; See Anesthesia Note    Procedure: Procedure(s):  COLONOSCOPY with polypectomies    :  Dr. Roxana Traore MD    Assistant(s): Endoscopy Technician-1: Carli Jean  Endoscopy RN-1: Denae López RN  Float Staff: Javan Martin RN    EBL:None    Specimens:   ID Type Source Tests Collected by Time Destination   1 : Transverse polyp Preservative Colon, Transverse  Yandel Saenz MD 11/17/2020 7150 Pathology   2 : Descending polyps Preservative Colon, Descending  Yandel Saenz MD 11/17/2020 6110 Pathology   3 : Recto-Sigmoid polyps Preservative Sigmoid  Yandel Saenz MD 11/17/2020 3364 Pathology       Findings: See printed and scanned procedure note    Complications: None    Tissue Implant Device: None    Dr. Roxana Traore MD  11/17/2020  8:07 AM    Roxana Traore MD  [facility] [address] [phone]  [email or web address]

## 2020-11-17 NOTE — ANESTHESIA PREPROCEDURE EVALUATION
Relevant Problems   No relevant active problems       Anesthetic History   No history of anesthetic complications            Review of Systems / Medical History  Patient summary reviewed and pertinent labs reviewed    Pulmonary  Within defined limits                 Neuro/Psych   Within defined limits           Cardiovascular    Hypertension: well controlled              Exercise tolerance: >4 METS     GI/Hepatic/Renal                Endo/Other             Other Findings              Physical Exam    Airway  Mallampati: III  TM Distance: 4 - 6 cm  Neck ROM: decreased range of motion   Mouth opening: Normal     Cardiovascular    Rhythm: regular  Rate: normal         Dental    Dentition: Poor dentition     Pulmonary  Breath sounds clear to auscultation               Abdominal  GI exam deferred       Other Findings            Anesthetic Plan    ASA: 2  Anesthesia type: MAC            Anesthetic plan and risks discussed with: Patient

## 2020-11-17 NOTE — DISCHARGE INSTRUCTIONS
DISCHARGE SUMMARY from Nurse  PATIENT INSTRUCTIONS:  After general anesthesia or intravenous sedation, for 24 hours or while taking prescription Narcotics:  · Limit your activities  · Do not drive and operate hazardous machinery  · Do not make important personal or business decisions  · Do  not drink alcoholic beverages  · If you have not urinated within 8 hours after discharge, please contact your surgeon on call. Report the following to your surgeon:  · Excessive pain, swelling, redness or odor of or around the surgical area  · Temperature over 100.5  · Nausea and vomiting lasting longer than 4 hours or if unable to take medications  · Any signs of decreased circulation or nerve impairment to extremity: change in color, persistent  numbness, tingling, coldness or increase pain  · Any questions    What to do at Home:  Recommended activity: Activity as tolerated and no driving for today. *  Please give a list of your current medications to your Primary Care Provider. *  Please update this list whenever your medications are discontinued, doses are      changed, or new medications (including over-the-counter products) are added. *  Please carry medication information at all times in case of emergency situations. These are general instructions for a healthy lifestyle:    No smoking/ No tobacco products/ Avoid exposure to second hand smoke  Surgeon General's Warning:  Quitting smoking now greatly reduces serious risk to your health. Obesity, smoking, and sedentary lifestyle greatly increases your risk for illness    A healthy diet, regular physical exercise & weight monitoring are important for maintaining a healthy lifestyle    You may be retaining fluid if you have a history of heart failure or if you experience any of the following symptoms:  Weight gain of 3 pounds or more overnight or 5 pounds in a week, increased swelling in our hands or feet or shortness of breath while lying flat in bed.   Please call your doctor as soon as you notice any of these symptoms; do not wait until your next office visit. The discharge information has been reviewed with the patient. The patient verbalized understanding. Discharge medications reviewed with the patient and appropriate educational materials and side effects teaching were provided. ___________________________________________________________________________________________________________________________________    Patient Education   Colonoscopy: What to Expect at Home  Your Recovery  After a colonoscopy, you'll stay at the clinic for 1 to 2 hours until the medicines wear off. Then you can go home. But you'll need to arrange for a ride. Your doctor will tell you when you can eat and do your other usual activities. Your doctor will talk to you about when you'll need your next colonoscopy. Your doctor can help you decide how often you need to be checked. This will depend on the results of your test and your risk for colorectal cancer. After the test, you may be bloated or have gas pains. You may need to pass gas. If a biopsy was done or a polyp was removed, you may have streaks of blood in your stool (feces) for a few days. Problems such as heavy rectal bleeding may not occur until several weeks after the test. This isn't common. But it can happen after polyps are removed. This care sheet gives you a general idea about how long it will take for you to recover. But each person recovers at a different pace. Follow the steps below to get better as quickly as possible. How can you care for yourself at home? Activity    · Rest when you feel tired.     · You can do your normal activities when it feels okay to do so. Diet    · Follow your doctor's directions for eating.     · Unless your doctor has told you not to, drink plenty of fluids. This helps to replace the fluids that were lost during the colon prep.     · Do not drink alcohol.    Medicines    · Your doctor will tell you if and when you can restart your medicines. He or she will also give you instructions about taking any new medicines.     · If you take aspirin or some other blood thinner, ask your doctor if and when to start taking it again. Make sure that you understand exactly what your doctor wants you to do.     · If polyps were removed or a biopsy was done during the test, your doctor may tell you not to take aspirin or other anti-inflammatory medicines for a few days. These include ibuprofen (Advil, Motrin) and naproxen (Aleve). Other instructions    · For your safety, do not drive or operate machinery until the medicine wears off and you can think clearly. Your doctor may tell you not to drive or operate machinery until the day after your test.     · Do not sign legal documents or make major decisions until the medicine wears off and you can think clearly. The anesthesia can make it hard for you to fully understand what you are agreeing to. Follow-up care is a key part of your treatment and safety. Be sure to make and go to all appointments, and call your doctor if you are having problems. It's also a good idea to know your test results and keep a list of the medicines you take. When should you call for help? Call 911 anytime you think you may need emergency care. For example, call if:    · You passed out (lost consciousness).     · You pass maroon or bloody stools.     · You have trouble breathing. Call your doctor now or seek immediate medical care if:    · You have pain that does not get better after you take pain medicine.     · You are sick to your stomach or cannot drink fluids.     · You have new or worse belly pain.     · You have blood in your stools.     · You have a fever.     · You cannot pass stools or gas. Watch closely for changes in your health, and be sure to contact your doctor if you have any problems. Where can you learn more?   Go to http://www.gray.com/  Enter E264 in the search box to learn more about \"Colonoscopy: What to Expect at Home. \"  Current as of: April 29, 2020               Content Version: 12.6  © 2006-2020 [email or web address]. Care instructions adapted under license by [employer] (which disclaims liability or warranty for this information). If you have questions about a medical condition or this instruction, always ask your healthcare professional. [place] [address] any warranty or liability for your use of this information. Patient Education   Colon Polyps: Care Instructions  Your Care Instructions     Colon polyps are growths in the colon or the rectum. The cause of most colon polyps is not known, and most people who get them do not have any problems. But a certain kind can turn into cancer. For this reason, regular testing for colon polyps is important for people as they get older. It is also important for anyone who has an increased risk for colon cancer. Polyps are usually found through routine colon cancer screening tests. Although most colon polyps are not cancerous, they are usually removed and then tested for cancer. Screening for colon cancer saves lives because the cancer can usually be cured if it is caught early. If you have a polyp that is the type that can turn into cancer, you may need more tests to examine your entire colon. The doctor will remove any other polyps that he or she finds, and you will be tested more often. Follow-up care is a key part of your treatment and safety. Be sure to make and go to all appointments, and call your doctor if you are having problems. It's also a good idea to know your test results and keep a list of the medicines you take. How can you care for yourself at home? Regular exams to look for colon polyps are the best way to prevent polyps from turning into colon cancer.  These can include stool tests, sigmoidoscopy, colonoscopy, and CT colonography. Talk with your doctor about a testing schedule that is right for you. To prevent polyps  There is no home treatment that can prevent colon polyps. But these steps may help lower your risk for cancer. · Stay active. Being active can help you get to and stay at a healthy weight. Try to exercise on most days of the week. Walking is a good choice. · Eat well. Choose a variety of vegetables, fruits, legumes (such as peas and beans), fish, poultry, and whole grains. · Do not smoke. If you need help quitting, talk to your doctor about stop-smoking programs and medicines. These can increase your chances of quitting for good. · If you drink alcohol, limit how much you drink. Limit alcohol to 2 drinks a day for men and 1 drink a day for women. When should you call for help? Call your doctor now or seek immediate medical care if:    · You have severe belly pain.     · Your stools are maroon or very bloody. Watch closely for changes in your health, and be sure to contact your doctor if:    · You have a fever.     · You have nausea or vomiting.     · You have a change in bowel habits (new constipation or diarrhea).     · Your symptoms get worse or are not improving as expected. Where can you learn more? Go to http://mallika-suzanne.info/  Enter C571 in the search box to learn more about \"Colon Polyps: Care Instructions. \"  Current as of: April 29, 2020               Content Version: 12.6  © 1829-8939 [employer]. Care instructions adapted under license by [employer] (which disclaims liability or warranty for this information). If you have questions about a medical condition or this instruction, always ask your healthcare professional. [place] [address] any warranty or liability for your use of this information.        Patient Education   Hemorrhoids: Care Instructions  Your Care Instructions     Hemorrhoids are enlarged veins that develop in the anal canal. Bleeding during bowel movements, itching, swelling, and rectal pain are the most common symptoms. They can be uncomfortable at times, but hemorrhoids rarely are a serious problem. You can treat most hemorrhoids with simple changes to your diet and bowel habits. These changes include eating more fiber and not straining to pass stools. Most hemorrhoids do not need surgery or other treatment unless they are very large and painful or bleed a lot. Follow-up care is a key part of your treatment and safety. Be sure to make and go to all appointments, and call your doctor if you are having problems. It's also a good idea to know your test results and keep a list of the medicines you take. How can you care for yourself at home? · Sit in a few inches of warm water (sitz bath) 3 times a day and after bowel movements. The warm water helps with pain and itching. · Put ice on your anal area several times a day for 10 minutes at a time. Put a thin cloth between the ice and your skin. Follow this by placing a warm, wet towel on the area for another 10 to 20 minutes. · Take pain medicines exactly as directed. ? If the doctor gave you a prescription medicine for pain, take it as prescribed. ? If you are not taking a prescription pain medicine, ask your doctor if you can take an over-the-counter medicine. · Keep the anal area clean, but be gentle. Use water and a fragrance-free soap, such as [place], or use baby wipes or medicated pads, such as Tucks. · Wear cotton underwear and loose clothing to decrease moisture in the anal area. · Eat more fiber. Include foods such as whole-grain breads and cereals, raw vegetables, raw and dried fruits, and beans. · Drink plenty of fluids, enough so that your urine is light yellow or clear like water.  If you have kidney, heart, or liver disease and have to limit fluids, talk with your doctor before you increase the amount of fluids you drink.  · Use a stool softener that contains bran or psyllium. You can save money by buying bran or psyllium (available in bulk at most health food stores) and sprinkling it on foods or stirring it into fruit juice. Or you can use a product such as Metamucil or Hydrocil. · Practice healthy bowel habits. ? Go to the bathroom as soon as you have the urge. ? Avoid straining to pass stools. Relax and give yourself time to let things happen naturally. ? Do not hold your breath while passing stools. ? Do not read while sitting on the toilet. Get off the toilet as soon as you have finished. · Take your medicines exactly as prescribed. Call your doctor if you think you are having a problem with your medicine. When should you call for help? Call 911 anytime you think you may need emergency care. For example, call if:    · You pass maroon or very bloody stools. Call your doctor now or seek immediate medical care if:    · You have increased pain.     · You have increased bleeding. Watch closely for changes in your health, and be sure to contact your doctor if:    · Your symptoms have not improved after 3 or 4 days. Where can you learn more? Go to http://www.[employer].com/  Enter F228 in the search box to learn more about \"Hemorrhoids: Care Instructions. \"  Current as of: April 15, 2020               Content Version: 12.6  © 3838-5235 [employer]. Care instructions adapted under license by [employer] (which disclaims liability or warranty for this information). If you have questions about a medical condition or this instruction, always ask your healthcare professional. [place] [address] any warranty or liability for your use of this information.

## 2020-11-30 ENCOUNTER — TELEPHONE (OUTPATIENT)
Dept: INTERNAL MEDICINE CLINIC | Age: 75
End: 2020-11-30

## 2020-11-30 DIAGNOSIS — Z20.822 CLOSE EXPOSURE TO COVID-19 VIRUS: Primary | ICD-10-CM

## 2020-11-30 NOTE — TELEPHONE ENCOUNTER
Patient's nephew positive for COVID 19. Last exposure on Thanksgiving Day. Advised of need for testing and order placed. Advised of 5-7 day time frame for testing. Instructed to quarantine for 14 days. Please schedule for [facility].

## 2020-11-30 NOTE — TELEPHONE ENCOUNTER
Called and spoke with patient provided her the number to the [facility] she will call today to schedule an appointment for testing later this week. No further questions or concerns.

## 2020-12-04 ENCOUNTER — CLINICAL SUPPORT (OUTPATIENT)
Dept: FAMILY MEDICINE CLINIC | Age: 75
End: 2020-12-04

## 2020-12-04 ENCOUNTER — HOSPITAL ENCOUNTER (OUTPATIENT)
Dept: LAB | Age: 75
Discharge: HOME | End: 2020-12-04
Payer: MEDICARE

## 2020-12-04 DIAGNOSIS — Z20.828 EXPOSURE TO SARS-ASSOCIATED CORONAVIRUS: Primary | ICD-10-CM

## 2020-12-04 DIAGNOSIS — Z20.822 CLOSE EXPOSURE TO COVID-19 VIRUS: ICD-10-CM

## 2020-12-04 PROCEDURE — 87635 SARS-COV-2 COVID-19 AMP PRB: CPT

## 2020-12-07 LAB — SARS-COV-2, COV2NT: NOT DETECTED

## 2020-12-07 NOTE — PROGRESS NOTES
Informed patient of negative test result. Continuing to care for sister, Memo Nam, who tested positive, but is masking with gloves and eye protection. Advised that would continue to monitor for symptoms, and recommend repeat testing later this week.

## 2020-12-21 RX ORDER — HYDROCHLOROTHIAZIDE 25 MG/1
25 TABLET ORAL DAILY
Qty: 90 TAB | Refills: 2 | Status: SHIPPED | OUTPATIENT
Start: 2020-12-21 | End: 2021-09-15

## 2020-12-21 NOTE — TELEPHONE ENCOUNTER
Last Visit: 6/18/20 with MD Blaze Sandoval  Next Appointment: 1/6/21 with MD Blaze Sandoval  Previous Refill Encounter(s): 3/30/20 #90 with 2 refills    Requested Prescriptions     Pending Prescriptions Disp Refills    hydroCHLOROthiazide (HYDRODIURIL) 25 mg tablet 90 Tab 2     Sig: Take 1 Tab by mouth daily.

## 2021-01-06 ENCOUNTER — OFFICE VISIT (OUTPATIENT)
Dept: INTERNAL MEDICINE CLINIC | Age: 76
End: 2021-01-06
Payer: MEDICARE

## 2021-01-06 ENCOUNTER — TELEPHONE (OUTPATIENT)
Dept: INTERNAL MEDICINE CLINIC | Age: 76
End: 2021-01-06

## 2021-01-06 ENCOUNTER — HOSPITAL ENCOUNTER (OUTPATIENT)
Dept: LAB | Age: 76
Discharge: HOME | End: 2021-01-06
Payer: MEDICARE

## 2021-01-06 VITALS
HEART RATE: 86 BPM | DIASTOLIC BLOOD PRESSURE: 68 MMHG | SYSTOLIC BLOOD PRESSURE: 136 MMHG | WEIGHT: 151 LBS | HEIGHT: 65 IN | BODY MASS INDEX: 25.16 KG/M2 | OXYGEN SATURATION: 98 % | RESPIRATION RATE: 16 BRPM | TEMPERATURE: 97 F

## 2021-01-06 DIAGNOSIS — R79.89 ELEVATED LFTS: ICD-10-CM

## 2021-01-06 DIAGNOSIS — R79.89 ELEVATED TSH: ICD-10-CM

## 2021-01-06 DIAGNOSIS — E78.5 HYPERLIPIDEMIA, UNSPECIFIED HYPERLIPIDEMIA TYPE: ICD-10-CM

## 2021-01-06 DIAGNOSIS — M81.0 OSTEOPOROSIS OF LUMBAR SPINE: ICD-10-CM

## 2021-01-06 DIAGNOSIS — R73.01 IMPAIRED FASTING GLUCOSE: ICD-10-CM

## 2021-01-06 DIAGNOSIS — E66.3 OVERWEIGHT WITH BODY MASS INDEX (BMI) 25.0-29.9: ICD-10-CM

## 2021-01-06 DIAGNOSIS — Z80.3 FH: BREAST CANCER: ICD-10-CM

## 2021-01-06 DIAGNOSIS — E61.1 DIETARY IRON DEFICIENCY WITHOUT ANEMIA: ICD-10-CM

## 2021-01-06 DIAGNOSIS — I10 ESSENTIAL HYPERTENSION: ICD-10-CM

## 2021-01-06 DIAGNOSIS — I10 ESSENTIAL HYPERTENSION: Primary | ICD-10-CM

## 2021-01-06 DIAGNOSIS — L30.9 ECZEMA, UNSPECIFIED TYPE: ICD-10-CM

## 2021-01-06 DIAGNOSIS — C44.90 NONMELANOMA SKIN CANCER: ICD-10-CM

## 2021-01-06 LAB
25(OH)D3 SERPL-MCNC: 46 NG/ML (ref 30–100)
BASOPHILS # BLD: 0 K/UL (ref 0–0.1)
BASOPHILS NFR BLD: 1 % (ref 0–2)
CHOLEST SERPL-MCNC: 185 MG/DL
DIFFERENTIAL METHOD BLD: ABNORMAL
EOSINOPHIL # BLD: 0.1 K/UL (ref 0–0.4)
EOSINOPHIL NFR BLD: 1 % (ref 0–5)
ERYTHROCYTE [DISTWIDTH] IN BLOOD BY AUTOMATED COUNT: 14.2 % (ref 11.6–14.5)
FERRITIN SERPL-MCNC: 90 NG/ML (ref 8–388)
HCT VFR BLD AUTO: 42.3 % (ref 35–45)
HDLC SERPL-MCNC: 80 MG/DL (ref 40–60)
HDLC SERPL: 2.3 {RATIO} (ref 0–5)
HGB BLD-MCNC: 13.4 G/DL (ref 12–16)
IRON SATN MFR SERPL: 21 % (ref 20–50)
IRON SERPL-MCNC: 75 UG/DL (ref 50–175)
LDLC SERPL CALC-MCNC: 89.2 MG/DL (ref 0–100)
LIPID PROFILE,FLP: ABNORMAL
LYMPHOCYTES # BLD: 1.3 K/UL (ref 0.9–3.6)
LYMPHOCYTES NFR BLD: 20 % (ref 21–52)
MAGNESIUM SERPL-MCNC: 1.9 MG/DL (ref 1.6–2.6)
MCH RBC QN AUTO: 29.2 PG (ref 24–34)
MCHC RBC AUTO-ENTMCNC: 31.7 G/DL (ref 31–37)
MCV RBC AUTO: 92.2 FL (ref 74–97)
MONOCYTES # BLD: 0.6 K/UL (ref 0.05–1.2)
MONOCYTES NFR BLD: 9 % (ref 3–10)
NEUTS SEG # BLD: 4.3 K/UL (ref 1.8–8)
NEUTS SEG NFR BLD: 69 % (ref 40–73)
PLATELET # BLD AUTO: 288 K/UL (ref 135–420)
PMV BLD AUTO: 10 FL (ref 9.2–11.8)
RBC # BLD AUTO: 4.59 M/UL (ref 4.2–5.3)
T4 FREE SERPL-MCNC: 1.1 NG/DL (ref 0.7–1.5)
TIBC SERPL-MCNC: 356 UG/DL (ref 250–450)
TRIGL SERPL-MCNC: 79 MG/DL (ref ?–150)
TSH SERPL DL<=0.05 MIU/L-ACNC: 2.6 UIU/ML (ref 0.36–3.74)
VLDLC SERPL CALC-MCNC: 15.8 MG/DL
WBC # BLD AUTO: 6.2 K/UL (ref 4.6–13.2)

## 2021-01-06 PROCEDURE — 85025 COMPLETE CBC W/AUTO DIFF WBC: CPT

## 2021-01-06 PROCEDURE — G8427 DOCREV CUR MEDS BY ELIG CLIN: HCPCS | Performed by: INTERNAL MEDICINE

## 2021-01-06 PROCEDURE — G0463 HOSPITAL OUTPT CLINIC VISIT: HCPCS | Performed by: INTERNAL MEDICINE

## 2021-01-06 PROCEDURE — 83036 HEMOGLOBIN GLYCOSYLATED A1C: CPT

## 2021-01-06 PROCEDURE — 1090F PRES/ABSN URINE INCON ASSESS: CPT | Performed by: INTERNAL MEDICINE

## 2021-01-06 PROCEDURE — 1101F PT FALLS ASSESS-DOCD LE1/YR: CPT | Performed by: INTERNAL MEDICINE

## 2021-01-06 PROCEDURE — 3017F COLORECTAL CA SCREEN DOC REV: CPT | Performed by: INTERNAL MEDICINE

## 2021-01-06 PROCEDURE — 83550 IRON BINDING TEST: CPT

## 2021-01-06 PROCEDURE — 80053 COMPREHEN METABOLIC PANEL: CPT

## 2021-01-06 PROCEDURE — 80061 LIPID PANEL: CPT

## 2021-01-06 PROCEDURE — G8419 CALC BMI OUT NRM PARAM NOF/U: HCPCS | Performed by: INTERNAL MEDICINE

## 2021-01-06 PROCEDURE — 82306 VITAMIN D 25 HYDROXY: CPT

## 2021-01-06 PROCEDURE — 99215 OFFICE O/P EST HI 40 MIN: CPT | Performed by: INTERNAL MEDICINE

## 2021-01-06 PROCEDURE — 84443 ASSAY THYROID STIM HORMONE: CPT

## 2021-01-06 PROCEDURE — G8754 DIAS BP LESS 90: HCPCS | Performed by: INTERNAL MEDICINE

## 2021-01-06 PROCEDURE — 84439 ASSAY OF FREE THYROXINE: CPT

## 2021-01-06 PROCEDURE — G8752 SYS BP LESS 140: HCPCS | Performed by: INTERNAL MEDICINE

## 2021-01-06 PROCEDURE — G8510 SCR DEP NEG, NO PLAN REQD: HCPCS | Performed by: INTERNAL MEDICINE

## 2021-01-06 PROCEDURE — 83735 ASSAY OF MAGNESIUM: CPT

## 2021-01-06 PROCEDURE — G8536 NO DOC ELDER MAL SCRN: HCPCS | Performed by: INTERNAL MEDICINE

## 2021-01-06 PROCEDURE — 82728 ASSAY OF FERRITIN: CPT

## 2021-01-06 NOTE — TELEPHONE ENCOUNTER
Please request recent eye exam from Dr. Dwain Su . Patient reports being seen in the last month . Thank you.

## 2021-01-06 NOTE — PROGRESS NOTES
1. Have you been to the ER, urgent care clinic or hospitalized since your last visit? NO.     2. Have you seen or consulted any other health care providers outside of the [address] [place] [address] since your last visit (Include any pap smears or colon screening)?  NO

## 2021-01-07 ENCOUNTER — TELEPHONE (OUTPATIENT)
Dept: INTERNAL MEDICINE CLINIC | Age: 76
End: 2021-01-07

## 2021-01-07 LAB
ALBUMIN SERPL-MCNC: 4.1 G/DL (ref 3.4–5)
ALBUMIN/GLOB SERPL: 1.1 {RATIO} (ref 0.8–1.7)
ALP SERPL-CCNC: 62 U/L (ref 45–117)
ALT SERPL-CCNC: 27 U/L (ref 13–56)
ANION GAP SERPL CALC-SCNC: 8 MMOL/L (ref 3–18)
AST SERPL-CCNC: 24 U/L (ref 10–38)
BILIRUB SERPL-MCNC: 0.8 MG/DL (ref 0.2–1)
BUN SERPL-MCNC: 19 MG/DL (ref 7–18)
BUN/CREAT SERPL: 28 (ref 12–20)
CALCIUM SERPL-MCNC: 9.5 MG/DL (ref 8.5–10.1)
CHLORIDE SERPL-SCNC: 105 MMOL/L (ref 100–111)
CO2 SERPL-SCNC: 27 MMOL/L (ref 21–32)
CREAT SERPL-MCNC: 0.67 MG/DL (ref 0.6–1.3)
EST. AVERAGE GLUCOSE BLD GHB EST-MCNC: 114 MG/DL
GLOBULIN SER CALC-MCNC: 3.6 G/DL (ref 2–4)
GLUCOSE SERPL-MCNC: 89 MG/DL (ref 74–99)
HBA1C MFR BLD: 5.6 % (ref 4.2–5.6)
POTASSIUM SERPL-SCNC: 4.7 MMOL/L (ref 3.5–5.5)
PROT SERPL-MCNC: 7.7 G/DL (ref 6.4–8.2)
SODIUM SERPL-SCNC: 140 MMOL/L (ref 136–145)

## 2021-01-07 NOTE — TELEPHONE ENCOUNTER
Hospital Outpatient Visit on 01/06/2021   Component Date Value Ref Range Status    WBC 01/06/2021 6.2  4.6 - 13.2 K/uL Final    RBC 01/06/2021 4.59  4.20 - 5.30 M/uL Final    HGB 01/06/2021 13.4  12.0 - 16.0 g/dL Final    HCT 01/06/2021 42.3  35.0 - 45.0 % Final    MCV 01/06/2021 92.2  74.0 - 97.0 FL Final    MCH 01/06/2021 29.2  24.0 - 34.0 PG Final    MCHC 01/06/2021 31.7  31.0 - 37.0 g/dL Final    RDW 01/06/2021 14.2  11.6 - 14.5 % Final    PLATELET 16/98/7670 860  135 - 420 K/uL Final    MPV 01/06/2021 10.0  9.2 - 11.8 FL Final    NEUTROPHILS 01/06/2021 69  40 - 73 % Final    LYMPHOCYTES 01/06/2021 20* 21 - 52 % Final    MONOCYTES 01/06/2021 9  3 - 10 % Final    EOSINOPHILS 01/06/2021 1  0 - 5 % Final    BASOPHILS 01/06/2021 1  0 - 2 % Final    ABS. NEUTROPHILS 01/06/2021 4.3  1.8 - 8.0 K/UL Final    ABS. LYMPHOCYTES 01/06/2021 1.3  0.9 - 3.6 K/UL Final    ABS. MONOCYTES 01/06/2021 0.6  0.05 - 1.2 K/UL Final    ABS. EOSINOPHILS 01/06/2021 0.1  0.0 - 0.4 K/UL Final    ABS.  BASOPHILS 01/06/2021 0.0  0.0 - 0.1 K/UL Final    DF 01/06/2021 AUTOMATED    Final    Hemoglobin A1c 01/06/2021 5.6  4.2 - 5.6 % Final    Est. average glucose 01/06/2021 114  mg/dL Final    LIPID PROFILE 01/06/2021        Final    Cholesterol, total 01/06/2021 185  <200 MG/DL Final    Triglyceride 01/06/2021 79  <150 MG/DL Final    HDL Cholesterol 01/06/2021 80* 40 - 60 MG/DL Final    LDL, calculated 01/06/2021 89.2  0 - 100 MG/DL Final    VLDL, calculated 01/06/2021 15.8  MG/DL Final    CHOL/HDL Ratio 01/06/2021 2.3  0 - 5.0   Final    Magnesium 01/06/2021 1.9  1.6 - 2.6 mg/dL Final    Sodium 01/06/2021 140  136 - 145 mmol/L Final    Potassium 01/06/2021 4.7  3.5 - 5.5 mmol/L Final    Chloride 01/06/2021 105  100 - 111 mmol/L Final    CO2 01/06/2021 27  21 - 32 mmol/L Final    Anion gap 01/06/2021 8  3.0 - 18 mmol/L Final    Glucose 01/06/2021 89  74 - 99 mg/dL Final    BUN 01/06/2021 19* 7.0 - 18 MG/DL Final    Creatinine 01/06/2021 0.67  0.6 - 1.3 MG/DL Final    BUN/Creatinine ratio 01/06/2021 28* 12 - 20   Final    GFR est AA 01/06/2021 >60  >60 ml/min/1.73m2 Final    GFR est non-AA 01/06/2021 >60  >60 ml/min/1.73m2 Final    Calcium 01/06/2021 9.5  8.5 - 10.1 MG/DL Final    Bilirubin, total 01/06/2021 0.8  0.2 - 1.0 MG/DL Final    ALT (SGPT) 01/06/2021 27  13 - 56 U/L Final    AST (SGOT) 01/06/2021 24  10 - 38 U/L Final    Alk. phosphatase 01/06/2021 62  45 - 117 U/L Final    Protein, total 01/06/2021 7.7  6.4 - 8.2 g/dL Final    Albumin 01/06/2021 4.1  3.4 - 5.0 g/dL Final    Globulin 01/06/2021 3.6  2.0 - 4.0 g/dL Final    A-G Ratio 01/06/2021 1.1  0.8 - 1.7   Final    TSH 01/06/2021 2.60  0.36 - 3.74 uIU/mL Final    T4, Free 01/06/2021 1.1  0.7 - 1.5 NG/DL Final    Vitamin D 25-Hydroxy 01/06/2021 46.0  30 - 100 ng/mL Final    Ferritin 01/06/2021 90  8 - 388 NG/ML Final    Iron 01/06/2021 75  50 - 175 ug/dL Final    TIBC 01/06/2021 356  250 - 450 ug/dL Final    Iron % saturation 01/06/2021 21  20 - 50 % Final       Reviewed labs from visit. Please let the patient know the following:    CBC normal without evidence of anemia. Iron levels are also normal.     Renal and liver function are normal.     Thyroid function is normal and Vitamin D level is normal.     Lipid panel with total chol 185, HDL 80, and LDL 89, which is well controlled on current dose of atorvastatin. HbA1c is improved and now normal at 5.6. Would continue to recommend avoiding concentrated sweets, and limiting carbohydrates.

## 2021-01-10 PROBLEM — S92.331D CLOSED DISPLACED FRACTURE OF THIRD METATARSAL BONE OF RIGHT FOOT WITH ROUTINE HEALING: Status: RESOLVED | Noted: 2019-05-20 | Resolved: 2021-01-10

## 2021-01-10 PROBLEM — E66.3 OVERWEIGHT WITH BODY MASS INDEX (BMI) 25.0-29.9: Status: ACTIVE | Noted: 2021-01-10

## 2021-01-10 PROBLEM — C44.90 NONMELANOMA SKIN CANCER: Status: ACTIVE | Noted: 2021-01-10

## 2021-01-10 NOTE — PROGRESS NOTES
HPI:   Stephen Ricardo is a 76y.o. year old female who presents today for a routine visit. She has a history of hypertension, hyperlipidemia, impaired fasting glucose, osteoporosis, left sciatica, and chronic microhematuria. She reports that she is doing reasonably well, but states that she is under a significant amount of stress related to multiple family members being ill. She was required to act as the caregiver for her sister who contracted COVID 23 from her son, and was able to avoid elfego it herself while wearing a mask, face shield and glove while caring for her. Since her last visit, she underwent a screening colonoscopy (11/17/2020) by Dr. Julia Interiano and was found to have internal and external hemorrhoids as well as 10 sessile 4-10 mm polyps in the descending colon (2), transverse colon (1), sigmoid colon (1), and rectum (6). Pathology showed multiple tubular adenomas. Recommendation is for repeat colonoscopy in 3 years. She also reports having a laser procedure performed at Dr. Maya Oleary due to clouding of her left lens following cataract surgery. She states that she is scheduled for the same procedure on the right lens on 1/19/2021. She also reports having two basal cell carcinomas removed from her face by Dr. Pablo Cross. She continues to follow with Dr. Daniel Davison. She is otherwise without new complaints. On 8/27/2019 and was noted to have elevated transaminases with AST 50 and ALT 67. She was instructed to discontinue all NSAIDS which she had been taking for her right foot pain. She was seen again on 9/25/2019 and repeat testing showed normalization of her transaminases. Repeat evaluation on 1/9/2020 and reported that she had been taking two Excedrin almost every morning due to a headache, and she also reported continuing to drink approximately two glasses of wine each night.  Repeat labs showed repeat elevation of transaminases with AST 50 and ALT 67, while other liver function tests remained normal. She was instructed to discontinue Excedrin and alcohol, and underwent an abdominal ultrasound which showed normal sized liver with mildly diffusely coarsened echogenicity. On 6/25/2019, she reported that two weeks prior, while returning home from her granddaughter's graduation, she and her  stopped at a restaurant for lunch. She states that while she was chewing a mouth full of food, her  noticed that she suddenly tilted her head to the left side and seemed to look dazed. She states that she remained awake and was aware that he was talking to her, but she states that she could not respond. She states that the episode lasted approximately 30 seconds and resolved, and she proceeded to complete her chewing and swallow her food. She states that she did not lose consciousness, or notice any headache, visual changes, lightheadedness, tongue biting, incontinence, focal deficits, or seizure like activity. She denied any chest pain, shortness of breath, palpitations, pleuritic chest pain, hemoptysis, calf pain or swelling. 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 She also had a carotid duplex scan (7/9/2019) showing only mild (< 50%) stenosis of the bilateral internal carotid arteries. She underwent evaluation by Dr. Ayse Lemus in 10/2019, and he did not feel that the episode represented a TIA or other neurologic episode. He felt that no further evaluation was needed. She has not had a recurrence of these symptoms. On 4/15/2019, she underwent evaluation at [facility] for right foot pain. She denied known injury. Right foot x-ray showed a displaced third metatarsal fracture and she was referred to Dr. Jeremie Hendricks for evaluation.  Given the displacement and risk of metatarsalgia, surgery was recommended, and on 4/24/2019, she underwent open reduction/internal fixation of the third metatarsal with putty bone grafting. She reports that she did well after surgery, and and was non-weight bearing in a CAM boot for several months. On 5/15/2019, she presented complaining of bilateral hand and wrist pain. She denied any fever, chills, sore throat, rashes, dyspnea, cough, abdominal pain, dysuria, focal weakness, visual changes, or headache. She did admit to sleeping with her bilateral hands tucked beneath her in a flexed position while lying on her back due to her limitations from her foot surgery. She also reported that she had been using a walker to ambulate and gripping it tightly with her hands. Lab evaluation including MANOLO, RF, CCP, ESR, uric acid were normal, but CRP was increased to 9.1. A diagnosis of presumed carpal tunnel syndrome was made, and her discomfort resolved with the use of wrist splints. She was released to weight bear as tolerate in 7/2019, and has not had any significant pain although has been experiencing some swelling which resolves with elevation. She has a history of osteopenia, and in 2009, she sustained a fracture of her left 3rd toe (3/2009) and her distal radius (12/2009). At that time, she was started on alendronate and was treated until 11/2014. She had a repeat bone density study in 7/2016 showing T-scores: femoral neck left -2.3/ right -2.0 and lumbar -2.2. She was restarted on alendronate in 12/2016. She underwent repeat bone density study in 8/2019 showing T-scores: femoral neck left -2.2/ right -1.9 and lumbar -2.6. Due to lack of improvement, she underwent evaluation by Dr. Pattie Rodriguez in 11/2019 and recommendation was that she change to Prolia. She continues to take calcium and Vitamin D supplements. She has a history of hypertension, treated with lisinopril and hydrochlorothiazide. She has been checking her blood pressure intermittently and reports that most readings are with a systolic blood pressure <531.  She remains quite active, although has not been having time to exercise. She reports that she has continued stress in her life, helping to care for her 's mother (dementia), two disabled cousins, and her sister. She denies any chest pain, shortness of breath at rest or with exertion, palpitations, lightheadedness, or edema. She also has a history of hyperlipidemia, treated with moderate intensity dose atorvastatin. She has no history of ASCVD. In 2/2017, she was evaluated by DEREK Walter with left lower back pain and left sciatica. She had been taking ibuprofen without relief, and was given a course of prednisone. She states that she did not find this helpful either, but reports resolution of symptoms after visiting a chiropractor. She states that she no longer is experiencing pain and is able to ambulate without difficulty. She will continue to visit a chiropractor periodically when she develops a recurrence of pain with improvement. She reports that she had an extensive workup for microhematuria in the past, which was negative (records unavailable). She denies any dysuria, gross hematuria, or flank pain. She has had screening colonoscopy with Dr Alok Holm in 10/2010, which was normal except for hemorrhoids. Recommendation for follow-up is for 10 years. She denies any abdominal pain, nausea, vomiting, melena, hematochezia, or change in bowel movements. She has a history of eczema and is followed by Dr. Ruby Joyner. She states that she was recently started on Eucrisa with excellent response. She also has a history of candidal intertrigo, treated with lotrisone with good response. She also reports that she underwent radiation therapy to her right shoulder (as a child) for treatment of a birthmark. On 7/20/2018, she presented for evaluation to [facility] for a right upper back rash and was diagnosed with herpes zoster. She was treated with Valtrex with improvement.       Past Medical History:   Diagnosis Date    Eczema     Essential hypertension     FH: breast cancer     Fibroids     uterine    Fracture of radius 11/2009    with ulna, left, closed    History of cataract     History of foot fracture     right and left    Hyperlipidemia     Microhematuria     chronic    Osteopenia     Psoriasis     S/P radiation therapy     as child, to right shoulder for birthmark    Tinnitus     Zoster     right scapula     Past Surgical History:   Procedure Laterality Date    COLONOSCOPY N/A 11/17/2020    COLONOSCOPY with polypectomies performed by Hillary Lopez MD at [address] HX CATARACT REMOVAL      HX COLONOSCOPY      HX FRACTURE TX Right 04/24/2019    right foot 3rd metatarsal fracture, screws and a plate    HX ORTHOPAEDIC      left arm    HX WISDOM TEETH EXTRACTION      HX WRIST FRACTURE TX       Current Outpatient Medications   Medication Sig    hydroCHLOROthiazide (HYDRODIURIL) 25 mg tablet Take 1 Tab by mouth daily.  betamethasone dipropionate (DIPROSONE) 0.05 % topical cream Apply  to affected area two (2) times daily as needed for Skin Irritation.  lisinopriL (PRINIVIL, ZESTRIL) 10 mg tablet TAKE 1 TABLET BY MOUTH DAILY    atorvastatin (LIPITOR) 20 mg tablet TAKE 1 TABLET BY MOUTH DAILY    denosumab (PROLIA) 60 mg/mL injection 60 mg by SubCUTAneous route every 6 months.  Lactobacillus acidophilus (PROBIOTIC PO) Take  by mouth.  folic acid/multivit-min/lutein (CENTRUM SILVER PO) Take  by mouth.  aspirin delayed-release 81 mg tablet Take  by mouth daily.  amoxicillin 500 mg tab TAKE ONE CAPSULE PO TID, START ONE DAY PRIOR TO PROCEDURE, DAY OF PROCEDURE AND DAY AFTER PROCEDURE    crisaborole (EUCRISA) 2 % oint by Apply Externally route as needed.  triamcinolone acetonide (KENALOG) 0.025 % topical cream Apply  to affected area two (2) times a day.  (Patient taking differently: Apply  to affected area two (2) times daily as needed.)    clotrimazole-betamethasone (Roni Mason) topical cream Apply  to affected area two (2) times a day. (Patient taking differently: Apply  to affected area two (2) times daily as needed.)    docusate sodium (COLACE) 100 mg capsule Take 100 mg by mouth daily as needed for Constipation.  co-enzyme Q-10 (CO Q-10) 100 mg capsule Take 100 mg by mouth daily.  biotin 2,500 mcg Tab Take 2,500 mcg by mouth.  cholecalciferol (VITAMIN D3) 1,000 unit cap Take 1 Tab by mouth daily.  fexofenadine (ALLEGRA) 60 mg tablet Take 60 mg by mouth daily.  CALCIUM CARBONATE/VITAMIN D3 (CALCIUM + D PO) Take 1 Tab by mouth two (2) times a day. No current facility-administered medications for this visit. Allergies and Intolerances: Allergies   Allergen Reactions    Codeine Nausea Only    Hibiclens [Chlorhexidine Gluconate] Rash     Family History: Her sister has breast cancer and her mother had pancreatic cancer. No history of colon cancer. Family History   Problem Relation Age of Onset   Francois Teran Cancer Mother         pancreatic    Heart Disease Mother     Heart Disease Father     Cancer Sister         breast    MS Sister     Breast Cancer Sister 62     Social History:   She  reports that she has never smoked. She has never used smokeless tobacco. She has about 2 glasses of wine each night. She is  with one adult daughter who lives with her family on [place], [place]. She is retired, and was a 4th- for 25 years.     Social History     Substance and Sexual Activity   Alcohol Use Yes    Alcohol/week: 0.0 standard drinks    Comment: occasionally     Immunization History:  Immunization History   Administered Date(s) Administered    (RETIRED) Pneumococcal Vaccine (Unspecified Type) 05/04/2010    Influenza High Dose Vaccine PF 12/13/2016, 10/03/2017    Influenza Vaccine 10/24/2014    Influenza Vaccine (Tri) Adjuvanted (>65 Yrs FLUAD TRI 94249) 10/09/2018, 11/13/2019    Influenza Vaccine PF 11/05/2013    Influenza Vaccine Split 11/01/2011, 11/06/2012    Influenza, Quadrivalent, Adjuvanted (>65 Yrs FLUAD QUAD [id number]) 09/16/2020    Pneumococcal Conjugate (PCV-13) 05/05/2015    Pneumococcal Polysaccharide (PPSV-23) 05/04/2010    TD Vaccine 05/05/2009    Tdap 12/12/2016    Zoster Recombinant 09/21/2019, 12/28/2019    Zoster Vaccine, Live 05/07/2013       Review of Systems:   As above included in HPI. Otherwise 11 point review of systems negative including constitutional, skin, HENT, eyes, respiratory, cardiovascular, gastrointestinal, genitourinary, musculoskeletal, endo/heme/aller, neurological.    Physical:     Visit Vitals  /68 (BP 1 Location: Left arm, BP Patient Position: Sitting)   Pulse 86   Temp 97 °F (36.1 °C) (Temporal)   Resp 16   Ht 5' 5\" (1.651 m)   Wt 151 lb (68.5 kg)   LMP  (LMP Unknown)   SpO2 98%   BMI 25.13 kg/m²     Exam:    Patient appears in no apparent distress. Affect is appropriate. HEENT: PERRLA, anicteric, no JVD, adenopathy or thyromegaly. No carotid bruits or radiated murmur. Lungs: clear to auscultation, no wheezes, rhonchi, or rales. Heart: regular rate and rhythm. No murmur, rubs, gallops  Abdomen: soft, nontender, nondistended, normal bowel sounds, no hepatosplenomegaly or masses. Extremities: without edema. Review of Data:  Labs:  No visits with results within 2 Week(s) from this visit.    Latest known visit with results is:   Hospital Outpatient Visit on 12/04/2020   Component Date Value Ref Range Status    SARS-CoV-2 12/04/2020 Not Detected  Not Detected   Final     Hospital Outpatient Visit on 01/06/2021   Component Date Value Ref Range Status    WBC 01/06/2021 6.2  4.6 - 13.2 K/uL Final    RBC 01/06/2021 4.59  4.20 - 5.30 M/uL Final    HGB 01/06/2021 13.4  12.0 - 16.0 g/dL Final    HCT 01/06/2021 42.3  35.0 - 45.0 % Final    MCV 01/06/2021 92.2  74.0 - 97.0 FL Final    MCH 01/06/2021 29.2  24.0 - 34.0 PG Final    MCHC 01/06/2021 31.7  31.0 - 37.0 g/dL Final    RDW 01/06/2021 14.2  11.6 - 14.5 % Final    PLATELET 48/53/7850 832  135 - 420 K/uL Final    MPV 01/06/2021 10.0  9.2 - 11.8 FL Final    NEUTROPHILS 01/06/2021 69  40 - 73 % Final    LYMPHOCYTES 01/06/2021 20* 21 - 52 % Final    MONOCYTES 01/06/2021 9  3 - 10 % Final    EOSINOPHILS 01/06/2021 1  0 - 5 % Final    BASOPHILS 01/06/2021 1  0 - 2 % Final    ABS. NEUTROPHILS 01/06/2021 4.3  1.8 - 8.0 K/UL Final    ABS. LYMPHOCYTES 01/06/2021 1.3  0.9 - 3.6 K/UL Final    ABS. MONOCYTES 01/06/2021 0.6  0.05 - 1.2 K/UL Final    ABS. EOSINOPHILS 01/06/2021 0.1  0.0 - 0.4 K/UL Final    ABS.  BASOPHILS 01/06/2021 0.0  0.0 - 0.1 K/UL Final    DF 01/06/2021 AUTOMATED    Final    Hemoglobin A1c 01/06/2021 5.6  4.2 - 5.6 % Final    Est. average glucose 01/06/2021 114  mg/dL Final    LIPID PROFILE 01/06/2021        Final    Cholesterol, total 01/06/2021 185  <200 MG/DL Final    Triglyceride 01/06/2021 79  <150 MG/DL Final    HDL Cholesterol 01/06/2021 80* 40 - 60 MG/DL Final    LDL, calculated 01/06/2021 89.2  0 - 100 MG/DL Final    VLDL, calculated 01/06/2021 15.8  MG/DL Final    CHOL/HDL Ratio 01/06/2021 2.3  0 - 5.0   Final    Magnesium 01/06/2021 1.9  1.6 - 2.6 mg/dL Final    Sodium 01/06/2021 140  136 - 145 mmol/L Final    Potassium 01/06/2021 4.7  3.5 - 5.5 mmol/L Final    Chloride 01/06/2021 105  100 - 111 mmol/L Final    CO2 01/06/2021 27  21 - 32 mmol/L Final    Anion gap 01/06/2021 8  3.0 - 18 mmol/L Final    Glucose 01/06/2021 89  74 - 99 mg/dL Final    BUN 01/06/2021 19* 7.0 - 18 MG/DL Final    Creatinine 01/06/2021 0.67  0.6 - 1.3 MG/DL Final    BUN/Creatinine ratio 01/06/2021 28* 12 - 20   Final    GFR est AA 01/06/2021 >60  >60 ml/min/1.73m2 Final    GFR est non-AA 01/06/2021 >60  >60 ml/min/1.73m2 Final    Calcium 01/06/2021 9.5  8.5 - 10.1 MG/DL Final    Bilirubin, total 01/06/2021 0.8  0.2 - 1.0 MG/DL Final    ALT (SGPT) 01/06/2021 27  13 - 56 U/L Final    AST (SGOT) 01/06/2021 24 10 - 38 U/L Final    Alk. phosphatase 01/06/2021 62  45 - 117 U/L Final    Protein, total 01/06/2021 7.7  6.4 - 8.2 g/dL Final    Albumin 01/06/2021 4.1  3.4 - 5.0 g/dL Final    Globulin 01/06/2021 3.6  2.0 - 4.0 g/dL Final    A-G Ratio 01/06/2021 1.1  0.8 - 1.7   Final    TSH 01/06/2021 2.60  0.36 - 3.74 uIU/mL Final    T4, Free 01/06/2021 1.1  0.7 - 1.5 NG/DL Final    Vitamin D 25-Hydroxy 01/06/2021 46.0  30 - 100 ng/mL Final    Ferritin 01/06/2021 90  8 - 388 NG/ML Final    Iron 01/06/2021 75  50 - 175 ug/dL Final    TIBC 01/06/2021 356  250 - 450 ug/dL Final    Iron % saturation 01/06/2021 21  20 - 50 % Final       Health Maintenance:  Screening:    Mammogram: negative (8/2020)   PAP smear: S/P ALMAS   Colorectal: colonoscopy (11/2020) tubular adenomas. Dr. Kimmy Iqbal Due 11/2023. Depression: none   DM (HbA1c/FPG): HbA1c 5.6 (1/2021)   Hepatitis C: negative (5/2016)   Falls: none   DEXA: osteopenia (8/2019). On Prolia. Dr. Frankey Birkenhead. Glaucoma: regular eye exams with Dr. Sydnie Resendez (last 1/2020)   Smoking: none   Vitamin D: 46.0 (1/2021)   Medicare Wellness: 6/18/2020      Impression:  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    Tinnitus H93.19    Microhematuria R31.29    Eczema L30.9    Osteopenia M85.80    Elevated TSH R79.89    Impaired fasting glucose R73.01    Candidal intertrigo B37.2    Carpal tunnel syndrome, bilateral G56.03    Osteoporosis of lumbar spine M81.0    Elevated LFTs R79.89    Dietary iron deficiency without anemia E61.1    Nonmelanoma skin cancer C44.90       Plan:  1. Hypertension. Appears well controlled on current regimen of lisinopril 10 mg daily and hydrochlorothiazide 25 mg daily. Renal function remains normal with creatinine 0.67/ eGFR >60, but BUN/creatinine ratio remains increased to 28.  Encouraged to continue to drink plenty of fluids. Continue to follow. 3. Hyperlipidemia. On moderate intensity dose atorvastatin (20 mg) with LDL 89 and HDL 80, indicative of good control. Emphasized importance of lifestyle modifications, including diet, exercise, and weight loss. Will continue to follow. 3. Transient neurologic symptoms. Experienced transient episode of head tilting to the left and inability to speak, lasting 30 seconds, while eating lunch. Differential diagnosis included possible TIA, seizure, or global hypoperfusion from arrhythmia or hypovolemia. Carotid duplex scan without significant ICA stenosis, and brain MRI without significant abnormality except for a stable appearing lipoma creating mild mass effect. On aspirin and statin. Evaluated by Dr. Sylvia Sam and not felt to be due to neurologic cause. No further evaluation felt to be needed. She has not had any recurrence. Continue to monitor. 4. Elevated transaminases. New onset mild elevation noted in 8/2019. Patient reported that she had been taking NSAIDS frequently to help control foot pain. Advised to discontinue and repeated in 9/2019 with normalization. Lab evaluation, including AMA, actin, hepatitis profile, CK, aldolase, celiac panel, ESR, CRP, and MANOLO, were negative, but did identify mildly low iron stores. Repeat 1/2020 again showed mild elevation of transaminases with otherwise normal LFT's. Patient had been taking Excedrin and did admit to 2 glasses of wine each night. Advised to discontinue both. Abdominal ultrasound obtained and showed mild increased echogenicity of liver, but otherwise normal. Repeat liver function tests today again show normalization of transaminases. Discussed likely due to hepatic steatosis. Advised to avoid alcohol and NSAIDS. Also, emphasized importance of lifestyle modifications, including diet, exercise, and weight loss. 5. Osteoporosis. History of radial and toe fracture in 2009.  Treated from 11/2009 to 11/2014 with alendronate. Repeat bone density scan in 7/2016 with 10 year risk of a major osteoporetic fracture at 14 % and hip fracture at 3.4 %. Restarted Fosamax in 12/2016. New right metatarsal displaced fracture without known injury in 6/2019. Repeat bone density on 8/6/2019 showed T-scores:  femoral neck left -2.2  /right -1.9 and lumbar -2.6 . Calculated FRAX score estimated her 10 year risk of a major osteoporetic fracture at 14 % and hip fracture at 3.9 %. Continued to show osteopenia in her bilateral hips and osteoporosis of her lumbar spine, but in comparison to her prior study in 7/2016, stable in her bilateral hips, but some worsening in her lumbar spine. Obtained lab evaluation for secondary osteoporosis, and TSH, intact PTH, calcium, vitamin D level, and gammopathy panel normal. Given recent fracture and some worsening of bone density scan despite treatment with biphosphonate, referred to Dr. Jose Londono in 11/2019 and recommendation was that she change to Prolia. Reports tolerating well. Continue calcium and Vitamin D supplements, and encouraged her to exercise, particularly weight bearing activities. 6. Displaced right third metatarsal fracture, s/p open reduction/internal fixation with putty bone grafting. She reports that she did well after surgery, and now fully weight bearing with orthotic in shoes. No significant pain, although mild swelling with prolonged standing. Doing well. 7. Iron deficiency without anemia. May be related to recent foot surgery. Advised to begin MVI with iron supplement. Repeat today with normalization of iron levels. Colonoscopy completed (11/2020) and 10 polyps removed with majority pathology showing tubular adenomas. Repeat in 3 years recommended. 8. Family history of breast cancer. Patient with family history of breast cancer in first degree relative (sister). She is currently being screened with annual mammograms.  Discussed recommendations for breast cancer screening in women with lifetime risk >20%. 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 No other family member with breast cancer. Wishing to continue with annual mammograms and breast exams. 9. Impaired fasting glucose. Repeat normalized today with normal HbA1c. Emphasized importance of lifestyle modifications, including diet, exercise, and weight loss. 10. Chronic microhematuria. Work-up reportedly negative in past. Recent urinalysis has been negative for blood. Follow. 11. Eczema. Found good response with Eucrisa, but has no more samples and not covered by insurance. Using Eucerin cream with variable response. Advised that should use triamcinolone cream for flares and continue Eucerin. 12. Nonmelanoma skin cancers. 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 Ann Marie Huizar. Continuing to follow with Dr. Kemp Alpers. 13. Candidal intertrigo. Improved with Lotrisone. Discussed preventative treatment with mycostatin powder and loose clothing. 14. Overweight. Remaining stable during pandemic. Emphasized importance of continued lifestyle modifications. Will continue to follow. 15. Elevated TSH. Repeat with free T4 today normal. Patient with history of radiation therapy for a birthmark on her right shoulder as a child. Will continue to follow. 16. Health maintenance. Already received influenza vaccine. Received 2/2 doses of Shingrix vaccine. Other immunizations up to date. Mammogram up to date. Colonoscopy completed and repeat due in 2023. Continue regular eye exams with Dr. Alonso Verdugo. Vitamin D level normal. Continue maintenance dose supplement. Medicare wellness visit up to date. Total time: 40 minutes spent with the patient on counseling, answering questions and/or coordination of care. Complex medical review performed, including review of medical history, lab results, and testing. Patient understands recommendations and agrees with plan. Follow-up in 6 months.

## 2021-01-13 RX ORDER — ATORVASTATIN CALCIUM 20 MG/1
20 TABLET, FILM COATED ORAL DAILY
Qty: 90 TAB | Refills: 2 | Status: SHIPPED | OUTPATIENT
Start: 2021-01-13 | End: 2021-10-14

## 2021-01-13 NOTE — TELEPHONE ENCOUNTER
Last Visit: 1/6/21 with MD Sury Maldonado  Next Appointment: 6/10/21 with MD Pacheco  Previous Refill Encounter(s): 4/16/20 #90 with 2 refills    Requested Prescriptions     Pending Prescriptions Disp Refills    atorvastatin (LIPITOR) 20 mg tablet 90 Tab 2     Sig: Take 1 Tab by mouth daily.

## 2021-01-27 ENCOUNTER — PATIENT MESSAGE (OUTPATIENT)
Dept: INTERNAL MEDICINE CLINIC | Age: 76
End: 2021-01-27

## 2021-01-27 NOTE — TELEPHONE ENCOUNTER
Called and discussed with patient that since her sister, Theo Bonner, received the monoclonal antibody, bamlanivimab, on 12/7/2020, recommendation is to wait 90 days to receive the COVID 19 vaccination. Answered all questions.

## 2021-02-08 RX ORDER — LISINOPRIL 10 MG/1
10 TABLET ORAL DAILY
Qty: 90 TAB | Refills: 2 | Status: SHIPPED | OUTPATIENT
Start: 2021-02-08 | End: 2021-08-06

## 2021-02-08 NOTE — TELEPHONE ENCOUNTER
Last Visit: 1/6/21 with MD Lobo No  Next Appointment: 6/10/21 with MD Pacheco  Previous Refill Encounter(s): 5/14/20 #90 with 2 refills    Requested Prescriptions     Pending Prescriptions Disp Refills    lisinopriL (PRINIVIL, ZESTRIL) 10 mg tablet 90 Tab 2     Sig: Take 1 Tab by mouth daily.

## 2021-04-04 NOTE — PROGRESS NOTES
1. Have you been to the ER, urgent care clinic since your last visit? Hospitalized since your last visit? No    2. Have you seen or consulted any other health care providers outside of the [address] [place] [address] since your last visit? Include any pap smears or colon screening.  No English

## 2021-06-03 ENCOUNTER — HOSPITAL ENCOUNTER (OUTPATIENT)
Dept: LAB | Age: 76
Discharge: HOME | End: 2021-06-03
Payer: MEDICARE

## 2021-06-03 ENCOUNTER — APPOINTMENT (OUTPATIENT)
Dept: INTERNAL MEDICINE CLINIC | Age: 76
End: 2021-06-03

## 2021-06-03 DIAGNOSIS — L30.9 ECZEMA, UNSPECIFIED TYPE: ICD-10-CM

## 2021-06-03 DIAGNOSIS — I10 ESSENTIAL HYPERTENSION: ICD-10-CM

## 2021-06-03 DIAGNOSIS — R79.89 ELEVATED LFTS: ICD-10-CM

## 2021-06-03 DIAGNOSIS — E78.5 HYPERLIPIDEMIA, UNSPECIFIED HYPERLIPIDEMIA TYPE: ICD-10-CM

## 2021-06-03 DIAGNOSIS — R79.89 ELEVATED TSH: ICD-10-CM

## 2021-06-03 DIAGNOSIS — M81.0 OSTEOPOROSIS OF LUMBAR SPINE: ICD-10-CM

## 2021-06-03 DIAGNOSIS — Z80.3 FH: BREAST CANCER: ICD-10-CM

## 2021-06-03 DIAGNOSIS — C44.90 NONMELANOMA SKIN CANCER: ICD-10-CM

## 2021-06-03 DIAGNOSIS — E61.1 DIETARY IRON DEFICIENCY WITHOUT ANEMIA: ICD-10-CM

## 2021-06-03 DIAGNOSIS — E66.3 OVERWEIGHT WITH BODY MASS INDEX (BMI) 25.0-29.9: ICD-10-CM

## 2021-06-03 DIAGNOSIS — R73.01 IMPAIRED FASTING GLUCOSE: ICD-10-CM

## 2021-06-03 LAB
25(OH)D3 SERPL-MCNC: 47.1 NG/ML (ref 30–100)
ALBUMIN SERPL-MCNC: 4.1 G/DL (ref 3.4–5)
ALBUMIN/GLOB SERPL: 1.3 {RATIO} (ref 0.8–1.7)
ALP SERPL-CCNC: 49 U/L (ref 45–117)
ALT SERPL-CCNC: 29 U/L (ref 13–56)
ANION GAP SERPL CALC-SCNC: 6 MMOL/L (ref 3–18)
AST SERPL-CCNC: 23 U/L (ref 10–38)
BASOPHILS # BLD: 0.1 K/UL (ref 0–0.1)
BASOPHILS NFR BLD: 1 % (ref 0–2)
BILIRUB SERPL-MCNC: 0.6 MG/DL (ref 0.2–1)
BUN SERPL-MCNC: 19 MG/DL (ref 7–18)
BUN/CREAT SERPL: 30 (ref 12–20)
CALCIUM SERPL-MCNC: 8.9 MG/DL (ref 8.5–10.1)
CHLORIDE SERPL-SCNC: 104 MMOL/L (ref 100–111)
CHOLEST SERPL-MCNC: 173 MG/DL
CO2 SERPL-SCNC: 28 MMOL/L (ref 21–32)
CREAT SERPL-MCNC: 0.64 MG/DL (ref 0.6–1.3)
DIFFERENTIAL METHOD BLD: NORMAL
EOSINOPHIL # BLD: 0.2 K/UL (ref 0–0.4)
EOSINOPHIL NFR BLD: 3 % (ref 0–5)
ERYTHROCYTE [DISTWIDTH] IN BLOOD BY AUTOMATED COUNT: 13.4 % (ref 11.6–14.5)
EST. AVERAGE GLUCOSE BLD GHB EST-MCNC: 123 MG/DL
GLOBULIN SER CALC-MCNC: 3.2 G/DL (ref 2–4)
GLUCOSE SERPL-MCNC: 87 MG/DL (ref 74–99)
HBA1C MFR BLD: 5.9 % (ref 4.2–5.6)
HCT VFR BLD AUTO: 43.2 % (ref 35–45)
HDLC SERPL-MCNC: 69 MG/DL (ref 40–60)
HDLC SERPL: 2.5 {RATIO} (ref 0–5)
HGB BLD-MCNC: 13.5 G/DL (ref 12–16)
LDLC SERPL CALC-MCNC: 86.2 MG/DL (ref 0–100)
LIPID PROFILE,FLP: ABNORMAL
LYMPHOCYTES # BLD: 1.9 K/UL (ref 0.9–3.6)
LYMPHOCYTES NFR BLD: 28 % (ref 21–52)
MAGNESIUM SERPL-MCNC: 2 MG/DL (ref 1.6–2.6)
MCH RBC QN AUTO: 29.4 PG (ref 24–34)
MCHC RBC AUTO-ENTMCNC: 31.3 G/DL (ref 31–37)
MCV RBC AUTO: 94.1 FL (ref 74–97)
MONOCYTES # BLD: 0.6 K/UL (ref 0.05–1.2)
MONOCYTES NFR BLD: 10 % (ref 3–10)
NEUTS SEG # BLD: 3.9 K/UL (ref 1.8–8)
NEUTS SEG NFR BLD: 58 % (ref 40–73)
PLATELET # BLD AUTO: 297 K/UL (ref 135–420)
PMV BLD AUTO: 9.6 FL (ref 9.2–11.8)
POTASSIUM SERPL-SCNC: 4.2 MMOL/L (ref 3.5–5.5)
PROT SERPL-MCNC: 7.3 G/DL (ref 6.4–8.2)
RBC # BLD AUTO: 4.59 M/UL (ref 4.2–5.3)
SODIUM SERPL-SCNC: 138 MMOL/L (ref 136–145)
T4 FREE SERPL-MCNC: 1.1 NG/DL (ref 0.7–1.5)
TRIGL SERPL-MCNC: 89 MG/DL (ref ?–150)
TSH SERPL DL<=0.05 MIU/L-ACNC: 3.86 UIU/ML (ref 0.36–3.74)
VLDLC SERPL CALC-MCNC: 17.8 MG/DL
WBC # BLD AUTO: 6.7 K/UL (ref 4.6–13.2)

## 2021-06-03 PROCEDURE — 84443 ASSAY THYROID STIM HORMONE: CPT

## 2021-06-03 PROCEDURE — 80061 LIPID PANEL: CPT

## 2021-06-03 PROCEDURE — 84439 ASSAY OF FREE THYROXINE: CPT

## 2021-06-03 PROCEDURE — 83735 ASSAY OF MAGNESIUM: CPT

## 2021-06-03 PROCEDURE — 36415 COLL VENOUS BLD VENIPUNCTURE: CPT

## 2021-06-03 PROCEDURE — 82306 VITAMIN D 25 HYDROXY: CPT

## 2021-06-03 PROCEDURE — 83036 HEMOGLOBIN GLYCOSYLATED A1C: CPT

## 2021-06-03 PROCEDURE — 85025 COMPLETE CBC W/AUTO DIFF WBC: CPT

## 2021-06-03 PROCEDURE — 80053 COMPREHEN METABOLIC PANEL: CPT

## 2021-06-10 ENCOUNTER — OFFICE VISIT (OUTPATIENT)
Dept: INTERNAL MEDICINE CLINIC | Age: 76
End: 2021-06-10
Payer: MEDICARE

## 2021-06-10 VITALS
SYSTOLIC BLOOD PRESSURE: 124 MMHG | RESPIRATION RATE: 16 BRPM | DIASTOLIC BLOOD PRESSURE: 76 MMHG | WEIGHT: 154.6 LBS | BODY MASS INDEX: 25.76 KG/M2 | OXYGEN SATURATION: 97 % | HEART RATE: 95 BPM | HEIGHT: 65 IN | TEMPERATURE: 97.2 F

## 2021-06-10 DIAGNOSIS — R73.01 IMPAIRED FASTING GLUCOSE: ICD-10-CM

## 2021-06-10 DIAGNOSIS — Z71.89 ADVANCED DIRECTIVES, COUNSELING/DISCUSSION: ICD-10-CM

## 2021-06-10 DIAGNOSIS — E78.5 HYPERLIPIDEMIA, UNSPECIFIED HYPERLIPIDEMIA TYPE: ICD-10-CM

## 2021-06-10 DIAGNOSIS — Z12.31 SCREENING MAMMOGRAM, ENCOUNTER FOR: ICD-10-CM

## 2021-06-10 DIAGNOSIS — M81.0 OSTEOPOROSIS OF LUMBAR SPINE: ICD-10-CM

## 2021-06-10 DIAGNOSIS — Z00.00 MEDICARE ANNUAL WELLNESS VISIT, SUBSEQUENT: ICD-10-CM

## 2021-06-10 DIAGNOSIS — I10 ESSENTIAL HYPERTENSION: Primary | ICD-10-CM

## 2021-06-10 DIAGNOSIS — R79.89 ELEVATED TSH: ICD-10-CM

## 2021-06-10 DIAGNOSIS — E66.3 OVERWEIGHT WITH BODY MASS INDEX (BMI) 25.0-29.9: ICD-10-CM

## 2021-06-10 DIAGNOSIS — Z13.31 SCREENING FOR DEPRESSION: ICD-10-CM

## 2021-06-10 DIAGNOSIS — R73.03 PREDIABETES: ICD-10-CM

## 2021-06-10 DIAGNOSIS — L30.9 ECZEMA, UNSPECIFIED TYPE: ICD-10-CM

## 2021-06-10 DIAGNOSIS — Z85.828 HISTORY OF NONMELANOMA SKIN CANCER: ICD-10-CM

## 2021-06-10 PROCEDURE — G8754 DIAS BP LESS 90: HCPCS | Performed by: INTERNAL MEDICINE

## 2021-06-10 PROCEDURE — 99214 OFFICE O/P EST MOD 30 MIN: CPT | Performed by: INTERNAL MEDICINE

## 2021-06-10 PROCEDURE — 99497 ADVNCD CARE PLAN 30 MIN: CPT | Performed by: INTERNAL MEDICINE

## 2021-06-10 PROCEDURE — G0439 PPPS, SUBSEQ VISIT: HCPCS | Performed by: INTERNAL MEDICINE

## 2021-06-10 PROCEDURE — G8752 SYS BP LESS 140: HCPCS | Performed by: INTERNAL MEDICINE

## 2021-06-10 PROCEDURE — G8536 NO DOC ELDER MAL SCRN: HCPCS | Performed by: INTERNAL MEDICINE

## 2021-06-10 PROCEDURE — 1101F PT FALLS ASSESS-DOCD LE1/YR: CPT | Performed by: INTERNAL MEDICINE

## 2021-06-10 PROCEDURE — 1090F PRES/ABSN URINE INCON ASSESS: CPT | Performed by: INTERNAL MEDICINE

## 2021-06-10 PROCEDURE — G8510 SCR DEP NEG, NO PLAN REQD: HCPCS | Performed by: INTERNAL MEDICINE

## 2021-06-10 PROCEDURE — G0463 HOSPITAL OUTPT CLINIC VISIT: HCPCS | Performed by: INTERNAL MEDICINE

## 2021-06-10 PROCEDURE — G8419 CALC BMI OUT NRM PARAM NOF/U: HCPCS | Performed by: INTERNAL MEDICINE

## 2021-06-10 PROCEDURE — G8427 DOCREV CUR MEDS BY ELIG CLIN: HCPCS | Performed by: INTERNAL MEDICINE

## 2021-06-10 NOTE — ACP (ADVANCE CARE PLANNING)
Advance Care Planning     Advance Care Planning (ACP) Physician/NP/PA Conversation      Date of Conversation: 6/10/2021  Conducted with: Patient with Decision Making Capacity    Healthcare Decision Maker:     Primary Decision Maker: Jen Hinson - [phone]    Secondary Decision Maker: Malik Caruso - [phone]  Click here to complete [address] including selection of the Healthcare Decision Maker Relationship (ie \"Primary\")  Today we documented Decision Maker(s) consistent with Legal Next of Kin hierarchy. Care Preferences:    Hospitalization: \"If your health worsens and it becomes clear that your chance of recovery is unlikely, what would be your preference regarding hospitalization? \"  The patient would prefer hospitalization. Ventilation: \"If you were unable to breathe on your own and your chance of recovery was unlikely, what would be your preference about the use of a ventilator (breathing machine) if it was available to you? \"   The patient would desire the use of a ventilator. Resuscitation: \"In the event your heart stopped as a result of an underlying serious health condition, would you want attempts to be made to restart your heart, or would you prefer a natural death? \"   Yes, attempt to resuscitate.     Additional topics discussed: treatment goals, benefit/burden of treatment options, ventilation preferences, hospitalization preferences, resuscitation preferences and end of life care preferences (vegetative state/imminent death)    Conversation Outcomes / Follow-Up Plan:   ACP in process - completing/providing documents   Reviewed DNR/DNI and patient elects Full Code (Attempt Resuscitation)     Length of Voluntary ACP Conversation in minutes:  16 minutes    Daniel Pennington MD

## 2021-06-10 NOTE — PATIENT INSTRUCTIONS
Heart-Healthy Diet: Care Instructions Your Care Instructions A heart-healthy diet has lots of vegetables, fruits, nuts, beans, and whole grains, and is low in salt. It limits foods that are high in saturated fat, such as meats, cheeses, and fried foods. It may be hard to change your diet, but even small changes can lower your risk of heart attack and heart disease. Follow-up care is a key part of your treatment and safety. Be sure to make and go to all appointments, and call your doctor if you are having problems. It's also a good idea to know your test results and keep a list of the medicines you take. How can you care for yourself at home? Watch your portions · Learn what a serving is. A \"serving\" and a \"portion\" are not always the same thing. Make sure that you are not eating larger portions than are recommended. For example, a serving of pasta is ½ cup. A serving size of meat is 2 to 3 ounces. A 3-ounce serving is about the size of a deck of cards. Measure serving sizes until you are good at [place]" them. Keep in mind that restaurants often serve portions that are 2 or 3 times the size of one serving. · To keep your energy level up and keep you from feeling hungry, eat often but in smaller portions. · Eat only the number of calories you need to stay at a healthy weight. If you need to lose weight, eat fewer calories than your body burns (through exercise and other physical activity). Eat more fruits and vegetables · Eat a variety of fruit and vegetables every day. Dark green, deep orange, red, or yellow fruits and vegetables are especially good for you. Examples include spinach, carrots, peaches, and berries. · Keep carrots, celery, and other veggies handy for snacks. Buy fruit that is in season and store it where you can see it so that you will be tempted to eat it. · Cook dishes that have a lot of veggies in them, such as stir-fries and soups. Limit saturated and trans fat · Read food labels, and try to avoid saturated and trans fats. They increase your risk of heart disease. · Use olive or canola oil when you cook. · Bake, broil, grill, or steam foods instead of frying them. · Choose lean meats instead of high-fat meats such as hot dogs and sausages. Cut off all visible fat when you prepare meat. · Eat fish, skinless poultry, and meat alternatives such as soy products instead of high-fat meats. Soy products, such as tofu, may be especially good for your heart. · Choose low-fat or fat-free milk and dairy products. Eat foods high in fiber · Eat a variety of grain products every day. Include whole-grain foods that have lots of fiber and nutrients. Examples of whole-grain foods include oats, whole wheat bread, and brown rice. · Buy whole-grain breads and cereals, instead of white bread or pastries. Limit salt and sodium · Limit how much salt and sodium you eat to help lower your blood pressure. · Taste food before you salt it. Add only a little salt when you think you need it. With time, your taste buds will adjust to less salt. · Eat fewer snack items, fast foods, and other high-salt, processed foods. Check food labels for the amount of sodium in packaged foods. · Choose low-sodium versions of canned goods (such as soups, vegetables, and beans). Limit sugar · Limit drinks and foods with added sugar. These include candy, desserts, and soda pop. Limit alcohol · Limit alcohol to no more than 2 drinks a day for men and 1 drink a day for women. Too much alcohol can cause health problems. When should you call for help? Watch closely for changes in your health, and be sure to contact your doctor if: 
  · You would like help planning heart-healthy meals. Where can you learn more? Go to http://www.TrakTek 3D.com/ Enter V137 in the search box to learn more about \"Heart-Healthy Diet: Care Instructions. \" Current as of: August 22, 2019               Content Version: 12.6 © 4945-9250 [employer]. Care instructions adapted under license by [employer] (which disclaims liability or warranty for this information). If you have questions about a medical condition or this instruction, always ask your healthcare professional. [address] any warranty or liability for your use of this information. Learning About Diabetes Food Guidelines Your Care Instructions Meal planning is important to manage diabetes. It helps keep your blood sugar at a target level (which you set with your doctor). You don't have to eat special foods. You can eat what your family eats, including sweets once in a while. But you do have to pay attention to how often you eat and how much you eat of certain foods. You may want to work with a dietitian or a certified diabetes educator (CDE) to help you plan meals and snacks. A dietitian or CDE can also help you lose weight if that is one of your goals. What should you know about eating carbs? Managing the amount of carbohydrate (carbs) you eat is an important part of healthy meals when you have diabetes. Carbohydrate is found in many foods. · Learn which foods have carbs. And learn the amounts of carbs in different foods. ? Bread, cereal, pasta, and rice have about 15 grams of carbs in a serving. A serving is 1 slice of bread (1 ounce), ½ cup of cooked cereal, or 1/3 cup of cooked pasta or rice. ? Fruits have 15 grams of carbs in a serving. A serving is 1 small fresh fruit, such as an apple or orange; ½ of a banana; ½ cup of cooked or canned fruit; ½ cup of fruit juice; 1 cup of melon or raspberries; or 2 tablespoons of dried fruit. ? Milk and no-sugar-added yogurt have 15 grams of carbs in a serving. A serving is 1 cup of milk or 2/3 cup of no-sugar-added yogurt. ? Starchy vegetables have 15 grams of carbs in a serving.  A serving is ½ cup of mashed potatoes or sweet potato; 1 cup winter squash; ½ of a small baked potato; ½ cup of cooked beans; or ½ cup cooked corn or green peas. · Learn how much carbs to eat each day and at each meal. A dietitian or CDE can teach you how to keep track of the amount of carbs you eat. This is called carbohydrate counting. · If you are not sure how to count carbohydrate grams, use the Plate Method to plan meals. It is a good, quick way to make sure that you have a balanced meal. It also helps you spread carbs throughout the day. ? Divide your plate by types of foods. Put non-starchy vegetables on half the plate, meat or other protein food on one-quarter of the plate, and a grain or starchy vegetable in the final quarter of the plate. To this you can add a small piece of fruit and 1 cup of milk or yogurt, depending on how many carbs you are supposed to eat at a meal. 
· Try to eat about the same amount of carbs at each meal. Do not \"save up\" your daily allowance of carbs to eat at one meal. 
· Proteins have very little or no carbs per serving. Examples of proteins are beef, chicken, turkey, fish, eggs, tofu, cheese, cottage cheese, and peanut butter. A serving size of meat is 3 ounces, which is about the size of a deck of cards. Examples of meat substitute serving sizes (equal to 1 ounce of meat) are 1/4 cup of cottage cheese, 1 egg, 1 tablespoon of peanut butter, and ½ cup of tofu. How can you eat out and still eat healthy? · Learn to estimate the serving sizes of foods that have carbohydrate. If you measure food at home, it will be easier to estimate the amount in a serving of restaurant food. · If the meal you order has too much carbohydrate (such as potatoes, corn, or baked beans), ask to have a low-carbohydrate food instead. Ask for a salad or green vegetables. · If you use insulin, check your blood sugar before and after eating out to help you plan how much to eat in the future.  
· If you eat more carbohydrate at a meal than you had planned, take a walk or do other exercise. This will help lower your blood sugar. What else should you know? · Limit saturated fat, such as the fat from meat and dairy products. This is a healthy choice because people who have diabetes are at higher risk of heart disease. So choose lean cuts of meat and nonfat or low-fat dairy products. Use olive or canola oil instead of butter or shortening when cooking. · Don't skip meals. Your blood sugar may drop too low if you skip meals and take insulin or certain medicines for diabetes. · Check with your doctor before you drink alcohol. Alcohol can cause your blood sugar to drop too low. Alcohol can also cause a bad reaction if you take certain diabetes medicines. Follow-up care is a key part of your treatment and safety. Be sure to make and go to all appointments, and call your doctor if you are having problems. It's also a good idea to know your test results and keep a list of the medicines you take. Where can you learn more? Go to http://www.gray.com/ Enter Q017 in the search box to learn more about \"Learning About Diabetes Food Guidelines. \" Current as of: December 20, 2019               Content Version: 12.6 © 0090-4357 [employer], [employer]. Care instructions adapted under license by [employer] (which disclaims liability or warranty for this information). If you have questions about a medical condition or this instruction, always ask your healthcare professional. [address] any warranty or liability for your use of this information. Learning About Low-Sodium Foods What foods are low in sodium? The foods you eat contain nutrients, such as vitamins and minerals. Sodium is a nutrient. Your body needs the right amount to stay healthy and work as it should. You can use the list below to help you make choices about which foods to eat. Fruits · Fresh, frozen, canned, or dried fruit Vegetables · Fresh or frozen vegetables, with no added salt · Canned vegetables, low-sodium or with no added salt Grains · Bagels without salted tops · Cereal with no added salt · Corn tortillas · Crackers with no added salt · Oatmeal, cooked without salt · Popcorn with no salt · Pasta and noodles, cooked without salt · Rice, cooked without salt · Unsalted pretzels Dairy and dairy alternatives · Butter, unsalted · Cream cheese · Ice cream 
· Milk · Soy milk Meats and other protein foods · Beans and peas, canned with no salt · Eggs · Fresh fish (not smoked) · Fresh meats (not smoked or cured) · Nuts and nut butter, prepared without salt · Poultry, not packaged with sodium solution · Tofu, unseasoned · Tuna, canned without salt Seasonings · Garlic · Herbs and spices · Lemon juice · Mustard · Olive oil · Salt-free seasoning mixes · Vinegar Work with your doctor to find out how much of this nutrient you need. Depending on your health, you may need more or less of it in your diet. Where can you learn more? Go to http://www.gray.com/ Enter B004 in the search box to learn more about \"Learning About Low-Sodium Foods. \" Current as of: August 22, 2019               Content Version: 12.6 © 2692-5939 [employer]). Care instructions adapted under license by [employer] (which disclaims liability or warranty for this information). If you have questions about a medical condition or this instruction, always ask your healthcare professional. [place] [address] any warranty or liability for your use of this information. Low Sodium Diet (2,000 Milligram): Care Instructions Your Care Instructions Too much sodium causes your body to hold on to extra water. This can raise your blood pressure and force your heart and kidneys to work harder. In very serious cases, this could cause you to be put in the hospital. It might even be life-threatening.  By limiting sodium, you will feel better and lower your risk of serious problems. The most common source of sodium is salt. People get most of the salt in their diet from canned, prepared, and packaged foods. Fast food and restaurant meals also are very high in sodium. Your doctor will probably limit your sodium to less than 2,000 milligrams (mg) a day. This limit counts all the sodium in prepared and packaged foods and any salt you add to your food. Follow-up care is a key part of your treatment and safety. Be sure to make and go to all appointments, and call your doctor if you are having problems. It's also a good idea to know your test results and keep a list of the medicines you take. How can you care for yourself at home? Read food labels · Read labels on cans and food packages. The labels tell you how much sodium is in each serving. Make sure that you look at the serving size. If you eat more than the serving size, you have eaten more sodium. · Food labels also tell you the Percent Daily Value for sodium. Choose products with low Percent Daily Values for sodium. · Be aware that sodium can come in forms other than salt, including monosodium glutamate (MSG), sodium citrate, and sodium bicarbonate (baking soda). MSG is often added to Asian food. When you eat out, you can sometimes ask for food without MSG or added salt. Buy low-sodium foods · Buy foods that are labeled \"unsalted\" (no salt added), \"sodium-free\" (less than 5 mg of sodium per serving), or \"low-sodium\" (less than 140 mg of sodium per serving). Foods labeled \"reduced-sodium\" and \"light sodium\" may still have too much sodium. Be sure to read the label to see how much sodium you are getting. · Buy fresh vegetables, or frozen vegetables without added sauces. Buy low-sodium versions of canned vegetables, soups, and other canned goods. Prepare low-sodium meals · Cut back on the amount of salt you use in cooking.  This will help you adjust to the taste. Do not add salt after cooking. One teaspoon of salt has about 2,300 mg of sodium. · Take the salt shaker off the table. · Flavor your food with garlic, lemon juice, onion, vinegar, herbs, and spices. Do not use soy sauce, lite soy sauce, steak sauce, onion salt, garlic salt, celery salt, mustard, or ketchup on your food. · Use low-sodium salad dressings, sauces, and ketchup. Or make your own salad dressings and sauces without adding salt. · Use less salt (or none) when recipes call for it. You can often use half the salt a recipe calls for without losing flavor. Other foods such as rice, pasta, and grains do not need added salt. · Rinse canned vegetables, and cook them in fresh water. This removes somebut not allof the salt. · Avoid water that is naturally high in sodium or that has been treated with water softeners, which add sodium. Call your local water company to find out the sodium content of your water supply. If you buy bottled water, read the label and choose a sodium-free brand. Avoid high-sodium foods · Avoid eating: 
? Smoked, cured, salted, and canned meat, fish, and poultry. ? Ham, tellez, hot dogs, and luncheon meats. ? Regular, hard, and processed cheese and regular peanut butter. ? Crackers with salted tops, and other salted snack foods such as pretzels, chips, and salted popcorn. ? Frozen prepared meals, unless labeled low-sodium. ? Canned and dried soups, broths, and bouillon, unless labeled sodium-free or low-sodium. ? Canned vegetables, unless labeled sodium-free or low-sodium. ? [place] fries, pizza, tacos, and other fast foods. ? Pickles, olives, ketchup, and other condiments, especially soy sauce, unless labeled sodium-free or low-sodium. Where can you learn more? Go to http://www.gray.com/ Enter Z445 in the search box to learn more about \"Low Sodium Diet (2,000 Milligram): Care Instructions. \" Current as of: August 22, 2019 Content Version: 12.6 © 3489-2476 [employer]. Care instructions adapted under license by [employer] (which disclaims liability or warranty for this information). If you have questions about a medical condition or this instruction, always ask your healthcare professional. [address] any warranty or liability for your use of this information. Medicare Wellness Visit, Female The best way to improve and maintain good health is to have a healthy lifestyle by eating a well-balanced diet, exercising regularly, limiting alcohol and stopping smoking. Regular visits with your physician or non-physician health care provider also support your good health. Preventive screening tests can find health problems before they become diseases or illnesses. Preventive services such as immunizations prevent serious infections. All people over age 72 should have a Pneumovax and a Prevnar-13 shot to prevent potentially life threatening infections with the pneumococcus bacteria, a common cause of pneumonia. These are once in a lifetime unless you and your provider decide differently. All people over 65 should have a yearly influenza vaccine or \"flu\" shot. This does not prevent infection with cold viruses but has been proven to prevent hospitalization and death from influenza. Although Medicare part B \"regular Medicare\" currently only covers tetanus vaccination in the context of an injury, a tetanus vaccine (Tdap or Td) is recommended every 10 years. A shingles vaccine is recommended once in a lifetime after age 61. The Shingles vaccine is also not covered by Medicare part B. Note, however, that both the Shingles vaccine and Tdap/Td are generally covered by secondary carriers. Please check your coverage and out of pocket expenses. Consider contacting your local health department because it may stock these vaccines for a reasonable charge.  
 
We currently have documentation of the following immunization history for you: 
Immunization History Administered Date(s) Administered  (RETIRED) Pneumococcal Vaccine (Unspecified Type) 05/04/2010  Covid-19, MODERNA, Mrna, Lnp-s, Pf, 100mcg/0.5mL 01/28/2021, 02/25/2021  Influenza High Dose Vaccine PF 12/13/2016, 10/03/2017  Influenza Vaccine 10/24/2014  Influenza Vaccine (Tri) Adjuvanted (>65 Yrs FLUAD TRI [id number]) 10/09/2018, 11/13/2019  Influenza Vaccine PF 11/05/2013  Influenza Vaccine Split 11/01/2011, 11/06/2012  Influenza, Quadrivalent, Adjuvanted (>65 Yrs FLUAD QUAD [id number]) 09/16/2020  Pneumococcal Conjugate (PCV-13) 05/05/2015  Pneumococcal Polysaccharide (PPSV-23) 05/04/2010  TD Vaccine 05/05/2009  Tdap 12/12/2016  Zoster Recombinant 09/21/2019, 12/28/2019  Zoster Vaccine, Live 05/07/2013 Screening for infection with Hepatitis C is recommended for anyone born between [birth year] through [address]. The table at the bottom of this document indicates the status of this and other preventive services. A bone mass density test (DEXA) to screen for osteoporosis or thinning of the bones should be done at least once after age 72 and may be done up to every 2 years as determined by you and your health care provider. The most recent DEXA we have on file for you is: DEXA Results (most recent): 
Results from Hospital Encounter encounter on 08/06/19 DEXA BONE DENSITY STUDY AXIAL Narrative DEXA BONE DENSITOMETRY, CENTRAL 
 
CLINICAL INDICATION/HISTORY: Postmenopausal. History of breast cancer and 
osteopenia. Hypertension. High cholesterol. Family history of hip fracture. Taking Fosamax, calcium and vitamin D. 
 
CLINICAL INDICATION/HISTORY: Using GE LUNAR Prodigy densitometer, bone density 
measurement was performed in the lumbar spine in addition to the proximal left 
and right femora and the right forearm.  T score refers to standard deviations 
above or below average compared to a young adult of the same sex. Z score 
refers to standard deviations above or below average compared to a patient of 
the same sex, age, race and weight. COMPARISON: The patient's prior studies are no longer available on the bone 
densitometry unit for comparison trending due to a prior computer issue on the 
densitometry unit. The data from the prior study of 25 July 2016 has been 
included on this study. FINDINGS: 
 
Lumbar Spine Levels: L1 and L2 Mean Bone Mineral Density (BMD):  0.864 g/cm2  (0.910 BMD on previous study) T Score: -2.6     (-2.2 T score on previous) Z Score: -0.9 On PA image of the lumbar spine obtained for localization of vertebral levels (not a diagnostic quality radiograph), it is noted that there is apparent 
increased density at L3 and L4. The density is not well characterized on the 
basis of this image, and may be due to underlying degenerative changes, prior 
surgery, trauma, or other osseous process. Since this density spuriously 
increases measured bone mineral density, this level has been excluded. Right distal 1/3 Radius BMD:  0.781 g/cm2  (0.769 BMD on previous study) T Score: -1.2     (-1.3 T score on previous) Z Score: 1.0 Left Total Proximal Femur BMD: 0.738  (0.721 BMD on previous study) T Score: -2.1     (-2.3 T score on previous) Z Score: -0.5 Right Total Proximal Femur BMD: 0.748  (0.749 BMD on previous study) T Score: -2.1     (-2.0 T score on previous) Z Score: -0.4 Left Femoral Neck BMD:  0.734 g/cm2 T Score: -2.2 Z Score: -0.3 Right Femoral Neck BMD:  0.769 g/cm2 T Score: -1.9 Z Score: -0.1 Impression IMPRESSION: 
 
1. BMD measures consistent with osteoporosis. 2.  This will serve as the new baseline study at this institution.  
 
Based upon current ISCD guidelines, the patient's overall diagnostic category, 
selected using WHO criteria in postmenopausal women and males aged 48 and above, 
is selected based upon the lowest T score from among the lumbar spine, total 
femur, femoral neck, (or distal third radius if measured). WHO Definition of Osteoporosis and Osteopenia on DXA (specified for post 
menopausal  females): 
 
Normal:                     T Score at or above -1 SD Osteopenia:               T Score between -1 and -2.5 SD Osteoporosis:             T Score at or below -2.5 SD The risk of fracture approximately doubles for each 1 SD decrease in T score. It is important to consider other factors in assessing a patient's risk of 
fracture, including age, risk of falling/injury, history of fragility fracture, 
family history of osteoporosis, smoking, low weight. Various fracture risk tools have been developed for adult patients and are 
available online. For example, the FRAX tool developed by [facility] is widely used. Reference www.iscd.org. It is also important to note that DXA measures bone density but does not 
distinguish among causes of decreased bone density, which include primary vs. 
secondary osteoporosis (such as metabolic bone disorders or possible effects of 
medications) and also other conditions (such as osteomalacia). Clinical 
considerations should determine what additional evaluation may be warranted to 
exclude secondary conditions in a patient with low bone density. It is 
suggested that secondary causes of low bone density be considered in patients 
with Z score lower than -2.0, and patients who are not responding to therapy for 
osteoporosis on followup DXA despite compliance with medications. Please note that reliable, valid comparisons can not be made between studies 
which have been performed on different densitometers. If clinically warranted, 
follow up study performed at this site would best permit assessment of trend for 
possible change in bone mineral density over time in comparison to this study.  
 
Thank you for this referral. 
 
 
Screening for diabetes mellitus with a blood sugar test (glucose) should be done at least every 3 years until age 79. You and your health care provider may decide whether to continue screening after age 79. The most recent blood glucose we have on file for you is:  
Lab Results Component Value Date/Time Glucose 87 06/03/2021 08:25 AM  
 
 
 
Glaucoma is a disease of the eye due to increased ocular pressure that can lead to blindness. People with risk factors for glaucoma ( race, diabetes, family history) should be screened at least every 2 years by an eye professional.  
 
Cardiovascular screening tests that check for elevated lipids or cholesterol (fatty part of blood) which can lead to heart disease and strokes should be done every 4-6 years through age 79. You and your health care provider may decide whether to continue screening after age 79. The most recent lipid panel we have on file for you is:  
Lab Results Component Value Date/Time Cholesterol, total 173 06/03/2021 08:25 AM  
 HDL Cholesterol 69 (H) 06/03/2021 08:25 AM  
 LDL, calculated 86.2 06/03/2021 08:25 AM  
 VLDL, calculated 17.8 06/03/2021 08:25 AM  
 Triglyceride 89 06/03/2021 08:25 AM  
 CHOL/HDL Ratio 2.5 06/03/2021 08:25 AM  
 
 
Colorectal cancer screening that evaluates for blood or polyps in your colon for people with average risk should be done yearly as a stool test, every five years as a flexible sigmoidoscope or every 10 years as a colonoscopy up to age 76. You and your health care provider may decide whether to continue screening after age 76. Breast cancer screening with a mammogram is recommended at least once every 2 years  for women age 54-69. You and your health care provider may decide whether to continue screening after age 76. The most recent mammogram we have on file for you is: [facility] Results (most recent): 
Results from Hospital Encounter encounter on 08/15/20 DONALD 3D ROCÍO W MAMMO BI SCREENING INCL CAD Narrative Exam: Screening mammogram 
 CLINICAL INDICATION: Screening mammogram 
 
Personal History of breast cancer: None Personal History of biopsy proven benign breast disease: None Personal History of reduction mammoplasty: None Family History of breast cancer: Sister. Date of Comparison Study: 8/6/2019, 8/3/2018, 7/27/2017 PROCEDURE: Digital screening mammogram of both breasts performed in the CC and 
MLO views. CAD was utilized to further enhance the diagnostic ability of the 
digital mammogram. 
3D Tomosynthesis was utilized. PARENCHYMAL RADIODENSITY: The breast density is heterogeneously dense, which may 
obscure small masses. FINDINGS: There are no suspicious masses or microcalcifications. No suspicious 
asymmetry or architectural distortion appreciated. Benign calcifications are 
present. Impression IMPRESSION: 
1. No suspicious findings Recommendations:  Resume annual screening mammogram. These results are being 
provided to the patient by letter. BI-RADS category 1 - Negative. The lack of mammographic evidence of malignancy should not deter further work-up 
of a clinically significant palpable finding. Radiodense breast tissue may 
obscure an early malignancy or a palpable finding. 10-15% of breast cancers are 
not detected by mammography. Screening for cervical cancer with a pap smear is recommended for all women with a cervix until age 72. The frequency of this test is based on the details of her prior pap smear testing. You and your health care provider may decide whether to continue screening after age 72. People who have smoked the equivalent of 1 pack per day for 30 years or more may benefit from screening for lung cancer with a yearly low dose CT scan until they have been non smokers for 15 years or competing health conditions render this unlikely to be beneficial. Our records show: n/a Your Medicare Wellness Exam is recommended annually.  
 
Here is a list of your current Health Maintenance items with a due date: 
Health Maintenance Topic Date Due  Lipid Screen  06/03/2022  Medicare Yearly Exam  06/11/2022  DTaP/Tdap/Td series (3 - Td or Tdap) 12/12/2026  Hepatitis C Screening  Completed  Bone Densitometry (Dexa) Screening  Completed  Shingrix Vaccine Age 50>  Completed  Flu Vaccine  Completed  COVID-19 Vaccine  Completed  Pneumococcal 65+ years  Completed

## 2021-06-10 NOTE — PROGRESS NOTES
This is the Subsequent Medicare Annual Wellness Exam, performed 12 months or more after the Initial AWV or the last Subsequent AWV    I have reviewed the patient's medical history in detail and updated the computerized patient record. Assessment/Plan   Education and counseling provided:  Are appropriate based on today's review and evaluation  End-of-Life planning (with patient's consent)  Influenza Vaccine  Screening Mammography  Colorectal cancer screening tests  Cardiovascular screening blood test  Bone mass measurement (DEXA)  Screening for glaucoma  Diabetes screening test    1. Essential hypertension  -     HEMOGLOBIN A1C WITH EAG; Future  -     LIPID PANEL; Future  -     MAGNESIUM; Future  -     METABOLIC PANEL, COMPREHENSIVE; Future  -     VITAMIN D, 25 HYDROXY; Future  2. Hyperlipidemia, unspecified hyperlipidemia type  -     HEMOGLOBIN A1C WITH EAG; Future  -     LIPID PANEL; Future  -     MAGNESIUM; Future  -     METABOLIC PANEL, COMPREHENSIVE; Future  -     VITAMIN D, 25 HYDROXY; Future  3. Prediabetes  -     HEMOGLOBIN A1C WITH EAG; Future  -     LIPID PANEL; Future  -     MAGNESIUM; Future  -     METABOLIC PANEL, COMPREHENSIVE; Future  -     VITAMIN D, 25 HYDROXY; Future  4. Impaired fasting glucose  -     HEMOGLOBIN A1C WITH EAG; Future  -     LIPID PANEL; Future  -     MAGNESIUM; Future  -     METABOLIC PANEL, COMPREHENSIVE; Future  -     VITAMIN D, 25 HYDROXY; Future  5. Elevated TSH  -     HEMOGLOBIN A1C WITH EAG; Future  -     LIPID PANEL; Future  -     MAGNESIUM; Future  -     METABOLIC PANEL, COMPREHENSIVE; Future  -     VITAMIN D, 25 HYDROXY; Future  6. Osteoporosis of lumbar spine  -     HEMOGLOBIN A1C WITH EAG; Future  -     LIPID PANEL; Future  -     MAGNESIUM; Future  -     METABOLIC PANEL, COMPREHENSIVE; Future  -     VITAMIN D, 25 HYDROXY; Future  7. Overweight with body mass index (BMI) 25.0-29.9  -     HEMOGLOBIN A1C WITH EAG; Future  -     LIPID PANEL;  Future  -     MAGNESIUM; Future  -     METABOLIC PANEL, COMPREHENSIVE; Future  -     VITAMIN D, 25 HYDROXY; Future  8. Eczema, unspecified type  -     HEMOGLOBIN A1C WITH EAG; Future  -     LIPID PANEL; Future  -     MAGNESIUM; Future  -     METABOLIC PANEL, COMPREHENSIVE; Future  -     VITAMIN D, 25 HYDROXY; Future  9. History of nonmelanoma skin cancer  10. Screening mammogram, encounter for  -     [facility] 3D ROCÍO W MAMMO BI SCREENING INCL CAD; Future  11. Medicare annual wellness visit, subsequent  -     ADVANCE CARE PLANNING FIRST 30 MINS  12. Advanced directives, counseling/discussion  -     ADVANCE CARE PLANNING FIRST 27 MINS  15. Screening for depression       Depression Risk Factor Screening     3 most recent PHQ Screens 6/10/2021   Little interest or pleasure in doing things Not at all   Feeling down, depressed, irritable, or hopeless Not at all   Total Score PHQ 2 0       Alcohol Risk Screen    Do you average more than 1 drink per night or more than 7 drinks a week:  No    On any one occasion in the past three months have you have had more than 3 drinks containing alcohol:  No        Functional Ability and Level of Safety    Hearing: Hearing is good. Activities of Daily Living: The home contains: no safety equipment. Patient does total self care      Ambulation: with no difficulty     Fall Risk:  Fall Risk Assessment, last 12 mths 6/10/2021   Able to walk? Yes   Fall in past 12 months? 0   Do you feel unsteady? 0   Are you worried about falling 0      Abuse Screen:  Patient is not abused       Cognitive Screening    Has your family/caregiver stated any concerns about your memory: no     Cognitive Screening: none performed    Health Maintenance Due   There are no preventive care reminders to display for this patient.     Patient Care Team   Patient Care Team:  Katy Franz MD as PCP - General (Internal Medicine)  Katy Franz MD as PCP - REHABILITATION [facility] Empaneled Provider  Viv Mejia OD (Optometry)  Toya Lee MD (Ophthalmology)  Alfredo Garcia MD (Dermatology)  Jacqueline Hugo MD (Orthopedic Surgery)  Iris Maldonado (Physician Assistant)  Mel Kendall MD (Gastroenterology)    History     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    Tinnitus H93.19    Microhematuria R31.29    Eczema L30.9    Osteopenia M85.80    Elevated TSH R79.89    Impaired fasting glucose R73.01    Candidal intertrigo B37.2    Carpal tunnel syndrome, bilateral G56.03    Osteoporosis of lumbar spine M81.0    History of nonmelanoma skin cancer Z85.828    Overweight with body mass index (BMI) 25.0-29.9 E66.3    Prediabetes R73.03     Past Medical History:   Diagnosis Date    Eczema     Essential hypertension     FH: breast cancer     Fibroids     uterine    Fracture of radius 11/2009    with ulna, left, closed    History of cataract     History of foot fracture     right and left    Hyperlipidemia     Microhematuria     chronic    Osteopenia     Psoriasis     S/P radiation therapy     as child, to right shoulder for birthmark    Tinnitus     Zoster     right scapula      Past Surgical History:   Procedure Laterality Date    COLONOSCOPY N/A 11/17/2020    COLONOSCOPY with polypectomies performed by Mel Kendall MD at [address] [place] [address] HX CATARACT REMOVAL      HX COLONOSCOPY      HX FRACTURE TX Right 04/24/2019    right foot 3rd metatarsal fracture, screws and a plate    HX ORTHOPAEDIC      left arm    HX WISDOM TEETH EXTRACTION      HX WRIST FRACTURE TX       Current Outpatient Medications   Medication Sig Dispense Refill    lisinopriL (PRINIVIL, ZESTRIL) 10 mg tablet Take 1 Tab by mouth daily. 90 Tab 2    atorvastatin (LIPITOR) 20 mg tablet Take 1 Tab by mouth daily. 90 Tab 2    hydroCHLOROthiazide (HYDRODIURIL) 25 mg tablet Take 1 Tab by mouth daily. 90 Tab 2    betamethasone dipropionate (DIPROSONE) 0.05 % topical cream Apply  to affected area two (2) times daily as needed for Skin Irritation.  denosumab (PROLIA) 60 mg/mL injection 60 mg by SubCUTAneous route every 6 months.  Lactobacillus acidophilus (PROBIOTIC PO) Take  by mouth.  folic acid/multivit-min/lutein (CENTRUM SILVER PO) Take  by mouth.  aspirin delayed-release 81 mg tablet Take  by mouth daily.  amoxicillin 500 mg tab TAKE ONE CAPSULE PO TID, START ONE DAY PRIOR TO PROCEDURE, DAY OF PROCEDURE AND DAY AFTER PROCEDURE 10 Tab 1    triamcinolone acetonide (KENALOG) 0.025 % topical cream Apply  to affected area two (2) times a day. (Patient taking differently: Apply  to affected area two (2) times daily as needed.) 15 g 2    clotrimazole-betamethasone (LOTRISONE) topical cream Apply  to affected area two (2) times a day. (Patient taking differently: Apply  to affected area two (2) times daily as needed.) 15 g 2    docusate sodium (COLACE) 100 mg capsule Take 100 mg by mouth daily as needed for Constipation.  co-enzyme Q-10 (CO Q-10) 100 mg capsule Take 100 mg by mouth daily.  biotin 2,500 mcg Tab Take 2,500 mcg by mouth.  cholecalciferol (VITAMIN D3) 1,000 unit cap Take 1 Tab by mouth daily.  fexofenadine (ALLEGRA) 60 mg tablet Take 60 mg by mouth daily.  CALCIUM CARBONATE/VITAMIN D3 (CALCIUM + D PO) Take 1 Tab by mouth two (2) times a day. Allergies   Allergen Reactions    Codeine Nausea Only    Hibiclens [Chlorhexidine Gluconate] Rash       Family History   Problem Relation Age of Onset   Priscilla Hayesin Cancer Mother         pancreatic    Heart Disease Mother     Heart Disease Father     Cancer Sister         breast    MS Sister     Breast Cancer Sister 62     Social History     Tobacco Use    Smoking status: Never Smoker    Smokeless tobacco: Never Used   Substance Use Topics    Alcohol use:  Yes     Alcohol/week: 0.0 standard drinks Comment: occasionally         Parris Mars MD

## 2021-06-10 NOTE — PROGRESS NOTES
1. Have you been to the ER, urgent care clinic or hospitalized since your last visit? NO.     2. Have you seen or consulted any other health care providers outside of the [address] [place] [address] since your last visit (Include any pap smears or colon screening)?  NO

## 2021-06-11 ENCOUNTER — TELEPHONE (OUTPATIENT)
Dept: INTERNAL MEDICINE CLINIC | Age: 76
End: 2021-06-11

## 2021-06-12 PROBLEM — Z85.828 HISTORY OF NONMELANOMA SKIN CANCER: Status: ACTIVE | Noted: 2021-01-10

## 2021-06-12 PROBLEM — R79.89 ELEVATED LFTS: Status: RESOLVED | Noted: 2019-08-31 | Resolved: 2021-06-12

## 2021-06-12 PROBLEM — E61.1 DIETARY IRON DEFICIENCY WITHOUT ANEMIA: Status: RESOLVED | Noted: 2019-09-30 | Resolved: 2021-06-12

## 2021-06-12 PROBLEM — R73.03 PREDIABETES: Status: ACTIVE | Noted: 2021-06-12

## 2021-06-12 NOTE — PROGRESS NOTES
HPI:   Toney Valdez is a 68y.o. year old female who presents today for a routine visit. She has a history of hypertension, hyperlipidemia, impaired fasting glucose, osteoporosis, left sciatica, and chronic microhematuria. She reports that she is doing reasonably well, but states that she states that she continues to be under a significant amount of stress related to multiple family members being ill. She has completed the Moderna COVID-19 vaccine series. She states that she completed a laser procedure on both eyes performed at Dr. Irma Lopez due to clouding of her lens following cataract surgery. She also reports that she has been having intermittent scintillating flashing in her bilateral eyes and was diagnosed with ocular migraines. She is otherwise without new complaints and feeling generally well. Summary of prior hospitalizations and medical history:  On 8/27/2019 and was noted to have elevated transaminases with AST 50 and ALT 67. She was instructed to discontinue all NSAIDS which she had been taking for her right foot pain. She was seen again on 9/25/2019 and repeat testing showed normalization of her transaminases. Repeat evaluation on 1/9/2020 and reported that she had been taking two Excedrin almost every morning due to a headache, and she also reported continuing to drink approximately two glasses of wine each night. Repeat labs showed repeat elevation of transaminases with AST 50 and ALT 67, while other liver function tests remained normal. She was instructed to discontinue Excedrin and alcohol, and underwent an abdominal ultrasound which showed normal sized liver with mildly diffusely coarsened echogenicity. On 6/25/2019, she reported that two weeks prior, while returning home from her granddaughter's graduation, she and her  stopped at a restaurant for lunch.  She states that while she was chewing a mouth full of food, her  noticed that she suddenly tilted her head to the left side and seemed to look dazed. She states that she remained awake and was aware that he was talking to her, but she states that she could not respond. She states that the episode lasted approximately 30 seconds and resolved, and she proceeded to complete her chewing and swallow her food. She states that she did not lose consciousness, or notice any headache, visual changes, lightheadedness, tongue biting, incontinence, focal deficits, or seizure like activity. She denied any chest pain, shortness of breath, palpitations, pleuritic chest pain, hemoptysis, calf pain or swelling. 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 She also had a carotid duplex scan (7/9/2019) showing only mild (< 50%) stenosis of the bilateral internal carotid arteries. She underwent evaluation by Dr. Marzena Bustos in 10/2019, and he did not feel that the episode represented a TIA or other neurologic episode. He felt that no further evaluation was needed. She has not had a recurrence of these symptoms. On 4/15/2019, she underwent evaluation at [facility] for right foot pain. She denied known injury. Right foot x-ray showed a displaced third metatarsal fracture and she was referred to Dr. Hawa Castillo for evaluation. Given the displacement and risk of metatarsalgia, surgery was recommended, and on 4/24/2019, she underwent open reduction/internal fixation of the third metatarsal with putty bone grafting. She reports that she did well after surgery, and and was non-weight bearing in a CAM boot for several months. On 5/15/2019, she presented complaining of bilateral hand and wrist pain. She denied any fever, chills, sore throat, rashes, dyspnea, cough, abdominal pain, dysuria, focal weakness, visual changes, or headache.  She did admit to sleeping with her bilateral hands tucked beneath her in a flexed position while lying on her back due to her limitations from her foot surgery. She also reported that she had been using a walker to ambulate and gripping it tightly with her hands. Lab evaluation including MANOLO, RF, CCP, ESR, uric acid were normal, but CRP was increased to 9.1. A diagnosis of presumed carpal tunnel syndrome was made, and her discomfort resolved with the use of wrist splints. She was released to weight bear as tolerate in 7/2019, and has not had any significant pain although has been experiencing some swelling which resolves with elevation. She has a history of osteopenia, and in 2009, she sustained a fracture of her left 3rd toe (3/2009) and her distal radius (12/2009). At that time, she was started on alendronate and was treated until 11/2014. She had a repeat bone density study in 7/2016 showing T-scores: femoral neck left -2.3/ right -2.0 and lumbar -2.2. She was restarted on alendronate in 12/2016. She underwent repeat bone density study in 8/2019 showing T-scores: femoral neck left -2.2/ right -1.9 and lumbar -2.6. Due to lack of improvement, she underwent evaluation by Dr. Billy Iyer in 11/2019 and recommendation was that she change to Prolia. She continues to take calcium and Vitamin D supplements. She has a history of hypertension, treated with lisinopril and hydrochlorothiazide. She has been checking her blood pressure intermittently and reports that most readings are with a systolic blood pressure <935. She remains quite active, although has not been having time to exercise. She reports that she has continued stress in her life, helping to care for her 's mother (dementia), two disabled cousins, and her sister. She denies any chest pain, shortness of breath at rest or with exertion, palpitations, lightheadedness, or edema. She also has a history of hyperlipidemia, treated with moderate intensity dose atorvastatin. She has no history of ASCVD.     In 2/2017, she was evaluated by DEREK Walter with left lower back pain and left sciatica. She had been taking ibuprofen without relief, and was given a course of prednisone. She states that she did not find this helpful either, but reports resolution of symptoms after visiting a chiropractor. She states that she no longer is experiencing pain and is able to ambulate without difficulty. She will continue to visit a chiropractor periodically when she develops a recurrence of pain with improvement. She reports that she had an extensive workup for microhematuria in the past, which was negative (records unavailable). She denies any dysuria, gross hematuria, or flank pain. She has had screening colonoscopy with Dr Dori Chan in 10/2010, which was normal except for hemorrhoids. Recommendation for follow-up is for 10 years. She underwent a screening colonoscopy (11/17/2020) by Dr. Kvng Magdaleno and was found to have internal and external hemorrhoids as well as 10 sessile 4-10 mm polyps in the descending colon (2), transverse colon (1), sigmoid colon (1), and rectum (6). Pathology showed multiple tubular adenomas. Recommendation is for repeat colonoscopy in 3 years. She denies any abdominal pain, nausea, vomiting, melena, hematochezia, or change in bowel movements. She has a history of eczema and is now being followed by Dr. Yassine Aguiar. She found Eucrisa to be very effective, but not covered by her insurance. Using triamcinolone cream intermittently. She also has a history of candidal intertrigo, treated with lotrisone with good response. She has a history of basal cell carcinomas, and underwent removal from her face by Dr. Kerry Hickman. She also reports that she underwent radiation therapy to her right shoulder (as a child) for treatment of a birthmark. On 7/20/2018, she presented for evaluation to [facility] for a right upper back rash and was diagnosed with herpes zoster. She was treated with Valtrex with improvement.       Past Medical History:   Diagnosis Date    Eczema     Essential hypertension     FH: breast cancer     Fibroids     uterine    Fracture of radius 11/2009    with ulna, left, closed    History of cataract     History of foot fracture     right and left    Hyperlipidemia     Microhematuria     chronic    Osteopenia     Psoriasis     S/P radiation therapy     as child, to right shoulder for birthmark    Tinnitus     Zoster     right scapula     Past Surgical History:   Procedure Laterality Date    COLONOSCOPY N/A 11/17/2020    COLONOSCOPY with polypectomies performed by Doug Bernard MD at [address] HX CATARACT REMOVAL      HX COLONOSCOPY      HX FRACTURE TX Right 04/24/2019    right foot 3rd metatarsal fracture, screws and a plate    HX ORTHOPAEDIC      left arm    HX WISDOM TEETH EXTRACTION      HX WRIST FRACTURE TX       Current Outpatient Medications   Medication Sig    lisinopriL (PRINIVIL, ZESTRIL) 10 mg tablet Take 1 Tab by mouth daily.  atorvastatin (LIPITOR) 20 mg tablet Take 1 Tab by mouth daily.  hydroCHLOROthiazide (HYDRODIURIL) 25 mg tablet Take 1 Tab by mouth daily.  betamethasone dipropionate (DIPROSONE) 0.05 % topical cream Apply  to affected area two (2) times daily as needed for Skin Irritation.  denosumab (PROLIA) 60 mg/mL injection 60 mg by SubCUTAneous route every 6 months.  Lactobacillus acidophilus (PROBIOTIC PO) Take  by mouth.  folic acid/multivit-min/lutein (CENTRUM SILVER PO) Take  by mouth.  aspirin delayed-release 81 mg tablet Take  by mouth daily.  amoxicillin 500 mg tab TAKE ONE CAPSULE PO TID, START ONE DAY PRIOR TO PROCEDURE, DAY OF PROCEDURE AND DAY AFTER PROCEDURE    crisaborole (EUCRISA) 2 % oint by Apply Externally route as needed.  triamcinolone acetonide (KENALOG) 0.025 % topical cream Apply  to affected area two (2) times a day.  (Patient taking differently: Apply  to affected area two (2) times daily as needed.)    clotrimazole-betamethasone (LOTRISONE) topical cream Apply  to affected area two (2) times a day. (Patient taking differently: Apply  to affected area two (2) times daily as needed.)    docusate sodium (COLACE) 100 mg capsule Take 100 mg by mouth daily as needed for Constipation.  co-enzyme Q-10 (CO Q-10) 100 mg capsule Take 100 mg by mouth daily.  biotin 2,500 mcg Tab Take 2,500 mcg by mouth.  cholecalciferol (VITAMIN D3) 1,000 unit cap Take 1 Tab by mouth daily.  fexofenadine (ALLEGRA) 60 mg tablet Take 60 mg by mouth daily.  CALCIUM CARBONATE/VITAMIN D3 (CALCIUM + D PO) Take 1 Tab by mouth two (2) times a day. No current facility-administered medications for this visit. Allergies and Intolerances: Allergies   Allergen Reactions    Codeine Nausea Only    Hibiclens [Chlorhexidine Gluconate] Rash     Family History: Her sister has breast cancer and her mother had pancreatic cancer. No history of colon cancer. Family History   Problem Relation Age of Onset   Mata Cancer Mother         pancreatic    Heart Disease Mother     Heart Disease Father     Cancer Sister         breast    MS Sister     Breast Cancer Sister 62     Social History:   She  reports that she has never smoked. She has never used smokeless tobacco. She has about 2 glasses of wine each night. She is  with one adult daughter who lives with her family on [place], [place]. She is retired, and was a 4th- for 25 years.     Social History     Substance and Sexual Activity   Alcohol Use Yes    Alcohol/week: 0.0 standard drinks    Comment: occasionally     Immunization History:  Immunization History   Administered Date(s) Administered    (RETIRED) Pneumococcal Vaccine (Unspecified Type) 05/04/2010    Covid-19, MODERNA, Mrna, Lnp-s, Pf, 100mcg/0.5mL 01/28/2021, 02/25/2021    Influenza High Dose Vaccine PF 12/13/2016, 10/03/2017    Influenza Vaccine 10/24/2014    Influenza Vaccine (Tri) Adjuvanted (>65 Yrs FLUAD TRI 23801) 10/09/2018, 11/13/2019    Influenza Vaccine PF 11/05/2013    Influenza Vaccine Split 11/01/2011, 11/06/2012    Influenza, Quadrivalent, Adjuvanted (>65 Yrs FLUAD QUAD 13604) 09/16/2020    Pneumococcal Conjugate (PCV-13) 05/05/2015    Pneumococcal Polysaccharide (PPSV-23) 05/04/2010    TD Vaccine 05/05/2009    Tdap 12/12/2016    Zoster Recombinant 09/21/2019, 12/28/2019    Zoster Vaccine, Live 05/07/2013       Review of Systems:   As above included in HPI. Otherwise 11 point review of systems negative including constitutional, skin, HENT, eyes, respiratory, cardiovascular, gastrointestinal, genitourinary, musculoskeletal, endo/heme/aller, neurological.    Physical:     Visit Vitals  /76 (BP 1 Location: Left arm, BP Patient Position: Sitting)   Pulse 95   Temp 97.2 °F (36.2 °C) (Temporal)   Resp 16   Ht 5' 5\" (1.651 m)   Wt 154 lb 9.6 oz (70.1 kg)   LMP  (LMP Unknown)   SpO2 97%   BMI 25.73 kg/m²     Exam:    Patient appears in no apparent distress. Affect is appropriate. HEENT --Anicteric sclerae, tympanic membranes normal,  ear canals normal.  PERRL, EOMI, conjunctiva and lids normal.  No cervical lymphadenopathy. No thyromegaly, JVD, or bruits. Carotid pulses 2+ with normal upstroke. Lungs --Clear to auscultation. No wheezing or rales. Heart --Regular rate and rhythm, no murmurs, rubs, gallops, or clicks. Abdomen -- Soft and nontender, no hepatosplenomegaly or masses. Extremities -- Without cyanosis, clubbing, edema.  Normal looking digits, ROM intact  Neuro -- CN 2-12 intact, strength 5/5 with intact soft touch in all extremities  Derm  no obvious abnormalities noted, no rash        Review of Data:  Labs:  Hospital Outpatient Visit on 06/03/2021   Component Date Value Ref Range Status    Hemoglobin A1c 06/03/2021 5.9* 4.2 - 5.6 % Final    Est. average glucose 06/03/2021 123  mg/dL Final    LIPID PROFILE 06/03/2021        Final    Cholesterol, total 06/03/2021 173  <200 MG/DL Final    Triglyceride 06/03/2021 89  <150 MG/DL Final    HDL Cholesterol 06/03/2021 69* 40 - 60 MG/DL Final    LDL, calculated 06/03/2021 86.2  0 - 100 MG/DL Final    VLDL, calculated 06/03/2021 17.8  MG/DL Final    CHOL/HDL Ratio 06/03/2021 2.5  0 - 5.0   Final    Magnesium 06/03/2021 2.0  1.6 - 2.6 mg/dL Final    WBC 06/03/2021 6.7  4.6 - 13.2 K/uL Final    RBC 06/03/2021 4.59  4.20 - 5.30 M/uL Final    HGB 06/03/2021 13.5  12.0 - 16.0 g/dL Final    HCT 06/03/2021 43.2  35.0 - 45.0 % Final    MCV 06/03/2021 94.1  74.0 - 97.0 FL Final    MCH 06/03/2021 29.4  24.0 - 34.0 PG Final    MCHC 06/03/2021 31.3  31.0 - 37.0 g/dL Final    RDW 06/03/2021 13.4  11.6 - 14.5 % Final    PLATELET 81/23/5651 283  135 - 420 K/uL Final    MPV 06/03/2021 9.6  9.2 - 11.8 FL Final    NEUTROPHILS 06/03/2021 58  40 - 73 % Final    LYMPHOCYTES 06/03/2021 28  21 - 52 % Final    MONOCYTES 06/03/2021 10  3 - 10 % Final    EOSINOPHILS 06/03/2021 3  0 - 5 % Final    BASOPHILS 06/03/2021 1  0 - 2 % Final    ABS. NEUTROPHILS 06/03/2021 3.9  1.8 - 8.0 K/UL Final    ABS. LYMPHOCYTES 06/03/2021 1.9  0.9 - 3.6 K/UL Final    ABS. MONOCYTES 06/03/2021 0.6  0.05 - 1.2 K/UL Final    ABS. EOSINOPHILS 06/03/2021 0.2  0.0 - 0.4 K/UL Final    ABS.  BASOPHILS 06/03/2021 0.1  0.0 - 0.1 K/UL Final    DF 06/03/2021 AUTOMATED    Final    Sodium 06/03/2021 138  136 - 145 mmol/L Final    Potassium 06/03/2021 4.2  3.5 - 5.5 mmol/L Final    Chloride 06/03/2021 104  100 - 111 mmol/L Final    CO2 06/03/2021 28  21 - 32 mmol/L Final    Anion gap 06/03/2021 6  3.0 - 18 mmol/L Final    Glucose 06/03/2021 87  74 - 99 mg/dL Final    BUN 06/03/2021 19* 7.0 - 18 MG/DL Final    Creatinine 06/03/2021 0.64  0.6 - 1.3 MG/DL Final    BUN/Creatinine ratio 06/03/2021 30* 12 - 20   Final    GFR est AA 06/03/2021 >60  >60 ml/min/1.73m2 Final    GFR est non-AA 06/03/2021 >60  >60 ml/min/1.73m2 Final    Calcium 06/03/2021 8.9  8.5 - 10.1 MG/DL Final    Bilirubin, total 06/03/2021 0.6  0.2 - 1.0 MG/DL Final    ALT (SGPT) 06/03/2021 29  13 - 56 U/L Final    AST (SGOT) 06/03/2021 23  10 - 38 U/L Final    Alk. phosphatase 06/03/2021 49  45 - 117 U/L Final    Protein, total 06/03/2021 7.3  6.4 - 8.2 g/dL Final    Albumin 06/03/2021 4.1  3.4 - 5.0 g/dL Final    Globulin 06/03/2021 3.2  2.0 - 4.0 g/dL Final    A-G Ratio 06/03/2021 1.3  0.8 - 1.7   Final    TSH 06/03/2021 3.86* 0.36 - 3.74 uIU/mL Final    T4, Free 06/03/2021 1.1  0.7 - 1.5 NG/DL Final    Vitamin D 25-Hydroxy 06/03/2021 47.1  30 - 100 ng/mL Final     No visits with results within 2 Day(s) from this visit.    Latest known visit with results is:   Hospital Outpatient Visit on 06/03/2021   Component Date Value Ref Range Status    Hemoglobin A1c 06/03/2021 5.9* 4.2 - 5.6 % Final    Est. average glucose 06/03/2021 123  mg/dL Final    LIPID PROFILE 06/03/2021        Final    Cholesterol, total 06/03/2021 173  <200 MG/DL Final    Triglyceride 06/03/2021 89  <150 MG/DL Final    HDL Cholesterol 06/03/2021 69* 40 - 60 MG/DL Final    LDL, calculated 06/03/2021 86.2  0 - 100 MG/DL Final    VLDL, calculated 06/03/2021 17.8  MG/DL Final    CHOL/HDL Ratio 06/03/2021 2.5  0 - 5.0   Final    Magnesium 06/03/2021 2.0  1.6 - 2.6 mg/dL Final    WBC 06/03/2021 6.7  4.6 - 13.2 K/uL Final    RBC 06/03/2021 4.59  4.20 - 5.30 M/uL Final    HGB 06/03/2021 13.5  12.0 - 16.0 g/dL Final    HCT 06/03/2021 43.2  35.0 - 45.0 % Final    MCV 06/03/2021 94.1  74.0 - 97.0 FL Final    MCH 06/03/2021 29.4  24.0 - 34.0 PG Final    MCHC 06/03/2021 31.3  31.0 - 37.0 g/dL Final    RDW 06/03/2021 13.4  11.6 - 14.5 % Final    PLATELET 90/96/3141 710  135 - 420 K/uL Final    MPV 06/03/2021 9.6  9.2 - 11.8 FL Final    NEUTROPHILS 06/03/2021 58  40 - 73 % Final    LYMPHOCYTES 06/03/2021 28  21 - 52 % Final    MONOCYTES 06/03/2021 10  3 - 10 % Final    EOSINOPHILS 06/03/2021 3  0 - 5 % Final    BASOPHILS 06/03/2021 1  0 - 2 % Final    ABS. NEUTROPHILS 06/03/2021 3.9  1.8 - 8.0 K/UL Final    ABS. LYMPHOCYTES 06/03/2021 1.9  0.9 - 3.6 K/UL Final    ABS. MONOCYTES 06/03/2021 0.6  0.05 - 1.2 K/UL Final    ABS. EOSINOPHILS 06/03/2021 0.2  0.0 - 0.4 K/UL Final    ABS. BASOPHILS 06/03/2021 0.1  0.0 - 0.1 K/UL Final    DF 06/03/2021 AUTOMATED    Final    Sodium 06/03/2021 138  136 - 145 mmol/L Final    Potassium 06/03/2021 4.2  3.5 - 5.5 mmol/L Final    Chloride 06/03/2021 104  100 - 111 mmol/L Final    CO2 06/03/2021 28  21 - 32 mmol/L Final    Anion gap 06/03/2021 6  3.0 - 18 mmol/L Final    Glucose 06/03/2021 87  74 - 99 mg/dL Final    BUN 06/03/2021 19* 7.0 - 18 MG/DL Final    Creatinine 06/03/2021 0.64  0.6 - 1.3 MG/DL Final    BUN/Creatinine ratio 06/03/2021 30* 12 - 20   Final    GFR est AA 06/03/2021 >60  >60 ml/min/1.73m2 Final    GFR est non-AA 06/03/2021 >60  >60 ml/min/1.73m2 Final    Calcium 06/03/2021 8.9  8.5 - 10.1 MG/DL Final    Bilirubin, total 06/03/2021 0.6  0.2 - 1.0 MG/DL Final    ALT (SGPT) 06/03/2021 29  13 - 56 U/L Final    AST (SGOT) 06/03/2021 23  10 - 38 U/L Final    Alk. phosphatase 06/03/2021 49  45 - 117 U/L Final    Protein, total 06/03/2021 7.3  6.4 - 8.2 g/dL Final    Albumin 06/03/2021 4.1  3.4 - 5.0 g/dL Final    Globulin 06/03/2021 3.2  2.0 - 4.0 g/dL Final    A-G Ratio 06/03/2021 1.3  0.8 - 1.7   Final    TSH 06/03/2021 3.86* 0.36 - 3.74 uIU/mL Final    T4, Free 06/03/2021 1.1  0.7 - 1.5 NG/DL Final    Vitamin D 25-Hydroxy 06/03/2021 47.1  30 - 100 ng/mL Final       Health Maintenance:  Screening:    Mammogram: negative (8/2020)   PAP smear: S/P ALMAS   Colorectal: colonoscopy (11/2020) tubular adenomas. Dr. Hayley Oreilly Due 11/2023. Depression: none   DM (HbA1c/FPG): HbA1c 5.9 (6/2021)   Hepatitis C: negative (5/2016)   Falls: none   DEXA: osteopenia (8/2019). On Prolia. Dr. Lorraine Chan.     Glaucoma: regular eye exams with Dr. Chema Guevara (last 1/2021) Smoking: none   Vitamin D: 47.1 (6/2021)   Medicare Wellness: today      Impression:  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    Tinnitus H93.19    Microhematuria R31.29    Eczema L30.9    Osteopenia M85.80    Elevated TSH R79.89    Impaired fasting glucose R73.01    Candidal intertrigo B37.2    Carpal tunnel syndrome, bilateral G56.03    Osteoporosis of lumbar spine M81.0    Elevated LFTs R79.89    Dietary iron deficiency without anemia E61.1    Nonmelanoma skin cancer C44.90    Overweight with body mass index (BMI) 25.0-29.9 E66.3       Plan:  1. Hypertension. Remains well controlled on current regimen of lisinopril 10 mg daily and hydrochlorothiazide 25 mg daily. Renal function remains normal with creatinine 0.64/ eGFR >60, but BUN/creatinine ratio remains increased to 30. Encouraged to continue to drink plenty of fluids. Continue to follow. 3. Hyperlipidemia. On moderate intensity dose atorvastatin (20 mg) with LDL 86 and HDL 69, indicative of good control. Emphasized importance of lifestyle modifications, including diet, exercise, and weight loss. Will continue to follow. 3. Prediabetes. Normal fasting glucose today, but HbA1c elevated to 5.9. Weight increased approximately 3 pounds and patient acknowledges that her diet has not been ideal.  Emphasized importance of lifestyle modifications, including diet, exercise, and weight loss. Will reassess next visit. 4. Elevated transaminases. New onset mild elevation noted in 8/2019. Patient reported that she had been taking NSAIDS frequently to help control foot pain. Advised to discontinue and repeated in 9/2019 with normalization. Lab evaluation, including AMA, actin, hepatitis profile, CK, aldolase, celiac panel, ESR, CRP, and MANOLO, were negative, but did identify mildly low iron stores. Repeat 1/2020 again showed mild elevation of transaminases with otherwise normal LFT's. Patient had been taking Excedrin and did admit to 2 glasses of wine each night. Advised to discontinue both. Abdominal ultrasound (1/2020) showed mildly increased echogenicity of liver, but otherwise normal. Repeat liver function tests today again show normalization of transaminases. Discussed likely due to hepatic steatosis. Advised to minimize alcohol and NSAIDS. Also, emphasized importance of lifestyle modifications, including diet, exercise, and weight loss. 5. Osteoporosis. History of radial and toe fracture in 2009. Treated from 11/2009 to 11/2014 with alendronate. Repeat bone density scan in 7/2016 with 10 year risk of a major osteoporetic fracture at 14 % and hip fracture at 3.4 %. Restarted Fosamax in 12/2016. New right metatarsal displaced fracture without known injury in 6/2019. Repeat bone density on 8/6/2019 showed T-scores:  femoral neck left -2.2  /right -1.9 and lumbar -2.6 . Calculated FRAX score estimated her 10 year risk of a major osteoporetic fracture at 14 % and hip fracture at 3.9 %. Comparison to her prior study in 7/2016 showed stability in her bilateral hips, but some worsening in her lumbar spine. Obtained lab evaluation for secondary osteoporosis, and TSH, intact PTH, calcium, vitamin D level, and gammopathy panel normal. Given recent fracture and some worsening of bone density scan despite treatment with biphosphonate, referred to Dr. Nhung Roman in 11/2019 and recommendation was that she change to Prolia. Reports tolerating well with next dose scheduled in 8/2021. Continue calcium and Vitamin D supplements, and encouraged her to exercise, particularly weight bearing activities. 6. Displaced right third metatarsal fracture, s/p open reduction/internal fixation with putty bone grafting. She reports that she did well after surgery, and now fully weight bearing with orthotic in shoes.  No significant pain, although mild swelling with prolonged standing. Doing well. 7. Iron deficiency without anemia. May be related to recent foot surgery. Advised to begin MVI with iron supplement. Colonoscopy completed (11/2020) and 10 polyps removed with majority pathology showing tubular adenomas. Repeat in 3 years recommended. 8. Family history of breast cancer. Patient with family history of breast cancer in first degree relative (sister). Discussed recommendations for breast cancer screening in women with lifetime risk >20%. 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 No other family member with breast cancer. Wishing to continue with annual mammograms and breast exams. 9. Chronic microhematuria. Work-up reportedly negative in past. Recent urinalysis has been negative for blood. Follow. 10. Eczema. Found good response with Eucrisa, but has no more samples and not covered by insurance. Using triamcinolone cream for flares and Eucerin cream.   11. Nonmelanoma skin cancers. 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 Dipika Quiroga. Continuing to follow with Dr. Robert Fischer for her regular skin exams annually. 12. Elevated TSH. Repeat TSH mildly elevated today with normal free T4. Patient with history of radiation therapy for a birthmark on her right shoulder as a child. Will continue to follow. 13. Overweight. Remaining stable during pandemic. Emphasized importance of continued lifestyle modifications. Will continue to follow. 14. Health maintenance. Completed Moderna COVID-19 vaccine series. Received 2/2 doses of Shingrix vaccine. Other immunizations up to date. Mammogram up to date. Colonoscopy completed and repeat due in 2023. Continue regular eye exams with Dr. Gabriela Sy. Vitamin D level normal. Continue maintenance dose supplement. In addition, an annual Medicare wellness visit was done today. Patient understands recommendations and agrees with plan. Follow-up in 6 months.

## 2021-08-06 RX ORDER — LISINOPRIL 10 MG/1
TABLET ORAL
Qty: 90 TABLET | Refills: 2 | Status: SHIPPED | OUTPATIENT
Start: 2021-08-06 | End: 2022-06-28

## 2021-08-16 DIAGNOSIS — Z12.31 SCREENING MAMMOGRAM, ENCOUNTER FOR: ICD-10-CM

## 2021-08-18 ENCOUNTER — HOSPITAL ENCOUNTER (OUTPATIENT)
Dept: MAMMOGRAPHY | Age: 76
Discharge: HOME | End: 2021-08-18
Attending: INTERNAL MEDICINE
Payer: MEDICARE

## 2021-08-18 PROCEDURE — 77063 BREAST TOMOSYNTHESIS BI: CPT

## 2021-09-13 ENCOUNTER — TRANSCRIBE ORDER (OUTPATIENT)
Dept: SCHEDULING | Age: 76
End: 2021-09-13

## 2021-09-13 DIAGNOSIS — M81.0 SENILE OSTEOPOROSIS: Primary | ICD-10-CM

## 2021-09-15 RX ORDER — HYDROCHLOROTHIAZIDE 25 MG/1
TABLET ORAL
Qty: 90 TABLET | Refills: 2 | Status: SHIPPED | OUTPATIENT
Start: 2021-09-15 | End: 2022-03-19

## 2021-12-06 ENCOUNTER — APPOINTMENT (OUTPATIENT)
Dept: INTERNAL MEDICINE CLINIC | Age: 76
End: 2021-12-06

## 2021-12-06 ENCOUNTER — HOSPITAL ENCOUNTER (OUTPATIENT)
Dept: LAB | Age: 76
Discharge: HOME | End: 2021-12-06
Payer: MEDICARE

## 2021-12-06 DIAGNOSIS — R73.01 IMPAIRED FASTING GLUCOSE: ICD-10-CM

## 2021-12-06 DIAGNOSIS — R73.03 PREDIABETES: ICD-10-CM

## 2021-12-06 DIAGNOSIS — E78.5 HYPERLIPIDEMIA, UNSPECIFIED HYPERLIPIDEMIA TYPE: ICD-10-CM

## 2021-12-06 DIAGNOSIS — R79.89 ELEVATED TSH: ICD-10-CM

## 2021-12-06 DIAGNOSIS — I10 ESSENTIAL HYPERTENSION: ICD-10-CM

## 2021-12-06 DIAGNOSIS — M81.0 OSTEOPOROSIS OF LUMBAR SPINE: ICD-10-CM

## 2021-12-06 DIAGNOSIS — L30.9 ECZEMA, UNSPECIFIED TYPE: ICD-10-CM

## 2021-12-06 DIAGNOSIS — E66.3 OVERWEIGHT WITH BODY MASS INDEX (BMI) 25.0-29.9: ICD-10-CM

## 2021-12-06 LAB
25(OH)D3 SERPL-MCNC: 48.7 NG/ML (ref 30–100)
ALBUMIN SERPL-MCNC: 4 G/DL (ref 3.4–5)
ALBUMIN/GLOB SERPL: 1.2 {RATIO} (ref 0.8–1.7)
ALP SERPL-CCNC: 50 U/L (ref 45–117)
ALT SERPL-CCNC: 29 U/L (ref 13–56)
ANION GAP SERPL CALC-SCNC: 6 MMOL/L (ref 3–18)
AST SERPL-CCNC: 24 U/L (ref 10–38)
BILIRUB SERPL-MCNC: 0.5 MG/DL (ref 0.2–1)
BUN SERPL-MCNC: 20 MG/DL (ref 7–18)
BUN/CREAT SERPL: 29 (ref 12–20)
CALCIUM SERPL-MCNC: 9.1 MG/DL (ref 8.5–10.1)
CHLORIDE SERPL-SCNC: 104 MMOL/L (ref 100–111)
CHOLEST SERPL-MCNC: 167 MG/DL
CO2 SERPL-SCNC: 29 MMOL/L (ref 21–32)
CREAT SERPL-MCNC: 0.68 MG/DL (ref 0.6–1.3)
EST. AVERAGE GLUCOSE BLD GHB EST-MCNC: 111 MG/DL
GLOBULIN SER CALC-MCNC: 3.3 G/DL (ref 2–4)
GLUCOSE SERPL-MCNC: 86 MG/DL (ref 74–99)
HBA1C MFR BLD: 5.5 % (ref 4.2–5.6)
HDLC SERPL-MCNC: 71 MG/DL (ref 40–60)
HDLC SERPL: 2.4 {RATIO} (ref 0–5)
LDLC SERPL CALC-MCNC: 76 MG/DL (ref 0–100)
LIPID PROFILE,FLP: ABNORMAL
MAGNESIUM SERPL-MCNC: 2 MG/DL (ref 1.6–2.6)
POTASSIUM SERPL-SCNC: 3.9 MMOL/L (ref 3.5–5.5)
PROT SERPL-MCNC: 7.3 G/DL (ref 6.4–8.2)
SODIUM SERPL-SCNC: 139 MMOL/L (ref 136–145)
TRIGL SERPL-MCNC: 100 MG/DL (ref ?–150)
VLDLC SERPL CALC-MCNC: 20 MG/DL

## 2021-12-06 PROCEDURE — 36415 COLL VENOUS BLD VENIPUNCTURE: CPT

## 2021-12-06 PROCEDURE — 83036 HEMOGLOBIN GLYCOSYLATED A1C: CPT

## 2021-12-06 PROCEDURE — 80053 COMPREHEN METABOLIC PANEL: CPT

## 2021-12-06 PROCEDURE — 83735 ASSAY OF MAGNESIUM: CPT

## 2021-12-06 PROCEDURE — 80061 LIPID PANEL: CPT

## 2021-12-06 PROCEDURE — 82306 VITAMIN D 25 HYDROXY: CPT

## 2021-12-16 ENCOUNTER — OFFICE VISIT (OUTPATIENT)
Dept: INTERNAL MEDICINE CLINIC | Age: 76
End: 2021-12-16
Payer: MEDICARE

## 2021-12-16 VITALS
OXYGEN SATURATION: 98 % | SYSTOLIC BLOOD PRESSURE: 122 MMHG | DIASTOLIC BLOOD PRESSURE: 76 MMHG | WEIGHT: 155.8 LBS | BODY MASS INDEX: 25.96 KG/M2 | HEIGHT: 65 IN | HEART RATE: 88 BPM | RESPIRATION RATE: 16 BRPM | TEMPERATURE: 97 F

## 2021-12-16 DIAGNOSIS — L30.9 ECZEMA, UNSPECIFIED TYPE: ICD-10-CM

## 2021-12-16 DIAGNOSIS — I10 ESSENTIAL HYPERTENSION: Primary | ICD-10-CM

## 2021-12-16 DIAGNOSIS — M81.0 OSTEOPOROSIS OF LUMBAR SPINE: ICD-10-CM

## 2021-12-16 DIAGNOSIS — E78.5 HYPERLIPIDEMIA, UNSPECIFIED HYPERLIPIDEMIA TYPE: ICD-10-CM

## 2021-12-16 DIAGNOSIS — R73.03 PREDIABETES: ICD-10-CM

## 2021-12-16 DIAGNOSIS — R73.01 IMPAIRED FASTING GLUCOSE: ICD-10-CM

## 2021-12-16 DIAGNOSIS — E66.3 OVERWEIGHT WITH BODY MASS INDEX (BMI) 25.0-29.9: ICD-10-CM

## 2021-12-16 PROCEDURE — G0463 HOSPITAL OUTPT CLINIC VISIT: HCPCS | Performed by: INTERNAL MEDICINE

## 2021-12-16 PROCEDURE — G8754 DIAS BP LESS 90: HCPCS | Performed by: INTERNAL MEDICINE

## 2021-12-16 PROCEDURE — G8427 DOCREV CUR MEDS BY ELIG CLIN: HCPCS | Performed by: INTERNAL MEDICINE

## 2021-12-16 PROCEDURE — 99214 OFFICE O/P EST MOD 30 MIN: CPT | Performed by: INTERNAL MEDICINE

## 2021-12-16 PROCEDURE — 1090F PRES/ABSN URINE INCON ASSESS: CPT | Performed by: INTERNAL MEDICINE

## 2021-12-16 PROCEDURE — G8536 NO DOC ELDER MAL SCRN: HCPCS | Performed by: INTERNAL MEDICINE

## 2021-12-16 PROCEDURE — G8510 SCR DEP NEG, NO PLAN REQD: HCPCS | Performed by: INTERNAL MEDICINE

## 2021-12-16 PROCEDURE — G8752 SYS BP LESS 140: HCPCS | Performed by: INTERNAL MEDICINE

## 2021-12-16 PROCEDURE — 1101F PT FALLS ASSESS-DOCD LE1/YR: CPT | Performed by: INTERNAL MEDICINE

## 2021-12-16 PROCEDURE — G8419 CALC BMI OUT NRM PARAM NOF/U: HCPCS | Performed by: INTERNAL MEDICINE

## 2021-12-16 RX ORDER — CYCLOSPORINE 0.5 MG/ML
1 EMULSION OPHTHALMIC EVERY 12 HOURS
COMMUNITY

## 2021-12-16 NOTE — PROGRESS NOTES
1. Have you been to the ER, urgent care clinic or hospitalized since your last visit? NO.     2. Have you seen or consulted any other health care providers outside of the [address] [place] [address] since your last visit (Include any pap smears or colon screening)?  NO

## 2021-12-16 NOTE — PATIENT INSTRUCTIONS
Heart-Healthy Diet: Care Instructions  Your Care Instructions     A heart-healthy diet has lots of vegetables, fruits, nuts, beans, and whole grains, and is low in salt. It limits foods that are high in saturated fat, such as meats, cheeses, and fried foods. It may be hard to change your diet, but even small changes can lower your risk of heart attack and heart disease. Follow-up care is a key part of your treatment and safety. Be sure to make and go to all appointments, and call your doctor if you are having problems. It's also a good idea to know your test results and keep a list of the medicines you take. How can you care for yourself at home? Watch your portions  · Learn what a serving is. A \"serving\" and a \"portion\" are not always the same thing. Make sure that you are not eating larger portions than are recommended. For example, a serving of pasta is ½ cup. A serving size of meat is 2 to 3 ounces. A 3-ounce serving is about the size of a deck of cards. Measure serving sizes until you are good at [place]" them. Keep in mind that restaurants often serve portions that are 2 or 3 times the size of one serving. · To keep your energy level up and keep you from feeling hungry, eat often but in smaller portions. · Eat only the number of calories you need to stay at a healthy weight. If you need to lose weight, eat fewer calories than your body burns (through exercise and other physical activity). Eat more fruits and vegetables  · Eat a variety of fruit and vegetables every day. Dark green, deep orange, red, or yellow fruits and vegetables are especially good for you. Examples include spinach, carrots, peaches, and berries. · Keep carrots, celery, and other veggies handy for snacks. Buy fruit that is in season and store it where you can see it so that you will be tempted to eat it. · Cook dishes that have a lot of veggies in them, such as stir-fries and soups.   Limit saturated and trans fat  · Read food labels, and try to avoid saturated and trans fats. They increase your risk of heart disease. · Use olive or canola oil when you cook. · Bake, broil, grill, or steam foods instead of frying them. · Choose lean meats instead of high-fat meats such as hot dogs and sausages. Cut off all visible fat when you prepare meat. · Eat fish, skinless poultry, and meat alternatives such as soy products instead of high-fat meats. Soy products, such as tofu, may be especially good for your heart. · Choose low-fat or fat-free milk and dairy products. Eat foods high in fiber  · Eat a variety of grain products every day. Include whole-grain foods that have lots of fiber and nutrients. Examples of whole-grain foods include oats, whole wheat bread, and brown rice. · Buy whole-grain breads and cereals, instead of white bread or pastries. Limit salt and sodium  · Limit how much salt and sodium you eat to help lower your blood pressure. · Taste food before you salt it. Add only a little salt when you think you need it. With time, your taste buds will adjust to less salt. · Eat fewer snack items, fast foods, and other high-salt, processed foods. Check food labels for the amount of sodium in packaged foods. · Choose low-sodium versions of canned goods (such as soups, vegetables, and beans). Limit sugar  · Limit drinks and foods with added sugar. These include candy, desserts, and soda pop. Limit alcohol  · Limit alcohol to no more than 2 drinks a day for men and 1 drink a day for women. Too much alcohol can cause health problems. When should you call for help? Watch closely for changes in your health, and be sure to contact your doctor if:    · You would like help planning heart-healthy meals. Where can you learn more? Go to http://www.[employer].com/  Enter V137 in the search box to learn more about \"Heart-Healthy Diet: Care Instructions. \"  Current as of: August 22, 2019               Content Version: 12.6  © 2284-5293 [employer]. Care instructions adapted under license by [employer] (which disclaims liability or warranty for this information). If you have questions about a medical condition or this instruction, always ask your healthcare professional. [address] any warranty or liability for your use of this information. Learning About Low-Sodium Foods  What foods are low in sodium? The foods you eat contain nutrients, such as vitamins and minerals. Sodium is a nutrient. Your body needs the right amount to stay healthy and work as it should. You can use the list below to help you make choices about which foods to eat. Fruits  · Fresh, frozen, canned, or dried fruit  Vegetables  · Fresh or frozen vegetables, with no added salt  · Canned vegetables, low-sodium or with no added salt  Grains  · Bagels without salted tops  · Cereal with no added salt  · Corn tortillas  · Crackers with no added salt  · Oatmeal, cooked without salt  · Popcorn with no salt  · Pasta and noodles, cooked without salt  · Rice, cooked without salt  · Unsalted pretzels  Dairy and dairy alternatives  · Butter, unsalted  · Cream cheese  · Ice cream  · Milk  · Soy milk  Meats and other protein foods  · Beans and peas, canned with no salt  · Eggs  · Fresh fish (not smoked)  · Fresh meats (not smoked or cured)  · Nuts and nut butter, prepared without salt  · Poultry, not packaged with sodium solution  · Tofu, unseasoned  · Tuna, canned without salt  Seasonings  · Garlic  · Herbs and spices  · Lemon juice  · Mustard  · Olive oil  · Salt-free seasoning mixes  · Vinegar  Work with your doctor to find out how much of this nutrient you need. Depending on your health, you may need more or less of it in your diet. Where can you learn more?   Go to http://www.gray.com/  Enter S460 in the search box to learn more about \"Learning About Low-Sodium Foods.\"  Current as of: August 22, 2019               Content Version: 12.6  © 1704-3242 [employer]. Care instructions adapted under license by [employer] (which disclaims liability or warranty for this information). If you have questions about a medical condition or this instruction, always ask your healthcare professional. [address] any warranty or liability for your use of this information. Low Sodium Diet (2,000 Milligram): Care Instructions  Your Care Instructions     Too much sodium causes your body to hold on to extra water. This can raise your blood pressure and force your heart and kidneys to work harder. In very serious cases, this could cause you to be put in the hospital. It might even be life-threatening. By limiting sodium, you will feel better and lower your risk of serious problems. The most common source of sodium is salt. People get most of the salt in their diet from canned, prepared, and packaged foods. Fast food and restaurant meals also are very high in sodium. Your doctor will probably limit your sodium to less than 2,000 milligrams (mg) a day. This limit counts all the sodium in prepared and packaged foods and any salt you add to your food. Follow-up care is a key part of your treatment and safety. Be sure to make and go to all appointments, and call your doctor if you are having problems. It's also a good idea to know your test results and keep a list of the medicines you take. How can you care for yourself at home? Read food labels  · Read labels on cans and food packages. The labels tell you how much sodium is in each serving. Make sure that you look at the serving size. If you eat more than the serving size, you have eaten more sodium. · Food labels also tell you the Percent Daily Value for sodium. Choose products with low Percent Daily Values for sodium.   · Be aware that sodium can come in forms other than salt, including monosodium glutamate (MSG), sodium citrate, and sodium bicarbonate (baking soda). MSG is often added to Asian food. When you eat out, you can sometimes ask for food without MSG or added salt. Buy low-sodium foods  · Buy foods that are labeled \"unsalted\" (no salt added), \"sodium-free\" (less than 5 mg of sodium per serving), or \"low-sodium\" (less than 140 mg of sodium per serving). Foods labeled \"reduced-sodium\" and \"light sodium\" may still have too much sodium. Be sure to read the label to see how much sodium you are getting. · Buy fresh vegetables, or frozen vegetables without added sauces. Buy low-sodium versions of canned vegetables, soups, and other canned goods. Prepare low-sodium meals  · Cut back on the amount of salt you use in cooking. This will help you adjust to the taste. Do not add salt after cooking. One teaspoon of salt has about 2,300 mg of sodium. · Take the salt shaker off the table. · Flavor your food with garlic, lemon juice, onion, vinegar, herbs, and spices. Do not use soy sauce, lite soy sauce, steak sauce, onion salt, garlic salt, celery salt, mustard, or ketchup on your food. · Use low-sodium salad dressings, sauces, and ketchup. Or make your own salad dressings and sauces without adding salt. · Use less salt (or none) when recipes call for it. You can often use half the salt a recipe calls for without losing flavor. Other foods such as rice, pasta, and grains do not need added salt. · Rinse canned vegetables, and cook them in fresh water. This removes some--but not all--of the salt. · Avoid water that is naturally high in sodium or that has been treated with water softeners, which add sodium. Call your local water company to find out the sodium content of your water supply. If you buy bottled water, read the label and choose a sodium-free brand. Avoid high-sodium foods  · Avoid eating:  ? Smoked, cured, salted, and canned meat, fish, and poultry.   ? Ham, tellez, hot dogs, and luncheon meats. ? Regular, hard, and processed cheese and regular peanut butter. ? Crackers with salted tops, and other salted snack foods such as pretzels, chips, and salted popcorn. ? Frozen prepared meals, unless labeled low-sodium. ? Canned and dried soups, broths, and bouillon, unless labeled sodium-free or low-sodium. ? Canned vegetables, unless labeled sodium-free or low-sodium. ? [place] fries, pizza, tacos, and other fast foods. ? Pickles, olives, ketchup, and other condiments, especially soy sauce, unless labeled sodium-free or low-sodium. Where can you learn more? Go to http://www.gray.com/  Enter V843 in the search box to learn more about \"Low Sodium Diet (2,000 Milligram): Care Instructions. \"  Current as of: August 22, 2019               Content Version: 12.6  © 9015-0223 [employer]. Care instructions adapted under license by [employer] (which disclaims liability or warranty for this information). If you have questions about a medical condition or this instruction, always ask your healthcare professional. [place] [address] any warranty or liability for your use of this information.

## 2021-12-20 NOTE — PROGRESS NOTES
HPI:   Rosenda Patel is a 68y.o. year old female who presents today for a routine visit. She has a history of hypertension, hyperlipidemia, impaired fasting glucose, osteoporosis, left sciatica, and chronic microhematuria. She has completed the Moderna COVID-19 vaccine series and received the Moderna booster dose. She reports that she is doing reasonably well, but states that she continues to be under a significant amount of stress related to recent illness and death of friends. She is otherwise without new complaints and feeling generally well. Summary of prior hospitalizations and medical history:  On 8/27/2019 and was noted to have elevated transaminases with AST 50 and ALT 67. She was instructed to discontinue all NSAIDS which she had been taking for her right foot pain. She was seen again on 9/25/2019 and repeat testing showed normalization of her transaminases. Repeat evaluation on 1/9/2020 and reported that she had been taking two Excedrin almost every morning due to a headache, and she also reported continuing to drink approximately two glasses of wine each night. Repeat labs showed repeat elevation of transaminases with AST 50 and ALT 67, while other liver function tests remained normal. She was instructed to discontinue Excedrin and alcohol, and underwent an abdominal ultrasound which showed normal sized liver with mildly diffusely coarsened echogenicity. On 6/25/2019, she reported that two weeks prior, while returning home from her granddaughter's graduation, she and her  stopped at a restaurant for lunch. She states that while she was chewing a mouth full of food, her  noticed that she suddenly tilted her head to the left side and seemed to look dazed. She states that she remained awake and was aware that he was talking to her, but she states that she could not respond.  She states that the episode lasted approximately 30 seconds and resolved, and she proceeded to complete her chewing and swallow her food. She states that she did not lose consciousness, or notice any headache, visual changes, lightheadedness, tongue biting, incontinence, focal deficits, or seizure like activity. She denied any chest pain, shortness of breath, palpitations, pleuritic chest pain, hemoptysis, calf pain or swelling. 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 She also had a carotid duplex scan (7/9/2019) showing only mild (< 50%) stenosis of the bilateral internal carotid arteries. She underwent evaluation by Dr. Emmett Peralta in 10/2019, and he did not feel that the episode represented a TIA or other neurologic episode. He felt that no further evaluation was needed. She has not had a recurrence of these symptoms. On 4/15/2019, she underwent evaluation at [facility] for right foot pain. She denied known injury. Right foot x-ray showed a displaced third metatarsal fracture and she was referred to Dr. Shai Levy for evaluation. Given the displacement and risk of metatarsalgia, surgery was recommended, and on 4/24/2019, she underwent open reduction/internal fixation of the third metatarsal with putty bone grafting. She reports that she did well after surgery, and and was non-weight bearing in a CAM boot for several months. On 5/15/2019, she presented complaining of bilateral hand and wrist pain. She denied any fever, chills, sore throat, rashes, dyspnea, cough, abdominal pain, dysuria, focal weakness, visual changes, or headache. She did admit to sleeping with her bilateral hands tucked beneath her in a flexed position while lying on her back due to her limitations from her foot surgery. She also reported that she had been using a walker to ambulate and gripping it tightly with her hands.  Lab evaluation including MANOLO, RF, CCP, ESR, uric acid were normal, but CRP was increased to 9.1. A diagnosis of presumed carpal tunnel syndrome was made, and her discomfort resolved with the use of wrist splints. She was released to weight bear as tolerate in 7/2019, and has not had any significant pain although has been experiencing some swelling which resolves with elevation. She has a history of osteopenia, and in 2009, she sustained a fracture of her left 3rd toe (3/2009) and her distal radius (12/2009). At that time, she was started on alendronate and was treated until 11/2014. She had a repeat bone density study in 7/2016 showing T-scores: femoral neck left -2.3/ right -2.0 and lumbar -2.2. She was restarted on alendronate in 12/2016. She underwent repeat bone density study in 8/2019 showing T-scores: femoral neck left -2.2/ right -1.9 and lumbar -2.6. Due to lack of improvement, she underwent evaluation by Dr. Ac Wharton in 11/2019 and recommendation was that she change to Prolia. She continues to take calcium and Vitamin D supplements. She has a history of hypertension, treated with lisinopril and hydrochlorothiazide. She has been checking her blood pressure intermittently and reports that most readings are with a systolic blood pressure <161. She remains quite active, although has not been having time to exercise. She reports that she has continued stress in her life, helping to care for her 's mother (dementia), two disabled cousins, and her sister. She denies any chest pain, shortness of breath at rest or with exertion, palpitations, lightheadedness, or edema. She also has a history of hyperlipidemia, treated with moderate intensity dose atorvastatin. She has no history of ASCVD. In 2/2017, she was evaluated by DEREK Walter with left lower back pain and left sciatica. She had been taking ibuprofen without relief, and was given a course of prednisone.  She states that she did not find this helpful either, but reports resolution of symptoms after visiting a chiropractor. She states that she no longer is experiencing pain and is able to ambulate without difficulty. She will continue to visit a chiropractor periodically when she develops a recurrence of pain with improvement. She reports that she had an extensive workup for microhematuria in the past, which was negative (records unavailable). She denies any dysuria, gross hematuria, or flank pain. She has had screening colonoscopy with Dr Patrice Horner in 10/2010, which was normal except for hemorrhoids. Recommendation for follow-up is for 10 years. She underwent a screening colonoscopy (11/17/2020) by Dr. Elvie Mitchell and was found to have internal and external hemorrhoids as well as 10 sessile 4-10 mm polyps in the descending colon (2), transverse colon (1), sigmoid colon (1), and rectum (6). Pathology showed multiple tubular adenomas. Recommendation is for repeat colonoscopy in 3 years. She denies any abdominal pain, nausea, vomiting, melena, hematochezia, or change in bowel movements. She has a history of eczema and is now being followed by Dr. Laura Hammond. She found Eucrisa to be very effective, but not covered by her insurance. Using triamcinolone cream intermittently. She also has a history of candidal intertrigo, treated with lotrisone with good response. She has a history of basal cell carcinomas, and underwent removal from her face by Dr. Sherly Donaldson. She also reports that she underwent radiation therapy to her right shoulder (as a child) for treatment of a birthmark. On 7/20/2018, she presented for evaluation to [facility] for a right upper back rash and was diagnosed with herpes zoster. She was treated with Valtrex with improvement.       Past Medical History:   Diagnosis Date    Eczema     Essential hypertension     FH: breast cancer     Fibroids     uterine    Fracture of radius 11/2009    with ulna, left, closed    History of cataract     History of foot fracture     right and left    Hyperlipidemia     Microhematuria     chronic    Osteopenia     Psoriasis     S/P radiation therapy     as child, to right shoulder for birthmark    Tinnitus     Zoster     right scapula     Past Surgical History:   Procedure Laterality Date    COLONOSCOPY N/A 11/17/2020    COLONOSCOPY with polypectomies performed by Sean Mcclendon MD at [address] HX CATARACT REMOVAL      HX COLONOSCOPY      HX FRACTURE TX Right 04/24/2019    right foot 3rd metatarsal fracture, screws and a plate    HX ORTHOPAEDIC      left arm    HX WISDOM TEETH EXTRACTION      HX WRIST FRACTURE TX       Current Outpatient Medications   Medication Sig    cycloSPORINE (Restasis) 0.05 % dpet Administer 1 Drop to both eyes every twelve (12) hours.  atorvastatin (LIPITOR) 20 mg tablet TAKE 1 TABLET BY MOUTH DAILY    hydroCHLOROthiazide (HYDRODIURIL) 25 mg tablet TAKE 1 TABLET BY MOUTH DAILY    lisinopriL (PRINIVIL, ZESTRIL) 10 mg tablet TAKE 1 TABLET BY MOUTH DAILY    betamethasone dipropionate (DIPROSONE) 0.05 % topical cream Apply  to affected area two (2) times daily as needed for Skin Irritation.  denosumab (PROLIA) 60 mg/mL injection 60 mg by SubCUTAneous route every 6 months.  Lactobacillus acidophilus (PROBIOTIC PO) Take  by mouth.  folic acid/multivit-min/lutein (CENTRUM SILVER PO) Take  by mouth.  aspirin delayed-release 81 mg tablet Take  by mouth daily.  amoxicillin 500 mg tab TAKE ONE CAPSULE PO TID, START ONE DAY PRIOR TO PROCEDURE, DAY OF PROCEDURE AND DAY AFTER PROCEDURE    triamcinolone acetonide (KENALOG) 0.025 % topical cream Apply  to affected area two (2) times a day. (Patient taking differently: Apply  to affected area two (2) times daily as needed.)    clotrimazole-betamethasone (LOTRISONE) topical cream Apply  to affected area two (2) times a day.  (Patient taking differently: Apply  to affected area two (2) times daily as needed.)    docusate sodium (COLACE) 100 mg capsule Take 100 mg by mouth daily as needed for Constipation.  co-enzyme Q-10 (CO Q-10) 100 mg capsule Take 100 mg by mouth daily.  biotin 2,500 mcg Tab Take 2,500 mcg by mouth.  cholecalciferol (VITAMIN D3) 1,000 unit cap Take 1 Tab by mouth daily.  fexofenadine (ALLEGRA) 60 mg tablet Take 60 mg by mouth daily.  CALCIUM CARBONATE/VITAMIN D3 (CALCIUM + D PO) Take 1 Tab by mouth two (2) times a day. No current facility-administered medications for this visit. Allergies and Intolerances: Allergies   Allergen Reactions    Codeine Nausea Only    Hibiclens [Chlorhexidine Gluconate] Rash     Family History: Her sister has breast cancer and her mother had pancreatic cancer. No history of colon cancer. Family History   Problem Relation Age of Onset   [place] Cancer Mother         pancreatic    Heart Disease Mother     Heart Disease Father     Cancer Sister         breast    Mult Sclerosis Sister     Breast Cancer Sister 62     Social History:   She  reports that she has never smoked. She has never used smokeless tobacco. She has about 2 glasses of wine each night. She is  with one adult daughter who lives with her family on [address], [place]. She is retired, and was a 4th- for 25 years.     Social History     Substance and Sexual Activity   Alcohol Use Yes    Alcohol/week: 0.0 standard drinks    Comment: occasionally     Immunization History:  Immunization History   Administered Date(s) Administered    (RETIRED) Pneumococcal Vaccine (Unspecified Type) 05/04/2010    COVID-19, Moderna Booster, PF, 0.25mL Dose 11/04/2021    COVID-19, Dileep Rollinsman, Primary or Immunocompromised Series, MRNA, PF, 100mcg/0.5mL 01/28/2021, 02/25/2021    Influenza High Dose Vaccine PF 12/13/2016, 10/03/2017    Influenza Vaccine 10/24/2014    Influenza Vaccine ([address] [place] [address]) PF (>6 Mo Flulaval, Fluarix, and >3 Yrs Afluria, Fluzone [id number]) 09/25/2021    Influenza Vaccine (Tri) Adjuvanted (>65 Yrs FLUAD TRI [id number]) 10/09/2018, 11/13/2019    Influenza Vaccine PF 11/05/2013    Influenza Vaccine Split 11/01/2011, 11/06/2012    Influenza, Quadrivalent, Adjuvanted (>65 Yrs FLUAD QUAD [id number]) 09/16/2020    Pneumococcal Conjugate (PCV-13) 05/05/2015    Pneumococcal Polysaccharide (PPSV-23) 05/04/2010    TD Vaccine 05/05/2009    Tdap 12/12/2016    Zoster Recombinant 09/21/2019, 12/28/2019    Zoster Vaccine, Live 05/07/2013       Review of Systems:   As above included in HPI. Otherwise 11 point review of systems negative including constitutional, skin, HENT, eyes, respiratory, cardiovascular, gastrointestinal, genitourinary, musculoskeletal, endo/heme/aller, neurological.    Physical:     Visit Vitals  /76 (BP 1 Location: Left arm, BP Patient Position: Sitting)   Pulse 88   Temp 97 °F (36.1 °C) (Temporal)   Resp 16   Ht 5' 5\" (1.651 m)   Wt 155 lb 12.8 oz (70.7 kg)   LMP  (LMP Unknown)   SpO2 98%   BMI 25.93 kg/m²     Exam:    Patient appears in no apparent distress. Affect is appropriate. HEENT: PERRLA, anicteric, no JVD, adenopathy or thyromegaly. No carotid bruits or radiated murmur. Lungs: clear to auscultation, no wheezes, rhonchi, or rales. Heart: regular rate and rhythm. No murmur, rubs, gallops  Abdomen: soft, nontender, nondistended, normal bowel sounds, no hepatosplenomegaly or masses. Extremities: without edema.           Review of Data:  Labs:  Hospital Outpatient Visit on 12/06/2021   Component Date Value Ref Range Status    Hemoglobin A1c 12/06/2021 5.5  4.2 - 5.6 % Final    Est. average glucose 12/06/2021 111  mg/dL Final    LIPID PROFILE 12/06/2021        Final    Cholesterol, total 12/06/2021 167  <200 MG/DL Final    Triglyceride 12/06/2021 100  <150 MG/DL Final    HDL Cholesterol 12/06/2021 71* 40 - 60 MG/DL Final    LDL, calculated 12/06/2021 76  0 - 100 MG/DL Final    VLDL, calculated 12/06/2021 20  MG/DL Final    CHOL/HDL Ratio 12/06/2021 2.4  0 - 5.0   Final    Magnesium 12/06/2021 2.0  1.6 - 2.6 mg/dL Final    Sodium 12/06/2021 139  136 - 145 mmol/L Final    Potassium 12/06/2021 3.9  3.5 - 5.5 mmol/L Final    Chloride 12/06/2021 104  100 - 111 mmol/L Final    CO2 12/06/2021 29  21 - 32 mmol/L Final    Anion gap 12/06/2021 6  3.0 - 18 mmol/L Final    Glucose 12/06/2021 86  74 - 99 mg/dL Final    BUN 12/06/2021 20* 7.0 - 18 MG/DL Final    Creatinine 12/06/2021 0.68  0.6 - 1.3 MG/DL Final    BUN/Creatinine ratio 12/06/2021 29* 12 - 20   Final    GFR est AA 12/06/2021 >60  >60 ml/min/1.73m2 Final    GFR est non-AA 12/06/2021 >60  >60 ml/min/1.73m2 Final    Calcium 12/06/2021 9.1  8.5 - 10.1 MG/DL Final    Bilirubin, total 12/06/2021 0.5  0.2 - 1.0 MG/DL Final    ALT (SGPT) 12/06/2021 29  13 - 56 U/L Final    AST (SGOT) 12/06/2021 24  10 - 38 U/L Final    Alk. phosphatase 12/06/2021 50  45 - 117 U/L Final    Protein, total 12/06/2021 7.3  6.4 - 8.2 g/dL Final    Albumin 12/06/2021 4.0  3.4 - 5.0 g/dL Final    Globulin 12/06/2021 3.3  2.0 - 4.0 g/dL Final    A-G Ratio 12/06/2021 1.2  0.8 - 1.7   Final    Vitamin D 25-Hydroxy 12/06/2021 48.7  30 - 100 ng/mL Final         Health Maintenance:  Screening:    Mammogram: negative (8/2021)   PAP smear: S/P ALMAS   Colorectal: colonoscopy (11/2020) tubular adenomas. Dr. Rico Tinajero Due 11/2023. Depression: none   DM (HbA1c/FPG): HbA1c 5.5 (12/2021)   Hepatitis C: negative (5/2016)   Falls: none   DEXA: osteopenia (8/2019). On Prolia. Dr. Tash Pastor.     Glaucoma: regular eye exams with Dr. Yoav Guzman (last 1/2021)   Smoking: none   Vitamin D: 48.7 (12/2021)   Medicare Wellness: 6/10/2021      Impression:  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    Tinnitus H93.19    Microhematuria R31.29    Eczema L30.9    Osteopenia M85.80    Elevated TSH R79.89    Impaired fasting glucose R73.01    Candidal intertrigo B37.2    Carpal tunnel syndrome, bilateral G56.03    Osteoporosis of lumbar spine M81.0    History of nonmelanoma skin cancer Z85.828    Overweight with body mass index (BMI) 25.0-29.9 E66.3    Prediabetes R73.03       Plan:  1. Hypertension. Remains well controlled on current regimen of lisinopril 10 mg daily and hydrochlorothiazide 25 mg daily. Renal function remains normal with creatinine 0.68/ eGFR >60, but BUN/creatinine ratio remains increased to 29. Encouraged to continue to drink plenty of fluids. Continue to follow. 3. Hyperlipidemia. On moderate intensity dose atorvastatin (20 mg) with LDL 76 and HDL 71, indicative of good control. Emphasized importance of lifestyle modifications, including diet, exercise, and weight loss. Will continue to follow. 3. Prediabetes. Normal fasting glucose today and HbA1c normalized to 5.5. Weight overall stable. Emphasized importance of lifestyle modifications, including diet, exercise, and weight loss. Will reassess next visit. 4. Elevated transaminases. New onset mild elevation noted in 8/2019. Patient reported that she had been taking NSAIDS frequently to help control foot pain. Advised to discontinue and repeated in 9/2019 with normalization. Lab evaluation, including AMA, actin, hepatitis profile, CK, aldolase, celiac panel, ESR, CRP, and MANOLO, were negative, but did identify mildly low iron stores. Repeat 1/2020 again showed mild elevation of transaminases with otherwise normal LFT's. Reported had been taking Excedrin and did admit to 2 glasses of wine each night. Advised to discontinue both. Abdominal ultrasound (1/2020) showed mildly increased echogenicity of liver consistent with hepatic steatosis, but otherwise normal.  Subsequent normalization and repeat liver function tests today remain normal. Advised to minimize alcohol and NSAIDS.  Also, emphasized importance of lifestyle modifications, including diet, exercise, and weight loss. 5. Osteoporosis. History of radial and toe fracture in 2009. Treated from 11/2009 to 11/2014 with alendronate. Repeat bone density scan in 7/2016 with 10 year risk of a major osteoporetic fracture at 14 % and hip fracture at 3.4 %. Restarted Fosamax in 12/2016. New right metatarsal displaced fracture without known injury in 6/2019. Repeat bone density on 8/6/2019 showed T-scores:  femoral neck left -2.2  /right -1.9 and lumbar -2.6 . Calculated FRAX score estimated her 10 year risk of a major osteoporetic fracture at 14 % and hip fracture at 3.9 %. Comparison to her prior study in 7/2016 showed stability in her bilateral hips, but some worsening in her lumbar spine. Obtained lab evaluation for secondary osteoporosis, and TSH, intact PTH, calcium, vitamin D level, and gammopathy panel normal. Given recent fracture and some worsening of bone density scan despite treatment with biphosphonate, referred to Dr. Yuly Patel in 11/2019 and recommendation was that she change to Prolia. Reports tolerating well with last dose in 8/2021. Scheduled for repeat bone density study in 1/2022. Continue calcium and Vitamin D supplements, and encouraged her to exercise, particularly weight bearing activities. Fall precautions stressed. 6. History of displaced right third metatarsal fracture, s/p open reduction/internal fixation with putty bone grafting. Doing well without significant sequela. 7. History of iron deficiency without anemia. Noted in 9/2019 and may have been related to foot surgery. Improved with supplementation. Colonoscopy completed (11/2020) and 10 polyps removed with majority of polyps' pathology showing tubular adenomas. Repeat in 3 years recommended (due 11/2023). 8. Family history of breast cancer. Patient with family history of breast cancer in first degree relative (sister). Discussed recommendations for breast cancer screening in women with lifetime risk >20%. 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 No other family member with breast cancer. Wishing to continue with annual mammograms and breast exams. 9. Chronic microhematuria. Work-up reportedly negative in past.  Will continue to monitor. 10. Eczema. Now well controlled on triamcinolone cream for flares and Eucerin cream.  Being followed by Dr. Calli Combs  11. Nonmelanoma skin cancers. 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 Lorie Pop. Continuing to follow with Dr. Calli Combs for her regular skin exams annually. 12. Elevated TSH. Repeat TSH mildly elevated today with normal free T4. Patient with history of radiation therapy for a birthmark on her right shoulder as a child. Will continue to follow. 13. Anxiety. Reactive symptoms of anxiety and depression to the death of a close friend and illness involving other close friends. Not wishing to begin medication currently but will call if symptoms worsen. 14. Overweight. Weight overall stable over the last 6 months. Emphasized importance of continued lifestyle modifications. Will continue to follow. 15. Health maintenance. Completed Moderna COVID-19 vaccine series and received the Moderna booster dose. Already received the influenza vaccine. Received 2/2 doses of Shingrix vaccine. Other immunizations up to date. Mammogram up to date. Colonoscopy completed and repeat due in 2023. Continue regular eye exams with Dr. Otoniel Thomas. Vitamin D level remains normal on maintenance dose supplement. Medicare wellness visit up-to-date. Patient understands recommendations and agrees with plan. Follow-up in 6 months.

## 2022-01-12 ENCOUNTER — HOSPITAL ENCOUNTER (OUTPATIENT)
Dept: BONE DENSITY | Age: 77
Discharge: HOME | End: 2022-01-12
Attending: INTERNAL MEDICINE
Payer: MEDICARE

## 2022-01-12 DIAGNOSIS — M81.0 SENILE OSTEOPOROSIS: ICD-10-CM

## 2022-01-12 PROCEDURE — 77080 DXA BONE DENSITY AXIAL: CPT

## 2022-03-14 NOTE — PATIENT INSTRUCTIONS
Heart-Healthy Diet: Care Instructions Your Care Instructions A heart-healthy diet has lots of vegetables, fruits, nuts, beans, and whole grains, and is low in salt. It limits foods that are high in saturated fat, such as meats, cheeses, and fried foods. It may be hard to change your diet, but even small changes can lower your risk of heart attack and heart disease. Follow-up care is a key part of your treatment and safety. Be sure to make and go to all appointments, and call your doctor if you are having problems. It's also a good idea to know your test results and keep a list of the medicines you take. How can you care for yourself at home? Watch your portions · Learn what a serving is. A \"serving\" and a \"portion\" are not always the same thing. Make sure that you are not eating larger portions than are recommended. For example, a serving of pasta is ½ cup. A serving size of meat is 2 to 3 ounces. A 3-ounce serving is about the size of a deck of cards. Measure serving sizes until you are good at [place]" them. Keep in mind that restaurants often serve portions that are 2 or 3 times the size of one serving. · To keep your energy level up and keep you from feeling hungry, eat often but in smaller portions. · Eat only the number of calories you need to stay at a healthy weight. If you need to lose weight, eat fewer calories than your body burns (through exercise and other physical activity). Eat more fruits and vegetables · Eat a variety of fruit and vegetables every day. Dark green, deep orange, red, or yellow fruits and vegetables are especially good for you. Examples include spinach, carrots, peaches, and berries. · Keep carrots, celery, and other veggies handy for snacks. Buy fruit that is in season and store it where you can see it so that you will be tempted to eat it. · Cook dishes that have a lot of veggies in them, such as stir-fries and soups. Limit saturated and trans fat · Read food labels, and try to avoid saturated and trans fats. They increase your risk of heart disease. · Use olive or canola oil when you cook. · Bake, broil, grill, or steam foods instead of frying them. · Choose lean meats instead of high-fat meats such as hot dogs and sausages. Cut off all visible fat when you prepare meat. · Eat fish, skinless poultry, and meat alternatives such as soy products instead of high-fat meats. Soy products, such as tofu, may be especially good for your heart. · Choose low-fat or fat-free milk and dairy products. Eat foods high in fiber · Eat a variety of grain products every day. Include whole-grain foods that have lots of fiber and nutrients. Examples of whole-grain foods include oats, whole wheat bread, and brown rice. · Buy whole-grain breads and cereals, instead of white bread or pastries. Limit salt and sodium · Limit how much salt and sodium you eat to help lower your blood pressure. · Taste food before you salt it. Add only a little salt when you think you need it. With time, your taste buds will adjust to less salt. · Eat fewer snack items, fast foods, and other high-salt, processed foods. Check food labels for the amount of sodium in packaged foods. · Choose low-sodium versions of canned goods (such as soups, vegetables, and beans). Limit sugar · Limit drinks and foods with added sugar. These include candy, desserts, and soda pop. Limit alcohol · Limit alcohol to no more than 2 drinks a day for men and 1 drink a day for women. Too much alcohol can cause health problems. When should you call for help? Watch closely for changes in your health, and be sure to contact your doctor if: 
  · You would like help planning heart-healthy meals. Where can you learn more? Go to http://www.[employer].com/ Enter V137 in the search box to learn more about \"Heart-Healthy Diet: Care Instructions. \" 
 Current as of: August 22, 2019               Content Version: 12.6 © 4908-1547 [employer]. Care instructions adapted under license by [employer] (which disclaims liability or warranty for this information). If you have questions about a medical condition or this instruction, always ask your healthcare professional. [address] any warranty or liability for your use of this information. No

## 2022-03-18 PROBLEM — Z85.828 HISTORY OF NONMELANOMA SKIN CANCER: Status: ACTIVE | Noted: 2021-01-10

## 2022-03-19 ENCOUNTER — HOSPITAL ENCOUNTER (EMERGENCY)
Age: 77
Discharge: HOME | End: 2022-03-19
Attending: STUDENT IN AN ORGANIZED HEALTH CARE EDUCATION/TRAINING PROGRAM
Payer: MEDICARE

## 2022-03-19 ENCOUNTER — APPOINTMENT (OUTPATIENT)
Dept: GENERAL RADIOLOGY | Age: 77
End: 2022-03-19
Attending: STUDENT IN AN ORGANIZED HEALTH CARE EDUCATION/TRAINING PROGRAM
Payer: MEDICARE

## 2022-03-19 VITALS
BODY MASS INDEX: 25.96 KG/M2 | HEART RATE: 86 BPM | HEIGHT: 65 IN | SYSTOLIC BLOOD PRESSURE: 116 MMHG | DIASTOLIC BLOOD PRESSURE: 78 MMHG | OXYGEN SATURATION: 95 % | RESPIRATION RATE: 17 BRPM | WEIGHT: 155.8 LBS | TEMPERATURE: 97.6 F

## 2022-03-19 DIAGNOSIS — R07.9 ACUTE CHEST PAIN: Primary | ICD-10-CM

## 2022-03-19 DIAGNOSIS — T14.8XXA MUSCLE STRAIN: ICD-10-CM

## 2022-03-19 PROBLEM — G56.03 CARPAL TUNNEL SYNDROME, BILATERAL: Status: ACTIVE | Noted: 2019-06-29

## 2022-03-19 PROBLEM — M81.0 OSTEOPOROSIS OF LUMBAR SPINE: Status: ACTIVE | Noted: 2019-08-12

## 2022-03-19 PROBLEM — R73.03 PREDIABETES: Status: ACTIVE | Noted: 2021-06-12

## 2022-03-19 PROBLEM — E66.3 OVERWEIGHT WITH BODY MASS INDEX (BMI) 25.0-29.9: Status: ACTIVE | Noted: 2021-01-10

## 2022-03-19 LAB
ANION GAP SERPL CALC-SCNC: 6 MMOL/L (ref 3–18)
BASOPHILS # BLD: 0 K/UL (ref 0–0.1)
BASOPHILS NFR BLD: 1 % (ref 0–2)
BUN SERPL-MCNC: 19 MG/DL (ref 7–18)
BUN/CREAT SERPL: 25 (ref 12–20)
CALCIUM SERPL-MCNC: 9.4 MG/DL (ref 8.5–10.1)
CHLORIDE SERPL-SCNC: 105 MMOL/L (ref 100–111)
CO2 SERPL-SCNC: 26 MMOL/L (ref 21–32)
CREAT SERPL-MCNC: 0.77 MG/DL (ref 0.6–1.3)
DIFFERENTIAL METHOD BLD: ABNORMAL
EOSINOPHIL # BLD: 0.2 K/UL (ref 0–0.4)
EOSINOPHIL NFR BLD: 2 % (ref 0–5)
ERYTHROCYTE [DISTWIDTH] IN BLOOD BY AUTOMATED COUNT: 12.9 % (ref 11.6–14.5)
GLUCOSE SERPL-MCNC: 98 MG/DL (ref 74–99)
HCT VFR BLD AUTO: 40.3 % (ref 35–45)
HGB BLD-MCNC: 12.9 G/DL (ref 12–16)
IMM GRANULOCYTES # BLD AUTO: 0 K/UL (ref 0–0.04)
IMM GRANULOCYTES NFR BLD AUTO: 0 % (ref 0–0.5)
LYMPHOCYTES # BLD: 2 K/UL (ref 0.9–3.6)
LYMPHOCYTES NFR BLD: 23 % (ref 21–52)
MAGNESIUM SERPL-MCNC: 2 MG/DL (ref 1.6–2.6)
MCH RBC QN AUTO: 28.4 PG (ref 24–34)
MCHC RBC AUTO-ENTMCNC: 32 G/DL (ref 31–37)
MCV RBC AUTO: 88.8 FL (ref 78–100)
MONOCYTES # BLD: 0.7 K/UL (ref 0.05–1.2)
MONOCYTES NFR BLD: 8 % (ref 3–10)
NEUTS SEG # BLD: 5.8 K/UL (ref 1.8–8)
NEUTS SEG NFR BLD: 66 % (ref 40–73)
NRBC # BLD: 0 K/UL (ref 0–0.01)
NRBC BLD-RTO: 0 PER 100 WBC
PLATELET # BLD AUTO: 240 K/UL (ref 135–420)
PMV BLD AUTO: 8.9 FL (ref 9.2–11.8)
POTASSIUM SERPL-SCNC: 4.2 MMOL/L (ref 3.5–5.5)
RBC # BLD AUTO: 4.54 M/UL (ref 4.2–5.3)
SODIUM SERPL-SCNC: 137 MMOL/L (ref 136–145)
TROPONIN-HIGH SENSITIVITY: 3 NG/L (ref 0–54)
TROPONIN-HIGH SENSITIVITY: <3 NG/L (ref 0–54)
WBC # BLD AUTO: 8.8 K/UL (ref 4.6–13.2)

## 2022-03-19 PROCEDURE — 85025 COMPLETE CBC W/AUTO DIFF WBC: CPT

## 2022-03-19 PROCEDURE — 99285 EMERGENCY DEPT VISIT HI MDM: CPT

## 2022-03-19 PROCEDURE — 84484 ASSAY OF TROPONIN QUANT: CPT

## 2022-03-19 PROCEDURE — 83735 ASSAY OF MAGNESIUM: CPT

## 2022-03-19 PROCEDURE — 93005 ELECTROCARDIOGRAM TRACING: CPT

## 2022-03-19 PROCEDURE — 71046 X-RAY EXAM CHEST 2 VIEWS: CPT

## 2022-03-19 PROCEDURE — 74011250637 HC RX REV CODE- 250/637: Performed by: STUDENT IN AN ORGANIZED HEALTH CARE EDUCATION/TRAINING PROGRAM

## 2022-03-19 PROCEDURE — 80048 BASIC METABOLIC PNL TOTAL CA: CPT

## 2022-03-19 RX ORDER — NAPROXEN 250 MG/1
500 TABLET ORAL 2 TIMES DAILY WITH MEALS
Qty: 30 TABLET | Refills: 0 | Status: SHIPPED | OUTPATIENT
Start: 2022-03-19 | End: 2022-07-03 | Stop reason: ALTCHOICE

## 2022-03-19 RX ORDER — GUAIFENESIN 100 MG/5ML
324 LIQUID (ML) ORAL
Status: COMPLETED | OUTPATIENT
Start: 2022-03-19 | End: 2022-03-19

## 2022-03-19 RX ADMIN — ASPIRIN 81 MG 324 MG: 81 TABLET ORAL at 05:41

## 2022-03-19 NOTE — ED NOTES
Patient care assumed from Dr. Lashawn Abbott. Refer to original note for more details. 66yo F presenting to the  ED for chest pain. Currently asymptomatic. Her lab work thus far has been unremarkable. First set of troponins negative. Pending repeat at time of signout     0850:  Repeat troponin negative. Patient has been updated on findings and reassured. She will be discharged home. Advised on follow-up with her primary as well as ED return precautions. She verbalizes understanding and agreement with plan.

## 2022-03-19 NOTE — ED PROVIDER NOTES
EMERGENCY DEPARTMENT HISTORY AND PHYSICAL EXAM      Date: (Not on file)  Patient Name: Linda Rivera    History of Presenting Illness     No chief complaint on file. History (Context): Linda Rivera is a 68 y.o. female with a past medical history significant for hyperlipidemia and hypertension comes into the ED today due to chest pain. Patient states chest pain began yesterday however significantly worsened approximately 1 hour prior to arrival.  Patient states pain is located to the bilateral upper back, left arm, and upper chest.  She states she is never had similar symptoms like this before. She describes pain as achy, constant, and without any alleviating or exacerbating factors. She states that she is not taking any medication for treatment of her symptoms prior to arrival.  Patient describes her symptoms as moderate in severity. She otherwise states has been feeling healthy without any fever, chills, dyspnea, cough, abdominal pain, nausea, vomit, or diarrhea. Patient denies previous MI or ACS in the past.  Of note patient does admit to being under a great deal of stress. PCP: Reynold Baumgarten, MD    Current Facility-Administered Medications   Medication Dose Route Frequency Provider Last Rate Last Admin    aspirin chewable tablet 324 mg  324 mg Oral NOW Khurram Price, DO         Current Outpatient Medications   Medication Sig Dispense Refill    cycloSPORINE (Restasis) 0.05 % dpet Administer 1 Drop to both eyes every twelve (12) hours.  atorvastatin (LIPITOR) 20 mg tablet TAKE 1 TABLET BY MOUTH DAILY 90 Tablet 3    hydroCHLOROthiazide (HYDRODIURIL) 25 mg tablet TAKE 1 TABLET BY MOUTH DAILY 90 Tablet 2    lisinopriL (PRINIVIL, ZESTRIL) 10 mg tablet TAKE 1 TABLET BY MOUTH DAILY 90 Tablet 2    betamethasone dipropionate (DIPROSONE) 0.05 % topical cream Apply  to affected area two (2) times daily as needed for Skin Irritation.       denosumab (PROLIA) 60 mg/mL injection 60 mg by SubCUTAneous route every 6 months.  Lactobacillus acidophilus (PROBIOTIC PO) Take  by mouth.  folic acid/multivit-min/lutein (CENTRUM SILVER PO) Take  by mouth.  aspirin delayed-release 81 mg tablet Take  by mouth daily.  amoxicillin 500 mg tab TAKE ONE CAPSULE PO TID, START ONE DAY PRIOR TO PROCEDURE, DAY OF PROCEDURE AND DAY AFTER PROCEDURE 10 Tab 1    triamcinolone acetonide (KENALOG) 0.025 % topical cream Apply  to affected area two (2) times a day. (Patient taking differently: Apply  to affected area two (2) times daily as needed.) 15 g 2    clotrimazole-betamethasone (LOTRISONE) topical cream Apply  to affected area two (2) times a day. (Patient taking differently: Apply  to affected area two (2) times daily as needed.) 15 g 2    docusate sodium (COLACE) 100 mg capsule Take 100 mg by mouth daily as needed for Constipation.  co-enzyme Q-10 (CO Q-10) 100 mg capsule Take 100 mg by mouth daily.  biotin 2,500 mcg Tab Take 2,500 mcg by mouth.  cholecalciferol (VITAMIN D3) 1,000 unit cap Take 1 Tab by mouth daily.  fexofenadine (ALLEGRA) 60 mg tablet Take 60 mg by mouth daily.  CALCIUM CARBONATE/VITAMIN D3 (CALCIUM + D PO) Take 1 Tab by mouth two (2) times a day.          Past History     Past Medical History:   Past Medical History:   Diagnosis Date    Eczema     Essential hypertension     FH: breast cancer     Fibroids     uterine    Fracture of radius 11/2009    with ulna, left, closed    History of cataract     History of foot fracture     right and left    Hyperlipidemia     Microhematuria     chronic    Osteopenia     Psoriasis     S/P radiation therapy     as child, to right shoulder for birthmark    Tinnitus     Zoster     right scapula       Past Surgical History:  Past Surgical History:   Procedure Laterality Date    COLONOSCOPY N/A 11/17/2020    COLONOSCOPY with polypectomies performed by Shamar Garcia MD at [address] HX CATARACT REMOVAL      HX COLONOSCOPY      HX FRACTURE TX Right 04/24/2019    right foot 3rd metatarsal fracture, screws and a plate    HX ORTHOPAEDIC      left arm    HX WISDOM TEETH EXTRACTION      HX WRIST FRACTURE TX         Family History:  Family History   Problem Relation Age of Onset    Cancer Mother         pancreatic    Heart Disease Mother     Heart Disease Father     Cancer Sister         breast    Mult Sclerosis Sister     Breast Cancer Sister 62       Social History:   Social History     Tobacco Use    Smoking status: Never Smoker    Smokeless tobacco: Never Used   Substance Use Topics    Alcohol use: Yes     Alcohol/week: 0.0 standard drinks     Comment: occasionally    Drug use: No       Allergies: Allergies   Allergen Reactions    Codeine Nausea Only    Hibiclens [Chlorhexidine Gluconate] Rash       PMH, PSH, family history, social history, allergies reviewed with the patient with significant items noted above. Review of Systems   Review of Systems   Constitutional: Negative for chills and fever. HENT: Negative for sore throat. Eyes: Negative for visual disturbance. Respiratory: Negative for shortness of breath. Cardiovascular: Positive for chest pain. Gastrointestinal: Negative for abdominal pain, nausea and vomiting. Genitourinary: Negative for difficulty urinating. Musculoskeletal: Positive for back pain. Negative for myalgias. Skin: Negative for rash. Neurological: Negative for headaches. Physical Exam   There were no vitals filed for this visit. Physical Exam  Vitals and nursing note reviewed. Constitutional:       General: She is not in acute distress. Appearance: Normal appearance. She is not ill-appearing or toxic-appearing. HENT:      Head: Normocephalic and atraumatic. Mouth/Throat:      Mouth: Mucous membranes are moist.   Eyes:      General: No scleral icterus.      Conjunctiva/sclera: Conjunctivae normal.   Cardiovascular: Rate and Rhythm: Normal rate and regular rhythm. Pulses: Normal pulses. Comments: Normal peripheral perfusion  Pulmonary:      Effort: Pulmonary effort is normal. No respiratory distress. Abdominal:      General: There is no distension. Palpations: Abdomen is soft. Tenderness: There is no abdominal tenderness. Musculoskeletal:         General: Tenderness (The bilateral upper trapezius muscles) present. No deformity. Normal range of motion. Cervical back: Normal range of motion and neck supple. Skin:     General: Skin is warm and dry. Findings: No rash. Neurological:      General: No focal deficit present. Mental Status: She is alert and oriented to person, place, and time. Mental status is at baseline. Psychiatric:         Mood and Affect: Mood normal.         Thought Content: Thought content normal.         Diagnostic Study Results     Labs -     Recent Results (from the past 12 hour(s))   EKG, 12 LEAD, INITIAL    Collection Time: 03/19/22  5:28 AM   Result Value Ref Range    Ventricular Rate 80 BPM    Atrial Rate 80 BPM    P-R Interval 170 ms    QRS Duration 80 ms    Q-T Interval 374 ms    QTC Calculation (Bezet) 431 ms    Calculated P Axis 32 degrees    Calculated R Axis -42 degrees    Calculated T Axis 27 degrees    Diagnosis       Normal sinus rhythm  Left axis deviation  Possible Anterior infarct (cited on or before 21-NOV-2009)  Abnormal ECG  When compared with ECG of 16-APR-2019 16:53,  No significant change was found        Labs Reviewed   CBC WITH AUTOMATED DIFF   METABOLIC PANEL, BASIC   TROPONIN-HIGH SENSITIVITY   MAGNESIUM       Radiologic Studies -   XR CHEST PA LAT    (Results Pending)     CT Results  (Last 48 hours)    None        CXR Results  (Last 48 hours)    None          The laboratory results, imaging results, and other diagnostic exams were reviewed in the EMR. Medical Decision Making   I am the first provider for this patient.     I reviewed the vital signs, available nursing notes, past medical history, past surgical history, family history and social history. Vital Signs-Reviewed the patient's vital signs. ED EKG interpretation:  Rhythm: normal sinus rhythm; and regular . Rate (approx.): 80; Axis: left axis deviation; P wave: normal; QRS interval: normal ; ST/T wave: normal; Other findings: borderline ekg. This EKG was interpreted by Megan Ibanez D.O. Records Reviewed: Personally, on initial evaluation    MDM:   Jarvis Colon presents with complaint of chest pain and back pain  DDX includes but is not limited to: ACS, MI, musculoskeletal chest pain    Patient overall well-appearing, no acute distress, nontoxic in appearance. Will obtain lab work and imaging for further evaluation of patients complaint. Will continue to monitor and evaluate patient while in the ED. Orders as below:  Orders Placed This Encounter    XR CHEST PA LAT    CBC WITH AUTOMATED DIFF    BASIC METABOLIC PANEL    TROPONIN-HIGH SENSITIVITY    MAGNESIUM    EKG, 12 LEAD, INITIAL    aspirin chewable tablet 324 mg        ED Course:        5:40 AM : Pt care transferred to Dr. Antony Hill  ,ED provider. History of patient complaint(s), available diagnostic reports and current treatment plan has been discussed thoroughly. Bedside rounding on patient occured : no . Intended disposition of patient : TBD  Pending diagnostics reports and/or labs (please list): Lab work    Dr. Miki See assistance in completion of this plan is greatly appreciated but it should be noted that I will be the provider of record for this patient. Procedures:  Procedures        Diagnosis and Disposition     CLINICAL IMPRESSION:  No diagnosis found. Current Discharge Medication List          Disposition:  TBD    Dragon Disclaimer     Please note that this dictation was completed with [employer], the computer voice recognition software.   Quite often unanticipated grammatical, syntax, homophones, and other interpretive errors are inadvertently transcribed by the computer software. Please disregard these errors. Please excuse any errors that have escaped final proofreading. Vladimir NIETO.

## 2022-03-19 NOTE — ED NOTES
Pt on continuous monitoring. No complaints at this time. No signs of distress.  Denies chest pain at this time

## 2022-03-19 NOTE — ED TRIAGE NOTES
Pt presents with c/o chest pain since yesterday. Reports she is taking care of four family members at home & this pain could be related to stress. She stated the pain is mostly in the upper back area, but when it traveled to her shoulder/upper arm area, she became concerned. She takes 81mg aspirin daily, with last dose yesterday. Denies SOB, n/v/d at time of provider assessment. EKG completed in triage & given to MD for review. PIV access obtained; blood drawn & taken to lab for processing.

## 2022-03-20 LAB
ATRIAL RATE: 80 BPM
CALCULATED P AXIS, ECG09: 32 DEGREES
CALCULATED R AXIS, ECG10: -42 DEGREES
CALCULATED T AXIS, ECG11: 27 DEGREES
DIAGNOSIS, 93000: NORMAL
P-R INTERVAL, ECG05: 170 MS
Q-T INTERVAL, ECG07: 374 MS
QRS DURATION, ECG06: 80 MS
QTC CALCULATION (BEZET), ECG08: 431 MS
VENTRICULAR RATE, ECG03: 80 BPM

## 2022-03-23 ENCOUNTER — OFFICE VISIT (OUTPATIENT)
Dept: INTERNAL MEDICINE CLINIC | Age: 77
End: 2022-03-23
Payer: MEDICARE

## 2022-03-23 VITALS
TEMPERATURE: 96.8 F | HEART RATE: 68 BPM | BODY MASS INDEX: 25.83 KG/M2 | WEIGHT: 155 LBS | HEIGHT: 65 IN | DIASTOLIC BLOOD PRESSURE: 66 MMHG | OXYGEN SATURATION: 98 % | SYSTOLIC BLOOD PRESSURE: 136 MMHG | RESPIRATION RATE: 16 BRPM

## 2022-03-23 DIAGNOSIS — M54.2 NECK PAIN: ICD-10-CM

## 2022-03-23 DIAGNOSIS — F41.9 ANXIETY: Primary | ICD-10-CM

## 2022-03-23 DIAGNOSIS — R07.9 CHEST PAIN, UNSPECIFIED TYPE: ICD-10-CM

## 2022-03-23 DIAGNOSIS — E66.3 OVERWEIGHT WITH BODY MASS INDEX (BMI) 25.0-29.9: ICD-10-CM

## 2022-03-23 DIAGNOSIS — R73.03 PREDIABETES: ICD-10-CM

## 2022-03-23 DIAGNOSIS — E78.5 HYPERLIPIDEMIA, UNSPECIFIED HYPERLIPIDEMIA TYPE: ICD-10-CM

## 2022-03-23 DIAGNOSIS — I10 ESSENTIAL HYPERTENSION: ICD-10-CM

## 2022-03-23 PROCEDURE — G8510 SCR DEP NEG, NO PLAN REQD: HCPCS | Performed by: INTERNAL MEDICINE

## 2022-03-23 PROCEDURE — 1101F PT FALLS ASSESS-DOCD LE1/YR: CPT | Performed by: INTERNAL MEDICINE

## 2022-03-23 PROCEDURE — G8419 CALC BMI OUT NRM PARAM NOF/U: HCPCS | Performed by: INTERNAL MEDICINE

## 2022-03-23 PROCEDURE — G0463 HOSPITAL OUTPT CLINIC VISIT: HCPCS | Performed by: INTERNAL MEDICINE

## 2022-03-23 PROCEDURE — G8752 SYS BP LESS 140: HCPCS | Performed by: INTERNAL MEDICINE

## 2022-03-23 PROCEDURE — G8754 DIAS BP LESS 90: HCPCS | Performed by: INTERNAL MEDICINE

## 2022-03-23 PROCEDURE — G8536 NO DOC ELDER MAL SCRN: HCPCS | Performed by: INTERNAL MEDICINE

## 2022-03-23 PROCEDURE — G8427 DOCREV CUR MEDS BY ELIG CLIN: HCPCS | Performed by: INTERNAL MEDICINE

## 2022-03-23 PROCEDURE — 1090F PRES/ABSN URINE INCON ASSESS: CPT | Performed by: INTERNAL MEDICINE

## 2022-03-23 PROCEDURE — 99214 OFFICE O/P EST MOD 30 MIN: CPT | Performed by: INTERNAL MEDICINE

## 2022-03-23 RX ORDER — SERTRALINE HYDROCHLORIDE 25 MG/1
25 TABLET, FILM COATED ORAL DAILY
Qty: 90 TABLET | Refills: 1 | Status: SHIPPED | OUTPATIENT
Start: 2022-03-23 | End: 2022-05-05

## 2022-03-23 NOTE — PATIENT INSTRUCTIONS
Neck Arthritis: Exercises  Introduction  Here are some examples of exercises for you to try. The exercises may be suggested for a condition or for rehabilitation. Start each exercise slowly. Ease off the exercises if you start to have pain. You will be told when to start these exercises and which ones will work best for you. How to do the exercises  Neck stretches to the side    1. This stretch works best if you keep your shoulder down as you lean away from it. To help you remember to do this, start by relaxing your shoulders and lightly holding on to your thighs or your chair. 2. Tilt your head toward your shoulder and hold for 15 to 30 seconds. Let the weight of your head stretch your muscles. 3. Repeat 2 to 4 times toward each shoulder. Chin tuck    1. Lie on the floor with a rolled-up towel under your neck. Your head should be touching the floor. 2. Slowly bring your chin toward your chest.  3. Hold for a count of 6, and then relax for up to 10 seconds. 4. Repeat 8 to 12 times. Active cervical rotation    1. Sit in a firm chair, or stand up straight. 2. Keeping your chin level, turn your head to the right, and hold for 15 to 30 seconds. 3. Turn your head to the left and hold for 15 to 30 seconds. 4. Repeat 2 to 4 times to each side. Shoulder blade squeeze    1. While standing, squeeze your shoulder blades together. 2. Do not raise your shoulders up as you are squeezing. 3. Hold for 6 seconds. 4. Repeat 8 to 12 times. Shoulder rolls    1. Sit comfortably with your feet shoulder-width apart. You can also do this exercise standing up. 2. Roll your shoulders up, then back, and then down in a smooth, circular motion. 3. Repeat 2 to 4 times. Follow-up care is a key part of your treatment and safety. Be sure to make and go to all appointments, and call your doctor if you are having problems. It's also a good idea to know your test results and keep a list of the medicines you take.   Where can you learn more? Go to http://www.gray.com/  Enter L092 in the search box to learn more about \"Neck Arthritis: Exercises. \"  Current as of: July 1, 2021               Content Version: 13.2  © 2006-2022 [employer]. Care instructions adapted under license by [employer] (which disclaims liability or warranty for this information). If you have questions about a medical condition or this instruction, always ask your healthcare professional. [address] any warranty or liability for your use of this information. Neck Strain or Sprain: Rehab Exercises  Introduction  Here are some examples of exercises for you to try. The exercises may be suggested for a condition or for rehabilitation. Start each exercise slowly. Ease off the exercises if you start to have pain. You will be told when to start these exercises and which ones will work best for you. How to do the exercises  Neck rotation    1. Sit in a firm chair, or stand up straight. 2. Keeping your chin level, turn your head to the right, and hold for 15 to 30 seconds. 3. Turn your head to the left and hold for 15 to 30 seconds. 4. Repeat 2 to 4 times to each side. Neck stretches    1. Look straight ahead, and tip your right ear to your right shoulder. Do not let your left shoulder rise up as you tip your head to the right. 2. Hold for 15 to 30 seconds. 3. Tilt your head to the left. Do not let your right shoulder rise up as you tip your head to the left. 4. Hold for 15 to 30 seconds. 5. Repeat 2 to 4 times to each side. Forward neck flexion    1. Sit in a firm chair, or stand up straight. 2. Bend your head forward. 3. Hold for 15 to 30 seconds. 4. Repeat 2 to 4 times. Lateral (side) bend strengthening    1. With your right hand, place your first two fingers on your right temple.   2. Start to bend your head to the side while using gentle pressure from your fingers to keep your head from bending. 3. Hold for about 6 seconds. 4. Repeat 8 to 12 times. 5. Switch hands and repeat the same exercise on your left side. Forward bend strengthening    1. Place your first two fingers of either hand on your forehead. 2. Start to bend your head forward while using gentle pressure from your fingers to keep your head from bending. 3. Hold for about 6 seconds. 4. Repeat 8 to 12 times. Neutral position strengthening    1. Using one hand, place your fingertips on the back of your head at the top of your neck. 2. Start to bend your head backward while using gentle pressure from your fingers to keep your head from bending. 3. Hold for about 6 seconds. 4. Repeat 8 to 12 times. Chin tuck    1. Lie on the floor with a rolled-up towel under your neck. Your head should be touching the floor. 2. Slowly bring your chin toward your chest.  3. Hold for a count of 6, and then relax for up to 10 seconds. 4. Repeat 8 to 12 times. Follow-up care is a key part of your treatment and safety. Be sure to make and go to all appointments, and call your doctor if you are having problems. It's also a good idea to know your test results and keep a list of the medicines you take. Where can you learn more? Go to http://www.gray.com/  Enter M679 in the search box to learn more about \"Neck Strain or Sprain: Rehab Exercises. \"  Current as of: July 1, 2021               Content Version: 13.2  © 2006-2022 [employer]. Care instructions adapted under license by [employer] (which disclaims liability or warranty for this information). If you have questions about a medical condition or this instruction, always ask your healthcare professional. [place] [address] any warranty or liability for your use of this information.

## 2022-03-23 NOTE — PROGRESS NOTES
Tiana Analy presents today for   Chief Complaint   Patient presents with   [facility] Follow Up       1. \"Have you been to the ER, urgent care clinic since your last visit? Hospitalized since your last visit? \" yes  [facility]  Atypical chest pain    2. \"Have you seen or consulted any other health care providers outside of the [address] [place] [address] since your last visit? \" no     3. For patients aged 39-70: Has the patient had a colonoscopy / FIT/ Cologuard? Yes - no Care Gap present      If the patient is female:    4. For patients aged 41-77: Has the patient had a mammogram within the past 2 years? NA - based on age or sex  See top three    5. For patients aged 21-65: Has the patient had a pap smear?  NA - based on age or sex

## 2022-03-28 NOTE — PROGRESS NOTES
HPI:   Sande Fabry is a 68y.o. year old female who presents today for post ED follow-up. She has a history of hypertension, hyperlipidemia, impaired fasting glucose, osteoporosis, left sciatica, and chronic microhematuria. She has completed the Moderna COVID-19 vaccine series and received the Moderna booster dose. On 3/19/2022, she presented to [facility] ED with complaint of upper chest, upper back, and left arm pain. She reported pain was present for approximately 24 hours although acutely increased 1 hour prior to presentation. She states that the discomfort was constant and without any alleviating or exacerbating factors. She denies any dyspnea, palpitations, nausea, vomiting, PND, orthopnea, or abdominal pain. Evaluation included WBC 8.8, Hb 12.9/ HCT 40.3, platelets 609, creatinine 0.77/eGFR > 60, troponin <3 and 3; chest x-ray no acute changes and hiatal hernia present; EKG sinus rhythm at 80 bpm, normal intervals and no change from previous. She was subsequently discharged. She presents today for follow-up and reports that she feels that her symptoms were secondary to anxiety. She reports that she continues to be under a significant amount of stress related to the recent illness and death among close friends, as well as caring for ill family members. She is aware of her excessive stress and is having difficulty managing it. She describes increasing anxiety. She is otherwise without new complaints. Summary of prior hospitalizations and medical history:  On 8/27/2019 and was noted to have elevated transaminases with AST 50 and ALT 67. She was instructed to discontinue all NSAIDS which she had been taking for her right foot pain. She was seen again on 9/25/2019 and repeat testing showed normalization of her transaminases.  Repeat evaluation on 1/9/2020 and reported that she had been taking two Excedrin almost every morning due to a headache, and she also reported continuing to drink approximately two glasses of wine each night. Repeat labs showed repeat elevation of transaminases with AST 50 and ALT 67, while other liver function tests remained normal. She was instructed to discontinue Excedrin and alcohol, and underwent an abdominal ultrasound which showed normal sized liver with mildly diffusely coarsened echogenicity. On 6/25/2019, she reported that two weeks prior, while returning home from her granddaughter's graduation, she and her  stopped at a restaurant for lunch. She states that while she was chewing a mouth full of food, her  noticed that she suddenly tilted her head to the left side and seemed to look dazed. She states that she remained awake and was aware that he was talking to her, but she states that she could not respond. She states that the episode lasted approximately 30 seconds and resolved, and she proceeded to complete her chewing and swallow her food. She states that she did not lose consciousness, or notice any headache, visual changes, lightheadedness, tongue biting, incontinence, focal deficits, or seizure like activity. She denied any chest pain, shortness of breath, palpitations, pleuritic chest pain, hemoptysis, calf pain or swelling. 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 She also had a carotid duplex scan (7/9/2019) showing only mild (< 50%) stenosis of the bilateral internal carotid arteries. She underwent evaluation by Dr. Shyann Kang in 10/2019, and he did not feel that the episode represented a TIA or other neurologic episode. He felt that no further evaluation was needed. She has not had a recurrence of these symptoms. On 4/15/2019, she underwent evaluation at [facility] for right foot pain. She denied known injury.  Right foot x-ray showed a displaced third metatarsal fracture and she was referred to Dr. Winsome Yin for evaluation. Given the displacement and risk of metatarsalgia, surgery was recommended, and on 4/24/2019, she underwent open reduction/internal fixation of the third metatarsal with putty bone grafting. She reports that she did well after surgery, and and was non-weight bearing in a CAM boot for several months. On 5/15/2019, she presented complaining of bilateral hand and wrist pain. She denied any fever, chills, sore throat, rashes, dyspnea, cough, abdominal pain, dysuria, focal weakness, visual changes, or headache. She did admit to sleeping with her bilateral hands tucked beneath her in a flexed position while lying on her back due to her limitations from her foot surgery. She also reported that she had been using a walker to ambulate and gripping it tightly with her hands. Lab evaluation including MANOLO, RF, CCP, ESR, uric acid were normal, but CRP was increased to 9.1. A diagnosis of presumed carpal tunnel syndrome was made, and her discomfort resolved with the use of wrist splints. She was released to weight bear as tolerate in 7/2019, and has not had any significant pain although has been experiencing some swelling which resolves with elevation. She has a history of osteopenia, and in 2009, she sustained a fracture of her left 3rd toe (3/2009) and her distal radius (12/2009). At that time, she was started on alendronate and was treated until 11/2014. She had a repeat bone density study in 7/2016 showing T-scores: femoral neck left -2.3/ right -2.0 and lumbar -2.2. She was restarted on alendronate in 12/2016. She underwent repeat bone density study in 8/2019 showing T-scores: femoral neck left -2.2/ right -1.9 and lumbar -2.6. Due to lack of improvement, she underwent evaluation by Dr. Ely Rowe in 11/2019 and recommendation was that she change to Prolia. She continues to take calcium and Vitamin D supplements.       She has a history of hypertension, treated with lisinopril and hydrochlorothiazide. She has been checking her blood pressure intermittently and reports that most readings are with a systolic blood pressure <912. She remains quite active, although has not been having time to exercise. She reports that she has continued stress in her life, helping to care for her 's mother (dementia), two disabled cousins, and her sister. She denies any chest pain, shortness of breath at rest or with exertion, palpitations, lightheadedness, or edema. She also has a history of hyperlipidemia, treated with moderate intensity dose atorvastatin. She has no history of ASCVD. In 2/2017, she was evaluated by DEREK Walter with left lower back pain and left sciatica. She had been taking ibuprofen without relief, and was given a course of prednisone. She states that she did not find this helpful either, but reports resolution of symptoms after visiting a chiropractor. She states that she no longer is experiencing pain and is able to ambulate without difficulty. She will continue to visit a chiropractor periodically when she develops a recurrence of pain with improvement. She reports that she had an extensive workup for microhematuria in the past, which was negative (records unavailable). She denies any dysuria, gross hematuria, or flank pain. She has had screening colonoscopy with Dr Roxana Ibqal in 10/2010, which was normal except for hemorrhoids. Recommendation for follow-up is for 10 years. She underwent a screening colonoscopy (11/17/2020) by Dr. Leyd Yoder and was found to have internal and external hemorrhoids as well as 10 sessile 4-10 mm polyps in the descending colon (2), transverse colon (1), sigmoid colon (1), and rectum (6). Pathology showed multiple tubular adenomas. Recommendation is for repeat colonoscopy in 3 years. She denies any abdominal pain, nausea, vomiting, melena, hematochezia, or change in bowel movements.     She has a history of eczema and is now being followed by  Rubi Pandey. She found Eucrisa to be very effective, but not covered by her insurance. Using triamcinolone cream intermittently. She also has a history of candidal intertrigo, treated with lotrisone with good response. She has a history of basal cell carcinomas, and underwent removal from her face by Dr. Parminder Munoz. She also reports that she underwent radiation therapy to her right shoulder (as a child) for treatment of a birthmark. On 7/20/2018, she presented for evaluation to [facility] for a right upper back rash and was diagnosed with herpes zoster. She was treated with Valtrex with improvement. Past Medical History:   Diagnosis Date    Eczema     Essential hypertension     FH: breast cancer     Fibroids     uterine    Fracture of radius 11/2009    with ulna, left, closed    History of cataract     History of foot fracture     right and left    Hyperlipidemia     Microhematuria     chronic    Osteopenia     Psoriasis     S/P radiation therapy     as child, to right shoulder for birthmark    Tinnitus     Zoster     right scapula     Past Surgical History:   Procedure Laterality Date    COLONOSCOPY N/A 11/17/2020    COLONOSCOPY with polypectomies performed by Governor Jose Angel MD at [address] HX CATARACT REMOVAL      HX COLONOSCOPY      HX FRACTURE TX Right 04/24/2019    right foot 3rd metatarsal fracture, screws and a plate    HX ORTHOPAEDIC      left arm    HX WISDOM TEETH EXTRACTION      HX WRIST FRACTURE TX       Current Outpatient Medications   Medication Sig    sertraline (ZOLOFT) 25 mg tablet Take 1 Tablet by mouth daily. Indications: anxiety    hydroCHLOROthiazide (HYDRODIURIL) 25 mg tablet TAKE 1 TABLET BY MOUTH DAILY    naproxen (NAPROSYN) 250 mg tablet Take 2 Tablets by mouth two (2) times daily (with meals).  cycloSPORINE (Restasis) 0.05 % dpet Administer 1 Drop to both eyes every twelve (12) hours.     atorvastatin (LIPITOR) 20 mg tablet TAKE 1 TABLET BY MOUTH DAILY  lisinopriL (PRINIVIL, ZESTRIL) 10 mg tablet TAKE 1 TABLET BY MOUTH DAILY    betamethasone dipropionate (DIPROSONE) 0.05 % topical cream Apply  to affected area two (2) times daily as needed for Skin Irritation.  denosumab (PROLIA) 60 mg/mL injection 60 mg by SubCUTAneous route every 6 months.  Lactobacillus acidophilus (PROBIOTIC PO) Take  by mouth.  aspirin delayed-release 81 mg tablet Take  by mouth daily.  amoxicillin 500 mg tab TAKE ONE CAPSULE PO TID, START ONE DAY PRIOR TO PROCEDURE, DAY OF PROCEDURE AND DAY AFTER PROCEDURE    clotrimazole-betamethasone (LOTRISONE) topical cream Apply  to affected area two (2) times a day. (Patient taking differently: Apply  to affected area two (2) times daily as needed.)    docusate sodium (COLACE) 100 mg capsule Take 100 mg by mouth daily as needed for Constipation.  co-enzyme Q-10 (CO Q-10) 100 mg capsule Take 100 mg by mouth daily.  biotin 2,500 mcg Tab Take 2,500 mcg by mouth.  fexofenadine (ALLEGRA) 60 mg tablet Take 60 mg by mouth daily.  CALCIUM CARBONATE/VITAMIN D3 (CALCIUM + D PO) Take 1 Tab by mouth two (2) times a day.  folic acid/multivit-min/lutein (CENTRUM SILVER PO) Take  by mouth. (Patient not taking: Reported on 3/23/2022)    triamcinolone acetonide (KENALOG) 0.025 % topical cream Apply  to affected area two (2) times a day. (Patient not taking: Reported on 3/23/2022)    cholecalciferol (VITAMIN D3) 1,000 unit cap Take 1 Tab by mouth daily. (Patient not taking: Reported on 3/23/2022)     No current facility-administered medications for this visit. Allergies and Intolerances: Allergies   Allergen Reactions    Codeine Nausea Only    Hibiclens [Chlorhexidine Gluconate] Rash     Family History: Her sister has breast cancer and her mother had pancreatic cancer. No history of colon cancer.     Family History   Problem Relation Age of Onset    Cancer Mother         pancreatic    Heart Disease Mother     Heart Disease Father    Sophia Wu Sister         breast    Mult Sclerosis Sister     Breast Cancer Sister 62     Social History:   She  reports that she has never smoked. She has never used smokeless tobacco. She has about 2 glasses of wine each night. She is  with one adult daughter who lives with her family on [place], [place]. She is retired, and was a 4th- for 25 years. Social History     Substance and Sexual Activity   Alcohol Use Yes    Alcohol/week: 0.0 standard drinks    Comment: occasionally     Immunization History:  Immunization History   Administered Date(s) Administered    (RETIRED) Pneumococcal Vaccine (Unspecified Type) 05/04/2010    COVID-19, Moderna Booster, PF, 0.25mL Dose 11/04/2021    COVID-19, Moderna, Primary or Immunocompromised Series, MRNA, PF, 100mcg/0.5mL 01/28/2021, 02/25/2021    Influenza High Dose Vaccine PF 12/13/2016, 10/03/2017    Influenza Vaccine 10/24/2014    Influenza Vaccine (Quad) PF (>6 Mo Flulaval, Fluarix, and >3 Yrs Afluria, Fluzone 75470) 09/25/2021    Influenza Vaccine (Tri) Adjuvanted (>65 Yrs FLUAD TRI 90877) 10/09/2018, 11/13/2019    Influenza Vaccine PF 11/05/2013    Influenza Vaccine Split 11/01/2011, 11/06/2012    Influenza, Quadrivalent, Adjuvanted (>65 Yrs FLUAD QUAD [id number]) 09/16/2020    Pneumococcal Conjugate (PCV-13) 05/05/2015    Pneumococcal Polysaccharide (PPSV-23) 05/04/2010    TD Vaccine 05/05/2009    Tdap 12/12/2016    Zoster Recombinant 09/21/2019, 12/28/2019    Zoster Vaccine, Live 05/07/2013       Review of Systems:   As above included in HPI.   Otherwise 11 point review of systems negative including constitutional, skin, HENT, eyes, respiratory, cardiovascular, gastrointestinal, genitourinary, musculoskeletal, endo/heme/aller, neurological.    Physical:     Visit Vitals  /66   Pulse 68   Temp 96.8 °F (36 °C) (Temporal)   Resp 16   Ht 5' 5\" (1.651 m)   Wt 155 lb (70.3 kg)   LMP  (LMP Unknown)   SpO2 98%   BMI 25.79 kg/m²     Exam:    Patient appears in no apparent distress. Affect is appropriate. HEENT: PERRLA, anicteric, no JVD, adenopathy or thyromegaly. No carotid bruits or radiated murmur. Lungs: clear to auscultation, no wheezes, rhonchi, or rales. Heart: regular rate and rhythm. No murmur, rubs, gallops  Abdomen: soft, nontender, nondistended, normal bowel sounds, no hepatosplenomegaly or masses. Extremities: without edema.           Review of Data:  Labs:  Admission on 03/19/2022, Discharged on 03/19/2022   Component Date Value Ref Range Status    Ventricular Rate 03/19/2022 80  BPM Final    Atrial Rate 03/19/2022 80  BPM Final    P-R Interval 03/19/2022 170  ms Final    QRS Duration 03/19/2022 80  ms Final    Q-T Interval 03/19/2022 374  ms Final    QTC Calculation (Bezet) 03/19/2022 431  ms Final    Calculated P Axis 03/19/2022 32  degrees Final    Calculated R Axis 03/19/2022 -42  degrees Final    Calculated T Axis 03/19/2022 27  degrees Final    Diagnosis 03/19/2022    Final                    Value:Normal sinus rhythm  Left axis deviation  Possible Anterior infarct (cited on or before 21-NOV-2009)  Abnormal ECG  When compared with ECG of 16-APR-2019 16:53,  No significant change was found  Confirmed by Chantelle Munoz M.D., [address] [place] [address] ([id number]) on 3/20/2022 8:34:04 PM      WBC 03/19/2022 8.8  4.6 - 13.2 K/uL Final    RBC 03/19/2022 4.54  4.20 - 5.30 M/uL Final    HGB 03/19/2022 12.9  12.0 - 16.0 g/dL Final    HCT 03/19/2022 40.3  35.0 - 45.0 % Final    MCV 03/19/2022 88.8  78.0 - 100.0 FL Final    MCH 03/19/2022 28.4  24.0 - 34.0 PG Final    MCHC 03/19/2022 32.0  31.0 - 37.0 g/dL Final    RDW 03/19/2022 12.9  11.6 - 14.5 % Final    PLATELET 87/60/1430 067  135 - 420 K/uL Final    MPV 03/19/2022 8.9* 9.2 - 11.8 FL Final    NRBC 03/19/2022 0.0  0  WBC Final    ABSOLUTE NRBC 03/19/2022 0.00  0.00 - 0.01 K/uL Final    NEUTROPHILS 03/19/2022 66  40 - 73 % Final    LYMPHOCYTES 03/19/2022 23  21 - 52 % Final    MONOCYTES 03/19/2022 8  3 - 10 % Final    EOSINOPHILS 03/19/2022 2  0 - 5 % Final    BASOPHILS 03/19/2022 1  0 - 2 % Final    IMMATURE GRANULOCYTES 03/19/2022 0  0.0 - 0.5 % Final    ABS. NEUTROPHILS 03/19/2022 5.8  1.8 - 8.0 K/UL Final    ABS. LYMPHOCYTES 03/19/2022 2.0  0.9 - 3.6 K/UL Final    ABS. MONOCYTES 03/19/2022 0.7  0.05 - 1.2 K/UL Final    ABS. EOSINOPHILS 03/19/2022 0.2  0.0 - 0.4 K/UL Final    ABS. BASOPHILS 03/19/2022 0.0  0.0 - 0.1 K/UL Final    ABS. IMM. GRANS. 03/19/2022 0.0  0.00 - 0.04 K/UL Final    DF 03/19/2022 AUTOMATED    Final    Sodium 03/19/2022 137  136 - 145 mmol/L Final    Potassium 03/19/2022 4.2  3.5 - 5.5 mmol/L Final    Chloride 03/19/2022 105  100 - 111 mmol/L Final    CO2 03/19/2022 26  21 - 32 mmol/L Final    Anion gap 03/19/2022 6  3.0 - 18 mmol/L Final    Glucose 03/19/2022 98  74 - 99 mg/dL Final    BUN 03/19/2022 19* 7.0 - 18 MG/DL Final    Creatinine 03/19/2022 0.77  0.6 - 1.3 MG/DL Final    BUN/Creatinine ratio 03/19/2022 25* 12 - 20   Final    GFR est AA 03/19/2022 >60  >60 ml/min/1.73m2 Final    GFR est non-AA 03/19/2022 >60  >60 ml/min/1.73m2 Final    Calcium 03/19/2022 9.4  8.5 - 10.1 MG/DL Final    Troponin-High Sensitivity 03/19/2022 <3  0 - 54 ng/L Final    Magnesium 03/19/2022 2.0  1.6 - 2.6 mg/dL Final    Troponin-High Sensitivity 03/19/2022 3  0 - 54 ng/L Final         Health Maintenance:  Screening:    Mammogram: negative (8/2021)   PAP smear: S/P ALMAS   Colorectal: colonoscopy (11/2020) tubular adenomas. Dr. Erica Lam Due 11/2023. Depression: none   DM (HbA1c/FPG): HbA1c 5.5 (12/2021)   Hepatitis C: negative (5/2016)   Falls: none   DEXA: osteopenia (8/2019). On Prolia. Dr. Sommer Mathur.     Glaucoma: regular eye exams with Dr. Romina Cardenas (last 1/2021)   Smoking: none   Vitamin D: 48.7 (12/2021)   Medicare Wellness: 6/10/2021      Impression:  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    Tinnitus H93.19    Microhematuria R31.29    Eczema L30.9    Osteopenia M85.80    Elevated TSH R79.89    Impaired fasting glucose R73.01    Carpal tunnel syndrome, bilateral G56.03    Osteoporosis of lumbar spine M81.0    History of nonmelanoma skin cancer Z85.828    Overweight with body mass index (BMI) 25.0-29.9 E66.3    Prediabetes R73.03       Plan:  Anxiety. Patient with increased anxiety as she tries to care for sick family members and close friends with terminal illnesses. Evaluation in ED negative and pain atypical for cardiac etiology. Acknowledges today that likely related to anxiety. Discussed counseling and provided with options to pursue. Will initiate SSRI to help with management of symptoms. Will begin Zoloft 25 mg daily. Advised regarding timing of therapeutic effect and possible side effects. Advised to call if no improvement. Noncardiac upper chest/upper back/left arm pain. Symptoms localized to neck area and suspect that may be musculoskeletal in etiology. Symptoms no longer constant. Provided with handout of neck exercises and will call if no improvement. Other chronic issues:  1. Hypertension. Remains well controlled on current regimen of lisinopril 10 mg daily and hydrochlorothiazide 25 mg daily. Renal function has been normal with creatinine 0.77/ eGFR >60, but BUN/creatinine ratio remains increased at 25. Encouraged to continue to drink plenty of fluids. Continue to follow. 3. Hyperlipidemia. On moderate intensity dose atorvastatin (20 mg) with LDL 76 and HDL 71 (12/2021), indicative of good control. Emphasized importance of lifestyle modifications, including diet, exercise, and weight loss. Will continue to follow. 3. Prediabetes. Normal fasting glucose and HbA1c normalized to 5.5 (12/2021). Weight overall stable.   Emphasized importance of lifestyle modifications, including diet, exercise, and weight loss. Will reassess next visit. 4. Elevated transaminases. New onset mild elevation noted in 8/2019. Patient reported that she had been taking NSAIDS frequently to help control foot pain. Advised to discontinue and repeated in 9/2019 with normalization. Lab evaluation, including AMA, actin, hepatitis profile, CK, aldolase, celiac panel, ESR, CRP, and MANOLO, were negative, but did identify mildly low iron stores. Repeat 1/2020 again showed mild elevation of transaminases with otherwise normal LFT's. Reported had been taking Excedrin and did admit to 2 glasses of wine each night. Advised to discontinue both. Abdominal ultrasound (1/2020) showed mildly increased echogenicity of liver consistent with hepatic steatosis, but otherwise normal.  Subsequent normalization and repeat liver function tests remain normal in 12/2021. Advised to minimize alcohol and NSAIDS. Also, emphasized importance of lifestyle modifications, including diet, exercise, and weight loss. 5. Osteoporosis. History of radial and toe fracture in 2009. Treated from 11/2009 to 11/2014 with alendronate. Repeat bone density scan in 7/2016 with 10 year risk of a major osteoporetic fracture at 14 % and hip fracture at 3.4 %. Restarted Fosamax in 12/2016. New right metatarsal displaced fracture without known injury in 6/2019. Repeat bone density on 8/6/2019 showed T-scores:  femoral neck left -2.2  /right -1.9 and lumbar -2.6 . Calculated FRAX score estimated her 10 year risk of a major osteoporetic fracture at 14 % and hip fracture at 3.9 %. Comparison to her prior study in 7/2016 showed stability in her bilateral hips, but some worsening in her lumbar spine.  Obtained lab evaluation for secondary osteoporosis, and TSH, intact PTH, calcium, vitamin D level, and gammopathy panel normal. Given recent fracture and some worsening of bone density scan despite treatment with biphosphonate, referred to Dr. Leti Germain in 11/2019 and recommendation was that she change to Prolia. Underwent repeat bone density study in 1/2022 which showed overall improvement, particularly in lumbar T score. Continues on Prolia with last dose in 1/2022. Continue calcium and Vitamin D supplements, and encouraged her to exercise, particularly weight bearing activities. Fall precautions stressed. 6. History of displaced right third metatarsal fracture, s/p open reduction/internal fixation with putty bone grafting. Doing well without significant sequela. 7. History of iron deficiency without anemia. Noted in 9/2019 and may have been related to foot surgery. Improved with supplementation. Colonoscopy completed (11/2020) and 10 polyps removed with majority of polyps' pathology showing tubular adenomas. Repeat in 3 years recommended (due 11/2023). 8. Family history of breast cancer. Patient with family history of breast cancer in first degree relative (sister). Discussed recommendations for breast cancer screening in women with lifetime risk >20%. 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 No other family member with breast cancer. Wishing to continue with annual mammograms and breast exams. 9. Chronic microhematuria. Work-up reportedly negative in past.  Will continue to monitor. 10. Eczema. Now well controlled on triamcinolone cream for flares and Eucerin cream.  Being followed by Dr. Enid Benjamin  11. Nonmelanoma skin cancers. 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 Stan Aguilar. Continuing to follow with Dr. Enid Benjamin for her regular skin exams annually. 12. Elevated TSH. Repeat TSH mildly elevated today with normal free T4. Patient with history of radiation therapy for a birthmark on her right shoulder as a child. Will continue to follow. 13. Anxiety. Previously discussed reactive symptoms of anxiety and depression to the death of a close friend and illness involving other close friends.   Had not wished to begin medication but now with worsening symptoms as discussed. 14. Overweight. Weight overall stable over the last 6 months. Emphasized importance of continued lifestyle modifications. Will continue to follow. 15. Health maintenance. Completed Moderna COVID-19 vaccine series and received the Moderna booster dose. Received 2/2 doses of Shingrix vaccine. Other immunizations up to date. Mammogram up to date. Colonoscopy completed and repeat due in 2023. Continue regular eye exams with Dr. Alondra Maria. Vitamin D level has been normal on maintenance dose supplement. Medicare wellness visit up-to-date. Patient understands recommendations and agrees with plan. Follow-up as previously scheduled.

## 2022-05-02 NOTE — PROGRESS NOTES
1. \"Have you been to the ER, urgent care clinic since your last visit? Hospitalized since your last visit? \" No    2. \"Have you seen or consulted any other health care providers outside of the [address] [place] [address] since your last visit? \" No     3. For patients aged 39-70: Has the patient had a colonoscopy / FIT/ Cologuard? NA - based on age      If the patient is female:    4. For patients aged 41-77: Has the patient had a mammogram within the past 2 years? NA - based on age or sex      11. For patients aged 21-65: Has the patient had a pap smear?  NA - based on age or sex

## 2022-05-03 ENCOUNTER — OFFICE VISIT (OUTPATIENT)
Dept: INTERNAL MEDICINE CLINIC | Age: 77
End: 2022-05-03
Payer: MEDICARE

## 2022-05-03 ENCOUNTER — HOSPITAL ENCOUNTER (OUTPATIENT)
Dept: GENERAL RADIOLOGY | Age: 77
Discharge: HOME | End: 2022-05-03
Payer: MEDICARE

## 2022-05-03 VITALS
WEIGHT: 154 LBS | SYSTOLIC BLOOD PRESSURE: 122 MMHG | RESPIRATION RATE: 16 BRPM | DIASTOLIC BLOOD PRESSURE: 72 MMHG | BODY MASS INDEX: 25.66 KG/M2 | HEIGHT: 65 IN | HEART RATE: 78 BPM | OXYGEN SATURATION: 98 % | TEMPERATURE: 97.1 F

## 2022-05-03 DIAGNOSIS — F41.9 ANXIETY: ICD-10-CM

## 2022-05-03 DIAGNOSIS — E66.3 OVERWEIGHT WITH BODY MASS INDEX (BMI) 25.0-29.9: ICD-10-CM

## 2022-05-03 DIAGNOSIS — M25.561 ACUTE PAIN OF RIGHT KNEE: ICD-10-CM

## 2022-05-03 DIAGNOSIS — I10 ESSENTIAL HYPERTENSION: ICD-10-CM

## 2022-05-03 DIAGNOSIS — M25.561 ACUTE PAIN OF RIGHT KNEE: Primary | ICD-10-CM

## 2022-05-03 PROCEDURE — 1090F PRES/ABSN URINE INCON ASSESS: CPT | Performed by: INTERNAL MEDICINE

## 2022-05-03 PROCEDURE — G8419 CALC BMI OUT NRM PARAM NOF/U: HCPCS | Performed by: INTERNAL MEDICINE

## 2022-05-03 PROCEDURE — 1101F PT FALLS ASSESS-DOCD LE1/YR: CPT | Performed by: INTERNAL MEDICINE

## 2022-05-03 PROCEDURE — G8752 SYS BP LESS 140: HCPCS | Performed by: INTERNAL MEDICINE

## 2022-05-03 PROCEDURE — G0463 HOSPITAL OUTPT CLINIC VISIT: HCPCS | Performed by: INTERNAL MEDICINE

## 2022-05-03 PROCEDURE — G8754 DIAS BP LESS 90: HCPCS | Performed by: INTERNAL MEDICINE

## 2022-05-03 PROCEDURE — 99213 OFFICE O/P EST LOW 20 MIN: CPT | Performed by: INTERNAL MEDICINE

## 2022-05-03 PROCEDURE — G8536 NO DOC ELDER MAL SCRN: HCPCS | Performed by: INTERNAL MEDICINE

## 2022-05-03 PROCEDURE — 73560 X-RAY EXAM OF KNEE 1 OR 2: CPT

## 2022-05-03 PROCEDURE — G8510 SCR DEP NEG, NO PLAN REQD: HCPCS | Performed by: INTERNAL MEDICINE

## 2022-05-03 PROCEDURE — G8427 DOCREV CUR MEDS BY ELIG CLIN: HCPCS | Performed by: INTERNAL MEDICINE

## 2022-05-03 NOTE — PATIENT INSTRUCTIONS
Knee Pain or Injury: Care Instructions  Your Care Instructions     Injuries are a common cause of knee problems. Sudden (acute) injuries may be caused by a direct blow to the knee. They can also be caused by abnormal twisting, bending, or falling on the knee. Pain, bruising, or swelling may be severe, and may start within minutes of the injury. Overuse is another cause of knee pain. Other causes are climbing stairs, kneeling, and other activities that use the knee. Everyday wear and tear, especially as you get older, also can cause knee pain. Rest, along with home treatment, often relieves pain and allows your knee to heal. If you have a serious knee injury, you may need tests and treatment. Follow-up care is a key part of your treatment and safety. Be sure to make and go to all appointments, and call your doctor if you are having problems. It's also a good idea to know your test results and keep a list of the medicines you take. How can you care for yourself at home? · Be safe with medicines. Read and follow all instructions on the label. ? If the doctor gave you a prescription medicine for pain, take it as prescribed. ? If you are not taking a prescription pain medicine, ask your doctor if you can take an over-the-counter medicine. · Rest and protect your knee. Take a break from any activity that may cause pain. · Put ice or a cold pack on your knee for 10 to 20 minutes at a time. Put a thin cloth between the ice and your skin. · Prop up a sore knee on a pillow when you ice it or anytime you sit or lie down for the next 3 days. Try to keep it above the level of your heart. This will help reduce swelling. · If your knee is not swollen, you can put moist heat, a heating pad, or a warm cloth on your knee. · If your doctor recommends an elastic bandage, sleeve, or other type of support for your knee, wear it as directed. · Follow your doctor's instructions about how much weight you can put on your leg. Use a cane, crutches, or a walker as instructed. · Follow your doctor's instructions about activity during your healing process. If you can do mild exercise, slowly increase your activity. · Reach and stay at a healthy weight. Extra weight can strain the joints, especially the knees and hips, and make the pain worse. Losing even a few pounds may help. When should you call for help? Call 911 anytime you think you may need emergency care. For example, call if:    · You have symptoms of a blood clot in your lung (called a pulmonary embolism). These may include:  ? Sudden chest pain. ? Trouble breathing. ? Coughing up blood. Call your doctor now or seek immediate medical care if:    · You have severe or increasing pain.     · Your leg or foot turns cold or changes color.     · You cannot stand or put weight on your knee.     · Your knee looks twisted or bent out of shape.     · You cannot move your knee.     · You have signs of infection, such as:  ? Increased pain, swelling, warmth, or redness. ? Red streaks leading from the knee. ? Pus draining from a place on your knee. ? A fever.     · You have signs of a blood clot in your leg (called a deep vein thrombosis), such as:  ? Pain in your calf, back of the knee, thigh, or groin. ? Redness and swelling in your leg or groin. Watch closely for changes in your health, and be sure to contact your doctor if:    · You have tingling, weakness, or numbness in your knee.     · You have any new symptoms, such as swelling.     · You have bruises from a knee injury that last longer than 2 weeks.     · You do not get better as expected. Where can you learn more? Go to http://www.gray.com/  Enter K195 in the search box to learn more about \"Knee Pain or Injury: Care Instructions. \"  Current as of: July 1, 2021               Content Version: 13.2  © 8017-1516 [employer], [employer].    Care instructions adapted under license by [employer] (which disclaims liability or warranty for this information). If you have questions about a medical condition or this instruction, always ask your healthcare professional. [address] any warranty or liability for your use of this information.

## 2022-05-03 NOTE — PROGRESS NOTES
Ahsan Long presents today for   Chief Complaint   Patient presents with    Knee Pain     Right knee pain , no known cause     1. \"Have you been to the ER, urgent care clinic since your last visit? Hospitalized since your last visit? \" no    2. \"Have you seen or consulted any other health care providers outside of the [address] [place] [address] since your last visit? \" no     3. For patients aged 39-70: Has the patient had a colonoscopy / FIT/ Cologuard? NA - based on age      If the patient is female:    4. For patients aged 41-77: Has the patient had a mammogram within the past 2 years? NA - based on age or sex  See top three    5. For patients aged 21-65: Has the patient had a pap smear?  NA - based on age or sex

## 2022-05-05 ENCOUNTER — OFFICE VISIT (OUTPATIENT)
Dept: ORTHOPEDIC SURGERY | Age: 77
End: 2022-05-05
Payer: MEDICARE

## 2022-05-05 VITALS — TEMPERATURE: 98.4 F | WEIGHT: 150 LBS | BODY MASS INDEX: 24.99 KG/M2 | HEIGHT: 65 IN

## 2022-05-05 DIAGNOSIS — M17.11 PRIMARY OSTEOARTHRITIS OF RIGHT KNEE: Primary | ICD-10-CM

## 2022-05-05 PROCEDURE — 1090F PRES/ABSN URINE INCON ASSESS: CPT | Performed by: ORTHOPAEDIC SURGERY

## 2022-05-05 PROCEDURE — G8432 DEP SCR NOT DOC, RNG: HCPCS | Performed by: ORTHOPAEDIC SURGERY

## 2022-05-05 PROCEDURE — G8756 NO BP MEASURE DOC: HCPCS | Performed by: ORTHOPAEDIC SURGERY

## 2022-05-05 PROCEDURE — 20611 DRAIN/INJ JOINT/BURSA W/US: CPT | Performed by: ORTHOPAEDIC SURGERY

## 2022-05-05 PROCEDURE — 1101F PT FALLS ASSESS-DOCD LE1/YR: CPT | Performed by: ORTHOPAEDIC SURGERY

## 2022-05-05 PROCEDURE — 99203 OFFICE O/P NEW LOW 30 MIN: CPT | Performed by: ORTHOPAEDIC SURGERY

## 2022-05-05 PROCEDURE — G8536 NO DOC ELDER MAL SCRN: HCPCS | Performed by: ORTHOPAEDIC SURGERY

## 2022-05-05 PROCEDURE — G8420 CALC BMI NORM PARAMETERS: HCPCS | Performed by: ORTHOPAEDIC SURGERY

## 2022-05-05 PROCEDURE — G8427 DOCREV CUR MEDS BY ELIG CLIN: HCPCS | Performed by: ORTHOPAEDIC SURGERY

## 2022-05-05 RX ORDER — TRIAMCINOLONE ACETONIDE 40 MG/ML
40 INJECTION, SUSPENSION INTRA-ARTICULAR; INTRAMUSCULAR ONCE
Status: COMPLETED | OUTPATIENT
Start: 2022-05-05 | End: 2022-05-05

## 2022-05-05 RX ORDER — MELOXICAM 15 MG/1
15 TABLET ORAL DAILY
Qty: 30 TABLET | Refills: 1 | Status: SHIPPED | OUTPATIENT
Start: 2022-05-05 | End: 2022-07-03 | Stop reason: ALTCHOICE

## 2022-05-05 RX ADMIN — TRIAMCINOLONE ACETONIDE 40 MG: 40 INJECTION, SUSPENSION INTRA-ARTICULAR; INTRAMUSCULAR at 08:50

## 2022-05-05 NOTE — PROGRESS NOTES
Simone Mchugh  1945   Chief Complaint   Patient presents with    Knee Pain     Right        HISTORY OF PRESENT ILLNESS  Simone Mchugh is a 68 y.o. female who presents today for evaluation of right knee pain. She was referred by Dr. Tayla Gates. She rates her pain 4/10 today. Pain has been present for more than about a month ago. She states she has climbed a fair amount of stairs recently and has made a trip to [place]. Pain is worse with ambulation and better with ice/heat. Intermittent in nature. Patient describes the pain as aching and dull that is Intermittent in nature. Symptoms are worse with walking and laying on her side and is better with  biofreeze, Heat, Ice. Associated symptoms include Swelling. Since problem started, it: is unchanged. Pain does not wake patient up at night. Has taken no meds for the problem. Has tried following treatments: Injections:NO; Brace:NO; Therapy:NO; Cane/Crutch:NO       Allergies   Allergen Reactions    Codeine Nausea Only    Hibiclens [Chlorhexidine Gluconate] Rash        Past Medical History:   Diagnosis Date    Eczema     Essential hypertension     FH: breast cancer     Fibroids     uterine    Fracture of radius 11/2009    with ulna, left, closed    History of cataract     History of foot fracture     right and left    Hyperlipidemia     Microhematuria     chronic    Osteopenia     Psoriasis     S/P radiation therapy     as child, to right shoulder for birthmark    Tinnitus     Zoster     right scapula      Social History     Socioeconomic History    Marital status:      Spouse name: Not on file    Number of children: Not on file    Years of education: Not on file    Highest education level: Not on file   Occupational History    Not on file   Tobacco Use    Smoking status: Never Smoker    Smokeless tobacco: Never Used   Vaping Use    Vaping Use: Never used   Substance and Sexual Activity    Alcohol use:  Yes     Alcohol/week: 3.0 - 4.0 standard drinks     Types: 3 - 4 Glasses of wine per week     Comment: Weekly    Drug use: No    Sexual activity: Yes     Partners: Male   Other Topics Concern    Not on file   Social History Narrative    Not on file     Social Determinants of Health     Financial Resource Strain:     Difficulty of Paying Living Expenses: Not on file   Food Insecurity:     Worried About Running Out of Food in the Last Year: Not on file    Erin of Food in the Last Year: Not on file   Transportation Needs:     Lack of Transportation (Medical): Not on file    Lack of Transportation (Non-Medical):  Not on file   Physical Activity:     Days of Exercise per Week: Not on file    Minutes of Exercise per Session: Not on file   Stress:     Feeling of Stress : Not on file   Social Connections:     Frequency of Communication with Friends and Family: Not on file    Frequency of Social Gatherings with Friends and Family: Not on file    Attends Bahai Services: Not on file    Active Member of [address] [place] [address] or Organizations: Not on file    Attends Club or Organization Meetings: Not on file    Marital Status: Not on file   Intimate Partner Violence:     Fear of Current or Ex-Partner: Not on file    Emotionally Abused: Not on file    Physically Abused: Not on file    Sexually Abused: Not on file   Housing Stability:     Unable to Pay for Housing in the Last Year: Not on file    Number of [place] in the Last Year: Not on file    Unstable Housing in the Last Year: Not on file      Past Surgical History:   Procedure Laterality Date    COLONOSCOPY N/A 11/17/2020    COLONOSCOPY with polypectomies performed by Bienvenido Stone MD at [address] HX CATARACT REMOVAL      HX COLONOSCOPY      HX FRACTURE TX Right 04/24/2019    right foot 3rd metatarsal fracture, screws and a plate    HX ORTHOPAEDIC      left arm    HX WISDOM TEETH EXTRACTION      HX WRIST FRACTURE TX        Family History   Problem Relation Age of Onset    Cancer Mother         pancreatic    Heart Disease Mother     Heart Disease Father     Cancer Sister         breast    Mult Sclerosis Sister     Breast Cancer Sister 62      Current Outpatient Medications   Medication Sig    hydroCHLOROthiazide (HYDRODIURIL) 25 mg tablet TAKE 1 TABLET BY MOUTH DAILY    naproxen (NAPROSYN) 250 mg tablet Take 2 Tablets by mouth two (2) times daily (with meals).  cycloSPORINE (Restasis) 0.05 % dpet Administer 1 Drop to both eyes every twelve (12) hours.  atorvastatin (LIPITOR) 20 mg tablet TAKE 1 TABLET BY MOUTH DAILY    lisinopriL (PRINIVIL, ZESTRIL) 10 mg tablet TAKE 1 TABLET BY MOUTH DAILY    betamethasone dipropionate (DIPROSONE) 0.05 % topical cream Apply  to affected area two (2) times daily as needed for Skin Irritation.  denosumab (PROLIA) 60 mg/mL injection 60 mg by SubCUTAneous route every 6 months.  Lactobacillus acidophilus (PROBIOTIC PO) Take  by mouth.  aspirin delayed-release 81 mg tablet Take  by mouth daily.  amoxicillin 500 mg tab TAKE ONE CAPSULE PO TID, START ONE DAY PRIOR TO PROCEDURE, DAY OF PROCEDURE AND DAY AFTER PROCEDURE    triamcinolone acetonide (KENALOG) 0.025 % topical cream Apply  to affected area two (2) times a day.  clotrimazole-betamethasone (LOTRISONE) topical cream Apply  to affected area two (2) times a day. (Patient taking differently: Apply  to affected area two (2) times daily as needed.)    docusate sodium (COLACE) 100 mg capsule Take 100 mg by mouth daily as needed for Constipation.  co-enzyme Q-10 (CO Q-10) 100 mg capsule Take 100 mg by mouth daily.  biotin 2,500 mcg Tab Take 2,500 mcg by mouth.  cholecalciferol (VITAMIN D3) 1,000 unit cap Take 1 Tablet by mouth daily.  fexofenadine (ALLEGRA) 60 mg tablet Take 60 mg by mouth daily.  CALCIUM CARBONATE/VITAMIN D3 (CALCIUM + D PO) Take 1 Tab by mouth two (2) times a day. No current facility-administered medications for this visit. REVIEW OF SYSTEM   Patient denies: Weight loss, Fever/Chills, HA, Visual changes, Fatigue, Chest pain, SOB, Abdominal pain, N/V/D/C, Blood in stool or urine, Edema. Pertinent positive as above in HPI. All others were negative    PHYSICAL EXAM:   Visit Vitals  Temp 98.4 °F (36.9 °C)   Ht 5' 5\" (1.651 m)   Wt 150 lb (68 kg)   LMP  (LMP Unknown)   BMI 24.96 kg/m²     The patient is a well-developed, well-nourished female   in no acute distress. The patient is alert and oriented times three. The patient is alert and oriented times three. Mood and affect are normal.  LYMPHATIC: lymph nodes are not enlarged and are within normal limits  SKIN: normal in color and non tender to palpation. There are no bruises or abrasions noted. NEUROLOGICAL: Motor sensory exam is within normal limits. Reflexes are equal bilaterally. There is normal sensation to pinprick and light touch  MUSCULOSKELETAL:  Examination Right knee   Skin Intact   Range of motion 0-130   Effusion +   Medial joint line tenderness +   Lateral joint line tenderness -   Tenderness Pes Bursa -   Tenderness insertion MCL -   Tenderness insertion LCL -   Alexiss -   Patella crepitus +   Patella grind +   Lachman -   Pivot shift -   Anterior drawer -   Posterior drawer -   Varus stress -   Valgus stress -   Neurovascular Intact   Calf Swelling and Tenderness to Palpation -   Mervat's Test -   Hamstring Cord Tightness -        PROCEDURE:   Right knee Injection with Ultrasound Guidance    Indication:Right Knee pain/swelling    After sterile prep, 4 cc of Xylocaine and 1 cc of Kenalog were injected into the right Knee. Intra-articular Ultrasound images captured using [address] [place] [address] Ultrasound machine using a frequency of 10 MHz with a linear transducer and scanned into patient's chart.            [facility]  OFFICE PROCEDURE PROGRESS NOTE        Chart reviewed for the following:  Felipe Zheng M.D, have reviewed the History, Physical and updated the Allergic reactions for Zahira Liu     TIME OUT performed immediately prior to start of procedure:  Fernando Ng M.D, have performed the following reviews on [address] prior to the start of the procedure:            * Patient was identified by name and date of birth   * Agreement on procedure being performed was verified  * Risks and Benefits explained to the patient  * Procedure site verified and marked as necessary  * Patient was positioned for comfort  * Needle placement confirmed by ultrasound  * Consent was signed and verified     Time: 8:30 AM     Date of procedure: 5/5/2022    Procedure performed by:  Timothy Ortega M.D    Provider assisted by: (see medication administration)    How tolerated by patient: tolerated the procedure well with no complications    Comments: none      IMAGING: XR of the right knee with 2 views obtained at [facility] radiology dated 5/3/2022 was reviewed and read by Dr. Cynthia Dent: calcification of the lateral meniscus with moderate degenerative changes in the patellofemoral joint      IMPRESSION:      ICD-10-CM ICD-9-CM    1. Primary osteoarthritis of right knee  M17.11 715.16         PLAN:  1. Pt presents today with right knee pain due to exacerbation of degenerative arthritis with joint space narrowing and I am hopeful a cortisone injection will provide relief. Return in 3 weeks if pain persists. Risk factors include: htn  2. No ultrasound exam indicated today  3. Yes cortisone injection indicated today R KNEE US   4. No Physical/Occupational Therapy indicated today  5. No diagnostic test indicated today:   6. No durable medical equipment indicated today  7. No referral indicated today   8. No medications indicated today:   9.  No Narcotic indicated today      RTC 3 weeks if pain continues       Scribed by Dena Villatoro) as dictated by Timothy Ortega MD    I, Dr. Timothy Ortega, confirm that all documentation is accurate.     Nancy Leo M.D.   [facility]

## 2022-05-08 NOTE — PROGRESS NOTES
HPI:   Eliezer Mayorga is a 68y.o. year old female who presents today for an acute visit. She has a history of hypertension, hyperlipidemia, impaired fasting glucose, osteoporosis, left sciatica, and chronic microhematuria. She has completed the Moderna COVID-19 vaccine series and received two Moderna booster doses. She presents today for evaluation of right knee pain. She states that over the last month she has noted pain developing in her right knee particularly after navigating stairs at home and when visiting a close friend in [place]. She describes the pain as dull aching and states that it is worse when ambulating. She states that she does not experience pain with resting except when lying down in bed at night on her side. She has applied heat and ice with some improvement and also has tried [employer]. She denies any other joint pain, fever, or chills. She denies any known trauma. She is otherwise without new complaints. Summary of prior hospitalizations and medical history:  On 3/19/2022, she presented to [facility] ED with complaint of upper chest, upper back, and left arm pain. She reported pain was present for approximately 24 hours although acutely increased 1 hour prior to presentation. She states that the discomfort was constant and without any alleviating or exacerbating factors. She denies any dyspnea, palpitations, nausea, vomiting, PND, orthopnea, or abdominal pain. Evaluation included WBC 8.8, Hb 12.9/ HCT 40.3, platelets 747, creatinine 0.77/eGFR > 60, troponin <3 and 3; chest x-ray no acute changes and hiatal hernia present; EKG sinus rhythm at 80 bpm, normal intervals and no change from previous. She was subsequently discharged. She was seen in follow-up on 3/23/2022 and reported that she felt that her symptoms were secondary to anxiety. She was prescribed sertraline and provided with contacts for counseling.     On 8/27/2019 and was noted to have elevated transaminases with AST 50 and ALT 67. She was instructed to discontinue all NSAIDS which she had been taking for her right foot pain. She was seen again on 9/25/2019 and repeat testing showed normalization of her transaminases. Repeat evaluation on 1/9/2020 and reported that she had been taking two Excedrin almost every morning due to a headache, and she also reported continuing to drink approximately two glasses of wine each night. Repeat labs showed repeat elevation of transaminases with AST 50 and ALT 67, while other liver function tests remained normal. She was instructed to discontinue Excedrin and alcohol, and underwent an abdominal ultrasound which showed normal sized liver with mildly diffusely coarsened echogenicity. On 6/25/2019, she reported that two weeks prior, while returning home from her granddaughter's graduation, she and her  stopped at a restaurant for lunch. She states that while she was chewing a mouth full of food, her  noticed that she suddenly tilted her head to the left side and seemed to look dazed. She states that she remained awake and was aware that he was talking to her, but she states that she could not respond. She states that the episode lasted approximately 30 seconds and resolved, and she proceeded to complete her chewing and swallow her food. She states that she did not lose consciousness, or notice any headache, visual changes, lightheadedness, tongue biting, incontinence, focal deficits, or seizure like activity. She denied any chest pain, shortness of breath, palpitations, pleuritic chest pain, hemoptysis, calf pain or swelling. 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 She also had a carotid duplex scan (7/9/2019) showing only mild (< 50%) stenosis of the bilateral internal carotid arteries.  She underwent evaluation by Dr. Adriana Henning in 10/2019, and he did not feel that the episode represented a TIA or other neurologic episode. He felt that no further evaluation was needed. She has not had a recurrence of these symptoms. On 4/15/2019, she underwent evaluation at [facility] for right foot pain. She denied known injury. Right foot x-ray showed a displaced third metatarsal fracture and she was referred to Dr. Ata Almazan for evaluation. Given the displacement and risk of metatarsalgia, surgery was recommended, and on 4/24/2019, she underwent open reduction/internal fixation of the third metatarsal with putty bone grafting. She reports that she did well after surgery, and and was non-weight bearing in a CAM boot for several months. On 5/15/2019, she presented complaining of bilateral hand and wrist pain. She denied any fever, chills, sore throat, rashes, dyspnea, cough, abdominal pain, dysuria, focal weakness, visual changes, or headache. She did admit to sleeping with her bilateral hands tucked beneath her in a flexed position while lying on her back due to her limitations from her foot surgery. She also reported that she had been using a walker to ambulate and gripping it tightly with her hands. Lab evaluation including MANOLO, RF, CCP, ESR, uric acid were normal, but CRP was increased to 9.1. A diagnosis of presumed carpal tunnel syndrome was made, and her discomfort resolved with the use of wrist splints. She was released to weight bear as tolerate in 7/2019, and has not had any significant pain although has been experiencing some swelling which resolves with elevation. She has a history of osteopenia, and in 2009, she sustained a fracture of her left 3rd toe (3/2009) and her distal radius (12/2009). At that time, she was started on alendronate and was treated until 11/2014. She had a repeat bone density study in 7/2016 showing T-scores: femoral neck left -2.3/ right -2.0 and lumbar -2.2.  She was restarted on alendronate in 12/2016. She underwent repeat bone density study in 8/2019 showing T-scores: femoral neck left -2.2/ right -1.9 and lumbar -2.6. Due to lack of improvement, she underwent evaluation by Dr. Cesario Opitz in 11/2019 and recommendation was that she change to Prolia. She continues to take calcium and Vitamin D supplements. She has a history of hypertension, treated with lisinopril and hydrochlorothiazide. She has been checking her blood pressure intermittently and reports that most readings are with a systolic blood pressure <367. She remains quite active, although has not been having time to exercise. She reports that she has continued stress in her life, helping to care for her 's mother (dementia), two disabled cousins, and her sister. She denies any chest pain, shortness of breath at rest or with exertion, palpitations, lightheadedness, or edema. She also has a history of hyperlipidemia, treated with moderate intensity dose atorvastatin. She has no history of ASCVD. In 2/2017, she was evaluated by DEREK Walter with left lower back pain and left sciatica. She had been taking ibuprofen without relief, and was given a course of prednisone. She states that she did not find this helpful either, but reports resolution of symptoms after visiting a chiropractor. She states that she no longer is experiencing pain and is able to ambulate without difficulty. She will continue to visit a chiropractor periodically when she develops a recurrence of pain with improvement. She reports that she had an extensive workup for microhematuria in the past, which was negative (records unavailable). She denies any dysuria, gross hematuria, or flank pain. She has had screening colonoscopy with Dr Toby North in 10/2010, which was normal except for hemorrhoids. Recommendation for follow-up is for 10 years.  She underwent a screening colonoscopy (11/17/2020) by Dr. Leeroy Earl and was found to have internal and external hemorrhoids as well as 10 sessile 4-10 mm polyps in the descending colon (2), transverse colon (1), sigmoid colon (1), and rectum (6). Pathology showed multiple tubular adenomas. Recommendation is for repeat colonoscopy in 3 years. She denies any abdominal pain, nausea, vomiting, melena, hematochezia, or change in bowel movements. She has a history of eczema and is now being followed by Dr. Mary Caba. She found Eucrisa to be very effective, but not covered by her insurance. Using triamcinolone cream intermittently. She also has a history of candidal intertrigo, treated with lotrisone with good response. She has a history of basal cell carcinomas, and underwent removal from her face by Dr. Jasper Franco. She also reports that she underwent radiation therapy to her right shoulder (as a child) for treatment of a birthmark. On 7/20/2018, she presented for evaluation to [facility] for a right upper back rash and was diagnosed with herpes zoster. She was treated with Valtrex with improvement.       Past Medical History:   Diagnosis Date    Eczema     Essential hypertension     FH: breast cancer     Fibroids     uterine    Fracture of radius 11/2009    with ulna, left, closed    History of cataract     History of foot fracture     right and left    Hyperlipidemia     Microhematuria     chronic    Osteopenia     Psoriasis     S/P radiation therapy     as child, to right shoulder for birthmark    Tinnitus     Zoster     right scapula     Past Surgical History:   Procedure Laterality Date    COLONOSCOPY N/A 11/17/2020    COLONOSCOPY with polypectomies performed by Mimi Yun MD at [address] HX CATARACT REMOVAL      HX COLONOSCOPY      HX FRACTURE TX Right 04/24/2019    right foot 3rd metatarsal fracture, screws and a plate    HX ORTHOPAEDIC      left arm    HX WISDOM TEETH EXTRACTION      HX WRIST FRACTURE TX       Current Outpatient Medications   Medication Sig    hydroCHLOROthiazide (HYDRODIURIL) 25 mg tablet TAKE 1 TABLET BY MOUTH DAILY    cycloSPORINE (Restasis) 0.05 % dpet Administer 1 Drop to both eyes every twelve (12) hours.  atorvastatin (LIPITOR) 20 mg tablet TAKE 1 TABLET BY MOUTH DAILY    lisinopriL (PRINIVIL, ZESTRIL) 10 mg tablet TAKE 1 TABLET BY MOUTH DAILY    betamethasone dipropionate (DIPROSONE) 0.05 % topical cream Apply  to affected area two (2) times daily as needed for Skin Irritation.  denosumab (PROLIA) 60 mg/mL injection 60 mg by SubCUTAneous route every 6 months.  Lactobacillus acidophilus (PROBIOTIC PO) Take  by mouth.  aspirin delayed-release 81 mg tablet Take  by mouth daily.  amoxicillin 500 mg tab TAKE ONE CAPSULE PO TID, START ONE DAY PRIOR TO PROCEDURE, DAY OF PROCEDURE AND DAY AFTER PROCEDURE    triamcinolone acetonide (KENALOG) 0.025 % topical cream Apply  to affected area two (2) times a day.  clotrimazole-betamethasone (LOTRISONE) topical cream Apply  to affected area two (2) times a day. (Patient taking differently: Apply  to affected area two (2) times daily as needed.)    docusate sodium (COLACE) 100 mg capsule Take 100 mg by mouth daily as needed for Constipation.  co-enzyme Q-10 (CO Q-10) 100 mg capsule Take 100 mg by mouth daily.  biotin 2,500 mcg Tab Take 2,500 mcg by mouth.  cholecalciferol (VITAMIN D3) 1,000 unit cap Take 1 Tablet by mouth daily.  fexofenadine (ALLEGRA) 60 mg tablet Take 60 mg by mouth daily.  CALCIUM CARBONATE/VITAMIN D3 (CALCIUM + D PO) Take 1 Tab by mouth two (2) times a day.  meloxicam (MOBIC) 15 mg tablet Take 1 Tablet by mouth daily.  naproxen (NAPROSYN) 250 mg tablet Take 2 Tablets by mouth two (2) times daily (with meals). No current facility-administered medications for this visit. Allergies and Intolerances:    Allergies   Allergen Reactions    Codeine Nausea Only    Hibiclens [Chlorhexidine Gluconate] Rash     Family History: Her sister has breast cancer and her mother had pancreatic cancer. No history of colon cancer. Family History   Problem Relation Age of Onset   [facility] Cancer Mother         pancreatic    Heart Disease Mother     Heart Disease Father     Cancer Sister         breast    Mult Sclerosis Sister     Breast Cancer Sister 62     Social History:   She  reports that she has never smoked. She has never used smokeless tobacco. She has about 2 glasses of wine each night. She is  with one adult daughter who lives with her family on [place], [place]. She is retired, and was a 4th- for 25 years. Social History     Substance and Sexual Activity   Alcohol Use Yes    Alcohol/week: 3.0 - 4.0 standard drinks    Types: 3 - 4 Glasses of wine per week    Comment: Weekly     Immunization History:  Immunization History   Administered Date(s) Administered    (RETIRED) Pneumococcal Vaccine (Unspecified Type) 05/04/2010    COVID-19, Moderna Booster, PF, 0.25mL Dose 11/04/2021, 05/03/2022    COVID-19, Moderna, Primary or Immunocompromised Series, MRNA, PF, 100mcg/0.5mL 01/28/2021, 02/25/2021    Influenza High Dose Vaccine PF 12/13/2016, 10/03/2017    Influenza Vaccine 10/24/2014    Influenza Vaccine (Quad) PF (>6 Mo Flulaval, Fluarix, and >3 Yrs Afluria, Fluzone [id number]) 09/25/2021    Influenza Vaccine (Tri) Adjuvanted (>65 Yrs FLUAD TRI [id number]) 10/09/2018, 11/13/2019    Influenza Vaccine PF 11/05/2013    Influenza Vaccine Split 11/01/2011, 11/06/2012    Influenza, Quadrivalent, Adjuvanted (>65 Yrs FLUAD QUAD [id number]) 09/16/2020    Pneumococcal Conjugate (PCV-13) 05/05/2015    Pneumococcal Polysaccharide (PPSV-23) 05/04/2010    TD Vaccine 05/05/2009    Tdap 12/12/2016    Zoster Recombinant 09/21/2019, 12/28/2019    Zoster Vaccine, Live 05/07/2013       Review of Systems:   As above included in HPI.   Otherwise 11 point review of systems negative including constitutional, skin, HENT, eyes, respiratory, cardiovascular, gastrointestinal, genitourinary, musculoskeletal, endo/heme/aller, neurological.    Physical:     Visit Vitals  /72   Pulse 78   Temp 97.1 °F (36.2 °C) (Temporal)   Resp 16   Ht 5' 5\" (1.651 m)   Wt 154 lb (69.9 kg)   LMP  (LMP Unknown)   SpO2 98%   BMI 25.63 kg/m²     Exam:    Patient appears in no apparent distress. Affect is appropriate. Lungs: clear to auscultation, no wheezes, rhonchi, or rales. Heart: regular rate and rhythm. No murmur, rubs, gallops  Abdomen: soft, nontender, nondistended, normal bowel sounds, no hepatosplenomegaly or masses. Extremities: without edema. Right knee without erythema or warmth. Mild tenderness along medial aspect of joint with minimal medial swelling. ROM intact and no instability noted. Review of Data:  Labs:  No visits with results within 2 Week(s) from this visit.    Latest known visit with results is:   Admission on 03/19/2022, Discharged on 03/19/2022   Component Date Value Ref Range Status    Ventricular Rate 03/19/2022 80  BPM Final    Atrial Rate 03/19/2022 80  BPM Final    P-R Interval 03/19/2022 170  ms Final    QRS Duration 03/19/2022 80  ms Final    Q-T Interval 03/19/2022 374  ms Final    QTC Calculation (Bezet) 03/19/2022 431  ms Final    Calculated P Axis 03/19/2022 32  degrees Final    Calculated R Axis 03/19/2022 -42  degrees Final    Calculated T Axis 03/19/2022 27  degrees Final    Diagnosis 03/19/2022    Final                    Value:Normal sinus rhythm  Left axis deviation  Possible Anterior infarct (cited on or before 21-NOV-2009)  Abnormal ECG  When compared with ECG of 16-APR-2019 16:53,  No significant change was found  Confirmed by Fabian Carmona M.D., [facility], [place], [facility] ([id number]) on 3/20/2022 8:34:04 PM      WBC 03/19/2022 8.8  4.6 - 13.2 K/uL Final    RBC 03/19/2022 4.54  4.20 - 5.30 M/uL Final    HGB 03/19/2022 12.9  12.0 - 16.0 g/dL Final    HCT 03/19/2022 40.3  35.0 - 45.0 % Final    MCV 03/19/2022 88.8  78.0 - 100.0 FL Final    MCH 03/19/2022 28.4 24.0 - 34.0 PG Final    MCHC 03/19/2022 32.0  31.0 - 37.0 g/dL Final    RDW 03/19/2022 12.9  11.6 - 14.5 % Final    PLATELET 16/71/1420 712  135 - 420 K/uL Final    MPV 03/19/2022 8.9* 9.2 - 11.8 FL Final    NRBC 03/19/2022 0.0  0  WBC Final    ABSOLUTE NRBC 03/19/2022 0.00  0.00 - 0.01 K/uL Final    NEUTROPHILS 03/19/2022 66  40 - 73 % Final    LYMPHOCYTES 03/19/2022 23  21 - 52 % Final    MONOCYTES 03/19/2022 8  3 - 10 % Final    EOSINOPHILS 03/19/2022 2  0 - 5 % Final    BASOPHILS 03/19/2022 1  0 - 2 % Final    IMMATURE GRANULOCYTES 03/19/2022 0  0.0 - 0.5 % Final    ABS. NEUTROPHILS 03/19/2022 5.8  1.8 - 8.0 K/UL Final    ABS. LYMPHOCYTES 03/19/2022 2.0  0.9 - 3.6 K/UL Final    ABS. MONOCYTES 03/19/2022 0.7  0.05 - 1.2 K/UL Final    ABS. EOSINOPHILS 03/19/2022 0.2  0.0 - 0.4 K/UL Final    ABS. BASOPHILS 03/19/2022 0.0  0.0 - 0.1 K/UL Final    ABS. IMM. GRANS. 03/19/2022 0.0  0.00 - 0.04 K/UL Final    DF 03/19/2022 AUTOMATED    Final    Sodium 03/19/2022 137  136 - 145 mmol/L Final    Potassium 03/19/2022 4.2  3.5 - 5.5 mmol/L Final    Chloride 03/19/2022 105  100 - 111 mmol/L Final    CO2 03/19/2022 26  21 - 32 mmol/L Final    Anion gap 03/19/2022 6  3.0 - 18 mmol/L Final    Glucose 03/19/2022 98  74 - 99 mg/dL Final    BUN 03/19/2022 19* 7.0 - 18 MG/DL Final    Creatinine 03/19/2022 0.77  0.6 - 1.3 MG/DL Final    BUN/Creatinine ratio 03/19/2022 25* 12 - 20   Final    GFR est AA 03/19/2022 >60  >60 ml/min/1.73m2 Final    GFR est non-AA 03/19/2022 >60  >60 ml/min/1.73m2 Final    Calcium 03/19/2022 9.4  8.5 - 10.1 MG/DL Final    Troponin-High Sensitivity 03/19/2022 <3  0 - 54 ng/L Final    Magnesium 03/19/2022 2.0  1.6 - 2.6 mg/dL Final    Troponin-High Sensitivity 03/19/2022 3  0 - 54 ng/L Final         Health Maintenance:  Screening:    Mammogram: negative (8/2021)   PAP smear: S/P ALMAS   Colorectal: colonoscopy (11/2020) tubular adenomas.  Dr. Kelly Squires Due 11/2023. Depression: none   DM (HbA1c/FPG): HbA1c 5.5 (12/2021)   Hepatitis C: negative (5/2016)   Falls: none   DEXA: osteopenia (8/2019). On Prolia. Dr. Arsalan Lopez. Glaucoma: regular eye exams with Dr. Zaid Zaragoza (last 1/2021)   Smoking: none   Vitamin D: 48.7 (12/2021)   Medicare Wellness: 6/10/2021      Impression:  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    Tinnitus H93.19    Microhematuria R31.29    Eczema L30.9    Osteopenia M85.80    Elevated TSH R79.89    Impaired fasting glucose R73.01    Carpal tunnel syndrome, bilateral G56.03    Osteoporosis of lumbar spine M81.0    History of nonmelanoma skin cancer Z85.828    Overweight with body mass index (BMI) 25.0-29.9 E66.3    Prediabetes R73.03       Plan:  Acute right knee pain. Patient reports developing significant right knee pain after walking up and down stairs at home and while visiting her close friend in [place]. Reports no pain at rest but experiencing pain with ambulation and when lying on her side at night. Pain localized to medial aspect of knee with mild swelling and tenderness but no evidence of inflammatory arthritis as no erythema or warmth evident. Will obtain a right knee x-ray and place referral to Dr. Alvaro Lesches for further evaluation. Advised that may use naproxen or ibuprofen sparingly for pain. Other chronic issues:  1. Hypertension. Remains well controlled on current regimen of lisinopril 10 mg daily and hydrochlorothiazide 25 mg daily. Renal function has been normal with creatinine 0.77/ eGFR >60, but BUN/creatinine ratio remains increased at 25. Encouraged to continue to drink plenty of fluids. Continue to follow. 3. Hyperlipidemia. On moderate intensity dose atorvastatin (20 mg) with LDL 76 and HDL 71 (12/2021), indicative of good control. Emphasized importance of lifestyle modifications, including diet, exercise, and weight loss. Will continue to follow. 3. Prediabetes. Normal fasting glucose and HbA1c normalized to 5.5 (12/2021). Weight overall stable. Emphasized importance of lifestyle modifications, including diet, exercise, and weight loss. Will reassess next visit. 4. Elevated transaminases. New onset mild elevation noted in 8/2019. Patient reported that she had been taking NSAIDS frequently to help control foot pain. Advised to discontinue and repeated in 9/2019 with normalization. Lab evaluation, including AMA, actin, hepatitis profile, CK, aldolase, celiac panel, ESR, CRP, and MANOLO, were negative, but did identify mildly low iron stores. Repeat 1/2020 again showed mild elevation of transaminases with otherwise normal LFT's. Reported had been taking Excedrin and did admit to 2 glasses of wine each night. Advised to discontinue both. Abdominal ultrasound (1/2020) showed mildly increased echogenicity of liver consistent with hepatic steatosis, but otherwise normal.  Subsequent normalization and repeat liver function tests normal in 12/2021. Advised to minimize alcohol and NSAIDS. Also, emphasized importance of lifestyle modifications, including diet, exercise, and weight loss. 5. Osteoporosis. History of radial and toe fracture in 2009. Treated from 11/2009 to 11/2014 with alendronate. Repeat bone density scan in 7/2016 with 10 year risk of a major osteoporetic fracture at 14 % and hip fracture at 3.4 %. Restarted Fosamax in 12/2016. New right metatarsal displaced fracture without known injury in 6/2019. Repeat bone density on 8/6/2019 showed T-scores:  femoral neck left -2.2  /right -1.9 and lumbar -2.6 . Calculated FRAX score estimated her 10 year risk of a major osteoporetic fracture at 14 % and hip fracture at 3.9 %.  Comparison to her prior study in 7/2016 showed stability in her bilateral hips, but some worsening in her lumbar spine. Obtained lab evaluation for secondary osteoporosis, and TSH, intact PTH, calcium, vitamin D level, and gammopathy panel normal. Given recent fracture and some worsening of bone density scan despite treatment with biphosphonate, referred to Dr. Lili Ly in 11/2019 and recommendation was that she change to Prolia. Underwent repeat bone density study in 1/2022 which showed overall improvement, particularly in lumbar T score. Continues on Prolia with last dose in 1/2022. Continue calcium and Vitamin D supplements, and encouraged her to exercise, particularly weight bearing activities. Fall precautions stressed. 6. History of displaced right third metatarsal fracture, s/p open reduction/internal fixation with putty bone grafting. Doing well without significant sequela. 7. History of iron deficiency without anemia. Noted in 9/2019 and may have been related to foot surgery. Improved with supplementation. Colonoscopy completed (11/2020) and 10 polyps removed with majority of polyps' pathology showing tubular adenomas. Repeat in 3 years recommended (due 11/2023). 8. Family history of breast cancer. Patient with family history of breast cancer in first degree relative (sister). Discussed recommendations for breast cancer screening in women with lifetime risk >20%. 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 No other family member with breast cancer. Wishing to continue with annual mammograms and breast exams. 9. Chronic microhematuria. Work-up reportedly negative in past.  Will continue to monitor. 10. Eczema. Now well controlled on triamcinolone cream for flares and Eucerin cream.  Being followed by Dr. Diandra Nguyen. 11. Nonmelanoma skin cancers. 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 Hyacinth Simmonds. Continuing to follow with Dr. Diandra Nguyen for her regular skin exams annually. 12. Elevated TSH. Repeat TSH mildly elevated today with normal free T4. Patient with history of radiation therapy for a birthmark on her right shoulder as a child.  Will continue to follow. 13. Anxiety. Patient reported increased anxiety in 3/2022 as she tried to care for sick family members and close friends with terminal illnesses. Evaluation in ED for chest pain negative and pain atypical for cardiac etiology. Determined that likely related to anxiety. Discussed counseling and provided with options to pursue and prescribed Zoloft 25 mg daily. Unclear if started and will follow-up at next visit. 14. Overweight. Weight overall stable over the last 6 months. Emphasized importance of continued lifestyle modifications. Will continue to follow. 15. Health maintenance. Completed Moderna COVID-19 vaccine series and received two Moderna booster doses. Received 2/2 doses of Shingrix vaccine. Other immunizations up to date. Mammogram up to date. Colonoscopy completed and repeat due in 2023. Continue regular eye exams with Dr. Vy Bro. Vitamin D level has been normal on maintenance dose supplement. Medicare wellness visit up-to-date. Patient understands recommendations and agrees with plan. Follow-up as previously scheduled.

## 2022-06-23 ENCOUNTER — HOSPITAL ENCOUNTER (OUTPATIENT)
Dept: LAB | Age: 77
Discharge: HOME | End: 2022-06-23
Payer: MEDICARE

## 2022-06-23 ENCOUNTER — APPOINTMENT (OUTPATIENT)
Dept: INTERNAL MEDICINE CLINIC | Age: 77
End: 2022-06-23

## 2022-06-23 DIAGNOSIS — R73.01 IMPAIRED FASTING GLUCOSE: ICD-10-CM

## 2022-06-23 DIAGNOSIS — E78.5 HYPERLIPIDEMIA, UNSPECIFIED HYPERLIPIDEMIA TYPE: ICD-10-CM

## 2022-06-23 DIAGNOSIS — I10 ESSENTIAL HYPERTENSION: ICD-10-CM

## 2022-06-23 DIAGNOSIS — R73.03 PREDIABETES: ICD-10-CM

## 2022-06-23 DIAGNOSIS — M81.0 OSTEOPOROSIS OF LUMBAR SPINE: ICD-10-CM

## 2022-06-23 LAB
25(OH)D3 SERPL-MCNC: 45.4 NG/ML (ref 30–100)
ALBUMIN SERPL-MCNC: 4.3 G/DL (ref 3.4–5)
ALBUMIN/GLOB SERPL: 1.3 {RATIO} (ref 0.8–1.7)
ALP SERPL-CCNC: 47 U/L (ref 45–117)
ALT SERPL-CCNC: 25 U/L (ref 13–56)
ANION GAP SERPL CALC-SCNC: 7 MMOL/L (ref 3–18)
AST SERPL-CCNC: 26 U/L (ref 10–38)
BASOPHILS # BLD: 0 K/UL (ref 0–0.1)
BASOPHILS NFR BLD: 1 % (ref 0–2)
BILIRUB SERPL-MCNC: 0.6 MG/DL (ref 0.2–1)
BUN SERPL-MCNC: 21 MG/DL (ref 7–18)
BUN/CREAT SERPL: 32 (ref 12–20)
CALCIUM SERPL-MCNC: 9.6 MG/DL (ref 8.5–10.1)
CHLORIDE SERPL-SCNC: 102 MMOL/L (ref 100–111)
CHOLEST SERPL-MCNC: 187 MG/DL
CO2 SERPL-SCNC: 29 MMOL/L (ref 21–32)
CREAT SERPL-MCNC: 0.66 MG/DL (ref 0.6–1.3)
DIFFERENTIAL METHOD BLD: ABNORMAL
EOSINOPHIL # BLD: 0.2 K/UL (ref 0–0.4)
EOSINOPHIL NFR BLD: 2 % (ref 0–5)
ERYTHROCYTE [DISTWIDTH] IN BLOOD BY AUTOMATED COUNT: 13.8 % (ref 11.6–14.5)
EST. AVERAGE GLUCOSE BLD GHB EST-MCNC: 117 MG/DL
GLOBULIN SER CALC-MCNC: 3.2 G/DL (ref 2–4)
GLUCOSE SERPL-MCNC: 77 MG/DL (ref 74–99)
HBA1C MFR BLD: 5.7 % (ref 4.2–5.6)
HCT VFR BLD AUTO: 43.3 % (ref 35–45)
HDLC SERPL-MCNC: 90 MG/DL (ref 40–60)
HDLC SERPL: 2.1 {RATIO} (ref 0–5)
HGB BLD-MCNC: 13.6 G/DL (ref 12–16)
IMM GRANULOCYTES # BLD AUTO: 0 K/UL (ref 0–0.04)
IMM GRANULOCYTES NFR BLD AUTO: 0 % (ref 0–0.5)
LDLC SERPL CALC-MCNC: 83.8 MG/DL (ref 0–100)
LIPID PROFILE,FLP: ABNORMAL
LYMPHOCYTES # BLD: 1.2 K/UL (ref 0.9–3.6)
LYMPHOCYTES NFR BLD: 16 % (ref 21–52)
MAGNESIUM SERPL-MCNC: 1.9 MG/DL (ref 1.6–2.6)
MCH RBC QN AUTO: 29.2 PG (ref 24–34)
MCHC RBC AUTO-ENTMCNC: 31.4 G/DL (ref 31–37)
MCV RBC AUTO: 92.9 FL (ref 78–100)
MONOCYTES # BLD: 0.7 K/UL (ref 0.05–1.2)
MONOCYTES NFR BLD: 9 % (ref 3–10)
NEUTS SEG # BLD: 5.7 K/UL (ref 1.8–8)
NEUTS SEG NFR BLD: 73 % (ref 40–73)
NRBC # BLD: 0 K/UL (ref 0–0.01)
NRBC BLD-RTO: 0 PER 100 WBC
PLATELET # BLD AUTO: 296 K/UL (ref 135–420)
PMV BLD AUTO: 9.8 FL (ref 9.2–11.8)
POTASSIUM SERPL-SCNC: 3.9 MMOL/L (ref 3.5–5.5)
PROT SERPL-MCNC: 7.5 G/DL (ref 6.4–8.2)
RBC # BLD AUTO: 4.66 M/UL (ref 4.2–5.3)
SODIUM SERPL-SCNC: 138 MMOL/L (ref 136–145)
T4 FREE SERPL-MCNC: 1.2 NG/DL (ref 0.7–1.5)
TRIGL SERPL-MCNC: 66 MG/DL (ref ?–150)
TSH SERPL DL<=0.05 MIU/L-ACNC: 2.82 UIU/ML (ref 0.36–3.74)
VLDLC SERPL CALC-MCNC: 13.2 MG/DL
WBC # BLD AUTO: 7.8 K/UL (ref 4.6–13.2)

## 2022-06-23 PROCEDURE — 80053 COMPREHEN METABOLIC PANEL: CPT

## 2022-06-23 PROCEDURE — 80061 LIPID PANEL: CPT

## 2022-06-23 PROCEDURE — 85025 COMPLETE CBC W/AUTO DIFF WBC: CPT

## 2022-06-23 PROCEDURE — 84443 ASSAY THYROID STIM HORMONE: CPT

## 2022-06-23 PROCEDURE — 84439 ASSAY OF FREE THYROXINE: CPT

## 2022-06-23 PROCEDURE — 83036 HEMOGLOBIN GLYCOSYLATED A1C: CPT

## 2022-06-23 PROCEDURE — 36415 COLL VENOUS BLD VENIPUNCTURE: CPT

## 2022-06-23 PROCEDURE — 82306 VITAMIN D 25 HYDROXY: CPT

## 2022-06-23 PROCEDURE — 83735 ASSAY OF MAGNESIUM: CPT

## 2022-06-30 ENCOUNTER — HOSPITAL ENCOUNTER (OUTPATIENT)
Dept: LAB | Age: 77
Discharge: HOME | End: 2022-06-30
Payer: MEDICARE

## 2022-06-30 ENCOUNTER — OFFICE VISIT (OUTPATIENT)
Dept: INTERNAL MEDICINE CLINIC | Age: 77
End: 2022-06-30
Payer: MEDICARE

## 2022-06-30 VITALS
WEIGHT: 151 LBS | TEMPERATURE: 97.2 F | HEIGHT: 65 IN | OXYGEN SATURATION: 98 % | HEART RATE: 82 BPM | DIASTOLIC BLOOD PRESSURE: 54 MMHG | BODY MASS INDEX: 25.16 KG/M2 | RESPIRATION RATE: 16 BRPM | SYSTOLIC BLOOD PRESSURE: 117 MMHG

## 2022-06-30 DIAGNOSIS — M17.11 PRIMARY OSTEOARTHRITIS OF RIGHT KNEE: ICD-10-CM

## 2022-06-30 DIAGNOSIS — Z71.89 ADVANCED DIRECTIVES, COUNSELING/DISCUSSION: ICD-10-CM

## 2022-06-30 DIAGNOSIS — I10 ESSENTIAL HYPERTENSION: Primary | ICD-10-CM

## 2022-06-30 DIAGNOSIS — E66.3 OVERWEIGHT WITH BODY MASS INDEX (BMI) 25.0-29.9: ICD-10-CM

## 2022-06-30 DIAGNOSIS — E78.5 HYPERLIPIDEMIA, UNSPECIFIED HYPERLIPIDEMIA TYPE: ICD-10-CM

## 2022-06-30 DIAGNOSIS — R73.03 PREDIABETES: ICD-10-CM

## 2022-06-30 DIAGNOSIS — R73.01 IMPAIRED FASTING GLUCOSE: ICD-10-CM

## 2022-06-30 DIAGNOSIS — Z00.00 MEDICARE ANNUAL WELLNESS VISIT, SUBSEQUENT: ICD-10-CM

## 2022-06-30 DIAGNOSIS — G89.29 CHRONIC PAIN OF RIGHT KNEE: ICD-10-CM

## 2022-06-30 DIAGNOSIS — M81.0 OSTEOPOROSIS OF LUMBAR SPINE: ICD-10-CM

## 2022-06-30 DIAGNOSIS — Z13.31 DEPRESSION SCREENING: ICD-10-CM

## 2022-06-30 DIAGNOSIS — Z12.31 ENCOUNTER FOR SCREENING MAMMOGRAM FOR MALIGNANT NEOPLASM OF BREAST: ICD-10-CM

## 2022-06-30 DIAGNOSIS — M25.561 CHRONIC PAIN OF RIGHT KNEE: ICD-10-CM

## 2022-06-30 DIAGNOSIS — F41.9 ANXIETY: ICD-10-CM

## 2022-06-30 LAB
APPEARANCE UR: CLEAR
BACTERIA URNS QL MICRO: NEGATIVE /HPF
BILIRUB UR QL: NEGATIVE
COLOR UR: YELLOW
CREAT UR-MCNC: 50 MG/DL (ref 30–125)
EPITH CASTS URNS QL MICRO: NORMAL /LPF (ref 0–5)
GLUCOSE UR STRIP.AUTO-MCNC: NEGATIVE MG/DL
HGB UR QL STRIP: NEGATIVE
KETONES UR QL STRIP.AUTO: NEGATIVE MG/DL
LEUKOCYTE ESTERASE UR QL STRIP.AUTO: ABNORMAL
MICROALBUMIN UR-MCNC: <0.5 MG/DL (ref 0–3)
MICROALBUMIN/CREAT UR-RTO: NORMAL MG/G (ref 0–30)
NITRITE UR QL STRIP.AUTO: NEGATIVE
PH UR STRIP: 7 [PH] (ref 5–8)
PROT UR STRIP-MCNC: NEGATIVE MG/DL
RBC #/AREA URNS HPF: NORMAL /HPF (ref 0–5)
SP GR UR REFRACTOMETRY: 1.01 (ref 1–1.03)
UROBILINOGEN UR QL STRIP.AUTO: 1 EU/DL (ref 0.2–1)
WBC URNS QL MICRO: NORMAL /HPF (ref 0–4)

## 2022-06-30 PROCEDURE — 1101F PT FALLS ASSESS-DOCD LE1/YR: CPT | Performed by: INTERNAL MEDICINE

## 2022-06-30 PROCEDURE — 99215 OFFICE O/P EST HI 40 MIN: CPT | Performed by: INTERNAL MEDICINE

## 2022-06-30 PROCEDURE — 99497 ADVNCD CARE PLAN 30 MIN: CPT | Performed by: INTERNAL MEDICINE

## 2022-06-30 PROCEDURE — G8417 CALC BMI ABV UP PARAM F/U: HCPCS | Performed by: INTERNAL MEDICINE

## 2022-06-30 PROCEDURE — 82570 ASSAY OF URINE CREATININE: CPT

## 2022-06-30 PROCEDURE — 81001 URINALYSIS AUTO W/SCOPE: CPT

## 2022-06-30 PROCEDURE — G8536 NO DOC ELDER MAL SCRN: HCPCS | Performed by: INTERNAL MEDICINE

## 2022-06-30 PROCEDURE — 1090F PRES/ABSN URINE INCON ASSESS: CPT | Performed by: INTERNAL MEDICINE

## 2022-06-30 PROCEDURE — G8510 SCR DEP NEG, NO PLAN REQD: HCPCS | Performed by: INTERNAL MEDICINE

## 2022-06-30 PROCEDURE — G0439 PPPS, SUBSEQ VISIT: HCPCS | Performed by: INTERNAL MEDICINE

## 2022-06-30 PROCEDURE — 1123F ACP DISCUSS/DSCN MKR DOCD: CPT | Performed by: INTERNAL MEDICINE

## 2022-06-30 PROCEDURE — G8752 SYS BP LESS 140: HCPCS | Performed by: INTERNAL MEDICINE

## 2022-06-30 PROCEDURE — G8427 DOCREV CUR MEDS BY ELIG CLIN: HCPCS | Performed by: INTERNAL MEDICINE

## 2022-06-30 PROCEDURE — G8754 DIAS BP LESS 90: HCPCS | Performed by: INTERNAL MEDICINE

## 2022-06-30 PROCEDURE — G0463 HOSPITAL OUTPT CLINIC VISIT: HCPCS | Performed by: INTERNAL MEDICINE

## 2022-06-30 NOTE — PROGRESS NOTES
This is the Subsequent Medicare Annual Wellness Exam, performed 12 months or more after the Initial AWV or the last Subsequent AWV    I have reviewed the patient's medical history in detail and updated the computerized patient record. Assessment/Plan   Education and counseling provided:  Are appropriate based on today's review and evaluation  End-of-Life planning (with patient's consent)  Influenza Vaccine  Screening Mammography  Colorectal cancer screening tests  Cardiovascular screening blood test  Bone mass measurement (DEXA)  Screening for glaucoma  Diabetes screening test    1. Essential hypertension  -     CBC WITH AUTOMATED DIFF; Future  -     MAGNESIUM; Future  -     METABOLIC PANEL, COMPREHENSIVE; Future  2. Hyperlipidemia, unspecified hyperlipidemia type  -     LIPID PANEL; Future  -     METABOLIC PANEL, COMPREHENSIVE; Future  3. Prediabetes  -     HEMOGLOBIN A1C WITH EAG; Future  -     METABOLIC PANEL, COMPREHENSIVE; Future  -     MICROALBUMIN, UR, RAND W/ MICROALB/CREAT RATIO; Future  4. Osteoporosis of lumbar spine  -     CBC WITH AUTOMATED DIFF; Future  -     MAGNESIUM; Future  -     METABOLIC PANEL, COMPREHENSIVE; Future  -     VITAMIN D, 25 HYDROXY; Future  5. Primary osteoarthritis of right knee  6. Chronic pain of right knee  7. Anxiety  8. Overweight with body mass index (BMI) 25.0-29.9  9. Medicare annual wellness visit, subsequent  -     ADVANCE CARE PLANNING FIRST 30 MINS  10. Advanced directives, counseling/discussion  -     ADVANCE CARE PLANNING FIRST 30 MINS  11. Depression screening  12. Encounter for screening mammogram for malignant neoplasm of breast  -     [facility] MAMMO BI SCREENING INCL CAD;  Future       Depression Risk Factor Screening     3 most recent PHQ Screens 6/30/2022   Little interest or pleasure in doing things Not at all   Feeling down, depressed, irritable, or hopeless Not at all   Total Score PHQ 2 0       Alcohol & Drug Abuse Risk Screen    Do you average more than 1 drink per night or more than 7 drinks a week:  No    On any one occasion in the past three months have you have had more than 3 drinks containing alcohol:  No          Functional Ability and Level of Safety    Hearing: Hearing is good. Activities of Daily Living: The home contains: no safety equipment. Patient does total self care      Ambulation: with no difficulty except for recent difficulty with right knee pain     Fall Risk:  Fall Risk Assessment, last 12 mths 6/30/2022   Able to walk? Yes   Fall in past 12 months? 0   Do you feel unsteady? 0   Are you worried about falling 0      Abuse Screen:  Patient is not abused       Cognitive Screening    Has your family/caregiver stated any concerns about your memory: no     Cognitive Screening: none performed today    Health Maintenance Due   There are no preventive care reminders to display for this patient.     Patient Care Team   Patient Care Team:  Lottie Magana MD as PCP - General (Internal Medicine Physician)  Lottie Magana MD as PCP - [facility] Empaneled Provider  Lana Grove OD (Optometry)  Marilee Mcgowan MD (Ophthalmology)  Naren Robles MD (Orthopedic Surgery)  Kay Elliott (Physician Assistant)  Kayla Orozco MD (Gastroenterology)  Alexandru Quiroga MD (Orthopedic Surgery)  Christine Corral MD (Dermatology Physician)    History     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    Tinnitus H93.19    Microhematuria R31.29    Psoriasis L40.9    Osteopenia M85.80    Impaired fasting glucose R73.01    Carpal tunnel syndrome, bilateral G56.03    Osteoporosis of lumbar spine M81.0    History of nonmelanoma skin cancer Z85.828    Overweight with body mass index (BMI) 25.0-29.9 E66.3    Prediabetes R73.03    Primary osteoarthritis of right knee M17.11     Past Medical History:   Diagnosis Date    Eczema     Essential hypertension     FH: breast cancer     Fibroids     uterine    Fracture of radius 11/2009    with ulna, left, closed    History of cataract     History of foot fracture     right and left    Hyperlipidemia     Microhematuria     chronic    Osteopenia     Psoriasis     S/P radiation therapy     as child, to right shoulder for birthmark    Tinnitus     Zoster     right scapula      Past Surgical History:   Procedure Laterality Date    COLONOSCOPY N/A 11/17/2020    COLONOSCOPY with polypectomies performed by Vi Monroy MD at [address] HX CATARACT REMOVAL      HX COLONOSCOPY      HX FRACTURE TX Right 04/24/2019    right foot 3rd metatarsal fracture, screws and a plate    HX ORTHOPAEDIC      left arm    HX WISDOM TEETH EXTRACTION      HX WRIST FRACTURE TX       Current Outpatient Medications   Medication Sig Dispense Refill    lisinopriL (PRINIVIL, ZESTRIL) 10 mg tablet TAKE 1 TABLET BY MOUTH DAILY 90 Tablet 3    hydroCHLOROthiazide (HYDRODIURIL) 25 mg tablet TAKE 1 TABLET BY MOUTH DAILY 90 Tablet 3    cycloSPORINE (Restasis) 0.05 % dpet Administer 1 Drop to both eyes every twelve (12) hours.  atorvastatin (LIPITOR) 20 mg tablet TAKE 1 TABLET BY MOUTH DAILY 90 Tablet 3    betamethasone dipropionate (DIPROSONE) 0.05 % topical cream Apply  to affected area two (2) times daily as needed for Skin Irritation.  denosumab (PROLIA) 60 mg/mL injection 60 mg by SubCUTAneous route every 6 months.  Lactobacillus acidophilus (PROBIOTIC PO) Take  by mouth.  aspirin delayed-release 81 mg tablet Take  by mouth daily.  amoxicillin 500 mg tab TAKE ONE CAPSULE PO TID, START ONE DAY PRIOR TO PROCEDURE, DAY OF PROCEDURE AND DAY AFTER PROCEDURE 10 Tab 1    clotrimazole-betamethasone (LOTRISONE) topical cream Apply  to affected area two (2) times a day.  (Patient taking differently: Apply  to affected area two (2) times daily as needed.) 15 g 2    docusate sodium (COLACE) 100 mg capsule Take 100 mg by mouth daily as needed for Constipation.  co-enzyme Q-10 (CO Q-10) 100 mg capsule Take 100 mg by mouth daily.  biotin 2,500 mcg Tab Take 2,500 mcg by mouth.  cholecalciferol (VITAMIN D3) 1,000 unit cap Take 1 Tablet by mouth daily.  fexofenadine (ALLEGRA) 60 mg tablet Take 60 mg by mouth daily.  CALCIUM CARBONATE/VITAMIN D3 (CALCIUM + D PO) Take 1 Tab by mouth two (2) times a day. Allergies   Allergen Reactions    Codeine Nausea Only    Hibiclens [Chlorhexidine Gluconate] Rash       Family History   Problem Relation Age of Onset   Leah Zach Cancer Mother         pancreatic    Heart Disease Mother     Heart Disease Father     Cancer Sister         breast    Mult Sclerosis Sister     Breast Cancer Sister 62     Social History     Tobacco Use    Smoking status: Never Smoker    Smokeless tobacco: Never Used   Substance Use Topics    Alcohol use:  Yes     Alcohol/week: 3.0 - 4.0 standard drinks     Types: 3 - 4 Glasses of wine per week     Comment: Weekly         Tutu Devlin MD

## 2022-06-30 NOTE — ACP (ADVANCE CARE PLANNING)
Advance Care Planning     Advance Care Planning (ACP) Physician/NP/PA Conversation      Date of Conversation: 6/30/2022  Conducted with: Patient with [address] Decision Maker:     Primary Decision Maker: Rose Arrington - [phone]    Secondary Decision Maker: Luz Haley - [phone]  Click here to complete [address] including selection of the Healthcare Decision Maker Relationship (ie \"Primary\")        Today we documented Decision Maker(s) consistent with Legal Next of Kin hierarchy. Care Preferences:    Hospitalization: \"If your health worsens and it becomes clear that your chance of recovery is unlikely, what would be your preference regarding hospitalization? \"  The patient would prefer hospitalization. Ventilation: \"If you were unable to breathe on your own and your chance of recovery was unlikely, what would be your preference about the use of a ventilator (breathing machine) if it was available to you? \"   The patient would desire the use of a ventilator. Resuscitation: \"In the event your heart stopped as a result of an underlying serious health condition, would you want attempts to be made to restart your heart, or would you prefer a natural death? \"   Yes, attempt to resuscitate.     Additional topics discussed: treatment goals, benefit/burden of treatment options, ventilation preferences, hospitalization preferences, resuscitation preferences and end of life care preferences (vegetative state/imminent death)    Conversation Outcomes / Follow-Up Plan:   ACP in process - completing/providing documents   Reviewed DNR/DNI and patient elects Full Code (Attempt Resuscitation)     Length of Voluntary ACP Conversation in minutes:  16 minutes    Lord Timoteo MD

## 2022-06-30 NOTE — PROGRESS NOTES
Gladis Conchis presents today for   Chief Complaint   Patient presents with   [employer] Wellness Visit        1. \"Have you been to the ER, urgent care clinic since your last visit? Hospitalized since your last visit? \" no    2. \"Have you seen or consulted any other health care providers outside of the [address] [place] [address] since your last visit? \" no     3. For patients aged 39-70: Has the patient had a colonoscopy / FIT/ Cologuard? Yes - no Care Gap present      If the patient is female:    4. For patients aged 41-77: Has the patient had a mammogram within the past 2 years? NA - based on age or sex  See top three    5. For patients aged 21-65: Has the patient had a pap smear?  NA - based on age or sex No

## 2022-06-30 NOTE — PATIENT INSTRUCTIONS
Heart-Healthy Diet: Care Instructions  Your Care Instructions     A heart-healthy diet has lots of vegetables, fruits, nuts, beans, and whole grains, and is low in salt. It limits foods that are high in saturated fat, such as meats, cheeses, and fried foods. It may be hard to change your diet, but even small changes can lower your risk of heart attack and heart disease. Follow-up care is a key part of your treatment and safety. Be sure to make and go to all appointments, and call your doctor if you are having problems. It's also a good idea to know your test results and keep a list of the medicines you take. How can you care for yourself at home? Watch your portions  · Learn what a serving is. A \"serving\" and a \"portion\" are not always the same thing. Make sure that you are not eating larger portions than are recommended. For example, a serving of pasta is ½ cup. A serving size of meat is 2 to 3 ounces. A 3-ounce serving is about the size of a deck of cards. Measure serving sizes until you are good at [place]" them. Keep in mind that restaurants often serve portions that are 2 or 3 times the size of one serving. · To keep your energy level up and keep you from feeling hungry, eat often but in smaller portions. · Eat only the number of calories you need to stay at a healthy weight. If you need to lose weight, eat fewer calories than your body burns (through exercise and other physical activity). Eat more fruits and vegetables  · Eat a variety of fruit and vegetables every day. Dark green, deep orange, red, or yellow fruits and vegetables are especially good for you. Examples include spinach, carrots, peaches, and berries. · Keep carrots, celery, and other veggies handy for snacks. Buy fruit that is in season and store it where you can see it so that you will be tempted to eat it. · Cook dishes that have a lot of veggies in them, such as stir-fries and soups.   Limit saturated and trans fat  · Read food labels, and try to avoid saturated and trans fats. They increase your risk of heart disease. · Use olive or canola oil when you cook. · Bake, broil, grill, or steam foods instead of frying them. · Choose lean meats instead of high-fat meats such as hot dogs and sausages. Cut off all visible fat when you prepare meat. · Eat fish, skinless poultry, and meat alternatives such as soy products instead of high-fat meats. Soy products, such as tofu, may be especially good for your heart. · Choose low-fat or fat-free milk and dairy products. Eat foods high in fiber  · Eat a variety of grain products every day. Include whole-grain foods that have lots of fiber and nutrients. Examples of whole-grain foods include oats, whole wheat bread, and brown rice. · Buy whole-grain breads and cereals, instead of white bread or pastries. Limit salt and sodium  · Limit how much salt and sodium you eat to help lower your blood pressure. · Taste food before you salt it. Add only a little salt when you think you need it. With time, your taste buds will adjust to less salt. · Eat fewer snack items, fast foods, and other high-salt, processed foods. Check food labels for the amount of sodium in packaged foods. · Choose low-sodium versions of canned goods (such as soups, vegetables, and beans). Limit sugar  · Limit drinks and foods with added sugar. These include candy, desserts, and soda pop. Limit alcohol  · Limit alcohol to no more than 2 drinks a day for men and 1 drink a day for women. Too much alcohol can cause health problems. When should you call for help? Watch closely for changes in your health, and be sure to contact your doctor if:    · You would like help planning heart-healthy meals. Where can you learn more? Go to http://www.Tabletize.com.com/  Enter V137 in the search box to learn more about \"Heart-Healthy Diet: Care Instructions. \"  Current as of: August 22, 2019               Content Version: 12.6  © 9516-8365 [employer]. Care instructions adapted under license by [employer] (which disclaims liability or warranty for this information). If you have questions about a medical condition or this instruction, always ask your healthcare professional. [address] any warranty or liability for your use of this information. Learning About Diabetes Food Guidelines  Your Care Instructions     Meal planning is important to manage diabetes. It helps keep your blood sugar at a target level (which you set with your doctor). You don't have to eat special foods. You can eat what your family eats, including sweets once in a while. But you do have to pay attention to how often you eat and how much you eat of certain foods. You may want to work with a dietitian or a certified diabetes educator (CDE) to help you plan meals and snacks. A dietitian or CDE can also help you lose weight if that is one of your goals. What should you know about eating carbs? Managing the amount of carbohydrate (carbs) you eat is an important part of healthy meals when you have diabetes. Carbohydrate is found in many foods. · Learn which foods have carbs. And learn the amounts of carbs in different foods. ? Bread, cereal, pasta, and rice have about 15 grams of carbs in a serving. A serving is 1 slice of bread (1 ounce), ½ cup of cooked cereal, or 1/3 cup of cooked pasta or rice. ? Fruits have 15 grams of carbs in a serving. A serving is 1 small fresh fruit, such as an apple or orange; ½ of a banana; ½ cup of cooked or canned fruit; ½ cup of fruit juice; 1 cup of melon or raspberries; or 2 tablespoons of dried fruit. ? Milk and no-sugar-added yogurt have 15 grams of carbs in a serving. A serving is 1 cup of milk or 2/3 cup of no-sugar-added yogurt. ? Starchy vegetables have 15 grams of carbs in a serving.  A serving is ½ cup of mashed potatoes or sweet potato; 1 cup winter squash; ½ of a small baked potato; ½ cup of cooked beans; or ½ cup cooked corn or green peas. · Learn how much carbs to eat each day and at each meal. A dietitian or CDE can teach you how to keep track of the amount of carbs you eat. This is called carbohydrate counting. · If you are not sure how to count carbohydrate grams, use the Plate Method to plan meals. It is a good, quick way to make sure that you have a balanced meal. It also helps you spread carbs throughout the day. ? Divide your plate by types of foods. Put non-starchy vegetables on half the plate, meat or other protein food on one-quarter of the plate, and a grain or starchy vegetable in the final quarter of the plate. To this you can add a small piece of fruit and 1 cup of milk or yogurt, depending on how many carbs you are supposed to eat at a meal.  · Try to eat about the same amount of carbs at each meal. Do not \"save up\" your daily allowance of carbs to eat at one meal.  · Proteins have very little or no carbs per serving. Examples of proteins are beef, chicken, turkey, fish, eggs, tofu, cheese, cottage cheese, and peanut butter. A serving size of meat is 3 ounces, which is about the size of a deck of cards. Examples of meat substitute serving sizes (equal to 1 ounce of meat) are 1/4 cup of cottage cheese, 1 egg, 1 tablespoon of peanut butter, and ½ cup of tofu. How can you eat out and still eat healthy? · Learn to estimate the serving sizes of foods that have carbohydrate. If you measure food at home, it will be easier to estimate the amount in a serving of restaurant food. · If the meal you order has too much carbohydrate (such as potatoes, corn, or baked beans), ask to have a low-carbohydrate food instead. Ask for a salad or green vegetables. · If you use insulin, check your blood sugar before and after eating out to help you plan how much to eat in the future.   · If you eat more carbohydrate at a meal than you had planned, take a walk or do other exercise. This will help lower your blood sugar. What else should you know? · Limit saturated fat, such as the fat from meat and dairy products. This is a healthy choice because people who have diabetes are at higher risk of heart disease. So choose lean cuts of meat and nonfat or low-fat dairy products. Use olive or canola oil instead of butter or shortening when cooking. · Don't skip meals. Your blood sugar may drop too low if you skip meals and take insulin or certain medicines for diabetes. · Check with your doctor before you drink alcohol. Alcohol can cause your blood sugar to drop too low. Alcohol can also cause a bad reaction if you take certain diabetes medicines. Follow-up care is a key part of your treatment and safety. Be sure to make and go to all appointments, and call your doctor if you are having problems. It's also a good idea to know your test results and keep a list of the medicines you take. Where can you learn more? Go to http://www.greene.com/  Enter I147 in the search box to learn more about \"Learning About Diabetes Food Guidelines. \"  Current as of: December 20, 2019               Content Version: 12.6  © 3267-8087 [employer]. Care instructions adapted under license by [employer] (which disclaims liability or warranty for this information). If you have questions about a medical condition or this instruction, always ask your healthcare professional. [address] any warranty or liability for your use of this information. Medicare Wellness Visit, Female    The best way to improve and maintain good health is to have a healthy lifestyle by eating a well-balanced diet, exercising regularly, limiting alcohol and stopping smoking. Regular visits with your physician or non-physician health care provider also support your good health.  Preventive screening tests can find health problems before they become diseases or illnesses. Preventive services such as immunizations prevent serious infections. All people over age 72 should have a Pneumovax and a Prevnar-13 shot to prevent potentially life threatening infections with the pneumococcus bacteria, a common cause of pneumonia. These are once in a lifetime unless you and your provider decide differently. All people over 65 should have a yearly influenza vaccine or \"flu\" shot. This does not prevent infection with cold viruses but has been proven to prevent hospitalization and death from influenza. Although Medicare part B \"regular Medicare\" currently only covers tetanus vaccination in the context of an injury, a tetanus vaccine (Tdap or Td) is recommended every 10 years. A shingles vaccine is recommended once in a lifetime after age 61. The Shingles vaccine is also not covered by Medicare part B. Note, however, that both the Shingles vaccine and Tdap/Td are generally covered by secondary carriers. Please check your coverage and out of pocket expenses. Consider contacting your local health department because it may stock these vaccines for a reasonable charge.     We currently have documentation of the following immunization history for you:  Immunization History   Administered Date(s) Administered    (RETIRED) Pneumococcal Vaccine (Unspecified Type) 05/04/2010    COVID-19, MODERNA BLUE border, Primary or Immunocompromised, (age 18y+), IM, 100 mcg/0.5mL 01/28/2021, 02/25/2021    COVID-19, MODERNA Booster BLUE border, (age 18y+), IM, 50mcg/0.25mL 11/04/2021, 05/03/2022    Influenza High Dose Vaccine PF 12/13/2016, 10/03/2017    Influenza Vaccine 10/24/2014    Influenza Vaccine (Quad) PF (>6 Mo Flulaval, Fluarix, and >3 Yrs Afluria, Fluzone [id number]) 09/25/2021    Influenza Vaccine (Tri) Adjuvanted (>65 Yrs FLUAD TRI [id number]) 10/09/2018, 11/13/2019    Influenza Vaccine PF 11/05/2013    Influenza Vaccine Split 11/01/2011, 11/06/2012    Influenza, Quadrivalent, Adjuvanted (>65 Yrs FLUAD QUAD [id number]) 09/16/2020    Pneumococcal Conjugate (PCV-13) 05/05/2015    Pneumococcal Polysaccharide (PPSV-23) 05/04/2010    TD Vaccine 05/05/2009    Tdap 12/12/2016    Zoster Recombinant 09/21/2019, 12/28/2019    Zoster Vaccine, Live 05/07/2013       Screening for infection with Hepatitis C is recommended for anyone born between [birth year] through 1965. The table at the bottom of this document indicates the status of this and other preventive services. A bone mass density test (DEXA) to screen for osteoporosis or thinning of the bones should be done at least once after age 72 and may be done up to every 2 years as determined by you and your health care provider. The most recent DEXA we have on file for you is:  DEXA Results (most recent):  Results from Hospital Encounter encounter on 01/12/22    DEXA BONE DENSITY STUDY AXIAL    Narrative  DEXA BONE DENSITOMETRY, CENTRAL    CLINICAL INDICATION/HISTORY: Postmenopausal. History of breast cancer and  osteoporosis. Prior left radius/ulna fracture and right foot fracture. Hypertension. High cholesterol. Prior Fosamax use. TECHNIQUE: Using GE LUNAR Prodigy densitometer, bone density measurement was  performed in the lumbar spine the proximal left and right femora and the right  forearm. T Score refers to standard deviations above or below average compared  to a young adult of the same sex. Z Score refers to standard deviations above or  below average compared to a patient of the same sex, age, race and weight. COMPARISON: August 6, 2019. FINDINGS:    Lumbar Spine Levels: L1 and L2  Mean Bone Mineral Density (BMD):  0.947 g/cm2  T Score: -1.9  Z Score: -0.1  BMD increased 9.6%, which is statistically significant within a 95 percent  confidence interval compared to preceding study.     On PA image of the lumbar spine obtained for localization of vertebral levels  (not a diagnostic quality radiograph), there is apparent increased density at L3  and L4. The density is not well characterized on the basis of this image, but  likely degenerative. Since this density spuriously increases measured bone  mineral density, this has been excluded. Right distal 1/3 Radius BMD:  0.801 g/cm2  T Score: -1.0  Z Score: 1.5  BMD increased 2.2%, which is not statistically significant within a 95 percent  confidence interval compared to preceding study. Left Total Proximal Femur BMD: 0.754 g/cm2  T Score:  -2.0  Z Score:  -0.2  BMD increased 2.2%, which is not statistically significant within a 95 percent  confidence interval compared to preceding study. Right Total Proximal Femur BMD: 0.760 g/cm2  T Score:  -2.0  Z Score:  -0.1  BMD increased 1.6%, which is not statistically significant within a 95 percent  confidence interval compared to preceding study. Left Femoral Neck BMD:  0.754 g/cm2  T Score: -2.0  Z Score: 0.0    Right Femoral Neck BMD:  0.767 g/cm2  T Score: -2.0  Z Score: 0.1    Impression  1. BMD measures consistent with osteopenia/low bone density. 2.  Compared to the preceding study, BMD has increased. Based upon current ISCD guidelines, the patient's overall diagnostic category,  selected using WHO criteria in postmenopausal women and males aged 48 and above,  is selected based upon the lowest T Score from among the lumbar spine, total  femur, femoral neck, (or distal third radius if measured). WHO Definition of Osteoporosis and Osteopenia on DXA (specified for  post-menopausal  females):    Normal:                     T Score at or above -1 SD  Osteopenia:              T Score between -1 and -2.5 SD  Osteoporosis:           T Score at or below -2.5 SD    The risk of fracture approximately doubles for each 1 SD decrease in T Score.   It is important to consider other factors in assessing a patient's risk of  fracture, including age, risk of falling/injury, history of fragility fracture,  family history of osteoporosis, smoking, low weight. Various fracture risk tools have been developed for adult patients and are  available online. For example, the FRAX tool developed by [facility] is widely used. Reference www.iscd.org. It is also important to note that DXA measures bone density but does not  distinguish among causes of decreased bone density, which include primary versus  secondary osteoporosis (such as metabolic bone disorders or possible effects of  medications) and also other conditions (such as osteomalacia). Clinical  considerations should determine what additional evaluation may be warranted to  exclude secondary conditions in a patient with low bone density. Please note that reliable, valid comparisons can not be made between studies  which have been performed on different densitometers. If clinically warranted,  follow up study performed at this site would best permit assessment of trend for  possible change in bone mineral density over time in comparison to this study. Thank you for this referral.      Screening for diabetes mellitus with a blood sugar test (glucose) should be done at least every 3 years until age 79. You and your health care provider may decide whether to continue screening after age 79. The most recent blood glucose we have on file for you is:   Lab Results   Component Value Date/Time    Glucose 77 06/23/2022 08:32 AM         Glaucoma is a disease of the eye due to increased ocular pressure that can lead to blindness. People with risk factors for glaucoma ( race, diabetes, family history) should be screened at least every 2 years by an eye professional.     Cardiovascular screening tests that check for elevated lipids or cholesterol (fatty part of blood) which can lead to heart disease and strokes should be done every 4-6 years through age 79. You and your health care provider may decide whether to continue screening after age 79.  The most recent lipid panel we have on file for you is:   Lab Results   Component Value Date/Time    Cholesterol, total 187 06/23/2022 08:32 AM    HDL Cholesterol 90 (H) 06/23/2022 08:32 AM    LDL, calculated 83.8 06/23/2022 08:32 AM    VLDL, calculated 13.2 06/23/2022 08:32 AM    Triglyceride 66 06/23/2022 08:32 AM    CHOL/HDL Ratio 2.1 06/23/2022 08:32 AM       Colorectal cancer screening that evaluates for blood or polyps in your colon for people with average risk should be done yearly as a stool test, every five years as a flexible sigmoidoscope or every 10 years as a colonoscopy up to age 76. You and your health care provider may decide whether to continue screening after age 76. Breast cancer screening with a mammogram is recommended at least once every 2 years  for women age 54-69. You and your health care provider may decide whether to continue screening after age 76. The most recent mammogram we have on file for you is:   [facility] Results (most recent):  Results from Orders Only encounter on 08/16/21    DONALD 3D ROCÍO W MAMMO BI SCREENING INCL CAD    Narrative  EXAM: DIGITAL SCREENING BILAT MAMMOGRAM WITH TOMOSYNTHESIS    CLINICAL INDICATION/HISTORY: Screening. COMPARISON: [id number]    TECHNIQUE: Digital 2D and 3D Tomosynthesis with CAD was performed. BREAST DENSITY:  C - The breasts are heterogeneously dense, which may obscure  small masses. FINDINGS:    No developing masses or suspicious calcifications are seen. Impression  No evidence for malignancy. BI-RADS CATEGORY: BI-RADS 1 : Negative    FOLLOWUP RECOMMENDATIONS: Routine annual followup. Thank you for enabling us to participate in the care of this patient. Screening for cervical cancer with a pap smear is recommended for all women with a cervix until age 72. The frequency of this test is based on the details of her prior pap smear testing. You and your health care provider may decide whether to continue screening after age 72.     People who have smoked the equivalent of 1 pack per day for 30 years or more may benefit from screening for lung cancer with a yearly low dose CT scan until they have been non smokers for 15 years or competing health conditions render this unlikely to be beneficial. Our records show: n/a    Your Medicare Wellness Exam is recommended annually.     Here is a list of your current Health Maintenance items with a due date:  Health Maintenance   Topic Date Due    Depression Screen  05/03/2023    Lipid Screen  06/23/2023    Medicare Yearly Exam  07/01/2023    DTaP/Tdap/Td series (3 - Td or Tdap) 12/12/2026    Hepatitis C Screening  Completed    Bone Densitometry (Dexa) Screening  Completed    Shingrix Vaccine Age 50>  Completed    Flu Vaccine  Completed    COVID-19 Vaccine  Completed    Pneumococcal 65+ years  Completed

## 2022-07-03 PROBLEM — M17.11 PRIMARY OSTEOARTHRITIS OF RIGHT KNEE: Status: ACTIVE | Noted: 2022-07-03

## 2022-07-03 NOTE — PROGRESS NOTES
HPI:   Dimitris Beal is a 68y.o. year old female who presents today for a routine visit. She has a history of hypertension, hyperlipidemia, impaired fasting glucose, osteoporosis, left sciatica, and chronic microhematuria. She has completed the Moderna COVID-19 vaccine series and received two Moderna booster doses. She was last seen on 5/3/2022 complaining of acute right knee pain for approximately 4 weeks worsened by ambulation and navigating stairs. She did not note sustained improvement with heat or ice application. She underwent a right knee x-ray (5/3/2022) which showed mild tricompartmental joint space narrowing most prominent at the patellofemoral compartment with trace joint effusion and edema at the infrapatellar fat pad. She was referred to Dr. Sharon Connelly, and on 5/5/2022, she had aspiration and cortisone injection but reports no significant improvement. She reports that she is continuing to experience pain with ambulation and has started using Voltaren gel with some relief. She is no longer using meloxicam or naproxen as concerned regarding side effects. She also reports that she did not start treatment with sertraline as prescribed since she has noted some improvement in her anxiety. She states that her close friend in [place] has passed away and feels that this has resulted in some decrease in stress for her. She is otherwise without new complaints and feeling generally well. Summary of prior hospitalizations and medical history:  On 3/19/2022, she presented to [facility] ED with complaint of upper chest, upper back, and left arm pain. She reported pain was present for approximately 24 hours although acutely increased 1 hour prior to presentation. She states that the discomfort was constant and without any alleviating or exacerbating factors. She denies any dyspnea, palpitations, nausea, vomiting, PND, orthopnea, or abdominal pain.   Evaluation included WBC 8.8, Hb 12.9/ HCT 40.3, platelets 365, creatinine 0.77/eGFR > 60, troponin <3 and 3; chest x-ray no acute changes and hiatal hernia present; EKG sinus rhythm at 80 bpm, normal intervals and no change from previous. She was subsequently discharged. She was seen in follow-up on 3/23/2022 and reported that she felt that her symptoms were secondary to anxiety. She was prescribed sertraline and provided with contacts for counseling. On 8/27/2019 and was noted to have elevated transaminases with AST 50 and ALT 67. She was instructed to discontinue all NSAIDS which she had been taking for her right foot pain. She was seen again on 9/25/2019 and repeat testing showed normalization of her transaminases. Repeat evaluation on 1/9/2020 and reported that she had been taking two Excedrin almost every morning due to a headache, and she also reported continuing to drink approximately two glasses of wine each night. Repeat labs showed repeat elevation of transaminases with AST 50 and ALT 67, while other liver function tests remained normal. She was instructed to discontinue Excedrin and alcohol, and underwent an abdominal ultrasound which showed normal sized liver with mildly diffusely coarsened echogenicity. On 6/25/2019, she reported that two weeks prior, while returning home from her granddaughter's graduation, she and her  stopped at a restaurant for lunch. She states that while she was chewing a mouth full of food, her  noticed that she suddenly tilted her head to the left side and seemed to look dazed. She states that she remained awake and was aware that he was talking to her, but she states that she could not respond. She states that the episode lasted approximately 30 seconds and resolved, and she proceeded to complete her chewing and swallow her food.  She states that she did not lose consciousness, or notice any headache, visual changes, lightheadedness, tongue biting, incontinence, focal deficits, or seizure like activity. She denied any chest pain, shortness of breath, palpitations, pleuritic chest pain, hemoptysis, calf pain or swelling. 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 She also had a carotid duplex scan (7/9/2019) showing only mild (< 50%) stenosis of the bilateral internal carotid arteries. She underwent evaluation by Dr. Eric Szymanski in 10/2019, and he did not feel that the episode represented a TIA or other neurologic episode. He felt that no further evaluation was needed. She has not had a recurrence of these symptoms. On 4/15/2019, she underwent evaluation at [facility] for right foot pain. She denied known injury. Right foot x-ray showed a displaced third metatarsal fracture and she was referred to Dr. Ortega Barnes for evaluation. Given the displacement and risk of metatarsalgia, surgery was recommended, and on 4/24/2019, she underwent open reduction/internal fixation of the third metatarsal with putty bone grafting. She reports that she did well after surgery, and and was non-weight bearing in a CAM boot for several months. On 5/15/2019, she presented complaining of bilateral hand and wrist pain. She denied any fever, chills, sore throat, rashes, dyspnea, cough, abdominal pain, dysuria, focal weakness, visual changes, or headache. She did admit to sleeping with her bilateral hands tucked beneath her in a flexed position while lying on her back due to her limitations from her foot surgery. She also reported that she had been using a walker to ambulate and gripping it tightly with her hands. Lab evaluation including MANOLO, RF, CCP, ESR, uric acid were normal, but CRP was increased to 9.1. A diagnosis of presumed carpal tunnel syndrome was made, and her discomfort resolved with the use of wrist splints.  She was released to weight bear as tolerate in 7/2019, and has not had any significant pain although has been experiencing some swelling which resolves with elevation. She has a history of osteopenia, and in 2009, she sustained a fracture of her left 3rd toe (3/2009) and her distal radius (12/2009). At that time, she was started on alendronate and was treated until 11/2014. She had a repeat bone density study in 7/2016 showing T-scores: femoral neck left -2.3/ right -2.0 and lumbar -2.2. She was restarted on alendronate in 12/2016. She underwent repeat bone density study in 8/2019 showing T-scores: femoral neck left -2.2/ right -1.9 and lumbar -2.6. Due to lack of improvement, she underwent evaluation by Dr. Chelsea Fairbanks in 11/2019 and recommendation was that she change to Prolia. She continues to take calcium and Vitamin D supplements. She has a history of hypertension, treated with lisinopril and hydrochlorothiazide. She has been checking her blood pressure intermittently and reports that most readings are with a systolic blood pressure <516. She remains quite active, although has not been having time to exercise. She reports that she has continued stress in her life, helping to care for her 's mother (dementia), two disabled cousins, and her sister. She denies any chest pain, shortness of breath at rest or with exertion, palpitations, lightheadedness, or edema. She also has a history of hyperlipidemia, treated with moderate intensity dose atorvastatin. She has no history of ASCVD. In 2/2017, she was evaluated by DEREK Walter with left lower back pain and left sciatica. She had been taking ibuprofen without relief, and was given a course of prednisone. She states that she did not find this helpful either, but reports resolution of symptoms after visiting a chiropractor. She states that she no longer is experiencing pain and is able to ambulate without difficulty.  She will continue to visit a chiropractor periodically when she develops a recurrence of pain with improvement. She reports that she had an extensive workup for microhematuria in the past, which was negative (records unavailable). She denies any dysuria, gross hematuria, or flank pain. She has had screening colonoscopy with Dr Sapphire Alvarado in 10/2010, which was normal except for hemorrhoids. Recommendation for follow-up is for 10 years. She underwent a screening colonoscopy (11/17/2020) by Dr. Reena Benitez and was found to have internal and external hemorrhoids as well as 10 sessile 4-10 mm polyps in the descending colon (2), transverse colon (1), sigmoid colon (1), and rectum (6). Pathology showed multiple tubular adenomas. Recommendation is for repeat colonoscopy in 3 years. She denies any abdominal pain, nausea, vomiting, melena, hematochezia, or change in bowel movements. She has a history of eczema and followed by Dr. Ck Acosta. She had found Eucrisa to be very effective, but not covered by her insurance. She underwent reevaluation in 10/2020 for worsening palmar rash which was felt to be secondary to psoriasis. She was prescribed betamethasone cream to use as needed with improvement. She also has a history of candidal intertrigo, treated with lotrisone with good response. She has a history of basal cell carcinoma, and underwent removal from her face by Dr. Serafin Anne in 9/2020. She also reports that she underwent radiation therapy to her right shoulder (as a child) for treatment of a birthmark. On 7/20/2018, she presented for evaluation to [facility] for a right upper back rash and was diagnosed with herpes zoster. She was treated with Valtrex with improvement.       Past Medical History:   Diagnosis Date    Eczema     Essential hypertension     FH: breast cancer     Fibroids     uterine    Fracture of radius 11/2009    with ulna, left, closed    History of cataract     History of foot fracture     right and left    Hyperlipidemia     Microhematuria     chronic    Osteopenia  Psoriasis     S/P radiation therapy     as child, to right shoulder for birthmark    Tinnitus     Zoster     right scapula     Past Surgical History:   Procedure Laterality Date    COLONOSCOPY N/A 11/17/2020    COLONOSCOPY with polypectomies performed by Luis Enrique Orellana MD at [address] HX CATARACT REMOVAL      HX COLONOSCOPY      HX FRACTURE TX Right 04/24/2019    right foot 3rd metatarsal fracture, screws and a plate    HX ORTHOPAEDIC      left arm    HX WISDOM TEETH EXTRACTION      HX WRIST FRACTURE TX       Current Outpatient Medications   Medication Sig    lisinopriL (PRINIVIL, ZESTRIL) 10 mg tablet TAKE 1 TABLET BY MOUTH DAILY    hydroCHLOROthiazide (HYDRODIURIL) 25 mg tablet TAKE 1 TABLET BY MOUTH DAILY    cycloSPORINE (Restasis) 0.05 % dpet Administer 1 Drop to both eyes every twelve (12) hours.  atorvastatin (LIPITOR) 20 mg tablet TAKE 1 TABLET BY MOUTH DAILY    betamethasone dipropionate (DIPROSONE) 0.05 % topical cream Apply  to affected area two (2) times daily as needed for Skin Irritation.  denosumab (PROLIA) 60 mg/mL injection 60 mg by SubCUTAneous route every 6 months.  Lactobacillus acidophilus (PROBIOTIC PO) Take  by mouth.  aspirin delayed-release 81 mg tablet Take  by mouth daily.  amoxicillin 500 mg tab TAKE ONE CAPSULE PO TID, START ONE DAY PRIOR TO PROCEDURE, DAY OF PROCEDURE AND DAY AFTER PROCEDURE    clotrimazole-betamethasone (LOTRISONE) topical cream Apply  to affected area two (2) times a day. (Patient taking differently: Apply  to affected area two (2) times daily as needed.)    docusate sodium (COLACE) 100 mg capsule Take 100 mg by mouth daily as needed for Constipation.  co-enzyme Q-10 (CO Q-10) 100 mg capsule Take 100 mg by mouth daily.  biotin 2,500 mcg Tab Take 2,500 mcg by mouth.  cholecalciferol (VITAMIN D3) 1,000 unit cap Take 1 Tablet by mouth daily.  fexofenadine (ALLEGRA) 60 mg tablet Take 60 mg by mouth daily.     CALCIUM CARBONATE/VITAMIN D3 (CALCIUM + D PO) Take 1 Tab by mouth two (2) times a day. No current facility-administered medications for this visit. Allergies and Intolerances: Allergies   Allergen Reactions    Codeine Nausea Only    Hibiclens [Chlorhexidine Gluconate] Rash     Family History: Her sister has breast cancer and her mother had pancreatic cancer. No history of colon cancer. Family History   Problem Relation Age of Onset   Jim Garces Cancer Mother         pancreatic    Heart Disease Mother     Heart Disease Father     Cancer Sister         breast    Mult Sclerosis Sister     Breast Cancer Sister 62     Social History:   She  reports that she has never smoked. She has never used smokeless tobacco. She has about 2 glasses of wine each night. She is  with one adult daughter who lives with her family on [place], [place]. She is retired, and was a 4th- for 25 years.     Social History     Substance and Sexual Activity   Alcohol Use Yes    Alcohol/week: 3.0 - 4.0 standard drinks    Types: 3 - 4 Glasses of wine per week    Comment: Weekly     Immunization History:  Immunization History   Administered Date(s) Administered    (RETIRED) Pneumococcal Vaccine (Unspecified Type) 05/04/2010    COVID-19, MODERNA BLUE border, Primary or Immunocompromised, (age 18y+), IM, 100 mcg/0.5mL 01/28/2021, 02/25/2021    COVID-19, MODERNA Booster BLUE border, (age 18y+), IM, 50mcg/0.25mL 11/04/2021, 05/03/2022    Influenza High Dose Vaccine PF 12/13/2016, 10/03/2017    Influenza Vaccine 10/24/2014    Influenza Vaccine (Quad) PF (>6 Mo Flulaval, Fluarix, and >3 Yrs Afluria, Fluzone [id number]) 09/25/2021    Influenza Vaccine (Tri) Adjuvanted (>65 Yrs FLUAD TRI [id number]) 10/09/2018, 11/13/2019    Influenza Vaccine PF 11/05/2013    Influenza Vaccine Split 11/01/2011, 11/06/2012    Influenza, Quadrivalent, Adjuvanted (>65 Yrs FLUAD QUAD [id number]) 09/16/2020    Pneumococcal Conjugate (PCV-13) 05/05/2015    Pneumococcal Polysaccharide (PPSV-23) 05/04/2010    TD Vaccine 05/05/2009    Tdap 12/12/2016    Zoster Recombinant 09/21/2019, 12/28/2019    Zoster Vaccine, Live 05/07/2013       Review of Systems:   As above included in HPI. Otherwise 11 point review of systems negative including constitutional, skin, HENT, eyes, respiratory, cardiovascular, gastrointestinal, genitourinary, musculoskeletal, endo/heme/aller, neurological.    Physical:     Visit Vitals  BP (!) 117/54   Pulse 82   Temp 97.2 °F (36.2 °C) (Temporal)   Resp 16   Ht 5' 5\" (1.651 m)   Wt 151 lb (68.5 kg)   LMP  (LMP Unknown)   SpO2 98%   BMI 25.13 kg/m²     Exam:    Patient appears in no apparent distress. Affect is appropriate. Lungs: clear to auscultation, no wheezes, rhonchi, or rales. Heart: regular rate and rhythm. No murmur, rubs, gallops  Abdomen: soft, nontender, nondistended, normal bowel sounds, no hepatosplenomegaly or masses. Extremities: without edema. Right knee without erythema or warmth. Mild tenderness along medial aspect of joint with obvious medial swelling. ROM intact and no instability noted.         Review of Data:  Labs:  Hospital Outpatient Visit on 06/23/2022   Component Date Value Ref Range Status    WBC 06/23/2022 7.8  4.6 - 13.2 K/uL Final    RBC 06/23/2022 4.66  4.20 - 5.30 M/uL Final    HGB 06/23/2022 13.6  12.0 - 16.0 g/dL Final    HCT 06/23/2022 43.3  35.0 - 45.0 % Final    MCV 06/23/2022 92.9  78.0 - 100.0 FL Final    MCH 06/23/2022 29.2  24.0 - 34.0 PG Final    MCHC 06/23/2022 31.4  31.0 - 37.0 g/dL Final    RDW 06/23/2022 13.8  11.6 - 14.5 % Final    PLATELET 15/41/7469 882  135 - 420 K/uL Final    MPV 06/23/2022 9.8  9.2 - 11.8 FL Final    NRBC 06/23/2022 0.0  0  WBC Final    ABSOLUTE NRBC 06/23/2022 0.00  0.00 - 0.01 K/uL Final    NEUTROPHILS 06/23/2022 73  40 - 73 % Final    LYMPHOCYTES 06/23/2022 16* 21 - 52 % Final    MONOCYTES 06/23/2022 9  3 - 10 % Final    EOSINOPHILS 06/23/2022 2  0 - 5 % Final    BASOPHILS 06/23/2022 1  0 - 2 % Final    IMMATURE GRANULOCYTES 06/23/2022 0  0.0 - 0.5 % Final    ABS. NEUTROPHILS 06/23/2022 5.7  1.8 - 8.0 K/UL Final    ABS. LYMPHOCYTES 06/23/2022 1.2  0.9 - 3.6 K/UL Final    ABS. MONOCYTES 06/23/2022 0.7  0.05 - 1.2 K/UL Final    ABS. EOSINOPHILS 06/23/2022 0.2  0.0 - 0.4 K/UL Final    ABS. BASOPHILS 06/23/2022 0.0  0.0 - 0.1 K/UL Final    ABS. IMM. GRANS. 06/23/2022 0.0  0.00 - 0.04 K/UL Final    DF 06/23/2022 AUTOMATED    Final    Hemoglobin A1c 06/23/2022 5.7* 4.2 - 5.6 % Final    Est. average glucose 06/23/2022 117  mg/dL Final    LIPID PROFILE 06/23/2022        Final    Cholesterol, total 06/23/2022 187  <200 MG/DL Final    Triglyceride 06/23/2022 66  <150 MG/DL Final    HDL Cholesterol 06/23/2022 90* 40 - 60 MG/DL Final    LDL, calculated 06/23/2022 83.8  0 - 100 MG/DL Final    VLDL, calculated 06/23/2022 13.2  MG/DL Final    CHOL/HDL Ratio 06/23/2022 2.1  0 - 5.0   Final    Magnesium 06/23/2022 1.9  1.6 - 2.6 mg/dL Final    Sodium 06/23/2022 138  136 - 145 mmol/L Final    Potassium 06/23/2022 3.9  3.5 - 5.5 mmol/L Final    Chloride 06/23/2022 102  100 - 111 mmol/L Final    CO2 06/23/2022 29  21 - 32 mmol/L Final    Anion gap 06/23/2022 7  3.0 - 18 mmol/L Final    Glucose 06/23/2022 77  74 - 99 mg/dL Final    BUN 06/23/2022 21* 7.0 - 18 MG/DL Final    Creatinine 06/23/2022 0.66  0.6 - 1.3 MG/DL Final    BUN/Creatinine ratio 06/23/2022 32* 12 - 20   Final    GFR est AA 06/23/2022 >60  >60 ml/min/1.73m2 Final    GFR est non-AA 06/23/2022 >60  >60 ml/min/1.73m2 Final    Calcium 06/23/2022 9.6  8.5 - 10.1 MG/DL Final    Bilirubin, total 06/23/2022 0.6  0.2 - 1.0 MG/DL Final    ALT (SGPT) 06/23/2022 25  13 - 56 U/L Final    AST (SGOT) 06/23/2022 26  10 - 38 U/L Final    Alk.  phosphatase 06/23/2022 47  45 - 117 U/L Final    Protein, total 06/23/2022 7.5  6.4 - 8.2 g/dL Final    Albumin 06/23/2022 4.3 3.4 - 5.0 g/dL Final    Globulin 06/23/2022 3.2  2.0 - 4.0 g/dL Final    A-G Ratio 06/23/2022 1.3  0.8 - 1.7   Final    TSH 06/23/2022 2.82  0.36 - 3.74 uIU/mL Final    T4, Free 06/23/2022 1.2  0.7 - 1.5 NG/DL Final    Vitamin D 25-Hydroxy 06/23/2022 45.4  30 - 100 ng/mL Final         Health Maintenance:  Screening:    Mammogram: negative (8/2021)   PAP smear: S/P ALMAS   Colorectal: colonoscopy (11/2020) tubular adenomas. Dr. Chad Francsi Due 11/2023. Depression: none   DM (HbA1c/FPG): HbA1c 5.7 (6/2022)   Hepatitis C: negative (5/2016)   Falls: none   DEXA: osteopenia (1/2022). On Prolia. Dr. Liane Hernánedz. Glaucoma: regular eye exams with Dr. Petra Vuong (last 1/2021)   Smoking: none   Vitamin D: 45.4 (6/2022)   Medicare Wellness: today      Impression:  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    Tinnitus H93.19    Microhematuria R31.29    Psoriasis L40.9    Osteopenia M85.80    Impaired fasting glucose R73.01    Carpal tunnel syndrome, bilateral G56.03    Osteoporosis of lumbar spine M81.0    History of nonmelanoma skin cancer Z85.828    Overweight with body mass index (BMI) 25.0-29.9 E66.3    Prediabetes R73.03    Primary osteoarthritis of right knee M17.11       Plan:  1. Hypertension. Remains well controlled on current regimen of lisinopril 10 mg daily and hydrochlorothiazide 25 mg daily. Renal function remains normal with creatinine 0.66/ eGFR >60, but BUN/creatinine ratio remains increased at 32. Encouraged to increase fluid intake. Continue to follow. 3. Hyperlipidemia. On moderate intensity dose atorvastatin (20 mg) with LDL 83 and HDL 90, indicative of good control. Emphasized importance of lifestyle modifications, including heart healthy diet, exercise, and weight loss. Will continue to follow. 3. Prediabetes.   Normal fasting glucose today at 77 but HbA1c increased to 5.7. Weight overall stable. No evidence of microvascular complications. Continue regular eye exams with Dr. Matty Gomez. Emphasized importance of lifestyle modifications, including heart healthy low carbohydrate diet, regular exercise, and weight loss. Will continue to monitor. 4. Osteoporosis. History of radial and toe fracture in 2009. Treated from 11/2009 to 11/2014 with alendronate. Repeat bone density scan in 7/2016 with 10 year risk of a major osteoporetic fracture at 14 % and hip fracture at 3.4 %. Restarted Fosamax in 12/2016. New right metatarsal displaced fracture without known injury in 6/2019. Repeat bone density on 8/6/2019 showed T-scores:  femoral neck left -2.2  /right -1.9 and lumbar -2.6 . Calculated FRAX score estimated her 10 year risk of a major osteoporetic fracture at 14 % and hip fracture at 3.9 %. Comparison to her prior study in 7/2016 showed stability in her bilateral hips, but some worsening in her lumbar spine. Obtained lab evaluation for secondary osteoporosis, and TSH, intact PTH, calcium, vitamin D level, and gammopathy panel normal. Given recent fracture and some worsening of bone density scan despite treatment with biphosphonate, referred to Dr. Chelsea Fairbanks in 11/2019 and recommendation was that she change to Prolia. Underwent repeat bone density study in 1/2022 which showed overall improvement, particularly in lumbar T score. Continues on Prolia with last dose in 1/2022. Continue calcium and Vitamin D supplements, and encouraged her to exercise, particularly weight bearing activities. Fall precautions stressed. 5. Osteoarthritis, right knee pain. Presented in 5/2022 with significant right knee pain with ambulation and when lying on her side at night. Pain localized to medial aspect of knee with mild swelling and tenderness.   Underwent right knee x-ray (5/3/2022) which showed mild tricompartmental joint space narrowing most prominent at the patellofemoral compartment with trace joint effusion and edema at the infrapatellar fat pad. She underwent evaluation by Dr. Catrachita Harding on 5/5/2022 and had aspiration and cortisone injection without significant improvement. She reports she is continuing to experience pain with ambulation and has started using Voltaren gel with some relief. No longer using meloxicam or naproxen as concerned regarding side effects. Advised follow-up with Dr. Catrachita Harding for consideration of other therapy. 6. History of elevated transaminases. New onset mild elevation noted in 8/2019. Patient reported that she had been taking NSAIDS frequently to help control foot pain. Advised to discontinue and repeated in 9/2019 with normalization. Lab evaluation, including AMA, actin, hepatitis profile, CK, aldolase, celiac panel, ESR, CRP, and MANOLO, were negative, but did identify mildly low iron stores. Repeat 1/2020 again showed mild elevation of transaminases with otherwise normal LFT's. Reported had been taking Excedrin and did admit to 2 glasses of wine each night. Advised to discontinue both. Abdominal ultrasound (1/2020) showed mildly increased echogenicity of liver consistent with hepatic steatosis, but otherwise normal.  Subsequent normalization and has remained normal since. Advised to minimize alcohol and NSAIDS. Also, emphasized importance of lifestyle modifications, including heart healthy diet, regular exercise, and weight loss. 7. History of iron deficiency without anemia. Noted in 9/2019 and may have been related to foot surgery. Improved with supplementation. Colonoscopy completed (11/2020) and 10 polyps removed with majority of polyps tubular adenomas. Repeat in 3 years recommended (due 11/2023). 8. Family history of breast cancer. Patient with family history of breast cancer in first degree relative (sister). Discussed recommendations for breast cancer screening in women with lifetime risk >20%. 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 No other family member with breast cancer. Wishing to continue with annual mammograms and breast exams. 9. Chronic microhematuria. Work-up reportedly negative in past.  Will continue to monitor. 10. Psoriasis. Now well controlled on betamethasone cream for flares and Eucerin cream daily. Being followed by Dr. Emily Woodson as needed. 11. History of nonmelanoma skin cancer. Had [facility] removed from face in 9/2020. Another biopsy on right cheek benign actinic keratosis. Had follow-up with Dr. Emily Woodson for her regular skin exam in 6/2022 and reports that she was told that did not need to continue with annual exams but rather should return as needed. 12. Elevated TSH. TSH mildly elevated in 6/2021 with normal free T4. Repeat normalized today. Patient with history of radiation therapy for a birthmark on her right shoulder as a child. Will continue to follow. 13. Anxiety. Patient had reported increased anxiety in 3/2022 as she tried to care for sick family members and a close friend with terminal illness. Underwent evaluation in ED on 3/19/2022 for chest pain negative and pain considered atypical for cardiac etiology and determined to be likely related to anxiety. Prescribed Zoloft 25 mg daily, but patient reports today that she did not begin. Reports that her close friend has passed away and states that she feels her anxiety has now improved. Wishing to continue to manage without treatment. Will continue to monitor. 14. Overweight. Weight decreased 4 pounds since 3/2022. Emphasized importance of continued attempts at lifestyle modifications including heart healthy diet and regular exercise. Will continue to follow. 15. Health maintenance. Completed Moderna COVID-19 vaccine series and received two Moderna booster doses. Received 2/2 doses of Shingrix vaccine. Other immunizations up to date. Mammogram up to date. Will place order for next mammogram in 8/2022. Colonoscopy up-to-date. Continue regular eye exams with Dr. Maya Oleary. Vitamin D level remains normal on maintenance dose supplement. In addition, an annual Medicare wellness visit was done today. Patient understands recommendations and agrees with plan. Follow-up in 6 months. Time spent in preparing for the visit, including review of history, tests done prior to arrival, additional time reviewing clinical data, imaging, outside records and test results: 5 minutes. Time spent in counseling with patient and/or family members regarding care plan: 35 minutes. Time spent in ordering tests, treatments, and referring patient for further care: 10 minutes. Time spent on visit does not include time for documentation. 1

## 2022-07-07 RX ORDER — MELOXICAM 15 MG/1
15 TABLET ORAL DAILY
Qty: 30 TABLET | Refills: 1 | Status: SHIPPED | OUTPATIENT
Start: 2022-07-07 | End: 2022-10-16 | Stop reason: ALTCHOICE

## 2022-07-21 ENCOUNTER — TELEPHONE (OUTPATIENT)
Dept: INTERNAL MEDICINE CLINIC | Age: 77
End: 2022-07-21

## 2022-07-21 NOTE — TELEPHONE ENCOUNTER
Called and spoke with patient. Reports that over the onset of intermittent right-sided back pain for the last week. She reports that the pain is transient and reports that she is not currently experiencing pain. She states that she has been doing a lot of lifting and rearranging of furniture in her home during this time. She states that she has noted improvement when applying heat. She denies any abdominal pain, hematuria, or dysuria. Denies any trauma, fever, or chills. Otherwise feeling well. Discussed that most likely musculoskeletal related to lifting and advised to continue applying heat and performing stretching exercises. Advised to call back for any worsening or new symptoms.

## 2022-07-21 NOTE — TELEPHONE ENCOUNTER
Patient called and states that she has been having back pain for about a week. States its right at the bottom of her ribs and goes down her back to her waist. No other symptoms to report at this time. She thought she had pulled a muscle but its not going away. She states when she is busy it doesn't hurt as bad, but when she stops and thinks about it it hurts. She has tried [email or web address]. Patient can be reached at [phone].   Please advise, thank you

## 2022-07-25 ENCOUNTER — PATIENT MESSAGE (OUTPATIENT)
Dept: INTERNAL MEDICINE CLINIC | Age: 77
End: 2022-07-25

## 2022-07-26 NOTE — TELEPHONE ENCOUNTER
Called and spoke with patient. Confirmed that exposure to COVID on 7/23/2022 was outdoors at a dinner. She reports that she and her  currently do not have any symptoms and advised that would likely have developed symptoms at this point given the short incubation period for the new omicron variant. She discussed that she has her son and his family coming on 7/28/2022 and advised that she could take a home rapid antigen test prior to their arrival to confirm that she is negative. Answered all questions.

## 2022-08-18 ENCOUNTER — HOSPITAL ENCOUNTER (OUTPATIENT)
Dept: MAMMOGRAPHY | Age: 77
Discharge: HOME | End: 2022-08-18
Attending: INTERNAL MEDICINE
Payer: MEDICARE

## 2022-08-18 DIAGNOSIS — Z12.31 ENCOUNTER FOR SCREENING MAMMOGRAM FOR MALIGNANT NEOPLASM OF BREAST: ICD-10-CM

## 2022-08-18 PROCEDURE — 77063 BREAST TOMOSYNTHESIS BI: CPT

## 2022-10-04 RX ORDER — ATORVASTATIN CALCIUM 20 MG/1
TABLET, FILM COATED ORAL
Qty: 90 TABLET | Refills: 3 | Status: SHIPPED | OUTPATIENT
Start: 2022-10-04

## 2022-10-06 ENCOUNTER — TELEPHONE (OUTPATIENT)
Dept: INTERNAL MEDICINE CLINIC | Age: 77
End: 2022-10-06

## 2022-10-06 NOTE — TELEPHONE ENCOUNTER
Pt advised re: Flu shots may be obtained this Sat 10/15/22 from 8-12 noon while supplies last at our [address] center location.     Or through a local pharmacy

## 2022-10-06 NOTE — TELEPHONE ENCOUNTER
----- Message from Andrey Longoria sent at 10/6/2022  1:49 PM EDT -----  Subject: Appointment Request    Reason for Call: Established Patient Appointment needed: Flu Shot    QUESTIONS    Reason for appointment request? No appointments available during search     Additional Information for Provider?   ---------------------------------------------------------------------------  --------------  [id number] [facility]  [phone]; OK to leave message on voicemail  ---------------------------------------------------------------------------  --------------  SCRIPT ANSWERS  COVID Screen: Abby Sanders

## 2022-10-11 ENCOUNTER — VIRTUAL VISIT (OUTPATIENT)
Dept: INTERNAL MEDICINE CLINIC | Age: 77
End: 2022-10-11
Payer: MEDICARE

## 2022-10-11 DIAGNOSIS — M17.11 PRIMARY OSTEOARTHRITIS OF RIGHT KNEE: ICD-10-CM

## 2022-10-11 DIAGNOSIS — E66.3 OVERWEIGHT WITH BODY MASS INDEX (BMI) 25.0-29.9: ICD-10-CM

## 2022-10-11 DIAGNOSIS — G89.29 CHRONIC PAIN OF RIGHT KNEE: ICD-10-CM

## 2022-10-11 DIAGNOSIS — M25.561 CHRONIC PAIN OF RIGHT KNEE: ICD-10-CM

## 2022-10-11 DIAGNOSIS — I10 ESSENTIAL HYPERTENSION: ICD-10-CM

## 2022-10-11 DIAGNOSIS — M71.21 BAKER'S CYST, UNRUPTURED, RIGHT: Primary | ICD-10-CM

## 2022-10-11 DIAGNOSIS — M81.0 OSTEOPOROSIS OF LUMBAR SPINE: ICD-10-CM

## 2022-10-11 PROCEDURE — G8756 NO BP MEASURE DOC: HCPCS | Performed by: INTERNAL MEDICINE

## 2022-10-11 PROCEDURE — G0463 HOSPITAL OUTPT CLINIC VISIT: HCPCS | Performed by: INTERNAL MEDICINE

## 2022-10-11 PROCEDURE — 99214 OFFICE O/P EST MOD 30 MIN: CPT | Performed by: INTERNAL MEDICINE

## 2022-10-11 PROCEDURE — 1090F PRES/ABSN URINE INCON ASSESS: CPT | Performed by: INTERNAL MEDICINE

## 2022-10-11 PROCEDURE — G8510 SCR DEP NEG, NO PLAN REQD: HCPCS | Performed by: INTERNAL MEDICINE

## 2022-10-11 PROCEDURE — 1123F ACP DISCUSS/DSCN MKR DOCD: CPT | Performed by: INTERNAL MEDICINE

## 2022-10-11 PROCEDURE — G8427 DOCREV CUR MEDS BY ELIG CLIN: HCPCS | Performed by: INTERNAL MEDICINE

## 2022-10-11 PROCEDURE — 1101F PT FALLS ASSESS-DOCD LE1/YR: CPT | Performed by: INTERNAL MEDICINE

## 2022-10-11 RX ORDER — DULOXETIN HYDROCHLORIDE 20 MG/1
20 CAPSULE, DELAYED RELEASE ORAL DAILY
COMMUNITY
Start: 2022-09-16

## 2022-10-11 NOTE — PROGRESS NOTES
1. \"Have you been to the ER, urgent care clinic since your last visit? Hospitalized since your last visit? \" No    2. \"Have you seen or consulted any other health care providers outside of the [address] [place] [address] since your last visit? \" No     3. For patients aged 39-70: Has the patient had a colonoscopy / FIT/ Cologuard? NA - based on age      If the patient is female:    4. For patients aged 41-77: Has the patient had a mammogram within the past 2 years? NA - based on age or sex      11. For patients aged 21-65: Has the patient had a pap smear?  NA - based on age or sex

## 2022-10-11 NOTE — PATIENT INSTRUCTIONS
Heart-Healthy Diet: Care Instructions  Your Care Instructions     A heart-healthy diet has lots of vegetables, fruits, nuts, beans, and whole grains, and is low in salt. It limits foods that are high in saturated fat, such as meats, cheeses, and fried foods. It may be hard to change your diet, but even small changes can lower your risk of heart attack and heart disease. Follow-up care is a key part of your treatment and safety. Be sure to make and go to all appointments, and call your doctor if you are having problems. It's also a good idea to know your test results and keep a list of the medicines you take. How can you care for yourself at home? Watch your portions  Learn what a serving is. A \"serving\" and a \"portion\" are not always the same thing. Make sure that you are not eating larger portions than are recommended. For example, a serving of pasta is ½ cup. A serving size of meat is 2 to 3 ounces. A 3-ounce serving is about the size of a deck of cards. Measure serving sizes until you are good at [place]" them. Keep in mind that restaurants often serve portions that are 2 or 3 times the size of one serving. To keep your energy level up and keep you from feeling hungry, eat often but in smaller portions. Eat only the number of calories you need to stay at a healthy weight. If you need to lose weight, eat fewer calories than your body burns (through exercise and other physical activity). Eat more fruits and vegetables  Eat a variety of fruit and vegetables every day. Dark green, deep orange, red, or yellow fruits and vegetables are especially good for you. Examples include spinach, carrots, peaches, and berries. Keep carrots, celery, and other veggies handy for snacks. Buy fruit that is in season and store it where you can see it so that you will be tempted to eat it. Cook dishes that have a lot of veggies in them, such as stir-fries and soups.   Limit saturated and trans fat  Read food labels, and try to avoid saturated and trans fats. They increase your risk of heart disease. Use olive or canola oil when you cook. Bake, broil, grill, or steam foods instead of frying them. Choose lean meats instead of high-fat meats such as hot dogs and sausages. Cut off all visible fat when you prepare meat. Eat fish, skinless poultry, and meat alternatives such as soy products instead of high-fat meats. Soy products, such as tofu, may be especially good for your heart. Choose low-fat or fat-free milk and dairy products. Eat foods high in fiber  Eat a variety of grain products every day. Include whole-grain foods that have lots of fiber and nutrients. Examples of whole-grain foods include oats, whole wheat bread, and brown rice. Buy whole-grain breads and cereals, instead of white bread or pastries. Limit salt and sodium  Limit how much salt and sodium you eat to help lower your blood pressure. Taste food before you salt it. Add only a little salt when you think you need it. With time, your taste buds will adjust to less salt. Eat fewer snack items, fast foods, and other high-salt, processed foods. Check food labels for the amount of sodium in packaged foods. Choose low-sodium versions of canned goods (such as soups, vegetables, and beans). Limit sugar  Limit drinks and foods with added sugar. These include candy, desserts, and soda pop. Limit alcohol  Limit alcohol to no more than 2 drinks a day for men and 1 drink a day for women. Too much alcohol can cause health problems. When should you call for help? Watch closely for changes in your health, and be sure to contact your doctor if:    You would like help planning heart-healthy meals. Where can you learn more? Go to http://www.greene.com/  Enter V137 in the search box to learn more about \"Heart-Healthy Diet: Care Instructions. \"  Current as of: August 22, 2019               Content Version: 12.6  © 9663-2311 [employer]. Care instructions adapted under license by [employer] (which disclaims liability or warranty for this information). If you have questions about a medical condition or this instruction, always ask your healthcare professional. [address] any warranty or liability for your use of this information.

## 2022-10-13 ENCOUNTER — OFFICE VISIT (OUTPATIENT)
Dept: ORTHOPEDIC SURGERY | Age: 77
End: 2022-10-13
Payer: MEDICARE

## 2022-10-13 VITALS — BODY MASS INDEX: 25.66 KG/M2 | HEIGHT: 65 IN | WEIGHT: 154 LBS | TEMPERATURE: 97.8 F

## 2022-10-13 DIAGNOSIS — M17.11 PRIMARY OSTEOARTHRITIS OF RIGHT KNEE: Primary | ICD-10-CM

## 2022-10-13 DIAGNOSIS — M71.21 BAKER'S CYST OF KNEE, RIGHT: ICD-10-CM

## 2022-10-13 PROCEDURE — 99214 OFFICE O/P EST MOD 30 MIN: CPT | Performed by: ORTHOPAEDIC SURGERY

## 2022-10-13 PROCEDURE — G8417 CALC BMI ABV UP PARAM F/U: HCPCS | Performed by: ORTHOPAEDIC SURGERY

## 2022-10-13 PROCEDURE — 1101F PT FALLS ASSESS-DOCD LE1/YR: CPT | Performed by: ORTHOPAEDIC SURGERY

## 2022-10-13 PROCEDURE — G8427 DOCREV CUR MEDS BY ELIG CLIN: HCPCS | Performed by: ORTHOPAEDIC SURGERY

## 2022-10-13 PROCEDURE — G8536 NO DOC ELDER MAL SCRN: HCPCS | Performed by: ORTHOPAEDIC SURGERY

## 2022-10-13 PROCEDURE — 1090F PRES/ABSN URINE INCON ASSESS: CPT | Performed by: ORTHOPAEDIC SURGERY

## 2022-10-13 PROCEDURE — G8756 NO BP MEASURE DOC: HCPCS | Performed by: ORTHOPAEDIC SURGERY

## 2022-10-13 PROCEDURE — 1123F ACP DISCUSS/DSCN MKR DOCD: CPT | Performed by: ORTHOPAEDIC SURGERY

## 2022-10-13 PROCEDURE — G8432 DEP SCR NOT DOC, RNG: HCPCS | Performed by: ORTHOPAEDIC SURGERY

## 2022-10-13 NOTE — PROGRESS NOTES
Kevin Baca  1945   Chief Complaint   Patient presents with    Knee Pain     Rt         HISTORY OF PRESENT ILLNESS  Kevin Baca is a 68 y.o. female who presents today for reevaluation of right knee. Patient rates pain as 7/10 today. Pain has been present since around April. She had climbed a fair amount of stairs and had made a trip to [place] prior to onset. Her pain is intermittent in nature. At last OV on 5/05/2022, patient had a right knee cortisone injection which provided minimal relief. She also notes swelling in her posterior knee which she first noticed this past Friday. Per the patient, Dr. Tracie Ferguson thinks the patient has a Baker's cyst in that knee. She describes a stinging pain in the medial side of the knee. Patient denies any fever, chills, chest pain, shortness of breath or calf pain. The remainder of the review of systems is negative. There are no new illness or injuries to report since last seen in the office. There are no changes to medications, allergies, family or social history. PHYSICAL EXAM:   Visit Vitals  Temp 97.8 °F (36.6 °C) (Temporal)   Ht 5' 5\" (1.651 m)   Wt 154 lb (69.9 kg)   LMP  (LMP Unknown)   BMI 25.63 kg/m²     The patient is a well-developed, well-nourished female   in no acute distress. The patient is alert and oriented times three. The patient is alert and oriented times three. Mood and affect are normal.  LYMPHATIC: lymph nodes are not enlarged and are within normal limits  SKIN: normal in color and non tender to palpation. There are no bruises or abrasions noted. NEUROLOGICAL: Motor sensory exam is within normal limits. Reflexes are equal bilaterally.  There is normal sensation to pinprick and light touch  MUSCULOSKELETAL:  Examination Right knee   Skin Intact   Range of motion 0-130   Effusion +   Medial joint line tenderness +   Lateral joint line tenderness -   Tenderness Pes Bursa -   Tenderness insertion MCL -   Tenderness insertion LCL -   Alexiss -   Patella crepitus +   Patella grind +   Lachman -   Pivot shift -   Anterior drawer -   Posterior drawer -   Varus stress -   Valgus stress -   Neurovascular Intact   Calf Swelling and Tenderness to Palpation -   Mervat's Test -   Hamstring Cord Tightness -         PROCEDURE: Limited Ultrasound Exam of  Right Knee    Indications: Pain/Swelling/Weakness in Right Knee  Assess: Right Knee posterior swelling    Diagnostic Ultrasound: 2D, gray scale B mode complete/limited ultrasound of right knee using a [employer] Ultrasound machine with a linear probe with evaluation of:  1.3 X 1.39 cm hypoechoic mass c/w Baker's cyst posteriorly    Ultrasound images captured and scanned into patient's chart. Assessment: 1.3 X 1.39 cm hypoechoic mass c/w Baker's cyst posteriorly  Encounter Diagnoses   Name Primary? Primary osteoarthritis of right knee Yes    Baker's cyst of knee, right          IMAGING: XR of the right knee with 2 views obtained at [facility] radiology dated 5/3/2022 was reviewed and read by Dr. Nuvia Monreal: calcification of the lateral meniscus with moderate degenerative changes in the patellofemoral joint      IMPRESSION:      ICD-10-CM ICD-9-CM    1. Primary osteoarthritis of right knee  M17.11 715.16       2. Baker's cyst of knee, right  M71.21 727.51            PLAN:   1. Pt presents today with right knee pain due to primary OA and she has failed cortisone injection. Will be proceeding with Euflexxa authorization for the right knee. She also presents with posterior knee swelling c/w possible Baker's cyst which was inspected with the ultrasound machine in the office today. Risk factors include: htn  2. Yes ultrasound exam indicated today R KNEE  3. No cortisone injection indicated today  4. No Physical/Occupational Therapy indicated today  5. No diagnostic test indicated today:   6. No durable medical equipment indicated today  7. No referral indicated today   8. No medications indicated today:   9.  No Narcotic indicated today      RTC following Euflexxa auth      Scribed by Michelle Pepper [address]) as dictated by MD BENTLEY Ferreira, Dr. Celeste Mclaughlin, confirm that all documentation is accurate.     Celeste Mclaughlin M.D.   [phone]

## 2022-10-16 PROBLEM — M25.561 CHRONIC PAIN OF RIGHT KNEE: Status: ACTIVE | Noted: 2022-10-16

## 2022-10-16 PROBLEM — G89.29 CHRONIC PAIN OF RIGHT KNEE: Status: ACTIVE | Noted: 2022-10-16

## 2022-10-16 PROBLEM — M71.21 BAKER'S CYST, UNRUPTURED, RIGHT: Status: ACTIVE | Noted: 2022-10-16

## 2022-10-16 NOTE — PROGRESS NOTES
Carlos Mahmood is a 68 y.o. female who was seen by synchronous (real-time) audio-video technology on 10/11/2022 for Other (Reports swollen lump behind right knee, no pain. First noticed 10/7/22)    HPI:   Carlos Mahmood is a 68y.o. year old female who presents today for an acute visit. She has a history of hypertension, hyperlipidemia, impaired fasting glucose, osteoporosis, left sciatica, and chronic microhematuria. She has completed the Moderna COVID-19 vaccine series and received two Moderna booster doses. On 5/3/2022, she presented complaining of acute right knee pain for approximately 4 weeks worsened by ambulation and navigating stairs. She did not note sustained improvement with heat or ice application. She underwent a right knee x-ray (5/3/2022) which showed mild tricompartmental joint space narrowing most prominent at the patellofemoral compartment with trace joint effusion and edema at the infrapatellar fat pad. She was referred to Dr. Say Smith, and on 5/5/2022, she had aspiration and cortisone injection but reported no significant improvement. She reported that she was continuing to experience pain with ambulation and had started using Voltaren gel with some relief. She states that she is not taking meloxicam or Naprosyn due to concern regarding side effects. She states that she had a follow-up visit with Dr. Janice Love last week and was instructed to begin two Tylenol ES twice daily and she was also prescribed Cymbalta 20 mg daily although has not yet initiated. She reports that 4 days ago, she noted swelling behind her right knee which tightens when walking downstairs. She states that the swelling is palpable although she does not experience any posterior knee pain. She denies any calf pain or swelling or pedal edema. She also denies any new trauma. She also reports that she did receive her Prolia injection last week.   She states that she is scheduled for her bivalent booster on 10 7/13/2022. She is otherwise without new complaints. Summary of prior hospitalizations and medical history:  On 3/19/2022, she presented to [facility] ED with complaint of upper chest, upper back, and left arm pain. She reported pain was present for approximately 24 hours although acutely increased 1 hour prior to presentation. She states that the discomfort was constant and without any alleviating or exacerbating factors. She denies any dyspnea, palpitations, nausea, vomiting, PND, orthopnea, or abdominal pain. Evaluation included WBC 8.8, Hb 12.9/ HCT 40.3, platelets 004, creatinine 0.77/eGFR > 60, troponin <3 and 3; chest x-ray no acute changes and hiatal hernia present; EKG sinus rhythm at 80 bpm, normal intervals and no change from previous. She was subsequently discharged. She was seen in follow-up on 3/23/2022 and reported that she felt that her symptoms were secondary to anxiety. She was prescribed sertraline and provided with contacts for counseling. On 8/27/2019 and was noted to have elevated transaminases with AST 50 and ALT 67. She was instructed to discontinue all NSAIDS which she had been taking for her right foot pain. She was seen again on 9/25/2019 and repeat testing showed normalization of her transaminases. Repeat evaluation on 1/9/2020 and reported that she had been taking two Excedrin almost every morning due to a headache, and she also reported continuing to drink approximately two glasses of wine each night. Repeat labs showed repeat elevation of transaminases with AST 50 and ALT 67, while other liver function tests remained normal. She was instructed to discontinue Excedrin and alcohol, and underwent an abdominal ultrasound which showed normal sized liver with mildly diffusely coarsened echogenicity. On 6/25/2019, she reported that two weeks prior, while returning home from her granddaughter's graduation, she and her  stopped at a restaurant for lunch.  She states that while she was chewing a mouth full of food, her  noticed that she suddenly tilted her head to the left side and seemed to look dazed. She states that she remained awake and was aware that he was talking to her, but she states that she could not respond. She states that the episode lasted approximately 30 seconds and resolved, and she proceeded to complete her chewing and swallow her food. She states that she did not lose consciousness, or notice any headache, visual changes, lightheadedness, tongue biting, incontinence, focal deficits, or seizure like activity. She denied any chest pain, shortness of breath, palpitations, pleuritic chest pain, hemoptysis, calf pain or swelling. 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 She also had a carotid duplex scan (7/9/2019) showing only mild (< 50%) stenosis of the bilateral internal carotid arteries. She underwent evaluation by Dr. Ilda Herrera in 10/2019, and he did not feel that the episode represented a TIA or other neurologic episode. He felt that no further evaluation was needed. She has not had a recurrence of these symptoms. On 4/15/2019, she underwent evaluation at [facility] for right foot pain. She denied known injury. Right foot x-ray showed a displaced third metatarsal fracture and she was referred to Dr. Radha Diez for evaluation. Given the displacement and risk of metatarsalgia, surgery was recommended, and on 4/24/2019, she underwent open reduction/internal fixation of the third metatarsal with putty bone grafting. She reports that she did well after surgery, and and was non-weight bearing in a CAM boot for several months. On 5/15/2019, she presented complaining of bilateral hand and wrist pain.  She denied any fever, chills, sore throat, rashes, dyspnea, cough, abdominal pain, dysuria, focal weakness, visual changes, or headache. She did admit to sleeping with her bilateral hands tucked beneath her in a flexed position while lying on her back due to her limitations from her foot surgery. She also reported that she had been using a walker to ambulate and gripping it tightly with her hands. Lab evaluation including MANOLO, RF, CCP, ESR, uric acid were normal, but CRP was increased to 9.1. A diagnosis of presumed carpal tunnel syndrome was made, and her discomfort resolved with the use of wrist splints. She was released to weight bear as tolerate in 7/2019, and has not had any significant pain although has been experiencing some swelling which resolves with elevation. She has a history of osteopenia, and in 2009, she sustained a fracture of her left 3rd toe (3/2009) and her distal radius (12/2009). At that time, she was started on alendronate and was treated until 11/2014. She had a repeat bone density study in 7/2016 showing T-scores: femoral neck left -2.3/ right -2.0 and lumbar -2.2. She was restarted on alendronate in 12/2016. She underwent repeat bone density study in 8/2019 showing T-scores: femoral neck left -2.2/ right -1.9 and lumbar -2.6. Due to lack of improvement, she underwent evaluation by Dr. Janice Love in 11/2019 and recommendation was that she change to Prolia. She continues to take calcium and Vitamin D supplements. She has a history of hypertension, treated with lisinopril and hydrochlorothiazide. She has been checking her blood pressure intermittently and reports that most readings are with a systolic blood pressure <467. She remains quite active, although has not been having time to exercise. She reports that she has continued stress in her life, helping to care for her 's mother (dementia), two disabled cousins, and her sister. She denies any chest pain, shortness of breath at rest or with exertion, palpitations, lightheadedness, or edema.  She also has a history of hyperlipidemia, treated with moderate intensity dose atorvastatin. She has no history of ASCVD. In 2/2017, she was evaluated by DEREK Walter with left lower back pain and left sciatica. She had been taking ibuprofen without relief, and was given a course of prednisone. She states that she did not find this helpful either, but reports resolution of symptoms after visiting a chiropractor. She states that she no longer is experiencing pain and is able to ambulate without difficulty. She will continue to visit a chiropractor periodically when she develops a recurrence of pain with improvement. She reports that she had an extensive workup for microhematuria in the past, which was negative (records unavailable). She denies any dysuria, gross hematuria, or flank pain. She has had screening colonoscopy with Dr Lois Dillard in 10/2010, which was normal except for hemorrhoids. Recommendation for follow-up is for 10 years. She underwent a screening colonoscopy (11/17/2020) by Dr. Claudetta Goodie and was found to have internal and external hemorrhoids as well as 10 sessile 4-10 mm polyps in the descending colon (2), transverse colon (1), sigmoid colon (1), and rectum (6). Pathology showed multiple tubular adenomas. Recommendation is for repeat colonoscopy in 3 years. She denies any abdominal pain, nausea, vomiting, melena, hematochezia, or change in bowel movements. She has a history of eczema and followed by Dr. Rubi Pandey. She had found Eucrisa to be very effective, but not covered by her insurance. She underwent reevaluation in 10/2020 for worsening palmar rash which was felt to be secondary to psoriasis. She was prescribed betamethasone cream to use as needed with improvement. She also has a history of candidal intertrigo, treated with lotrisone with good response. She has a history of basal cell carcinoma, and underwent removal from her face by Dr. Parminder Munoz in 9/2020.  She also reports that she underwent radiation therapy to her right shoulder (as a child) for treatment of a birthmark. On 7/20/2018, she presented for evaluation to [facility] for a right upper back rash and was diagnosed with herpes zoster. She was treated with Valtrex with improvement. Past Medical History:   Diagnosis Date    Eczema     Essential hypertension     FH: breast cancer     Fibroids     uterine    Fracture of radius 11/2009    with ulna, left, closed    History of cataract     History of foot fracture     right and left    Hyperlipidemia     Microhematuria     chronic    Osteopenia     Psoriasis     S/P radiation therapy     as child, to right shoulder for birthmark    Tinnitus     Zoster     right scapula     Past Surgical History:   Procedure Laterality Date    COLONOSCOPY N/A 11/17/2020    COLONOSCOPY with polypectomies performed by Atiya Yip MD at [facility]      HX COLONOSCOPY      HX FRACTURE TX Right 04/24/2019    right foot 3rd metatarsal fracture, screws and a plate    HX ORTHOPAEDIC      left arm    HX WISDOM TEETH EXTRACTION      HX WRIST FRACTURE TX       Current Outpatient Medications   Medication Sig    DULoxetine (CYMBALTA) 20 mg capsule Take 20 mg by mouth daily. atorvastatin (LIPITOR) 20 mg tablet TAKE 1 TABLET BY MOUTH DAILY    lisinopriL (PRINIVIL, ZESTRIL) 10 mg tablet TAKE 1 TABLET BY MOUTH DAILY    hydroCHLOROthiazide (HYDRODIURIL) 25 mg tablet TAKE 1 TABLET BY MOUTH DAILY    cycloSPORINE (Restasis) 0.05 % dpet Administer 1 Drop to both eyes every twelve (12) hours. betamethasone dipropionate (DIPROSONE) 0.05 % topical cream Apply  to affected area two (2) times daily as needed for Skin Irritation. denosumab (PROLIA) 60 mg/mL injection 60 mg by SubCUTAneous route every 6 months. Lactobacillus acidophilus (PROBIOTIC PO) Take  by mouth. aspirin delayed-release 81 mg tablet Take  by mouth daily.     amoxicillin 500 mg tab TAKE ONE CAPSULE PO TID, START ONE DAY PRIOR TO PROCEDURE, DAY OF PROCEDURE AND DAY AFTER PROCEDURE    clotrimazole-betamethasone (LOTRISONE) topical cream Apply  to affected area two (2) times a day. (Patient taking differently: Apply  to affected area two (2) times daily as needed.)    docusate sodium (COLACE) 100 mg capsule Take 100 mg by mouth daily as needed for Constipation. co-enzyme Q-10 (CO Q-10) 100 mg capsule Take 100 mg by mouth daily. biotin 2,500 mcg Tab Take 2,500 mcg by mouth. cholecalciferol (VITAMIN D3) 1,000 unit cap Take 1 Tablet by mouth daily. fexofenadine (ALLEGRA) 60 mg tablet Take 60 mg by mouth daily. CALCIUM CARBONATE/VITAMIN D3 (CALCIUM + D PO) Take 1 Tab by mouth two (2) times a day. No current facility-administered medications for this visit. Allergies and Intolerances: Allergies   Allergen Reactions    Codeine Nausea Only    Povidone-Iodine Hives    Hibiclens [Chlorhexidine Gluconate] Rash     Family History: Her sister has breast cancer and her mother had pancreatic cancer. No history of colon cancer. Family History   Problem Relation Age of Onset    Cancer Mother         pancreatic    Heart Disease Mother     Heart Disease Father     Cancer Sister         breast    Mult Sclerosis Sister     Breast Cancer Sister 62     Social History:   She  reports that she has never smoked. She has never used smokeless tobacco. She has about 2 glasses of wine each night. She is  with one adult daughter who lives with her family on [place], [place]. She is retired, and was a 4th- for 25 years.     Social History     Substance and Sexual Activity   Alcohol Use Yes    Alcohol/week: 3.0 - 4.0 standard drinks    Types: 3 - 4 Glasses of wine per week    Comment: Weekly     Immunization History:  Immunization History   Administered Date(s) Administered    (RETIRED) Pneumococcal Vaccine (Unspecified Type) 05/04/2010    COVID-19, MODERNA BLUE border, Primary or Immunocompromised, (age 18y+), IM, 100 mcg/0.5mL 01/28/2021, 02/25/2021    COVID-19, MODERNA Bivalent BOOSTER, (age 18y+), IM, 50 mcg/0.5 mL 10/13/2022    COVID-19, MODERNA Booster BLUE border, (age 18y+), IM, 50mcg/0.25mL 11/04/2021, 05/03/2022    Influenza High Dose Vaccine PF 12/13/2016, 10/03/2017    Influenza Vaccine 10/24/2014    Influenza Vaccine (Tri) Adjuvanted (>65 Yrs FLUAD TRI [id number]) 10/09/2018, 11/13/2019    Influenza Vaccine PF 11/05/2013    Influenza Vaccine Split 11/01/2011, 11/06/2012    Influenza, FLUAD, (age 72 y+), Adjuvanted 09/16/2020, 10/08/2022    Influenza, FLUARIX, FLULAVAL, FLUZONE (age 10 mo+) AND AFLURIA, (age 1 y+), PF, 0.5mL 09/25/2021    Pneumococcal Conjugate (PCV-13) 05/05/2015    Pneumococcal Polysaccharide (PPSV-23) 05/04/2010    TD Vaccine 05/05/2009    Tdap 12/12/2016    Zoster Recombinant 09/21/2019, 12/28/2019    Zoster Vaccine, Live 05/07/2013       Review of Systems:   As above included in HPI. Otherwise 11 point review of systems negative including constitutional, skin, HENT, eyes, respiratory, cardiovascular, gastrointestinal, genitourinary, musculoskeletal, endo/heme/aller, neurological.    Physical:   There were no vitals taken for this visit.       Constitutional: [x] Appears well-developed and well-nourished [x] No apparent distress      [] Abnormal -     Mental status: [x] Alert and awake  [x] Oriented to person/place/time [x] Able to follow commands    [] Abnormal -     Eyes:   EOM    [x]  Normal    [] Abnormal -   Sclera  [x]  Normal    [] Abnormal -          Discharge [x]  None visible   [] Abnormal -     HENT: [x] Normocephalic, atraumatic  [] Abnormal -   [x] Mouth/Throat: Mucous membranes are moist    External Ears [x] Normal  [] Abnormal -    Neck: [x] No visualized mass [] Abnormal -     Pulmonary/Chest: [x] Respiratory effort normal   [x] No visualized signs of difficulty breathing or respiratory distress        [] Abnormal -      Musculoskeletal:   [] Normal gait with no signs of ataxia         [x] Normal range of motion of neck        [] Abnormal -     Neurological:        [x] No Facial Asymmetry (Cranial nerve 7 motor function) (limited exam due to video visit)          [x] No gaze palsy        [] Abnormal -          Skin:        [x] No significant exanthematous lesions or discoloration noted on facial skin         [] Abnormal -            Psychiatric:       [x] Normal Affect [] Abnormal -        [x] No Hallucinations    Other pertinent observable physical exam findings:-          Review of Data:  Labs:  No visits with results within 2 Week(s) from this visit. Latest known visit with results is:   Hospital Outpatient Visit on 06/30/2022   Component Date Value Ref Range Status    Microalbumin,urine random 06/30/2022 <0.50  0 - 3.0 MG/DL Final    Creatinine, urine random 06/30/2022 50.00  30 - 125 mg/dL Final    Microalbumin/Creat ratio (mg/g cre* 06/30/2022 Cannot calculate ratio due to microalbumin result outside reportable range. 0 - 30 mg/g Final    Color 06/30/2022 YELLOW    Final    Appearance 06/30/2022 CLEAR    Final    Specific gravity 06/30/2022 1.014  1.005 - 1.030   Final    pH (UA) 06/30/2022 7.0  5.0 - 8.0   Final    Protein 06/30/2022 Negative  NEG mg/dL Final    Glucose 06/30/2022 Negative  NEG mg/dL Final    Ketone 06/30/2022 Negative  NEG mg/dL Final    Bilirubin 06/30/2022 Negative  NEG   Final    Blood 06/30/2022 Negative  NEG   Final    Urobilinogen 06/30/2022 1.0  0.2 - 1.0 EU/dL Final    Nitrites 06/30/2022 Negative  NEG   Final    Leukocyte Esterase 06/30/2022 TRACE (A)  NEG   Final    WBC 06/30/2022 1 to 3  0 - 4 /hpf Final    RBC 06/30/2022 NONE  0 - 5 /hpf Final    Epithelial cells 06/30/2022 FEW  0 - 5 /lpf Final    Bacteria 06/30/2022 Negative  NEG /hpf Final         Health Maintenance:  Screening:    Mammogram: negative (8/2022)   PAP smear: S/P ALMAS   Colorectal: colonoscopy (11/2020) tubular adenomas. Dr. Joellyn Holstein Due 11/2023.    Depression: none   DM (HbA1c/FPG): HbA1c 5.7 (6/2022)   Hepatitis C: negative (5/2016)   Falls: none   DEXA: osteopenia (1/2022). On Prolia. Dr. Lance Mcclelland. Glaucoma: regular eye exams with Dr. Cynthia Maciel (last 1/2021)   Smoking: none   Vitamin D: 45.4 (6/2022)   Medicare Wellness: 6/30/2022      Impression:  Patient Active Problem List   Diagnosis Code    Essential hypertension I10    FH: breast cancer Z80.3    Hyperlipidemia E78.5    S/P radiation therapy to right shoulder for birthmark Z92.3    Tinnitus H93.19    Microhematuria R31.29    Psoriasis L40.9    Osteopenia M85.80    Impaired fasting glucose R73.01    Carpal tunnel syndrome, bilateral G56.03    Osteoporosis of lumbar spine M81.0    History of nonmelanoma skin cancer Z85.828    Overweight with body mass index (BMI) 25.0-29.9 E66.3    Prediabetes R73.03    Primary osteoarthritis of right knee M17.11    Baker's cyst, unruptured, right M71.21    Chronic pain of right knee M25.561, G89.29       Plan:  Right knee osteoarthritis with new probable Baker's cyst.  Patient with difficulty with chronic right knee pain worsened with ambulation since 5/2022. Right knee x-ray on 5/3/2022 showed chronic degenerative findings consistent with mild tricompartmental osteoarthritis most prominent in the patellofemoral area with trace joint effusion and edema at the infra patellar fat pad. She underwent evaluation by Dr. Svetlana Vasquez and received a cortisone injection without significant improvement. She has been applying Voltaren gel and was advised to begin two Tylenol ES twice daily by Dr. Lance Mcclelland. Also prescribed Cymbalta but not yet begun. New posterior knee swelling with tightness likely consistent with Baker's cyst based upon description. Given persistent knee pain and new Baker's cyst, advised follow-up with Dr. Svetlana Vasquez. Also advised to begin Cymbalta 20 mg daily to help with chronic knee pain. Discussed that may also be of benefit in managing her difficulty with anxiety.       Other chronic issues:  1. Hypertension. Remains well controlled on current regimen of lisinopril 10 mg daily and hydrochlorothiazide 25 mg daily. Renal function has been normal with creatinine 0.66/ eGFR >60 (6/2022), but BUN/creatinine ratio remains increased at 32. Encouraged to increase fluid intake. Continue to follow. 3. Hyperlipidemia. On moderate intensity dose atorvastatin (20 mg) with LDL 83 and HDL 90 (6/2022), indicative of good control. Emphasized importance of lifestyle modifications, including heart healthy diet, exercise, and weight loss. Will continue to follow. 3. Prediabetes. Normal fasting glucose at 77 but HbA1c increased to 5.7 in 6/2022. Weight overall stable. No evidence of microvascular complications. Continue regular eye exams with Dr. Dion Gunn. Emphasized importance of lifestyle modifications, including heart healthy low carbohydrate diet, regular exercise, and weight loss. Will continue to monitor. 4. Osteoporosis. History of radial and toe fracture in 2009. Treated from 11/2009 to 11/2014 with alendronate. Repeat bone density scan in 7/2016 with 10 year risk of a major osteoporetic fracture at 14 % and hip fracture at 3.4 %. Restarted Fosamax in 12/2016. New right metatarsal displaced fracture without known injury in 6/2019. Repeat bone density on 8/6/2019 showed T-scores:  femoral neck left -2.2  /right -1.9 and lumbar -2.6 . Calculated FRAX score estimated her 10 year risk of a major osteoporetic fracture at 14 % and hip fracture at 3.9 %. Comparison to her prior study in 7/2016 showed stability in her bilateral hips, but some worsening in her lumbar spine. Obtained lab evaluation for secondary osteoporosis, and TSH, intact PTH, calcium, vitamin D level, and gammopathy panel normal. Given recent fracture and some worsening of bone density scan despite treatment with biphosphonate, referred to Dr. Lance Mcclelland in 11/2019 and recommendation was that she change to Prolia.  Underwent repeat bone density study in 1/2022 which showed overall improvement, particularly in lumbar T score. Continues on Prolia with last dose 1 week ago. Continue calcium and Vitamin D supplements, and encouraged her to exercise, particularly weight bearing activities. Fall precautions stressed. 5. Osteoarthritis, right knee with pain. Presented in 5/2022 with significant right knee pain with ambulation and when lying on her side at night. Pain localized to medial aspect of knee with mild swelling and tenderness. Underwent right knee x-ray (5/3/2022) which showed mild tricompartmental joint space narrowing most prominent at the patellofemoral compartment with trace joint effusion and edema at the infrapatellar fat pad. She underwent evaluation by Dr. Velia Motley on 5/5/2022 and had aspiration and cortisone injection without significant improvement. She reports continuing to experience pain with ambulation and using Voltaren gel with some relief. Reports advised by Dr. Lance Mcclelland to begin two Tylenol ES twice daily for pain and was also prescribed Cymbalta 20 mg daily but has not yet initiated. Discussed today and urged to proceed with treatment. Now with new right Baker's cyst as discussed above. 6. Hepatic steatosis with history of elevated transaminases. New onset mild elevation noted in 8/2019. Patient reported that she had been taking NSAIDS frequently to help control foot pain. Advised to discontinue and repeated in 9/2019 with normalization. Lab evaluation, including AMA, actin, hepatitis profile, CK, aldolase, celiac panel, ESR, CRP, and MANOLO, were negative, but did identify mildly low iron stores. Repeat 1/2020 again showed mild elevation of transaminases with otherwise normal LFT's. Reported had been taking Excedrin and did admit to 2 glasses of wine each night. Advised to discontinue both.  Abdominal ultrasound (1/2020) showed mildly increased echogenicity of liver consistent with hepatic steatosis, but otherwise normal.  Subsequent normalization and has remained normal since. Advised to minimize alcohol and NSAIDS. Also, emphasized importance of lifestyle modifications, including heart healthy diet, regular exercise, and weight loss. 7. History of iron deficiency without anemia. Noted in 9/2019 and may have been related to foot surgery. Improved with supplementation. Colonoscopy completed (11/2020) and 10 polyps removed with majority of polyps tubular adenomas. Repeat in 3 years recommended (due 11/2023). 8. Family history of breast cancer. Patient with family history of breast cancer in first degree relative (sister). Discussed recommendations for breast cancer screening in women with lifetime risk >20%. 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 No other family member with breast cancer. Wishing to continue with annual mammograms and breast exams. 9. Chronic microhematuria. Work-up reportedly negative in past.  Will continue to monitor. 10. Psoriasis. Now well controlled on betamethasone cream for flares and Eucerin cream daily. Being followed by Dr. Kevin Orozco as needed. 11. History of nonmelanoma skin cancer. Had [facility] removed from face in 9/2020. Another biopsy on right cheek benign actinic keratosis. Had follow-up with Dr. Kevin Orozco for her regular skin exam in 6/2022 and advised to follow-up as needed. 12. Elevated TSH. TSH mildly elevated in 6/2021 with normal free T4. Repeat normalized today. Patient with history of radiation therapy as a child for a birthmark on her right shoulder. Will continue to monitor. 13. Anxiety. Patient had reported increased anxiety in 3/2022 as she tried to care for sick family members and a close friend with terminal illness. Underwent evaluation in ED on 3/19/2022 for chest pain negative and pain considered atypical for cardiac etiology and determined to be likely related to anxiety. Prescribed Zoloft 25 mg daily, but did not begin.   Reported that her anxiety had improved following the passing of her her close friend. Wishing to continue to manage without treatment. Will continue to monitor. 14. Overweight. Emphasized importance of continued attempts at lifestyle modifications including heart healthy diet and regular exercise. Will continue to follow. 15. Health maintenance. Completed Moderna COVID-19 vaccine series and received two Moderna booster doses. Reports scheduled for influenza vaccine and bivalent booster dose on 10/13/2022. Received 2/2 doses of Shingrix vaccine. Other immunizations up to date. Mammogram up to date. Colonoscopy up-to-date. Continue regular eye exams with Dr. Fidelia Flores. Vitamin D level has been normal on maintenance dose supplement. Medicare wellness visit up-to-date. Patient understands recommendations and agrees with plan. Follow-up as previously scheduled. We discussed the expected course, resolution and complications of the diagnosis(es) in detail. Medication risks, benefits, costs, interactions, and alternatives were discussed as indicated. I advised her to contact the office if her condition worsens, changes or fails to improve as anticipated. She expressed understanding with the diagnosis(es) and plan. Esperanza Gonzalez, was evaluated through a synchronous (real-time) audio-video encounter. The patient (or guardian if applicable) is aware that this is a billable service, which includes applicable co-pays. This Virtual Visit was conducted with patient's (and/or legal guardian's) consent. The visit was conducted pursuant to the emergency declaration under the [address] [place] [address], [address] [place] [address] authority and the [employer] and [employer] General Act. Patient identification was verified, and a caregiver was present when appropriate. The patient was located at: Home: [id number] [facility] [id number]  The provider was located at:  Facility (Appt Department): [address] [facility]        Van Segovia MD        Future orders:    ICD-10-CM ICD-9-CM    1. Baker's cyst, unruptured, right  M71.21 727.51       2. Primary osteoarthritis of right knee  M17.11 715.16       3. Chronic pain of right knee  M25.561 719.46     G89.29 338.29       4. Osteoporosis of lumbar spine  M81.0 733.00       5. Essential hypertension  I10 401.9       6.  Overweight with body mass index (BMI) 25.0-29.9  E66.3 278.02

## 2022-12-13 ENCOUNTER — TELEPHONE (OUTPATIENT)
Dept: INTERNAL MEDICINE CLINIC | Age: 77
End: 2022-12-13

## 2022-12-13 NOTE — TELEPHONE ENCOUNTER
Patient stating she woke up with one eye red this morning. It's only part of the white are. No pain or itching. She has notice it getting crusty some throughout the day. She doesn't know if this could be the start of pink eye or what. Stating she hasn't been around anyone.

## 2022-12-13 NOTE — TELEPHONE ENCOUNTER
Called and spoke with patient. Reports that he noted redness on the outer lower part of her right eye without involvement of any other portion of right eye or her left eye. Reports felt irritated yesterday and noticed minimal redness today. Improved with use of Restasis drops. Not noting any pain or purulent discharge. Advised that may be due to irritation from eyelash and discussed washing eye gently with water in the morning and using Systane drops for lubrication. Advised to call back for any worsening symptoms.

## 2023-01-05 ENCOUNTER — APPOINTMENT (OUTPATIENT)
Dept: INTERNAL MEDICINE CLINIC | Age: 78
End: 2023-01-05

## 2023-01-05 ENCOUNTER — HOSPITAL ENCOUNTER (OUTPATIENT)
Dept: LAB | Age: 78
Discharge: HOME | End: 2023-01-05
Payer: MEDICARE

## 2023-01-05 DIAGNOSIS — E78.5 HYPERLIPIDEMIA, UNSPECIFIED HYPERLIPIDEMIA TYPE: ICD-10-CM

## 2023-01-05 DIAGNOSIS — M81.0 OSTEOPOROSIS OF LUMBAR SPINE: ICD-10-CM

## 2023-01-05 DIAGNOSIS — I10 ESSENTIAL HYPERTENSION: ICD-10-CM

## 2023-01-05 DIAGNOSIS — R73.03 PREDIABETES: ICD-10-CM

## 2023-01-05 LAB
25(OH)D3 SERPL-MCNC: 46.2 NG/ML (ref 30–100)
ALBUMIN SERPL-MCNC: 4.2 G/DL (ref 3.4–5)
ALBUMIN/GLOB SERPL: 1.4 (ref 0.8–1.7)
ALP SERPL-CCNC: 44 U/L (ref 45–117)
ALT SERPL-CCNC: 20 U/L (ref 13–56)
ANION GAP SERPL CALC-SCNC: 11 MMOL/L (ref 3–18)
AST SERPL-CCNC: 21 U/L (ref 10–38)
BASOPHILS # BLD: 0.1 K/UL (ref 0–0.1)
BASOPHILS NFR BLD: 1 % (ref 0–2)
BILIRUB SERPL-MCNC: 0.7 MG/DL (ref 0.2–1)
BUN SERPL-MCNC: 22 MG/DL (ref 7–18)
BUN/CREAT SERPL: 32 (ref 12–20)
CALCIUM SERPL-MCNC: 9.3 MG/DL (ref 8.5–10.1)
CHLORIDE SERPL-SCNC: 103 MMOL/L (ref 100–111)
CHOLEST SERPL-MCNC: 188 MG/DL
CO2 SERPL-SCNC: 26 MMOL/L (ref 21–32)
CREAT SERPL-MCNC: 0.68 MG/DL (ref 0.6–1.3)
DIFFERENTIAL METHOD BLD: NORMAL
EOSINOPHIL # BLD: 0.2 K/UL (ref 0–0.4)
EOSINOPHIL NFR BLD: 4 % (ref 0–5)
ERYTHROCYTE [DISTWIDTH] IN BLOOD BY AUTOMATED COUNT: 12.8 % (ref 11.6–14.5)
EST. AVERAGE GLUCOSE BLD GHB EST-MCNC: 114 MG/DL
GLOBULIN SER CALC-MCNC: 3.1 G/DL (ref 2–4)
GLUCOSE SERPL-MCNC: 87 MG/DL (ref 74–99)
HBA1C MFR BLD: 5.6 % (ref 4.2–5.6)
HCT VFR BLD AUTO: 42.5 % (ref 35–45)
HDLC SERPL-MCNC: 75 MG/DL (ref 40–60)
HDLC SERPL: 2.5 (ref 0–5)
HGB BLD-MCNC: 13.4 G/DL (ref 12–16)
IMM GRANULOCYTES # BLD AUTO: 0 K/UL (ref 0–0.04)
IMM GRANULOCYTES NFR BLD AUTO: 0 % (ref 0–0.5)
LDLC SERPL CALC-MCNC: 97.6 MG/DL (ref 0–100)
LIPID PROFILE,FLP: ABNORMAL
LYMPHOCYTES # BLD: 1.7 K/UL (ref 0.9–3.6)
LYMPHOCYTES NFR BLD: 27 % (ref 21–52)
MAGNESIUM SERPL-MCNC: 1.8 MG/DL (ref 1.6–2.6)
MCH RBC QN AUTO: 28.3 PG (ref 24–34)
MCHC RBC AUTO-ENTMCNC: 31.5 G/DL (ref 31–37)
MCV RBC AUTO: 89.9 FL (ref 78–100)
MONOCYTES # BLD: 0.6 K/UL (ref 0.05–1.2)
MONOCYTES NFR BLD: 10 % (ref 3–10)
NEUTS SEG # BLD: 3.7 K/UL (ref 1.8–8)
NEUTS SEG NFR BLD: 58 % (ref 40–73)
NRBC # BLD: 0 K/UL (ref 0–0.01)
NRBC BLD-RTO: 0 PER 100 WBC
PLATELET # BLD AUTO: 270 K/UL (ref 135–420)
PMV BLD AUTO: 10.1 FL (ref 9.2–11.8)
POTASSIUM SERPL-SCNC: 3.3 MMOL/L (ref 3.5–5.5)
PROT SERPL-MCNC: 7.3 G/DL (ref 6.4–8.2)
RBC # BLD AUTO: 4.73 M/UL (ref 4.2–5.3)
SODIUM SERPL-SCNC: 140 MMOL/L (ref 136–145)
TRIGL SERPL-MCNC: 77 MG/DL (ref ?–150)
VLDLC SERPL CALC-MCNC: 15.4 MG/DL
WBC # BLD AUTO: 6.4 K/UL (ref 4.6–13.2)

## 2023-01-05 PROCEDURE — 82306 VITAMIN D 25 HYDROXY: CPT

## 2023-01-05 PROCEDURE — 83735 ASSAY OF MAGNESIUM: CPT

## 2023-01-05 PROCEDURE — 83036 HEMOGLOBIN GLYCOSYLATED A1C: CPT

## 2023-01-05 PROCEDURE — 80053 COMPREHEN METABOLIC PANEL: CPT

## 2023-01-05 PROCEDURE — 85025 COMPLETE CBC W/AUTO DIFF WBC: CPT

## 2023-01-05 PROCEDURE — 80061 LIPID PANEL: CPT

## 2023-01-05 PROCEDURE — 36415 COLL VENOUS BLD VENIPUNCTURE: CPT

## 2023-01-06 ENCOUNTER — TELEPHONE (OUTPATIENT)
Dept: INTERNAL MEDICINE CLINIC | Age: 78
End: 2023-01-06

## 2023-01-06 RX ORDER — POTASSIUM CHLORIDE 20 MEQ/1
20 TABLET, EXTENDED RELEASE ORAL 2 TIMES DAILY
Qty: 6 TABLET | Refills: 0 | Status: SHIPPED | OUTPATIENT
Start: 2023-01-06 | End: 2023-01-09

## 2023-01-06 NOTE — TELEPHONE ENCOUNTER
Called and spoke with patient. Informed that potassium was low on pre-visit labs at K 3.3. On hydrochlorothiazide. Will treat with K-Dur 20 mEq twice daily for 3 days. Will reassess at office visit.

## 2023-01-12 ENCOUNTER — OFFICE VISIT (OUTPATIENT)
Dept: INTERNAL MEDICINE CLINIC | Age: 78
End: 2023-01-12
Payer: MEDICARE

## 2023-01-12 ENCOUNTER — HOSPITAL ENCOUNTER (OUTPATIENT)
Dept: LAB | Age: 78
Discharge: HOME | End: 2023-01-12
Payer: MEDICARE

## 2023-01-12 VITALS
HEIGHT: 65 IN | HEART RATE: 73 BPM | SYSTOLIC BLOOD PRESSURE: 126 MMHG | BODY MASS INDEX: 24.66 KG/M2 | RESPIRATION RATE: 16 BRPM | OXYGEN SATURATION: 98 % | DIASTOLIC BLOOD PRESSURE: 78 MMHG | WEIGHT: 148 LBS | TEMPERATURE: 97 F

## 2023-01-12 DIAGNOSIS — I10 ESSENTIAL HYPERTENSION: Primary | ICD-10-CM

## 2023-01-12 DIAGNOSIS — R79.89 ELEVATED TSH: ICD-10-CM

## 2023-01-12 DIAGNOSIS — R73.03 PREDIABETES: ICD-10-CM

## 2023-01-12 DIAGNOSIS — M81.0 OSTEOPOROSIS OF LUMBAR SPINE: ICD-10-CM

## 2023-01-12 DIAGNOSIS — G89.29 CHRONIC PAIN OF RIGHT KNEE: ICD-10-CM

## 2023-01-12 DIAGNOSIS — M71.21 BAKER'S CYST, UNRUPTURED, RIGHT: ICD-10-CM

## 2023-01-12 DIAGNOSIS — E87.6 HYPOKALEMIA: ICD-10-CM

## 2023-01-12 DIAGNOSIS — M25.561 CHRONIC PAIN OF RIGHT KNEE: ICD-10-CM

## 2023-01-12 DIAGNOSIS — M17.11 PRIMARY OSTEOARTHRITIS OF RIGHT KNEE: ICD-10-CM

## 2023-01-12 DIAGNOSIS — E78.5 HYPERLIPIDEMIA, UNSPECIFIED HYPERLIPIDEMIA TYPE: ICD-10-CM

## 2023-01-12 DIAGNOSIS — F41.9 ANXIETY: ICD-10-CM

## 2023-01-12 LAB
ANION GAP SERPL CALC-SCNC: 9 MMOL/L (ref 3–18)
BUN SERPL-MCNC: 19 MG/DL (ref 7–18)
BUN/CREAT SERPL: 33 (ref 12–20)
CALCIUM SERPL-MCNC: 9.6 MG/DL (ref 8.5–10.1)
CHLORIDE SERPL-SCNC: 102 MMOL/L (ref 100–111)
CO2 SERPL-SCNC: 26 MMOL/L (ref 21–32)
CREAT SERPL-MCNC: 0.58 MG/DL (ref 0.6–1.3)
CREAT UR-MCNC: 73 MG/DL (ref 30–125)
GLUCOSE SERPL-MCNC: 91 MG/DL (ref 74–99)
MAGNESIUM SERPL-MCNC: 1.9 MG/DL (ref 1.6–2.6)
MICROALBUMIN UR-MCNC: 0.53 MG/DL (ref 0–3)
MICROALBUMIN/CREAT UR-RTO: 7 MG/G (ref 0–30)
POTASSIUM SERPL-SCNC: 3.6 MMOL/L (ref 3.5–5.5)
SODIUM SERPL-SCNC: 137 MMOL/L (ref 136–145)

## 2023-01-12 PROCEDURE — G0463 HOSPITAL OUTPT CLINIC VISIT: HCPCS | Performed by: INTERNAL MEDICINE

## 2023-01-12 PROCEDURE — 83735 ASSAY OF MAGNESIUM: CPT

## 2023-01-12 PROCEDURE — 36415 COLL VENOUS BLD VENIPUNCTURE: CPT

## 2023-01-12 PROCEDURE — 82570 ASSAY OF URINE CREATININE: CPT

## 2023-01-12 PROCEDURE — 80048 BASIC METABOLIC PNL TOTAL CA: CPT

## 2023-01-12 NOTE — PATIENT INSTRUCTIONS
Heart-Healthy Diet: Care Instructions  Your Care Instructions     A heart-healthy diet has lots of vegetables, fruits, nuts, beans, and whole grains, and is low in salt. It limits foods that are high in saturated fat, such as meats, cheeses, and fried foods. It may be hard to change your diet, but even small changes can lower your risk of heart attack and heart disease. Follow-up care is a key part of your treatment and safety. Be sure to make and go to all appointments, and call your doctor if you are having problems. It's also a good idea to know your test results and keep a list of the medicines you take. How can you care for yourself at home? Watch your portions  Learn what a serving is. A \"serving\" and a \"portion\" are not always the same thing. Make sure that you are not eating larger portions than are recommended. For example, a serving of pasta is ½ cup. A serving size of meat is 2 to 3 ounces. A 3-ounce serving is about the size of a deck of cards. Measure serving sizes until you are good at [place]" them. Keep in mind that restaurants often serve portions that are 2 or 3 times the size of one serving. To keep your energy level up and keep you from feeling hungry, eat often but in smaller portions. Eat only the number of calories you need to stay at a healthy weight. If you need to lose weight, eat fewer calories than your body burns (through exercise and other physical activity). Eat more fruits and vegetables  Eat a variety of fruit and vegetables every day. Dark green, deep orange, red, or yellow fruits and vegetables are especially good for you. Examples include spinach, carrots, peaches, and berries. Keep carrots, celery, and other veggies handy for snacks. Buy fruit that is in season and store it where you can see it so that you will be tempted to eat it. Cook dishes that have a lot of veggies in them, such as stir-fries and soups.   Limit saturated and trans fat  Read food labels, and try to avoid saturated and trans fats. They increase your risk of heart disease. Use olive or canola oil when you cook. Bake, broil, grill, or steam foods instead of frying them. Choose lean meats instead of high-fat meats such as hot dogs and sausages. Cut off all visible fat when you prepare meat. Eat fish, skinless poultry, and meat alternatives such as soy products instead of high-fat meats. Soy products, such as tofu, may be especially good for your heart. Choose low-fat or fat-free milk and dairy products. Eat foods high in fiber  Eat a variety of grain products every day. Include whole-grain foods that have lots of fiber and nutrients. Examples of whole-grain foods include oats, whole wheat bread, and brown rice. Buy whole-grain breads and cereals, instead of white bread or pastries. Limit salt and sodium  Limit how much salt and sodium you eat to help lower your blood pressure. Taste food before you salt it. Add only a little salt when you think you need it. With time, your taste buds will adjust to less salt. Eat fewer snack items, fast foods, and other high-salt, processed foods. Check food labels for the amount of sodium in packaged foods. Choose low-sodium versions of canned goods (such as soups, vegetables, and beans). Limit sugar  Limit drinks and foods with added sugar. These include candy, desserts, and soda pop. Limit alcohol  Limit alcohol to no more than 2 drinks a day for men and 1 drink a day for women. Too much alcohol can cause health problems. When should you call for help? Watch closely for changes in your health, and be sure to contact your doctor if:    You would like help planning heart-healthy meals. Where can you learn more? Go to http://www.greene.com/  Enter V137 in the search box to learn more about \"Heart-Healthy Diet: Care Instructions. \"  Current as of: August 22, 2019               Content Version: 12.6  © 0244-4536 Healthwise, Incorporated. Care instructions adapted under license by [employer] (which disclaims liability or warranty for this information). If you have questions about a medical condition or this instruction, always ask your healthcare professional. [address] any warranty or liability for your use of this information. High Cholesterol: Care Instructions  Your Care Instructions     Cholesterol is a type of fat in your blood. It is needed for many body functions, such as making new cells. Cholesterol is made by your body. It also comes from food you eat. High cholesterol means that you have too much of the fat in your blood. This raises your risk of a heart attack and stroke. LDL and HDL are part of your total cholesterol. LDL is the \"bad\" cholesterol. High LDL can raise your risk for heart disease, heart attack, and stroke. HDL is the \"good\" cholesterol. It helps clear bad cholesterol from the body. High HDL is linked with a lower risk of heart disease, heart attack, and stroke. Your cholesterol levels help your doctor find out your risk for having a heart attack or stroke. You and your doctor can talk about whether you need to lower your risk and what treatment is best for you. A heart-healthy lifestyle along with medicines can help lower your cholesterol and your risk. The way you choose to lower your risk will depend on how high your risk is for heart attack and stroke. It will also depend on how you feel about taking medicines. Follow-up care is a key part of your treatment and safety. Be sure to make and go to all appointments, and call your doctor if you are having problems. It's also a good idea to know your test results and keep a list of the medicines you take. How can you care for yourself at home? Eat a variety of foods every day.  Good choices include fruits, vegetables, whole grains (like oatmeal), dried beans and peas, nuts and seeds, soy products (like tofu), and fat-free or low-fat dairy products. Replace butter, margarine, and hydrogenated or partially hydrogenated oils with olive and canola oils. (Canola oil margarine without trans fat is fine.)  Replace red meat with fish, poultry, and soy protein (like tofu). Limit processed and packaged foods like chips, crackers, and cookies. Bake, broil, or steam foods. Don't cross them. Be physically active. Get at least 30 minutes of exercise on most days of the week. Walking is a good choice. You also may want to do other activities, such as running, swimming, cycling, or playing tennis or team sports. Stay at a healthy weight or lose weight by making the changes in eating and physical activity listed above. Losing just a small amount of weight, even 5 to 10 pounds, can reduce your risk for having a heart attack or stroke. Do not smoke. When should you call for help? Watch closely for changes in your health, and be sure to contact your doctor if:    You need help making lifestyle changes. You have questions about your medicine. Where can you learn more? Go to http://www.gray.com/  Enter C893 in the search box to learn more about \"High Cholesterol: Care Instructions. \"  Current as of: December 16, 2019               Content Version: 12.6  © 5238-2703 [employer], [employer]. Care instructions adapted under license by [employer] (which disclaims liability or warranty for this information). If you have questions about a medical condition or this instruction, always ask your healthcare professional. [place] [address] any warranty or liability for your use of this information. Learning About Low-Sodium Foods  What foods are low in sodium? The foods you eat contain nutrients, such as vitamins and minerals. Sodium is a nutrient. Your body needs the right amount to stay healthy and work as it should.  You can use the list below to help you make choices about which foods to eat. Fruits  Fresh, frozen, canned, or dried fruit  Vegetables  Fresh or frozen vegetables, with no added salt  Canned vegetables, low-sodium or with no added salt  Grains  Bagels without salted tops  Cereal with no added salt  Corn tortillas  Crackers with no added salt  Oatmeal, cooked without salt  Popcorn with no salt  Pasta and noodles, cooked without salt  Rice, cooked without salt  Unsalted pretzels  Dairy and dairy alternatives  Butter, unsalted  Cream cheese  Ice cream  Milk  Soy milk  Meats and other protein foods  Beans and peas, canned with no salt  Eggs  Fresh fish (not smoked)  Fresh meats (not smoked or cured)  Nuts and nut butter, prepared without salt  Poultry, not packaged with sodium solution  Tofu, unseasoned  Tuna, canned without salt  Seasonings  Garlic  Herbs and spices  Lemon juice  Mustard  Olive oil  Salt-free seasoning mixes  Vinegar  Work with your doctor to find out how much of this nutrient you need. Depending on your health, you may need more or less of it in your diet. Where can you learn more? Go to http://www.gray.com/  Enter S460 in the search box to learn more about \"Learning About Low-Sodium Foods. \"  Current as of: August 22, 2019               Content Version: 12.6  © 0809-8181 [employer], [employer]. Care instructions adapted under license by [employer] (which disclaims liability or warranty for this information). If you have questions about a medical condition or this instruction, always ask your healthcare professional. [place] [address] any warranty or liability for your use of this information. Low Sodium Diet (2,000 Milligram): Care Instructions  Your Care Instructions     Too much sodium causes your body to hold on to extra water. This can raise your blood pressure and force your heart and kidneys to work harder.  In very serious cases, this could cause you to be put in the hospital. It might even be life-threatening. By limiting sodium, you will feel better and lower your risk of serious problems. The most common source of sodium is salt. People get most of the salt in their diet from canned, prepared, and packaged foods. Fast food and restaurant meals also are very high in sodium. Your doctor will probably limit your sodium to less than 2,000 milligrams (mg) a day. This limit counts all the sodium in prepared and packaged foods and any salt you add to your food. Follow-up care is a key part of your treatment and safety. Be sure to make and go to all appointments, and call your doctor if you are having problems. It's also a good idea to know your test results and keep a list of the medicines you take. How can you care for yourself at home? Read food labels  Read labels on cans and food packages. The labels tell you how much sodium is in each serving. Make sure that you look at the serving size. If you eat more than the serving size, you have eaten more sodium. Food labels also tell you the Percent Daily Value for sodium. Choose products with low Percent Daily Values for sodium. Be aware that sodium can come in forms other than salt, including monosodium glutamate (MSG), sodium citrate, and sodium bicarbonate (baking soda). MSG is often added to Asian food. When you eat out, you can sometimes ask for food without MSG or added salt. Buy low-sodium foods  Buy foods that are labeled \"unsalted\" (no salt added), \"sodium-free\" (less than 5 mg of sodium per serving), or \"low-sodium\" (less than 140 mg of sodium per serving). Foods labeled \"reduced-sodium\" and \"light sodium\" may still have too much sodium. Be sure to read the label to see how much sodium you are getting. Buy fresh vegetables, or frozen vegetables without added sauces. Buy low-sodium versions of canned vegetables, soups, and other canned goods. Prepare low-sodium meals  Cut back on the amount of salt you use in cooking.  This will help you adjust to the taste. Do not add salt after cooking. One teaspoon of salt has about 2,300 mg of sodium. Take the salt shaker off the table. Flavor your food with garlic, lemon juice, onion, vinegar, herbs, and spices. Do not use soy sauce, lite soy sauce, steak sauce, onion salt, garlic salt, celery salt, mustard, or ketchup on your food. Use low-sodium salad dressings, sauces, and ketchup. Or make your own salad dressings and sauces without adding salt. Use less salt (or none) when recipes call for it. You can often use half the salt a recipe calls for without losing flavor. Other foods such as rice, pasta, and grains do not need added salt. Rinse canned vegetables, and cook them in fresh water. This removes some--but not all--of the salt. Avoid water that is naturally high in sodium or that has been treated with water softeners, which add sodium. Call your local water company to find out the sodium content of your water supply. If you buy bottled water, read the label and choose a sodium-free brand. Avoid high-sodium foods  Avoid eating:  Smoked, cured, salted, and canned meat, fish, and poultry. Ham, tellez, hot dogs, and luncheon meats. Regular, hard, and processed cheese and regular peanut butter. Crackers with salted tops, and other salted snack foods such as pretzels, chips, and salted popcorn. Frozen prepared meals, unless labeled low-sodium. Canned and dried soups, broths, and bouillon, unless labeled sodium-free or low-sodium. Canned vegetables, unless labeled sodium-free or low-sodium. [place] fries, pizza, tacos, and other fast foods. Pickles, olives, ketchup, and other condiments, especially soy sauce, unless labeled sodium-free or low-sodium. Where can you learn more? Go to http://www.gray.com/  Enter V843 in the search box to learn more about \"Low Sodium Diet (2,000 Milligram): Care Instructions. \"  Current as of: August 22, 2019               Content Version: 12.6  © 2568-2732 Healthwise, Incorporated. Care instructions adapted under license by [employer] (which disclaims liability or warranty for this information). If you have questions about a medical condition or this instruction, always ask your healthcare professional. [address] any warranty or liability for your use of this information.

## 2023-01-12 NOTE — PROGRESS NOTES
1. \"Have you been to the ER, urgent care clinic since your last visit? Hospitalized since your last visit? \" No    2. \"Have you seen or consulted any other health care providers outside of the [address] [place] [address] since your last visit? \" No     3. For patients aged 39-70: Has the patient had a colonoscopy / FIT/ Cologuard? NA - based on age      If the patient is female:    4. For patients aged 41-77: Has the patient had a mammogram within the past 2 years? NA - based on age or sex      11. For patients aged 21-65: Has the patient had a pap smear?  NA - based on age or sex

## 2023-01-16 NOTE — PROGRESS NOTES
HPI:   Donte Tobias is a 68y.o. year old female who presents today for a routine visit. She has a history of hypertension, hyperlipidemia, impaired fasting glucose, osteoporosis, left sciatica, and chronic microhematuria. She has completed the Moderna COVID-19 vaccine series and received two Moderna booster doses and the updated bivalent booster dose. She reports that she is doing reasonably well. She continues to have difficulty with stress related to caring for her sister and mother-in-law who have extensive health issues. She reports that since starting Cymbalta 20 mg daily she has noted significant improvement in her right knee pain. She is otherwise without new complaints. On 5/3/2022, she presented complaining of acute right knee pain for approximately 4 weeks worsened by ambulation and navigating stairs. She did not note sustained improvement with heat or ice application. She underwent a right knee x-ray (5/3/2022) which showed mild tricompartmental joint space narrowing most prominent at the patellofemoral compartment with trace joint effusion and edema at the infrapatellar fat pad. She was referred to Dr. Kathy Patiño, and on 5/5/2022, she had aspiration and cortisone injection but reported no significant improvement. She reported that she was continuing to experience pain with ambulation and had started using Voltaren gel with some relief. She states that she is not taking meloxicam or Naprosyn due to concern regarding side effects, but has been finding two Tylenol ES twice daily and Cymbalta 20 mg daily to be helpful in controlling pain. She presented on 10/11/2022 with a new swelling behind her right knee which tightened when walking downstairs, and was diagnosed with a Baker's cyst.  She had a follow-up appointment with Dr. Kathy Patiño on 10/13/2022 and was advised that would recommend proceeding with the Euflexxa series of injections.   However, she was never contacted to schedule. Summary of prior hospitalizations and medical history:  On 3/19/2022, she presented to [facility] ED with complaint of upper chest, upper back, and left arm pain. She reported pain was present for approximately 24 hours although acutely increased 1 hour prior to presentation. She states that the discomfort was constant and without any alleviating or exacerbating factors. She denies any dyspnea, palpitations, nausea, vomiting, PND, orthopnea, or abdominal pain. Evaluation included WBC 8.8, Hb 12.9/ HCT 40.3, platelets 747, creatinine 0.77/eGFR > 60, troponin <3 and 3; chest x-ray no acute changes and hiatal hernia present; EKG sinus rhythm at 80 bpm, normal intervals and no change from previous. She was subsequently discharged. She was seen in follow-up on 3/23/2022 and reported that she felt that her symptoms were secondary to anxiety. She was prescribed sertraline and provided with contacts for counseling. On 8/27/2019 and was noted to have elevated transaminases with AST 50 and ALT 67. She was instructed to discontinue all NSAIDS which she had been taking for her right foot pain. She was seen again on 9/25/2019 and repeat testing showed normalization of her transaminases. Repeat evaluation on 1/9/2020 and reported that she had been taking two Excedrin almost every morning due to a headache, and she also reported continuing to drink approximately two glasses of wine each night. Repeat labs showed repeat elevation of transaminases with AST 50 and ALT 67, while other liver function tests remained normal. She was instructed to discontinue Excedrin and alcohol, and underwent an abdominal ultrasound which showed normal sized liver with mildly diffusely coarsened echogenicity. On 6/25/2019, she reported that two weeks prior, while returning home from her granddaughter's graduation, she and her  stopped at a restaurant for lunch.  She states that while she was chewing a mouth full of food, her  noticed that she suddenly tilted her head to the left side and seemed to look dazed. She states that she remained awake and was aware that he was talking to her, but she states that she could not respond. She states that the episode lasted approximately 30 seconds and resolved, and she proceeded to complete her chewing and swallow her food. She states that she did not lose consciousness, or notice any headache, visual changes, lightheadedness, tongue biting, incontinence, focal deficits, or seizure like activity. She denied any chest pain, shortness of breath, palpitations, pleuritic chest pain, hemoptysis, calf pain or swelling. 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 She also had a carotid duplex scan (7/9/2019) showing only mild (< 50%) stenosis of the bilateral internal carotid arteries. She underwent evaluation by Dr. Shane Quarles in 10/2019, and he did not feel that the episode represented a TIA or other neurologic episode. He felt that no further evaluation was needed. She has not had a recurrence of these symptoms. On 4/15/2019, she underwent evaluation at [facility] for right foot pain. She denied known injury. Right foot x-ray showed a displaced third metatarsal fracture and she was referred to Dr. Alana Burrell for evaluation. Given the displacement and risk of metatarsalgia, surgery was recommended, and on 4/24/2019, she underwent open reduction/internal fixation of the third metatarsal with putty bone grafting. She reports that she did well after surgery, and and was non-weight bearing in a CAM boot for several months. On 5/15/2019, she presented complaining of bilateral hand and wrist pain.  She denied any fever, chills, sore throat, rashes, dyspnea, cough, abdominal pain, dysuria, focal weakness, visual changes, or headache. She did admit to sleeping with her bilateral hands tucked beneath her in a flexed position while lying on her back due to her limitations from her foot surgery. She also reported that she had been using a walker to ambulate and gripping it tightly with her hands. Lab evaluation including MANOLO, RF, CCP, ESR, uric acid were normal, but CRP was increased to 9.1. A diagnosis of presumed carpal tunnel syndrome was made, and her discomfort resolved with the use of wrist splints. She was released to weight bear as tolerate in 7/2019, and has not had any significant pain although has been experiencing some swelling which resolves with elevation. She has a history of osteopenia, and in 2009, she sustained a fracture of her left 3rd toe (3/2009) and her distal radius (12/2009). At that time, she was started on alendronate and was treated until 11/2014. She had a repeat bone density study in 7/2016 showing T-scores: femoral neck left -2.3/ right -2.0 and lumbar -2.2. She was restarted on alendronate in 12/2016. She underwent repeat bone density study in 8/2019 showing T-scores: femoral neck left -2.2/ right -1.9 and lumbar -2.6. Due to lack of improvement, she underwent evaluation by Dr. Teodora Macedo in 11/2019 and recommendation was that she change to Prolia. She continues to take calcium and Vitamin D supplements. She has a history of hypertension, treated with lisinopril and hydrochlorothiazide. She has been checking her blood pressure intermittently and reports that most readings are with a systolic blood pressure <394. She remains quite active, although has not been having time to exercise. She reports that she has continued stress in her life, helping to care for her 's mother (dementia), two disabled cousins, and her sister. She denies any chest pain, shortness of breath at rest or with exertion, palpitations, lightheadedness, or edema.  She also has a history of hyperlipidemia, treated with moderate intensity dose atorvastatin. She has no history of ASCVD. In 2/2017, she was evaluated by DEREK Walter with left lower back pain and left sciatica. She had been taking ibuprofen without relief, and was given a course of prednisone. She states that she did not find this helpful either, but reports resolution of symptoms after visiting a chiropractor. She states that she no longer is experiencing pain and is able to ambulate without difficulty. She will continue to visit a chiropractor periodically when she develops a recurrence of pain with improvement. She reports that she had an extensive workup for microhematuria in the past, which was negative (records unavailable). She denies any dysuria, gross hematuria, or flank pain. She has had screening colonoscopy with Dr Seema Moon in 10/2010, which was normal except for hemorrhoids. Recommendation for follow-up is for 10 years. She underwent a screening colonoscopy (11/17/2020) by Dr. Ayana Joyce and was found to have internal and external hemorrhoids as well as 10 sessile 4-10 mm polyps in the descending colon (2), transverse colon (1), sigmoid colon (1), and rectum (6). Pathology showed multiple tubular adenomas. Recommendation is for repeat colonoscopy in 3 years. She denies any abdominal pain, nausea, vomiting, melena, hematochezia, or change in bowel movements. She has a history of eczema and followed by Dr. Danika Rowan. She had found Eucrisa to be very effective, but not covered by her insurance. She underwent reevaluation in 10/2020 for worsening palmar rash which was felt to be secondary to psoriasis. She was prescribed betamethasone cream to use as needed with improvement. She also has a history of candidal intertrigo, treated with lotrisone with good response. She has a history of basal cell carcinoma, and underwent removal from her face by Dr. Britney Márquez in 9/2020.  She also reports that she underwent radiation therapy to her right shoulder (as a child) for treatment of a birthmark. On 7/20/2018, she presented for evaluation to [facility] for a right upper back rash and was diagnosed with herpes zoster. She was treated with Valtrex with improvement. Past Medical History:   Diagnosis Date    Eczema     Essential hypertension     FH: breast cancer     Fibroids     uterine    Fracture of radius 11/2009    with ulna, left, closed    History of cataract     History of foot fracture     right and left    Hyperlipidemia     Microhematuria     chronic    Osteopenia     Psoriasis     S/P radiation therapy     as child, to right shoulder for birthmark    Tinnitus     Zoster     right scapula     Past Surgical History:   Procedure Laterality Date    COLONOSCOPY N/A 11/17/2020    COLONOSCOPY with polypectomies performed by Lobo Baugh MD at [facility]      HX COLONOSCOPY      HX FRACTURE TX Right 04/24/2019    right foot 3rd metatarsal fracture, screws and a plate    HX ORTHOPAEDIC      left arm    HX WISDOM TEETH EXTRACTION      HX WRIST FRACTURE TX       Current Outpatient Medications   Medication Sig    DULoxetine (CYMBALTA) 20 mg capsule Take 20 mg by mouth daily. atorvastatin (LIPITOR) 20 mg tablet TAKE 1 TABLET BY MOUTH DAILY    lisinopriL (PRINIVIL, ZESTRIL) 10 mg tablet TAKE 1 TABLET BY MOUTH DAILY    hydroCHLOROthiazide (HYDRODIURIL) 25 mg tablet TAKE 1 TABLET BY MOUTH DAILY    cycloSPORINE (Restasis) 0.05 % dpet Administer 1 Drop to both eyes every twelve (12) hours. betamethasone dipropionate (DIPROSONE) 0.05 % topical cream Apply  to affected area two (2) times daily as needed for Skin Irritation. denosumab (PROLIA) 60 mg/mL injection 60 mg by SubCUTAneous route every 6 months. aspirin delayed-release 81 mg tablet Take  by mouth daily.     amoxicillin 500 mg tab TAKE ONE CAPSULE PO TID, START ONE DAY PRIOR TO PROCEDURE, DAY OF PROCEDURE AND DAY AFTER PROCEDURE    clotrimazole-betamethasone (LOTRISONE) topical cream Apply  to affected area two (2) times a day. (Patient taking differently: Apply  to affected area two (2) times daily as needed.)    docusate sodium (COLACE) 100 mg capsule Take 100 mg by mouth daily as needed for Constipation. co-enzyme Q-10 (CO Q-10) 100 mg capsule Take 100 mg by mouth daily. biotin 2,500 mcg Tab Take 2,500 mcg by mouth. cholecalciferol (VITAMIN D3) 1,000 unit cap Take 1 Tablet by mouth daily. fexofenadine (ALLEGRA) 60 mg tablet Take 60 mg by mouth daily. CALCIUM CARBONATE/VITAMIN D3 (CALCIUM + D PO) Take 1 Tab by mouth two (2) times a day. No current facility-administered medications for this visit. Allergies and Intolerances: Allergies   Allergen Reactions    Codeine Nausea Only    Povidone-Iodine Hives    Hibiclens [Chlorhexidine Gluconate] Rash     Family History: Her sister has breast cancer and her mother had pancreatic cancer. No history of colon cancer. Family History   Problem Relation Age of Onset    Cancer Mother         pancreatic    Heart Disease Mother     Heart Disease Father     Cancer Sister         breast    Mult Sclerosis Sister     Breast Cancer Sister 62     Social History:   She  reports that she has never smoked. She has never used smokeless tobacco. She has about 2 glasses of wine each night. She is  with one adult daughter who lives with her family on [place], [place]. She is retired, and was a 4th- for 25 years.     Social History     Substance and Sexual Activity   Alcohol Use Yes    Alcohol/week: 3.0 - 4.0 standard drinks    Types: 3 - 4 Glasses of wine per week    Comment: Weekly     Immunization History:  Immunization History   Administered Date(s) Administered    (RETIRED) Pneumococcal Vaccine (Unspecified Type) 05/04/2010    COVID-19, MODERNA BLUE border, Primary or Immunocompromised, (age 18y+), IM, 100 mcg/0.5mL 01/28/2021, 02/25/2021    COVID-19, MODERNA Bivalent BOOSTER, (age 18y+), IM, 50 mcg/0.5 mL 10/13/2022    COVID-19, MODERNA Booster BLUE border, (age 18y+), IM, 50mcg/0.25mL 11/04/2021, 05/03/2022    Influenza High Dose Vaccine PF 12/13/2016, 10/03/2017    Influenza Vaccine 10/24/2014    Influenza Vaccine (Tri) Adjuvanted (>65 Yrs FLUAD TRI 04653) 10/09/2018, 11/13/2019    Influenza Vaccine PF 11/05/2013    Influenza Vaccine Split 11/01/2011, 11/06/2012    Influenza, FLUAD, (age 72 y+), Adjuvanted 09/16/2020, 10/08/2022    Influenza, FLUARIX, FLULAVAL, FLUZONE (age 10 mo+) AND AFLURIA, (age 1 y+), PF, 0.5mL 09/25/2021    Pneumococcal Conjugate (PCV-13) 05/05/2015    Pneumococcal Polysaccharide (PPSV-23) 05/04/2010    TD Vaccine 05/05/2009    Tdap 12/12/2016    Zoster Recombinant 09/21/2019, 12/28/2019    Zoster Vaccine, Live 05/07/2013       Review of Systems:   As above included in HPI. Otherwise 11 point review of systems negative including constitutional, skin, HENT, eyes, respiratory, cardiovascular, gastrointestinal, genitourinary, musculoskeletal, endo/heme/aller, neurological.    Physical:   Visit Vitals  /78 (BP 1 Location: Left upper arm, BP Patient Position: Sitting)   Pulse 73   Temp 97 °F (36.1 °C) (Temporal)   Resp 16   Ht 5' 5\" (1.651 m)   Wt 148 lb (67.1 kg)   SpO2 98%   BMI 24.63 kg/m²     Patient appears in no apparent distress. Affect is appropriate. HEENT: PERRLA, anicteric, oropharynx clear, no JVD, adenopathy or thyromegaly. No carotid bruits or radiated murmur. Lungs: clear to auscultation, no wheezes, rhonchi, or rales. Heart: regular rate and rhythm. No murmur, rubs, gallops  Abdomen: soft, nontender, nondistended, normal bowel sounds, no hepatosplenomegaly or masses. Extremities: without edema.          Review of Data:  Labs:  Hospital Outpatient Visit on 01/05/2023   Component Date Value Ref Range Status    WBC 01/05/2023 6.4  4.6 - 13.2 K/uL Final    RBC 01/05/2023 4.73  4.20 - 5.30 M/uL Final    HGB 01/05/2023 13.4  12.0 - 16.0 g/dL Final    HCT 01/05/2023 42.5  35.0 - 45.0 % Final    MCV 01/05/2023 89.9  78.0 - 100.0 FL Final    MCH 01/05/2023 28.3  24.0 - 34.0 PG Final    MCHC 01/05/2023 31.5  31.0 - 37.0 g/dL Final    RDW 01/05/2023 12.8  11.6 - 14.5 % Final    PLATELET 29/07/5575 853  135 - 420 K/uL Final    MPV 01/05/2023 10.1  9.2 - 11.8 FL Final    NRBC 01/05/2023 0.0  0  WBC Final    ABSOLUTE NRBC 01/05/2023 0.00  0.00 - 0.01 K/uL Final    NEUTROPHILS 01/05/2023 58  40 - 73 % Final    LYMPHOCYTES 01/05/2023 27  21 - 52 % Final    MONOCYTES 01/05/2023 10  3 - 10 % Final    EOSINOPHILS 01/05/2023 4  0 - 5 % Final    BASOPHILS 01/05/2023 1  0 - 2 % Final    IMMATURE GRANULOCYTES 01/05/2023 0  0.0 - 0.5 % Final    ABS. NEUTROPHILS 01/05/2023 3.7  1.8 - 8.0 K/UL Final    ABS. LYMPHOCYTES 01/05/2023 1.7  0.9 - 3.6 K/UL Final    ABS. MONOCYTES 01/05/2023 0.6  0.05 - 1.2 K/UL Final    ABS. EOSINOPHILS 01/05/2023 0.2  0.0 - 0.4 K/UL Final    ABS. BASOPHILS 01/05/2023 0.1  0.0 - 0.1 K/UL Final    ABS. IMM.  GRANS. 01/05/2023 0.0  0.00 - 0.04 K/UL Final    DF 01/05/2023 AUTOMATED    Final    Hemoglobin A1c 01/05/2023 5.6  4.2 - 5.6 % Final    Est. average glucose 01/05/2023 114  mg/dL Final    LIPID PROFILE 01/05/2023        Final    Cholesterol, total 01/05/2023 188  <200 MG/DL Final    Triglyceride 01/05/2023 77  <150 MG/DL Final    HDL Cholesterol 01/05/2023 75 (A)  40 - 60 MG/DL Final    LDL, calculated 01/05/2023 97.6  0 - 100 MG/DL Final    VLDL, calculated 01/05/2023 15.4  MG/DL Final    CHOL/HDL Ratio 01/05/2023 2.5  0 - 5.0   Final    Magnesium 01/05/2023 1.8  1.6 - 2.6 mg/dL Final    Sodium 01/05/2023 140  136 - 145 mmol/L Final    Potassium 01/05/2023 3.3 (A)  3.5 - 5.5 mmol/L Final    Chloride 01/05/2023 103  100 - 111 mmol/L Final    CO2 01/05/2023 26  21 - 32 mmol/L Final    Anion gap 01/05/2023 11  3.0 - 18 mmol/L Final    Glucose 01/05/2023 87  74 - 99 mg/dL Final    BUN 01/05/2023 22 (A)  7.0 - 18 MG/DL Final    Creatinine 01/05/2023 0.68  0.6 - 1.3 MG/DL Final    BUN/Creatinine ratio 01/05/2023 32 (A)  12 - 20   Final    eGFR 01/05/2023 >60  >60 ml/min/1.73m2 Final    Calcium 01/05/2023 9.3  8.5 - 10.1 MG/DL Final    Bilirubin, total 01/05/2023 0.7  0.2 - 1.0 MG/DL Final    ALT (SGPT) 01/05/2023 20  13 - 56 U/L Final    AST (SGOT) 01/05/2023 21  10 - 38 U/L Final    Alk. phosphatase 01/05/2023 44 (A)  45 - 117 U/L Final    Protein, total 01/05/2023 7.3  6.4 - 8.2 g/dL Final    Albumin 01/05/2023 4.2  3.4 - 5.0 g/dL Final    Globulin 01/05/2023 3.1  2.0 - 4.0 g/dL Final    A-G Ratio 01/05/2023 1.4  0.8 - 1.7   Final    Vitamin D 25-Hydroxy 01/05/2023 46.2  30 - 100 ng/mL Final         Health Maintenance:  Screening:    Mammogram: negative (8/2022)   PAP smear: S/P ALMAS   Colorectal: colonoscopy (11/2020) tubular adenomas. Dr. Elinor Farah Due 11/2023. Depression: none   DM (HbA1c/FPG): FPG 87/ HbA1c 5.6 (1/2023)   Hepatitis C: negative (5/2016)   Falls: none   DEXA: osteopenia (1/2022). On Prolia. Dr. Ronald Ahuja. Glaucoma: regular eye exams with Dr. Joan Villanueva (last 1/2021)   Smoking: none   Vitamin D: 46.2 (1/2023)   Medicare Wellness: 6/30/2022      Impression:  Patient Active Problem List   Diagnosis Code    Essential hypertension I10    FH: breast cancer Z80.3    Hyperlipidemia E78.5    S/P radiation therapy to right shoulder for birthmark Z92.3    Tinnitus H93.19    Microhematuria R31.29    Psoriasis L40.9    Osteopenia M85.80    Impaired fasting glucose R73.01    Carpal tunnel syndrome, bilateral G56.03    Osteoporosis of lumbar spine M81.0    History of nonmelanoma skin cancer Z85.828    Overweight with body mass index (BMI) 25.0-29.9 E66.3    Prediabetes R73.03    Primary osteoarthritis of right knee M17.11    Baker's cyst, unruptured, right M71.21    Chronic pain of right knee M25.561, G89.29       Plan:  1. Hypertension. Remains well controlled on current regimen of lisinopril 10 mg daily and hydrochlorothiazide 25 mg daily.  Renal function remains normal with creatinine 0.68/ eGFR >60. Advised to increase fluid intake given elevated BUN/creatinine ratio at 32. Hypokalemia on previsit labs with K 3.3. Supplemented with K-Dur and will reassess BMP/magnesium today. Advised to increase potassium in diet. Will continue to follow. 3. Hyperlipidemia. On moderate intensity dose atorvastatin (20 mg) with LDL 97 and HDL 75, indicative of good control. Emphasized importance of lifestyle modifications, including heart healthy diet, exercise, and weight loss. Will continue to follow. 3. Prediabetes. Normal fasting glucose at 87 today with normalization of HbA1c to 5.6. No evidence of microvascular complications. Continue regular eye exams with Dr. Logan Carter. Emphasized importance of lifestyle modifications, including heart healthy low carbohydrate diet, regular exercise, and weight loss. Will continue to monitor. 4. Osteoporosis. History of radial and toe fracture in 2009. Treated from 11/2009 to 11/2014 with alendronate. Repeat bone density scan in 7/2016 with 10 year risk of a major osteoporetic fracture at 14 % and hip fracture at 3.4 %. Restarted Fosamax in 12/2016. New right metatarsal displaced fracture without known injury in 6/2019. Repeat bone density on 8/6/2019 showed T-scores:  femoral neck left -2.2  /right -1.9 and lumbar -2.6 . Calculated FRAX score estimated her 10 year risk of a major osteoporetic fracture at 14 % and hip fracture at 3.9 %. Comparison to her prior study in 7/2016 showed stability in her bilateral hips, but some worsening in her lumbar spine. Obtained lab evaluation for secondary osteoporosis, and TSH, intact PTH, calcium, vitamin D level, and gammopathy panel normal. Given recent fracture and some worsening of bone density scan despite treatment with biphosphonate, referred to Dr. Ashish Silva in 11/2019 and recommendation was that she change to Prolia.  Underwent repeat bone density study in 1/2022 which showed overall improvement, particularly in lumbar T score. Continues on Prolia with last dose in 6/2022. Has follow-up scheduled with Dr. Jose Hinson next week. Continue calcium and Vitamin D supplements, and encouraged her to exercise, particularly weight bearing activities. Fall precautions stressed. 5. Osteoarthritis, right knee with pain. Presented in 5/2022 with significant right knee pain with ambulation and when lying on her side at night. Pain localized to medial aspect of knee with mild swelling and tenderness. Underwent right knee x-ray (5/3/2022) which showed mild tricompartmental joint space narrowing most prominent at the patellofemoral compartment with trace joint effusion and edema at the infrapatellar fat pad. She underwent evaluation by Dr. Sarah Alford on 5/5/2022 and had aspiration and cortisone injection without significant improvement. She reported continuing to experience pain with ambulation and now using Tylenol ES twice daily, Voltaren gel, and Cymbalta 20 mg daily with significant relief. New posterior knee swelling with tightness in 10/2022 likely consistent with Baker's cyst and had follow-up visit with Dr. Sarah Alford on 10/13/2022 confirming findings. Recommendation was to proceed with Euflexxa series of injections, but patient reports that she has not been contacted by his office to schedule. Noting significant improvement since initiation of Cymbalta and wishing to continue to follow for now. 6. Hepatic steatosis with history of elevated transaminases. New onset mild elevation noted in 8/2019. Patient reported that she had been taking NSAIDS frequently to help control foot pain. Advised to discontinue and repeated in 9/2019 with normalization. Lab evaluation, including AMA, actin, hepatitis profile, CK, aldolase, celiac panel, ESR, CRP, and MANOLO, were negative, but did identify mildly low iron stores.  Repeat 1/2020 again showed mild elevation of transaminases with otherwise normal LFT's.  Reported had been taking Excedrin and did admit to 2 glasses of wine each night. Advised to discontinue both. Abdominal ultrasound (1/2020) showed mildly increased echogenicity of liver consistent with hepatic steatosis, but otherwise normal.  Subsequent normalization and has remained normal since. Transaminases remain normal today. Advised to minimize alcohol and NSAIDS. Also, emphasized importance of lifestyle modifications, including heart healthy diet and regular exercise. 7. History of iron deficiency without anemia. Noted in 9/2019 and may have been related to foot surgery. Improved with supplementation. Colonoscopy completed (11/2020) and 10 polyps removed with majority of polyps tubular adenomas. Repeat in 3 years recommended (due 11/2023). 8. Family history of breast cancer. Patient with family history of breast cancer in first degree relative (sister). Discussed recommendations for breast cancer screening in women with lifetime risk >20%. 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 No other family member with breast cancer. Wishing to continue with annual mammograms and breast exams. 9. Chronic microhematuria. Work-up reportedly negative in past.  Will continue to monitor. 10. Psoriasis. Now well controlled on betamethasone cream for flares and Eucerin cream daily. Being followed by Dr. Anselmo Pires as needed. 11. History of nonmelanoma skin cancer. Had [facility] removed from face in 9/2020. Another biopsy on right cheek benign actinic keratosis. Had follow-up with Dr. Anselmo Pires for her regular skin exam in 6/2022 and advised to follow-up as needed. 12. Elevated TSH. TSH mildly elevated in 6/2021 with normal free T4. Repeat normalized today. Patient with history of radiation therapy as a child for a birthmark on her right shoulder. Will continue to monitor. 13. Anxiety. Patient had reported increased anxiety in 3/2022 as she tried to care for sick family members and a close friend with terminal illness. Underwent evaluation in ED on 3/19/2022 for chest pain negative and pain considered atypical for cardiac etiology and determined to be likely related to anxiety. Prescribed Zoloft 25 mg daily, but did not begin. Reported that her anxiety had improved following the passing of her her close friend. Now on Cymbalta 20 mg for pain control and feels that it may also be helpful in managing her anxiety. Will continue to monitor. 14. Overweight. Weight decreased 3 pounds since last visit with normal BMI at 24.63 today. Emphasized importance of continued attempts at lifestyle modifications including heart healthy diet and regular exercise. Will continue to follow. 15. Health maintenance. Completed Moderna COVID-19 vaccine series and received two Moderna booster doses and the updated bivalent booster dose. Already received the influenza vaccine. Received 2/2 doses of Shingrix vaccine. Other immunizations up to date. Mammogram up to date. Colonoscopy up-to-date. Will be due for repeat in 11/2023 and will place referral at next visit if not scheduled. Continue regular eye exams with Dr. Mary Lock. Vitamin D level remains normal on maintenance dose supplement. Medicare wellness visit up-to-date. Patient understands recommendations and agrees with plan. Follow-up in 6 months. Future orders:    ICD-10-CM ICD-9-CM    1. Essential hypertension  I10 401.9 MAGNESIUM      METABOLIC PANEL, COMPREHENSIVE      URINALYSIS W/MICROSCOPIC      CBC WITH AUTOMATED DIFF      2. Hypokalemia  E87.6 276.8 MAGNESIUM      METABOLIC PANEL, BASIC      3. Hyperlipidemia, unspecified hyperlipidemia type  E78.5 272.4 LIPID PANEL      TSH 3RD GENERATION      T4, FREE      4. Prediabetes  R73.03 790.29 HEMOGLOBIN A1C WITH EAG      TSH 3RD GENERATION      T4, FREE      5. Osteoporosis of lumbar spine  M81.0 733.00 VITAMIN D, 25 HYDROXY      6. Chronic pain of right knee  M25.561 719.46     G89.29 338.29       7.  Primary osteoarthritis of right knee  M17.11 715.16       8. Baker's cyst, unruptured, right  M71.21 727.51       9. Anxiety  F41.9 300.00       10.  Elevated TSH  R79.89 794.5 TSH 3RD GENERATION      T4, FREE

## 2023-01-27 ENCOUNTER — DOCUMENTATION ONLY (OUTPATIENT)
Dept: ORTHOPEDIC SURGERY | Age: 78
End: 2023-01-27

## 2023-02-05 DIAGNOSIS — R79.89 ELEVATED TSH: ICD-10-CM

## 2023-02-05 DIAGNOSIS — R73.03 PREDIABETES: Primary | ICD-10-CM

## 2023-02-05 DIAGNOSIS — R73.03 PREDIABETES: ICD-10-CM

## 2023-02-05 DIAGNOSIS — E78.5 HYPERLIPIDEMIA, UNSPECIFIED HYPERLIPIDEMIA TYPE: Primary | ICD-10-CM

## 2023-02-05 DIAGNOSIS — I10 ESSENTIAL HYPERTENSION: Primary | ICD-10-CM

## 2023-02-07 DIAGNOSIS — I10 ESSENTIAL HYPERTENSION: Primary | ICD-10-CM

## 2023-02-07 RX ORDER — BETAMETHASONE DIPROPIONATE 0.5 MG/G
CREAM TOPICAL
Qty: 30 G | Refills: 3 | Status: SHIPPED | OUTPATIENT
Start: 2023-02-07

## 2023-02-07 NOTE — TELEPHONE ENCOUNTER
Patient states this was last prescribed by a different provider but she was told the last time she was here that Dr Jose Luis Coto would be able to refill it. Please advise, thank you. Requested Prescriptions     Pending Prescriptions Disp Refills    betamethasone dipropionate (DIPROSONE) 0.05 % topical cream 15 g      Sig: Apply  to affected area two (2) times daily as needed for Skin Irritation.

## 2023-02-26 ENCOUNTER — APPOINTMENT (OUTPATIENT)
Facility: HOSPITAL | Age: 78
End: 2023-02-26
Payer: MEDICARE

## 2023-02-26 ENCOUNTER — HOSPITAL ENCOUNTER (EMERGENCY)
Facility: HOSPITAL | Age: 78
Discharge: HOME | End: 2023-02-26
Attending: EMERGENCY MEDICINE
Payer: MEDICARE

## 2023-02-26 VITALS
RESPIRATION RATE: 18 BRPM | BODY MASS INDEX: 24.93 KG/M2 | TEMPERATURE: 97.9 F | WEIGHT: 149.6 LBS | DIASTOLIC BLOOD PRESSURE: 75 MMHG | HEIGHT: 65 IN | SYSTOLIC BLOOD PRESSURE: 128 MMHG | OXYGEN SATURATION: 99 % | HEART RATE: 93 BPM

## 2023-02-26 DIAGNOSIS — S39.012A STRAIN OF LUMBAR REGION, INITIAL ENCOUNTER: Primary | ICD-10-CM

## 2023-02-26 DIAGNOSIS — R05.9 COUGH, UNSPECIFIED TYPE: ICD-10-CM

## 2023-02-26 LAB
ANION GAP SERPL CALC-SCNC: 6 MMOL/L (ref 3–18)
BASOPHILS # BLD: 0.1 K/UL (ref 0–0.1)
BASOPHILS NFR BLD: 1 % (ref 0–2)
BUN SERPL-MCNC: 18 MG/DL (ref 7–18)
BUN/CREAT SERPL: 27 (ref 12–20)
CALCIUM SERPL-MCNC: 9.1 MG/DL (ref 8.5–10.1)
CHLORIDE SERPL-SCNC: 105 MMOL/L (ref 100–111)
CO2 SERPL-SCNC: 27 MMOL/L (ref 21–32)
CREAT SERPL-MCNC: 0.67 MG/DL (ref 0.6–1.3)
DIFFERENTIAL METHOD BLD: NORMAL
EKG ATRIAL RATE: 75 BPM
EKG DIAGNOSIS: NORMAL
EKG P AXIS: 53 DEGREES
EKG P-R INTERVAL: 144 MS
EKG Q-T INTERVAL: 374 MS
EKG QRS DURATION: 80 MS
EKG QTC CALCULATION (BAZETT): 417 MS
EKG R AXIS: -58 DEGREES
EKG T AXIS: 48 DEGREES
EKG VENTRICULAR RATE: 75 BPM
EOSINOPHIL # BLD: 0.2 K/UL (ref 0–0.4)
EOSINOPHIL NFR BLD: 3 % (ref 0–5)
ERYTHROCYTE [DISTWIDTH] IN BLOOD BY AUTOMATED COUNT: 12.7 % (ref 11.6–14.5)
GLUCOSE SERPL-MCNC: 99 MG/DL (ref 74–99)
HCT VFR BLD AUTO: 41.7 % (ref 35–45)
HGB BLD-MCNC: 13.2 G/DL (ref 12–16)
IMM GRANULOCYTES # BLD AUTO: 0 K/UL (ref 0–0.04)
IMM GRANULOCYTES NFR BLD AUTO: 0 % (ref 0–0.5)
LYMPHOCYTES # BLD: 1.3 K/UL (ref 0.9–3.6)
LYMPHOCYTES NFR BLD: 25 % (ref 21–52)
MCH RBC QN AUTO: 28.1 PG (ref 24–34)
MCHC RBC AUTO-ENTMCNC: 31.7 G/DL (ref 31–37)
MCV RBC AUTO: 88.9 FL (ref 78–100)
MONOCYTES # BLD: 0.5 K/UL (ref 0.05–1.2)
MONOCYTES NFR BLD: 9 % (ref 3–10)
NEUTS SEG # BLD: 3.1 K/UL (ref 1.8–8)
NEUTS SEG NFR BLD: 62 % (ref 40–73)
NRBC # BLD: 0 K/UL (ref 0–0.01)
NRBC BLD-RTO: 0 PER 100 WBC
PLATELET # BLD AUTO: 284 K/UL (ref 135–420)
PMV BLD AUTO: 9.2 FL (ref 9.2–11.8)
POTASSIUM SERPL-SCNC: 3.8 MMOL/L (ref 3.5–5.5)
RBC # BLD AUTO: 4.69 M/UL (ref 4.2–5.3)
SODIUM SERPL-SCNC: 138 MMOL/L (ref 136–145)
TROPONIN I SERPL HS-MCNC: 5 NG/L (ref 0–54)
WBC # BLD AUTO: 5 K/UL (ref 4.6–13.2)

## 2023-02-26 PROCEDURE — 85025 COMPLETE CBC W/AUTO DIFF WBC: CPT

## 2023-02-26 PROCEDURE — 96374 THER/PROPH/DIAG INJ IV PUSH: CPT

## 2023-02-26 PROCEDURE — 71275 CT ANGIOGRAPHY CHEST: CPT

## 2023-02-26 PROCEDURE — 93005 ELECTROCARDIOGRAM TRACING: CPT | Performed by: EMERGENCY MEDICINE

## 2023-02-26 PROCEDURE — 84484 ASSAY OF TROPONIN QUANT: CPT

## 2023-02-26 PROCEDURE — 80048 BASIC METABOLIC PNL TOTAL CA: CPT

## 2023-02-26 PROCEDURE — 6360000004 HC RX CONTRAST MEDICATION: Performed by: EMERGENCY MEDICINE

## 2023-02-26 PROCEDURE — 6360000002 HC RX W HCPCS: Performed by: EMERGENCY MEDICINE

## 2023-02-26 PROCEDURE — 99285 EMERGENCY DEPT VISIT HI MDM: CPT

## 2023-02-26 RX ORDER — KETOROLAC TROMETHAMINE 15 MG/ML
15 INJECTION, SOLUTION INTRAMUSCULAR; INTRAVENOUS
Status: COMPLETED | OUTPATIENT
Start: 2023-02-26 | End: 2023-02-26

## 2023-02-26 RX ADMIN — IOPAMIDOL 80 ML: 755 INJECTION, SOLUTION INTRAVENOUS at 12:17

## 2023-02-26 RX ADMIN — KETOROLAC TROMETHAMINE 15 MG: 15 INJECTION, SOLUTION INTRAMUSCULAR; INTRAVENOUS at 11:20

## 2023-02-26 ASSESSMENT — PAIN DESCRIPTION - DESCRIPTORS
DESCRIPTORS: SHARP
DESCRIPTORS: ACHING

## 2023-02-26 ASSESSMENT — PAIN DESCRIPTION - FREQUENCY: FREQUENCY: CONTINUOUS

## 2023-02-26 ASSESSMENT — PAIN DESCRIPTION - LOCATION
LOCATION: BACK
LOCATION: BACK

## 2023-02-26 ASSESSMENT — PAIN DESCRIPTION - ORIENTATION
ORIENTATION: MID
ORIENTATION: POSTERIOR;UPPER

## 2023-02-26 ASSESSMENT — PAIN SCALES - GENERAL
PAINLEVEL_OUTOF10: 8
PAINLEVEL_OUTOF10: 8

## 2023-02-26 ASSESSMENT — PAIN DESCRIPTION - PAIN TYPE: TYPE: ACUTE PAIN

## 2023-02-26 ASSESSMENT — LIFESTYLE VARIABLES
HOW OFTEN DO YOU HAVE A DRINK CONTAINING ALCOHOL: NEVER
HOW MANY STANDARD DRINKS CONTAINING ALCOHOL DO YOU HAVE ON A TYPICAL DAY: PATIENT DOES NOT DRINK

## 2023-02-26 ASSESSMENT — PAIN - FUNCTIONAL ASSESSMENT: PAIN_FUNCTIONAL_ASSESSMENT: 0-10

## 2023-02-26 NOTE — ED PROVIDER NOTES
EMERGENCY DEPARTMENT HISTORY AND PHYSICAL EXAM    1:03 PM patient seen at this time in room QB      Date: 2/26/2023  Patient Name: Dali Kaur    History of Presenting Illness     Chief Complaint   Patient presents with    Back Pain    Cough         History Provided By: patient    Additional History (Context): Dali Kaur is a 78 y.o. female presents with only medical history psoriasis  Has 3 days of cough, then developed left-sided pain in her back that is frankly pleuritic.  She has had some nasal congestion and production of mucus.  Otherwise does not feel ill.  No fevers abdominal pain vomiting diarrhea or lethargy.  No chest pain.  No exertional component to this.  No medications tried for this..    PCP: Noemy Benitez MD    Chief Complaint:   Duration:    Timing:    Location:   Quality:   Severity:   Modifying Factors:   Associated Symptoms:       No current facility-administered medications for this encounter.     Current Outpatient Medications   Medication Sig Dispense Refill    Amoxicillin 500 MG TABS TAKE ONE CAPSULE PO TID, START ONE DAY PRIOR TO PROCEDURE, DAY OF PROCEDURE AND DAY AFTER PROCEDURE (Patient not taking: Reported on 2/26/2023)      aspirin 81 MG EC tablet Take by mouth daily      atorvastatin (LIPITOR) 20 MG tablet TAKE 1 TABLET BY MOUTH DAILY      betamethasone dipropionate 0.05 % cream Apply topically 2 times daily as needed      Biotin 2.5 MG TABS Take 2,500 mcg by mouth      vitamin D 25 MCG (1000 UT) CAPS Take 1 tablet by mouth daily      clotrimazole-betamethasone (LOTRISONE) 1-0.05 % cream Apply topically 2 times daily      coenzyme Q10 100 MG CAPS capsule Take 100 mg by mouth daily      cycloSPORINE (RESTASIS) 0.05 % ophthalmic emulsion Apply 1 drop to eye in the morning and 1 drop in the evening.      denosumab (PROLIA) 60 MG/ML SOSY SC injection Inject 60 mg into the skin      docusate (COLACE, DULCOLAX) 100 MG CAPS Take 100 mg by mouth daily as needed      DULoxetine  (CYMBALTA) 20 MG extended release capsule Take 20 mg by mouth daily      fexofenadine (ALLEGRA) 60 MG tablet Take 60 mg by mouth daily      hydroCHLOROthiazide (HYDRODIURIL) 25 MG tablet TAKE 1 TABLET BY MOUTH DAILY      lisinopril (PRINIVIL;ZESTRIL) 10 MG tablet TAKE 1 TABLET BY MOUTH DAILY         Past History     Past Medical History:  Past Medical History:   Diagnosis Date    Eczema     Essential hypertension     FH: breast cancer     Fibroids     uterine    Fracture of radius 11/2009    with ulna, left, closed    History of cataract     History of foot fracture     right and left    Hyperlipidemia     Microhematuria     chronic    Osteopenia     Psoriasis     S/P radiation therapy     as child, to right shoulder for birthmark    Tinnitus     Zoster     right scapula       Past Surgical History:  Past Surgical History:   Procedure Laterality Date    CATARACT REMOVAL      COLONOSCOPY      COLONOSCOPY N/A 11/17/2020    COLONOSCOPY with polypectomies performed by Gege Marshall MD at [facility] Right 04/24/2019    right foot 3rd metatarsal fracture, screws and a plate    ORTHOPEDIC SURGERY      left arm    WISDOM TOOTH EXTRACTION      WRIST FRACTURE SURGERY         Family History:  Family History   Problem Relation Age of Onset    Breast Cancer Sister 62    Cancer Mother         pancreatic    Mult Sclerosis Sister     Cancer Sister         breast    Heart Disease Father     Heart Disease Mother        Social History:  Social History     Tobacco Use    Smoking status: Never    Smokeless tobacco: Never   Substance Use Topics    Alcohol use: Yes     Alcohol/week: 3.0 - 4.0 standard drinks    Drug use: No       Allergies:   Allergies   Allergen Reactions    Povidone-Iodine Hives    Chlorhexidine Gluconate Rash    Codeine Nausea Only and Nausea And Vomiting         Review of Systems     Review of Systems      Physical Exam       Patient Vitals for the past 12 hrs:   Temp Pulse Resp BP SpO2 02/26/23 1022 97.9 °F (36.6 °C) 93 18 128/75 99 %       IPVITALS  Patient Vitals for the past 24 hrs:   BP Temp Temp src Pulse Resp SpO2 Height Weight   02/26/23 1022 128/75 97.9 °F (36.6 °C) Temporal 93 18 99 % 5' 5\" (1.651 m) 149 lb 9.6 oz (67.9 kg)       Physical Exam  Constitutional:       General: She is not in acute distress. Appearance: Normal appearance. She is not ill-appearing. HENT:      Head: Normocephalic and atraumatic. Cardiovascular:      Rate and Rhythm: Normal rate and regular rhythm. Pulmonary:      Effort: Pulmonary effort is normal.      Breath sounds: Normal breath sounds. No wheezing or rhonchi. Chest:      Chest wall: Tenderness present. Abdominal:      Tenderness: There is no abdominal tenderness. Musculoskeletal:         General: Normal range of motion. Cervical back: Normal range of motion and neck supple. Skin:     General: Skin is warm and dry. Neurological:      General: No focal deficit present. Mental Status: She is alert.          Diagnostic Study Results   Labs -  Recent Results (from the past 24 hour(s))   BMP    Collection Time: 02/26/23 11:11 AM   Result Value Ref Range    Sodium 138 136 - 145 mmol/L    Potassium 3.8 3.5 - 5.5 mmol/L    Chloride 105 100 - 111 mmol/L    CO2 27 21 - 32 mmol/L    Anion Gap 6 3.0 - 18 mmol/L    Glucose 99 74 - 99 mg/dL    BUN 18 7.0 - 18 MG/DL    Creatinine 0.67 0.6 - 1.3 MG/DL    Bun/Cre Ratio 27 (H) 12 - 20      Est, Glom Filt Rate >60 >60 ml/min/1.73m2    Calcium 9.1 8.5 - 10.1 MG/DL   CBC with Auto Differential    Collection Time: 02/26/23 11:11 AM   Result Value Ref Range    WBC 5.0 4.6 - 13.2 K/uL    RBC 4.69 4.20 - 5.30 M/uL    Hemoglobin 13.2 12.0 - 16.0 g/dL    Hematocrit 41.7 35.0 - 45.0 %    MCV 88.9 78.0 - 100.0 FL    MCH 28.1 24.0 - 34.0 PG    MCHC 31.7 31.0 - 37.0 g/dL    RDW 12.7 11.6 - 14.5 %    Platelets 882 465 - 711 K/uL    MPV 9.2 9.2 - 11.8 FL    Nucleated RBCs 0.0 0  WBC    nRBC 0.00 0.00 - 0.01 K/uL    Seg Neutrophils 62 40 - 73 %    Lymphocytes 25 21 - 52 %    Monocytes 9 3 - 10 %    Eosinophils % 3 0 - 5 %    Basophils 1 0 - 2 %    Immature Granulocytes 0 0.0 - 0.5 %    Segs Absolute 3.1 1.8 - 8.0 K/UL    Absolute Lymph # 1.3 0.9 - 3.6 K/UL    Absolute Mono # 0.5 0.05 - 1.2 K/UL    Absolute Eos # 0.2 0.0 - 0.4 K/UL    Basophils Absolute 0.1 0.0 - 0.1 K/UL    Absolute Immature Granulocyte 0.0 0.00 - 0.04 K/UL    Differential Type AUTOMATED     Troponin    Collection Time: 02/26/23 11:11 AM   Result Value Ref Range    Troponin, High Sensitivity 5 0 - 54 ng/L   EKG 12 Lead    Collection Time: 02/26/23 11:11 AM   Result Value Ref Range    Ventricular Rate 75 BPM    Atrial Rate 75 BPM    P-R Interval 144 ms    QRS Duration 80 ms    Q-T Interval 374 ms    QTc Calculation (Bazett) 417 ms    P Axis 53 degrees    R Axis -58 degrees    T Axis 48 degrees    Diagnosis Normal sinus rhythm        Radiologic Studies -   CTA CHEST W WO CONTRAST   Final Result   .      No pulmonary embolism.      No confluent consolidation.        [unfilled]    Medications ordered:   Medications   ketorolac (TORADOL) injection 15 mg (15 mg IntraVENous Given 2/26/23 1120)   iopamidol (ISOVUE-370) 76 % injection 80 mL (80 mLs IntraVENous Given 2/26/23 1217)         Medical Decision Making   Initial Medical Decision Making and DDx:  Most likely viral benign URI giving her the cough and secondary musculoskeletal pain, consider pneumonia.  Very unlikely PE ACS.  Toradol for pain control.    ED Course: Progress Notes, Reevaluation, and Consults:  ED Course as of 02/26/23 1303   Sun Feb 26, 2023   1249 Labs reviewed all benign no BRENDA elevated troponin anemia, awaiting CTA results.    My interpretation twelve-lead EKG sinus rhythm at 75 no acute process narrow complex [CB]      ED Course User Index  [CB] Aron Myers MD     1:03 PM Pt reevaluated at this time.  Discussed results and findings, as well as, diagnosis and plan for  discharge. Follow up with doctors/services listed. Return to the emergency department for alarming symptoms. Pt verbalizes understanding and agreement with plan. All questions addressed. Pain is down to a 4 out of 10 on reassessment, no evidence for pulmonary embolism pneumothorax pneumonia ACS CHF. Continue symptomatic management likely irritated costovertebral joint or intercostal muscle. I am the first provider for this patient. I reviewed the vital signs, available nursing notes, past medical history, past surgical history, family history and social history. Patient Vitals for the past 12 hrs:   Temp Pulse Resp BP SpO2   02/26/23 1022 97.9 °F (36.6 °C) 93 18 128/75 99 %       Vital Signs-Reviewed the patient's vital signs. Pulse Oximetry Analysis, Cardiac Monitor, 12 lead ekg:      Interpreted by the EP. Records Reviewed: Nursing notes reviewed (Time of Review: 1:03 PM)    Procedures:   Critical Care Time: 0  If critical care time is note it is exclusive of any separately billable procedures. Aspirin: (was aspirin given for stroke?)    Diagnosis     Clinical Impression:   1. Strain of lumbar region, initial encounter    2.  Cough, unspecified type        Disposition: DISPOSITION Decision To Discharge 02/26/2023 01:02:44 PM       New for 2023:  DISCHARGE MEDICATIONS:  Current Discharge Medication List             Details   Amoxicillin 500 MG TABS TAKE ONE CAPSULE PO TID, START ONE DAY PRIOR TO PROCEDURE, DAY OF PROCEDURE AND DAY AFTER PROCEDURE      aspirin 81 MG EC tablet Take by mouth daily      atorvastatin (LIPITOR) 20 MG tablet TAKE 1 TABLET BY MOUTH DAILY      betamethasone dipropionate 0.05 % cream Apply topically 2 times daily as needed      Biotin 2.5 MG TABS Take 2,500 mcg by mouth      vitamin D 25 MCG (1000 UT) CAPS Take 1 tablet by mouth daily      clotrimazole-betamethasone (LOTRISONE) 1-0.05 % cream Apply topically 2 times daily      coenzyme Q10 100 MG CAPS capsule Take 100 mg by mouth daily      cycloSPORINE (RESTASIS) 0.05 % ophthalmic emulsion Apply 1 drop to eye in the morning and 1 drop in the evening. denosumab (PROLIA) 60 MG/ML SOSY SC injection Inject 60 mg into the skin      docusate (COLACE, DULCOLAX) 100 MG CAPS Take 100 mg by mouth daily as needed      DULoxetine (CYMBALTA) 20 MG extended release capsule Take 20 mg by mouth daily      fexofenadine (ALLEGRA) 60 MG tablet Take 60 mg by mouth daily      hydroCHLOROthiazide (HYDRODIURIL) 25 MG tablet TAKE 1 TABLET BY MOUTH DAILY      lisinopril (PRINIVIL;ZESTRIL) 10 MG tablet TAKE 1 TABLET BY MOUTH DAILY             DISCONTINUED MEDICATIONS:  Current Discharge Medication List          PATIENT REFERRED TO:  Follow Up with:   Daryl Carter, [address] [facility]  [phone]    In 3 days    _______________________________    Notes:    Juli Fulton MD using Dragon dictation      _______________________________     Patricia Scruggs MD  02/26/23 1924

## 2023-02-26 NOTE — ED TRIAGE NOTES
Pt c/o middle back pain when coughing since Thursday. Pt olga pulling or lifting anything. Pt stated \"that she has back pain when she coughing\".

## 2023-02-26 NOTE — ED NOTES
Discharge instructions reviewed with patient. Patient verbalized understanding. Patient advised to follow up as directed on discharge instructions. Patient denies questions, needs or concerns at this time. Patient verbalized understanding. No s/sx of distress noted.         Yeison Trammell RN  02/26/23 0841

## 2023-02-28 ENCOUNTER — OFFICE VISIT (OUTPATIENT)
Age: 78
End: 2023-02-28
Payer: MEDICARE

## 2023-02-28 VITALS
TEMPERATURE: 97.1 F | SYSTOLIC BLOOD PRESSURE: 122 MMHG | WEIGHT: 150 LBS | HEIGHT: 65 IN | DIASTOLIC BLOOD PRESSURE: 61 MMHG | BODY MASS INDEX: 24.99 KG/M2 | RESPIRATION RATE: 16 BRPM | HEART RATE: 83 BPM | OXYGEN SATURATION: 98 %

## 2023-02-28 DIAGNOSIS — S39.012D BACK STRAIN, SUBSEQUENT ENCOUNTER: Primary | ICD-10-CM

## 2023-02-28 DIAGNOSIS — M81.0 OSTEOPOROSIS OF LUMBAR SPINE: ICD-10-CM

## 2023-02-28 DIAGNOSIS — I10 ESSENTIAL HYPERTENSION: ICD-10-CM

## 2023-02-28 DIAGNOSIS — R05.1 ACUTE COUGH: ICD-10-CM

## 2023-02-28 DIAGNOSIS — J30.2 SEASONAL ALLERGIC RHINITIS, UNSPECIFIED TRIGGER: ICD-10-CM

## 2023-02-28 DIAGNOSIS — K44.9 HIATAL HERNIA: ICD-10-CM

## 2023-02-28 PROCEDURE — 1123F ACP DISCUSS/DSCN MKR DOCD: CPT | Performed by: INTERNAL MEDICINE

## 2023-02-28 PROCEDURE — 1036F TOBACCO NON-USER: CPT | Performed by: INTERNAL MEDICINE

## 2023-02-28 PROCEDURE — 1090F PRES/ABSN URINE INCON ASSESS: CPT | Performed by: INTERNAL MEDICINE

## 2023-02-28 PROCEDURE — 99215 OFFICE O/P EST HI 40 MIN: CPT | Performed by: INTERNAL MEDICINE

## 2023-02-28 PROCEDURE — G8399 PT W/DXA RESULTS DOCUMENT: HCPCS | Performed by: INTERNAL MEDICINE

## 2023-02-28 PROCEDURE — G8420 CALC BMI NORM PARAMETERS: HCPCS | Performed by: INTERNAL MEDICINE

## 2023-02-28 PROCEDURE — 3078F DIAST BP <80 MM HG: CPT | Performed by: INTERNAL MEDICINE

## 2023-02-28 PROCEDURE — 3074F SYST BP LT 130 MM HG: CPT | Performed by: INTERNAL MEDICINE

## 2023-02-28 PROCEDURE — G8427 DOCREV CUR MEDS BY ELIG CLIN: HCPCS | Performed by: INTERNAL MEDICINE

## 2023-02-28 PROCEDURE — G8484 FLU IMMUNIZE NO ADMIN: HCPCS | Performed by: INTERNAL MEDICINE

## 2023-02-28 ASSESSMENT — PATIENT HEALTH QUESTIONNAIRE - PHQ9
2. FEELING DOWN, DEPRESSED OR HOPELESS: 0
SUM OF ALL RESPONSES TO PHQ QUESTIONS 1-9: 0
SUM OF ALL RESPONSES TO PHQ QUESTIONS 1-9: 0
SUM OF ALL RESPONSES TO PHQ9 QUESTIONS 1 & 2: 0
SUM OF ALL RESPONSES TO PHQ QUESTIONS 1-9: 0
1. LITTLE INTEREST OR PLEASURE IN DOING THINGS: 0
SUM OF ALL RESPONSES TO PHQ QUESTIONS 1-9: 0

## 2023-02-28 NOTE — PATIENT INSTRUCTIONS
Heart-Healthy Diet: Care Instructions  Your Care Instructions     A heart-healthy diet has lots of vegetables, fruits, nuts, beans, and whole grains, and is low in salt. It limits foods that are high in saturated fat, such as meats, cheeses, and fried foods. It may be hard to change your diet, but even small changes can lower your risk of heart attack and heart disease. Follow-up care is a key part of your treatment and safety. Be sure to make and go to all appointments, and call your doctor if you are having problems. It's also a good idea to know your test results and keep a list of the medicines you take. How can you care for yourself at home? Watch your portions  Learn what a serving is. A \"serving\" and a \"portion\" are not always the same thing. Make sure that you are not eating larger portions than are recommended. For example, a serving of pasta is ½ cup. A serving size of meat is 2 to 3 ounces. A 3-ounce serving is about the size of a deck of cards. Measure serving sizes until you are good at [place]" them. Keep in mind that restaurants often serve portions that are 2 or 3 times the size of one serving. To keep your energy level up and keep you from feeling hungry, eat often but in smaller portions. Eat only the number of calories you need to stay at a healthy weight. If you need to lose weight, eat fewer calories than your body burns (through exercise and other physical activity). Eat more fruits and vegetables  Eat a variety of fruit and vegetables every day. Dark green, deep orange, red, or yellow fruits and vegetables are especially good for you. Examples include spinach, carrots, peaches, and berries. Keep carrots, celery, and other veggies handy for snacks. Buy fruit that is in season and store it where you can see it so that you will be tempted to eat it. Cook dishes that have a lot of veggies in them, such as stir-fries and soups.   Limit saturated and trans fat  Read food labels, and try to avoid saturated and trans fats. They increase your risk of heart disease. Use olive or canola oil when you cook. Bake, broil, grill, or steam foods instead of frying them. Choose lean meats instead of high-fat meats such as hot dogs and sausages. Cut off all visible fat when you prepare meat. Eat fish, skinless poultry, and meat alternatives such as soy products instead of high-fat meats. Soy products, such as tofu, may be especially good for your heart. Choose low-fat or fat-free milk and dairy products. Eat foods high in fiber  Eat a variety of grain products every day. Include whole-grain foods that have lots of fiber and nutrients. Examples of whole-grain foods include oats, whole wheat bread, and brown rice. Buy whole-grain breads and cereals, instead of white bread or pastries. Limit salt and sodium  Limit how much salt and sodium you eat to help lower your blood pressure. Taste food before you salt it. Add only a little salt when you think you need it. With time, your taste buds will adjust to less salt. Eat fewer snack items, fast foods, and other high-salt, processed foods. Check food labels for the amount of sodium in packaged foods. Choose low-sodium versions of canned goods (such as soups, vegetables, and beans). Limit sugar  Limit drinks and foods with added sugar. These include candy, desserts, and soda pop. Limit alcohol  Limit alcohol to no more than 2 drinks a day for men and 1 drink a day for women. Too much alcohol can cause health problems. When should you call for help? Watch closely for changes in your health, and be sure to contact your doctor if:    You would like help planning heart-healthy meals. Where can you learn more? Go to http://www.kaye.com/  Enter V137 in the search box to learn more about \"Heart-Healthy Diet: Care Instructions. \"  Current as of: August 22, 2019               Content Version: 12.6  © 4722-9903 [employer]. Care instructions adapted under license by [employer] (which disclaims liability or warranty for this information). If you have questions about a medical condition or this instruction, always ask your healthcare professional. [address] any warranty or liability for your use of this information.

## 2023-02-28 NOTE — PROGRESS NOTES
Whitney Murry presents today for   Chief Complaint   Patient presents with    Follow-up     ED Follow up seen at [facility] ER 2/26/23 Strain of lumbar region           1. \"Have you been to the ER, urgent care clinic since your last visit? Hospitalized since your last visit? \" yes    2. \"Have you seen or consulted any other health care providers outside of the [address] [place] [address] since your last visit? \" no     3. For patients aged 39-70: Has the patient had a colonoscopy / FIT/ Cologuard? NA - based on age      If the patient is female:    4. For patients aged 41-77: Has the patient had a mammogram within the past 2 years? NA - based on age or sex      11. For patients aged 21-65: Has the patient had a pap smear?  NA - based on age or sex

## 2023-02-28 NOTE — PROGRESS NOTES
HPI:   Parish Moss is a 68y.o. year old female who presents today for post ED follow-up. She has a history of hypertension, hyperlipidemia, impaired fasting glucose, osteoporosis, left sciatica, and chronic microhematuria. She has completed the Moderna COVID-19 vaccine series and received two Moderna booster doses and the updated bivalent booster dose. On 2/26/2023, she presented to [facility] ED complaining of a 4-day history of left-sided mid back pain exacerbated by coughing. She reported that she had nasal congestion and a cough productive of clear mucus, but did not not feel ill otherwise. She performed a home rapid antigen COVID-19 test which was negative. She denied any fever, chest pain, shortness of breath, or dyspnea on exertion. Evaluation included WBC 5.0, Hb 13.2/HCT 41.7, platelets 141, creatinine 0.67/eGFR >60, troponin 5, EKG sinus rhythm at 75 bpm, poor R wave progression without change from prior; chest CTA showed no evidence of PE, no confluent consolidation, pneumothorax, or adenopathy, and the aorta was without aneurysm or dissection; moderate-large hiatal hernia. She was diagnosed with back strain and treated with IV ketorolac 15 mg with some improvement. She was subsequently discharged. She presents today for follow-up and reports that she is doing much better. She states that she believes she injured her back when lifting a transport chair used for her mother-in-law. She states that she had persistent left-sided mid back pain exacerbated by movement and coughing but was reassured after testing and imaging in the ED. She states that the pain has since resolved although she is still experiencing some persistent coughing. She does notice postnasal drainage and states that nasal discharge is clear. She is currently using an allergy pill that she purchased online although is not sure of the name. She is otherwise without new complaints.        Summary of prior hospitalizations and medical history:  On 5/3/2022, she presented complaining of acute right knee pain for approximately 4 weeks worsened by ambulation and navigating stairs. She did not note sustained improvement with heat or ice application. She underwent a right knee x-ray (5/3/2022) which showed mild tricompartmental joint space narrowing most prominent at the patellofemoral compartment with trace joint effusion and edema at the infrapatellar fat pad. She was referred to Dr. Joel Mcmahon, and on 5/5/2022, she had aspiration and cortisone injection but reported no significant improvement. She reported that she was continuing to experience pain with ambulation and had started using Voltaren gel with some relief. She states that she is not taking meloxicam or Naprosyn due to concern regarding side effects, but has been finding two Tylenol ES twice daily and Cymbalta 20 mg daily to be helpful in controlling pain. She presented on 10/11/2022 with a new swelling behind her right knee which tightened when walking downstairs, and was diagnosed with a Baker's cyst.  She had a follow-up appointment with Dr. Joel Mcmahon on 10/13/2022 and was advised that would recommend proceeding with the Euflexxa series of injections. However, she was never contacted to schedule. On 3/19/2022, she presented to [facility] ED with complaint of upper chest, upper back, and left arm pain. She reported pain was present for approximately 24 hours although acutely increased 1 hour prior to presentation. She states that the discomfort was constant and without any alleviating or exacerbating factors. She denies any dyspnea, palpitations, nausea, vomiting, PND, orthopnea, or abdominal pain. Evaluation included WBC 8.8, Hb 12.9/ HCT 40.3, platelets 452, creatinine 0.77/eGFR > 60, troponin <3 and 3; chest x-ray no acute changes and hiatal hernia present; EKG sinus rhythm at 80 bpm, normal intervals and no change from previous. She was subsequently discharged. She was seen in follow-up on 3/23/2022 and reported that she felt that her symptoms were secondary to anxiety. She was prescribed sertraline and provided with contacts for counseling. On 8/27/2019 and was noted to have elevated transaminases with AST 50 and ALT 67. She was instructed to discontinue all NSAIDS which she had been taking for her right foot pain. She was seen again on 9/25/2019 and repeat testing showed normalization of her transaminases. Repeat evaluation on 1/9/2020 and reported that she had been taking two Excedrin almost every morning due to a headache, and she also reported continuing to drink approximately two glasses of wine each night. Repeat labs showed repeat elevation of transaminases with AST 50 and ALT 67, while other liver function tests remained normal. She was instructed to discontinue Excedrin and alcohol, and underwent an abdominal ultrasound which showed normal sized liver with mildly diffusely coarsened echogenicity. On 6/25/2019, she reported that two weeks prior, while returning home from her granddaughter's graduation, she and her  stopped at a restaurant for lunch. She states that while she was chewing a mouth full of food, her  noticed that she suddenly tilted her head to the left side and seemed to look dazed. She states that she remained awake and was aware that he was talking to her, but she states that she could not respond. She states that the episode lasted approximately 30 seconds and resolved, and she proceeded to complete her chewing and swallow her food. She states that she did not lose consciousness, or notice any headache, visual changes, lightheadedness, tongue biting, incontinence, focal deficits, or seizure like activity. She denied any chest pain, shortness of breath, palpitations, pleuritic chest pain, hemoptysis, calf pain or swelling.  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 She also had a carotid duplex scan (7/9/2019) showing only mild (< 50%) stenosis of the bilateral internal carotid arteries. She underwent evaluation by Dr. Florencio Rider in 10/2019, and he did not feel that the episode represented a TIA or other neurologic episode. He felt that no further evaluation was needed. She has not had a recurrence of these symptoms. On 4/15/2019, she underwent evaluation at [facility] for right foot pain. She denied known injury. Right foot x-ray showed a displaced third metatarsal fracture and she was referred to Dr. Jeaneth Gandhi for evaluation. Given the displacement and risk of metatarsalgia, surgery was recommended, and on 4/24/2019, she underwent open reduction/internal fixation of the third metatarsal with putty bone grafting. She reports that she did well after surgery, and and was non-weight bearing in a CAM boot for several months. On 5/15/2019, she presented complaining of bilateral hand and wrist pain. She denied any fever, chills, sore throat, rashes, dyspnea, cough, abdominal pain, dysuria, focal weakness, visual changes, or headache. She did admit to sleeping with her bilateral hands tucked beneath her in a flexed position while lying on her back due to her limitations from her foot surgery. She also reported that she had been using a walker to ambulate and gripping it tightly with her hands. Lab evaluation including BORIS, RF, CCP, ESR, uric acid were normal, but CRP was increased to 9.1. A diagnosis of presumed carpal tunnel syndrome was made, and her discomfort resolved with the use of wrist splints. She was released to weight bear as tolerate in 7/2019, and has not had any significant pain although has been experiencing some swelling which resolves with elevation.  She has a history of osteopenia, and in 2009, she sustained a fracture of her left 3rd toe (3/2009) and her distal radius (12/2009). At that time, she was started on alendronate and was treated until 11/2014. She had a repeat bone density study in 7/2016 showing T-scores: femoral neck left -2.3/ right -2.0 and lumbar -2.2. She was restarted on alendronate in 12/2016. She underwent repeat bone density study in 8/2019 showing T-scores: femoral neck left -2.2/ right -1.9 and lumbar -2.6. Due to lack of improvement, she underwent evaluation by Dr. Harmony Cabral in 11/2019 and recommendation was that she change to Prolia. She continues to take calcium and Vitamin D supplements. She has a history of hypertension, treated with lisinopril and hydrochlorothiazide. She has been checking her blood pressure intermittently and reports that most readings are with a systolic blood pressure <326. She remains quite active, although has not been having time to exercise. She reports that she has continued stress in her life, helping to care for her 's mother (dementia), two disabled cousins, and her sister. She denies any chest pain, shortness of breath at rest or with exertion, palpitations, lightheadedness, or edema. She also has a history of hyperlipidemia, treated with moderate intensity dose atorvastatin. She has no history of ASCVD. In 2/2017, she was evaluated by [facility] Vela with left lower back pain and left sciatica. She had been taking ibuprofen without relief, and was given a course of prednisone. She states that she did not find this helpful either, but reports resolution of symptoms after visiting a chiropractor. She states that she no longer is experiencing pain and is able to ambulate without difficulty. She will continue to visit a chiropractor periodically when she develops a recurrence of pain with improvement. She reports that she had an extensive workup for microhematuria in the past, which was negative (records unavailable). She denies any dysuria, gross hematuria, or flank pain.      She has had screening colonoscopy with Dr Heidy Sousa in 10/2010, which was normal except for hemorrhoids. Recommendation for follow-up is for 10 years. She underwent a screening colonoscopy (11/17/2020) by Dr. Rick Burrwos and was found to have internal and external hemorrhoids as well as 10 sessile 4-10 mm polyps in the descending colon (2), transverse colon (1), sigmoid colon (1), and rectum (6). Pathology showed multiple tubular adenomas. Recommendation is for repeat colonoscopy in 3 years. She denies any abdominal pain, nausea, vomiting, melena, hematochezia, or change in bowel movements. She has a history of eczema and followed by Dr. Jaylyn Tripathi. She had found Eucrisa to be very effective, but not covered by her insurance. She underwent reevaluation in 10/2020 for worsening palmar rash which was felt to be secondary to psoriasis. She was prescribed betamethasone cream to use as needed with improvement. She also has a history of candidal intertrigo, treated with lotrisone with good response. She has a history of basal cell carcinoma, and underwent removal from her face by Dr. Howie Serrano in 9/2020. She also reports that she underwent radiation therapy to her right shoulder (as a child) for treatment of a birthmark. On 7/20/2018, she presented for evaluation to [facility] for a right upper back rash and was diagnosed with herpes zoster. She was treated with Valtrex with improvement.         Past Medical History:   Diagnosis Date    Eczema     Essential hypertension     FH: breast cancer     Fibroids     uterine    Fracture of radius 11/2009    with ulna, left, closed    History of cataract     History of foot fracture     right and left    Hyperlipidemia     Microhematuria     chronic    Osteopenia     Psoriasis     S/P radiation therapy     as child, to right shoulder for birthmark    Tinnitus     Zoster     right scapula     Past Surgical History:   Procedure Laterality Date    CATARACT REMOVAL      COLONOSCOPY COLONOSCOPY N/A 11/17/2020    COLONOSCOPY with polypectomies performed by Shameka Ruth MD at [facility] Right 04/24/2019    right foot 3rd metatarsal fracture, screws and a plate    ORTHOPEDIC SURGERY      left arm    WISDOM TOOTH EXTRACTION      WRIST FRACTURE SURGERY       Current Outpatient Medications   Medication Sig    Amoxicillin 500 MG TABS     aspirin 81 MG EC tablet Take by mouth daily    atorvastatin (LIPITOR) 20 MG tablet TAKE 1 TABLET BY MOUTH DAILY    betamethasone dipropionate 0.05 % cream Apply topically 2 times daily as needed    Biotin 2.5 MG TABS Take 2,500 mcg by mouth    vitamin D 25 MCG (1000 UT) CAPS Take 1 tablet by mouth daily    clotrimazole-betamethasone (LOTRISONE) 1-0.05 % cream Apply topically 2 times daily    coenzyme Q10 100 MG CAPS capsule Take 100 mg by mouth daily    cycloSPORINE (RESTASIS) 0.05 % ophthalmic emulsion Apply 1 drop to eye in the morning and 1 drop in the evening. denosumab (PROLIA) 60 MG/ML SOSY SC injection Inject 60 mg into the skin    docusate (COLACE, DULCOLAX) 100 MG CAPS Take 100 mg by mouth daily as needed    DULoxetine (CYMBALTA) 20 MG extended release capsule Take 20 mg by mouth daily    fexofenadine (ALLEGRA) 60 MG tablet Take 60 mg by mouth daily    hydroCHLOROthiazide (HYDRODIURIL) 25 MG tablet TAKE 1 TABLET BY MOUTH DAILY    lisinopril (PRINIVIL;ZESTRIL) 10 MG tablet TAKE 1 TABLET BY MOUTH DAILY     No current facility-administered medications for this visit. Allergies and Intolerances: Allergies   Allergen Reactions    Povidone-Iodine Hives    Chlorhexidine Gluconate Rash    Codeine Nausea Only and Nausea And Vomiting     Family History:  Her sister has breast cancer and her mother had pancreatic cancer. No history of colon cancer.     Family History   Problem Relation Age of Onset    Breast Cancer Sister 62    Cancer Mother         pancreatic    Mult Sclerosis Sister     Cancer Sister         breast    Heart Disease Father     Heart Disease Mother      Social History:   She  reports that she has never smoked. She has never used smokeless tobacco.   She has about 2 glasses of wine each night. She is  with one adult daughter who lives with her family on [place], [place]. She is retired, and was a 4th- for 25 years. Social History     Substance and Sexual Activity   Alcohol Use Yes    Alcohol/week: 3.0 - 4.0 standard drinks     Immunization History:  Immunization History   Administered Date(s) Administered    COVID-19, MODERNA BLUE border, Primary or Immunocompromised, (age 12y+), IM, 100 mcg/0.5mL 01/28/2021, 02/25/2021, 11/04/2021    Influenza Virus Vaccine 11/01/2011, 11/06/2012, 10/24/2014    Influenza, FLUAD, (age 72 y+), Adjuvanted, 0.5mL 09/16/2020    Influenza, FLUARIX, FLULAVAL, FLUZONE (age 10 mo+) AND AFLURIA, (age 1 y+), PF, 0.5mL 09/25/2021    Influenza, High Dose (Fluzone 65 yrs and older) 12/13/2016, 10/03/2017    Influenza, Triv, inactivated, subunit, adjuvanted, IM (Fluad 65 yrs and older) 10/09/2018, 11/13/2019    Influenza, Trivalent PF 11/05/2013    Pneumococcal Conjugate 13-valent ([id number]) 05/05/2015    Pneumococcal Polysaccharide ([id number]) 05/04/2010    Td vaccine (adult) 05/05/2009    Tdap (Boostrix, Adacel) 12/12/2016    Zoster Live (Zostavax) 05/07/2013    Zoster Recombinant (Shingrix) 09/21/2019, 12/28/2019       Review of Systems:   As above included in HPI. Otherwise 11 point review of systems negative including constitutional, skin, HENT, eyes, respiratory, cardiovascular, gastrointestinal, genitourinary, musculoskeletal, endocrine, hematologic, allergy, and neurologic. Physical:   Vitals:    02/28/23 1122   BP: 122/61   Pulse: 83   Resp: 16   Temp: 97.1 °F (36.2 °C)   TempSrc: Temporal   SpO2: 98%   Weight: 150 lb (68 kg)   Height: 5' 5\" (1.651 m)       Exam:   Patient appears in no apparent distress. Affect is appropriate.     HEENT: ELEN, anicteric, oropharynx clear, no JVD, adenopathy or thyromegaly. No carotid bruits or radiated murmur. Lungs: clear to auscultation, no wheezes, rhonchi, or rales. Heart: regular rate and rhythm. No murmur, rubs, gallops  Abdomen: soft, nontender, nondistended, normal bowel sounds, no hepatosplenomegaly or masses. Back: No tenderness to palpation over spine or paraspinal muscles  Extremities: without edema.         Review of Data:  Labs:  Admission on 02/26/2023, Discharged on 02/26/2023   Component Date Value Ref Range Status    Sodium 02/26/2023 138  136 - 145 mmol/L Final    Potassium 02/26/2023 3.8  3.5 - 5.5 mmol/L Final    Chloride 02/26/2023 105  100 - 111 mmol/L Final    CO2 02/26/2023 27  21 - 32 mmol/L Final    Anion Gap 02/26/2023 6  3.0 - 18 mmol/L Final    Glucose 02/26/2023 99  74 - 99 mg/dL Final    BUN 02/26/2023 18  7.0 - 18 MG/DL Final    Creatinine 02/26/2023 0.67  0.6 - 1.3 MG/DL Final    Bun/Cre Ratio 02/26/2023 27 (A)  12 - 20   Final    Est, Glom Filt Rate 02/26/2023 >60  >60 ml/min/1.73m2 Final    Calcium 02/26/2023 9.1  8.5 - 10.1 MG/DL Final    WBC 02/26/2023 5.0  4.6 - 13.2 K/uL Final    RBC 02/26/2023 4.69  4.20 - 5.30 M/uL Final    Hemoglobin 02/26/2023 13.2  12.0 - 16.0 g/dL Final    Hematocrit 02/26/2023 41.7  35.0 - 45.0 % Final    MCV 02/26/2023 88.9  78.0 - 100.0 FL Final    MCH 02/26/2023 28.1  24.0 - 34.0 PG Final    MCHC 02/26/2023 31.7  31.0 - 37.0 g/dL Final    RDW 02/26/2023 12.7  11.6 - 14.5 % Final    Platelets 24/65/9969 284  135 - 420 K/uL Final    MPV 02/26/2023 9.2  9.2 - 11.8 FL Final    Nucleated RBCs 02/26/2023 0.0  0  WBC Final    nRBC 02/26/2023 0.00  0.00 - 0.01 K/uL Final    Seg Neutrophils 02/26/2023 62  40 - 73 % Final    Lymphocytes 02/26/2023 25  21 - 52 % Final    Monocytes 02/26/2023 9  3 - 10 % Final    Eosinophils % 02/26/2023 3  0 - 5 % Final    Basophils 02/26/2023 1  0 - 2 % Final    Immature Granulocytes 02/26/2023 0  0.0 - 0.5 % Final    Segs Absolute 02/26/2023 3.1  1.8 - 8.0 K/UL Final    Absolute Lymph # 02/26/2023 1.3  0.9 - 3.6 K/UL Final    Absolute Mono # 02/26/2023 0.5  0.05 - 1.2 K/UL Final    Absolute Eos # 02/26/2023 0.2  0.0 - 0.4 K/UL Final    Basophils Absolute 02/26/2023 0.1  0.0 - 0.1 K/UL Final    Absolute Immature Granulocyte 02/26/2023 0.0  0.00 - 0.04 K/UL Final    Differential Type 02/26/2023 AUTOMATED    Final    Troponin, High Sensitivity 02/26/2023 5  0 - 54 ng/L Final    Ventricular Rate 02/26/2023 75  BPM Final    Atrial Rate 02/26/2023 75  BPM Final    P-R Interval 02/26/2023 144  ms Final    QRS Duration 02/26/2023 80  ms Final    Q-T Interval 02/26/2023 374  ms Final    QTc Calculation (Bazett) 02/26/2023 417  ms Final    P Axis 02/26/2023 53  degrees Final    R Axis 02/26/2023 -58  degrees Final    T [place] 02/26/2023 48  degrees Final    Diagnosis 02/26/2023 Normal sinus rhythm   Final     CT Result (most recent):  CTA CHEST W WO CONTRAST 02/26/2023    Narrative  INDICATION:  Cough, chest pain, pleuritic    COMPARISON: Plain radiograph 3/22    TECHNIQUE:  Precontrast  images were obtained to localize the volume for acquisition. Multislice helical CT arteriography was performed from the diaphragm to the  thoracic inlet during uneventful rapid bolus intravenous administration. Lung  and soft tissue windows were generated. 3-D MIP Post processing was performed  and coronal reformatted images were also generated. All CT scans at this  facility are performed using doze optimization technique as appropriate to a  performed exam, to include automated exposure control, adjustment of the mA  and/or KV according to patient size (including appropriate matching for site  specific examinations), or use of iterative reconstruction technique. FINDINGS:    No confluent consolidation. Minimal dependent change. No pneumothorax. No  suspicious nodule. The pulmonary arteries are well enhanced and no pulmonary emboli are identified.     There is no mediastinal or hilar adenopathy or mass. The aorta enhances normally  without evidence of aneurysm or dissection. Heterogeneous appearing thyroid gland limiting evaluation by overlying artifact. Low attenuating nodules cannot be excluded. If warranted nonemergent outpatient  ultrasound may be helpful for correlation. The visualized portions of the upper abdominal organs demonstrate no acute  process. Moderate to large hiatal hernia. No acute osseous abnormality. Impression  . No pulmonary embolism. No confluent consolidation. Health Maintenance:  Screening:               Mammogram: negative (8/2022)              PAP smear: S/P SARA              Colorectal: colonoscopy (11/2020) tubular adenomas. Dr. Sendy Walsh Due 11/2023. Depression: none              DM (HbA1c/FPG): FPG 87/ HbA1c 5.6 (1/2023)              Hepatitis C: negative (5/2016)              Falls: none              DEXA: osteopenia (1/2022). On Prolia. Dr. Chas Galvin. Glaucoma: regular eye exams with Dr. Dave Tineo (last 1/2021)              Smoking: none              Vitamin D: 46.2 (1/2023)              Medicare Wellness: 6/30/2022        Impression:  Patient Active Problem List   Diagnosis    FH: breast cancer    Impaired fasting glucose    History of nonmelanoma skin cancer    Microhematuria    S/P radiation therapy    Osteopenia    Overweight with body mass index (BMI) 25.0-29.9    Osteoporosis of lumbar spine    Hyperlipidemia    Carpal tunnel syndrome, bilateral    Tinnitus    Prediabetes    Essential hypertension    Primary osteoarthritis of right knee    Psoriasis    Baker's cyst, unruptured, right    Chronic pain of right knee       Plan:  Mid back pain, subsequent encounter. Presented to [facility] ED for a 4-day history of left-sided mid back pain exacerbated by movement and coughing.   Patient reported assisting with transporting her mother-in-law and lifting a transport chair prior to the onset of her pain. ED evaluation with unremarkable labs, stable unchanged EKG, and chest CTA without evidence of PE, consolidation, pneumothorax, adenopathy, or aortic dissection. Treated with IV ketorolac 15 mg with some improvement, but reports essential resolution of pain today and likely secondary to back strain. Has been attempting to perform stretches and encouraged to continue to prevent recurrence. Advised to call for any recurrent symptoms and will consider referral to physical therapy. Cough secondary to allergies. Reports onset of cough 1 week ago with nasal congestion, postnasal drainage, and clear nasal discharge. Caused exacerbation of mid back pain. Feeling well without fever and COVID-19 rapid antigen testing negative. Likely secondary to allergies and taking generic antihistamine. Advised to begin Flonase and change to Allegra which has been effective in the past.  Advised to call for lack of response or any worsening symptoms. Hiatal hernia, moderate-large. Incidental finding of moderate-large hiatal hernia on chest CTA. Patient denies any symptoms of reflux or epigastric discomfort with eating. Will continue to monitor. Chronic issues:  1. Hypertension. Remains well controlled on current regimen of lisinopril 10 mg daily and hydrochlorothiazide 25 mg daily. Renal function remains normal with creatinine 0.67/ eGFR >60 in ED. Advised to maintain good fluid intake. No evidence of hypokalemia in the ED. Will continue to follow. 3. Hyperlipidemia. On moderate intensity dose atorvastatin (20 mg) with LDL 97 and HDL 75 (1/2023), indicative of good control. Emphasized importance of lifestyle modifications, including heart healthy diet, exercise, and weight loss. Will continue to follow. 3. Prediabetes. Normal fasting glucose at 87 in 1/2023 with normalization of HbA1c to 5.6. No evidence of microvascular complications. Continue regular eye exams with Dr. Nvuia Carvajal. Emphasized importance of lifestyle modifications, including heart healthy low carbohydrate diet, regular exercise, and weight loss. Will continue to monitor. 4. Osteoporosis. History of radial and toe fracture in 2009. Treated from 11/2009 to 11/2014 with alendronate. Repeat bone density scan in 7/2016 with 10 year risk of a major osteoporetic fracture at 14 % and hip fracture at 3.4 %. Restarted Fosamax in 12/2016. New right metatarsal displaced fracture without known injury in 6/2019. Repeat bone density on 8/6/2019 showed T-scores:  femoral neck left -2.2  /right -1.9 and lumbar -2.6 . Calculated FRAX score estimated her 10 year risk of a major osteoporetic fracture at 14 % and hip fracture at 3.9 %. Comparison to her prior study in 7/2016 showed stability in her bilateral hips, but some worsening in her lumbar spine. Obtained lab evaluation for secondary osteoporosis, and TSH, intact PTH, calcium, vitamin D level, and gammopathy panel normal. Given recent fracture and some worsening of bone density scan despite treatment with biphosphonate, referred to Dr. Fish Rae in 11/2019 and recommendation was that she change to Prolia. Underwent repeat bone density study in 1/2022 which showed overall improvement, particularly in lumbar T score. Continues on Prolia every 6 months and following with Dr. Fish Rae with last visit in 1/2023. Continue calcium and Vitamin D supplements, and encouraged her to exercise, particularly weight bearing activities. Fall precautions stressed. 5. Osteoarthritis, right knee with pain. Presented in 5/2022 with significant right knee pain with ambulation and when lying on her side at night. Pain localized to medial aspect of knee with mild swelling and tenderness. Underwent right knee x-ray (5/3/2022) which showed mild tricompartmental joint space narrowing most prominent at the patellofemoral compartment with trace joint effusion and edema at the infrapatellar fat pad.   She underwent evaluation by Dr. Leopoldo Pringle on 5/5/2022 and had aspiration and cortisone injection without significant improvement. She reported continuing to experience pain with ambulation and now using Tylenol ES twice daily, Voltaren gel, and Cymbalta 20 mg daily with significant relief. New posterior knee swelling with tightness in 10/2022 likely consistent with Baker's cyst and had follow-up visit with Dr. Leopoldo Pringle on 10/13/2022 confirming findings. Recommendation was to proceed with Euflexxa series of injections but decided not to proceed since noted significant improvement after starting on Cymbalta. Will continue to monitor. 6. Hepatic steatosis with history of elevated transaminases. New onset mild elevation noted in 8/2019. Patient reported that she had been taking NSAIDS frequently to help control foot pain. Advised to discontinue and repeated in 9/2019 with normalization. Lab evaluation, including AMA, actin, hepatitis profile, CK, aldolase, celiac panel, ESR, CRP, and BORIS, were negative, but did identify mildly low iron stores. Repeat 1/2020 again showed mild elevation of transaminases with otherwise normal LFT's. Reported had been taking Excedrin and did admit to 2 glasses of wine each night. Advised to discontinue both. Abdominal ultrasound (1/2020) showed mildly increased echogenicity of liver consistent with hepatic steatosis, but otherwise normal.  Subsequent normalization and has remained normal since. Transaminases remain normal today. Advised to minimize alcohol and NSAIDS. Also, emphasized importance of lifestyle modifications, including heart healthy diet and regular exercise. 7. History of iron deficiency without anemia. Noted in 9/2019 and may have been related to foot surgery. Improved with supplementation. Colonoscopy completed (11/2020) and 10 polyps removed with majority of polyps tubular adenomas. Repeat in 3 years recommended (due 11/2023).    8. Family history of breast cancer. Patient with family history of breast cancer in first degree relative (sister). Discussed recommendations for breast cancer screening in women with lifetime risk >20%. 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 No other family member with breast cancer. Wishing to continue with annual mammograms and breast exams. 9. Chronic microhematuria. Work-up reportedly negative in past.  Will continue to monitor. 10. Psoriasis. Now well controlled on betamethasone cream for flares and Eucerin cream daily. Being followed by Dr. Su Green as needed. 11. History of nonmelanoma skin cancer. Had [address] removed from face in 9/2020. Another biopsy on right cheek benign actinic keratosis. Had follow-up with Dr. Su Green for her regular skin exam in 6/2022 and advised to follow-up as needed. 12. Elevated TSH. TSH mildly elevated in 6/2021 with normal free T4. Repeat normalized in 1/2023. Patient with history of radiation therapy as a child for a birthmark on her right shoulder. Will continue to monitor. 13. Anxiety. Patient had reported increased anxiety in 3/2022 as she tried to care for sick family members and a close friend with terminal illness. Underwent evaluation in ED on 3/19/2022 for chest pain negative and pain considered atypical for cardiac etiology and determined to be likely related to anxiety. Prescribed Zoloft 25 mg daily, but did not begin. Reported that her anxiety had improved following the passing of her close friend. Now on Cymbalta 20 mg for pain control and feels that it may also be helpful in managing her anxiety. Will continue to monitor. 14. Overweight. Weight remaining overall stable since last visit with normal BMI at 24.96 today. Emphasized importance of continued attempts at lifestyle modifications including heart healthy diet and regular exercise. Will continue to follow. 15. Health maintenance.   Completed Moderna COVID-19 vaccine series and received two Moderna booster doses and the updated bivalent booster dose. Received 2/2 doses of Shingrix vaccine. Other immunizations up to date. Mammogram up to date. Colonoscopy up-to-date. Will be due for repeat in 11/2023 and will place referral at next visit if not scheduled. Continue regular eye exams with Dr. Teri Del Valle. Vitamin D level has been normal on maintenance dose supplement. Medicare wellness visit up-to-date. Patient understands recommendations and agrees with plan. Follow-up as previously scheduled. Time spent in preparing for the visit, including review of history, tests done prior to arrival, additional time reviewing clinical data, imaging, outside records and test results:  5  minutes. Time spent in counseling with patient and/or family members regarding care plan: 25 minutes. Time spent on documentation, ordering tests, treatments, and referring patient for further care: 15 minutes. Debbie Briggs

## 2023-03-01 PROBLEM — K44.9 HIATAL HERNIA: Status: ACTIVE | Noted: 2023-03-01

## 2023-05-05 RX ORDER — HYDROCHLOROTHIAZIDE 25 MG/1
TABLET ORAL
Qty: 90 TABLET | Refills: 0 | Status: SHIPPED | OUTPATIENT
Start: 2023-05-05

## 2023-05-26 RX ORDER — LISINOPRIL 10 MG/1
TABLET ORAL
Qty: 90 TABLET | Refills: 3 | Status: SHIPPED | OUTPATIENT
Start: 2023-05-26

## 2023-07-10 RX ORDER — ATORVASTATIN CALCIUM 20 MG/1
TABLET, FILM COATED ORAL
Qty: 90 TABLET | Refills: 3 | Status: SHIPPED | OUTPATIENT
Start: 2023-07-10

## 2023-07-10 NOTE — TELEPHONE ENCOUNTER
Please refill if appropriate or refuse medication if not appropriate.     PCP: Angelique Wall MD     Last appt: 2/28/23    Last refill: 10/4/22      Future Appointments   Date Time Provider [address] [place] [address]   7/12/2023  8:35 AM IO LAB VISIT [facility] BS AMB   7/18/2023  8:30 AM Angelique Wall MD [facility] BS AMB         Requested Prescriptions     Pending Prescriptions Disp Refills    atorvastatin (LIPITOR) 20 MG tablet [Pharmacy Med Name: ATORVASTATIN 20MG TABLETS] 90 tablet      Sig: TAKE 1 TABLET BY MOUTH DAILY

## 2023-07-12 ENCOUNTER — HOSPITAL ENCOUNTER (OUTPATIENT)
Facility: HOSPITAL | Age: 78
Setting detail: SPECIMEN
Discharge: HOME | End: 2023-07-15
Payer: MEDICARE

## 2023-07-12 DIAGNOSIS — R73.03 PREDIABETES: ICD-10-CM

## 2023-07-12 DIAGNOSIS — I10 ESSENTIAL HYPERTENSION: ICD-10-CM

## 2023-07-12 DIAGNOSIS — R79.89 ELEVATED TSH: ICD-10-CM

## 2023-07-12 DIAGNOSIS — E78.5 HYPERLIPIDEMIA, UNSPECIFIED HYPERLIPIDEMIA TYPE: ICD-10-CM

## 2023-07-12 LAB
ALBUMIN SERPL-MCNC: 4 G/DL (ref 3.4–5)
ALBUMIN/GLOB SERPL: 1.3 (ref 0.8–1.7)
ALP SERPL-CCNC: 47 U/L (ref 45–117)
ALT SERPL-CCNC: 23 U/L (ref 13–56)
ANION GAP SERPL CALC-SCNC: 6 MMOL/L (ref 3–18)
AST SERPL-CCNC: 18 U/L (ref 10–38)
BASOPHILS # BLD: 0 K/UL (ref 0–0.1)
BASOPHILS NFR BLD: 1 % (ref 0–2)
BILIRUB SERPL-MCNC: 0.6 MG/DL (ref 0.2–1)
BUN SERPL-MCNC: 20 MG/DL (ref 7–18)
BUN/CREAT SERPL: 29 (ref 12–20)
CALCIUM SERPL-MCNC: 9.3 MG/DL (ref 8.5–10.1)
CHLORIDE SERPL-SCNC: 105 MMOL/L (ref 100–111)
CHOLEST SERPL-MCNC: 157 MG/DL
CO2 SERPL-SCNC: 27 MMOL/L (ref 21–32)
CREAT SERPL-MCNC: 0.7 MG/DL (ref 0.6–1.3)
DIFFERENTIAL METHOD BLD: ABNORMAL
EOSINOPHIL # BLD: 0.2 K/UL (ref 0–0.4)
EOSINOPHIL NFR BLD: 4 % (ref 0–5)
ERYTHROCYTE [DISTWIDTH] IN BLOOD BY AUTOMATED COUNT: 13.2 % (ref 11.6–14.5)
EST. AVERAGE GLUCOSE BLD GHB EST-MCNC: 114 MG/DL
GLOBULIN SER CALC-MCNC: 3.2 G/DL (ref 2–4)
GLUCOSE SERPL-MCNC: 92 MG/DL (ref 74–99)
HBA1C MFR BLD: 5.6 % (ref 4.2–5.6)
HCT VFR BLD AUTO: 41.7 % (ref 35–45)
HDLC SERPL-MCNC: 80 MG/DL (ref 40–60)
HDLC SERPL: 2 (ref 0–5)
HGB BLD-MCNC: 12.9 G/DL (ref 12–16)
IMM GRANULOCYTES # BLD AUTO: 0 K/UL (ref 0–0.04)
IMM GRANULOCYTES NFR BLD AUTO: 0 % (ref 0–0.5)
LDLC SERPL CALC-MCNC: 64.2 MG/DL (ref 0–100)
LIPID PANEL: ABNORMAL
LYMPHOCYTES # BLD: 1.4 K/UL (ref 0.9–3.6)
LYMPHOCYTES NFR BLD: 25 % (ref 21–52)
MAGNESIUM SERPL-MCNC: 1.9 MG/DL (ref 1.6–2.6)
MCH RBC QN AUTO: 27.9 PG (ref 24–34)
MCHC RBC AUTO-ENTMCNC: 30.9 G/DL (ref 31–37)
MCV RBC AUTO: 90.3 FL (ref 78–100)
MONOCYTES # BLD: 0.6 K/UL (ref 0.05–1.2)
MONOCYTES NFR BLD: 10 % (ref 3–10)
NEUTS SEG # BLD: 3.4 K/UL (ref 1.8–8)
NEUTS SEG NFR BLD: 61 % (ref 40–73)
NRBC # BLD: 0 K/UL (ref 0–0.01)
NRBC BLD-RTO: 0 PER 100 WBC
PLATELET # BLD AUTO: 270 K/UL (ref 135–420)
PMV BLD AUTO: 10.1 FL (ref 9.2–11.8)
POTASSIUM SERPL-SCNC: 3.9 MMOL/L (ref 3.5–5.5)
PROT SERPL-MCNC: 7.2 G/DL (ref 6.4–8.2)
RBC # BLD AUTO: 4.62 M/UL (ref 4.2–5.3)
SODIUM SERPL-SCNC: 138 MMOL/L (ref 136–145)
T4 FREE SERPL-MCNC: 1.1 NG/DL (ref 0.7–1.5)
TRIGL SERPL-MCNC: 64 MG/DL
VLDLC SERPL CALC-MCNC: 12.8 MG/DL
WBC # BLD AUTO: 5.6 K/UL (ref 4.6–13.2)

## 2023-07-12 PROCEDURE — 80053 COMPREHEN METABOLIC PANEL: CPT

## 2023-07-12 PROCEDURE — 84439 ASSAY OF FREE THYROXINE: CPT

## 2023-07-12 PROCEDURE — 83036 HEMOGLOBIN GLYCOSYLATED A1C: CPT

## 2023-07-12 PROCEDURE — 83735 ASSAY OF MAGNESIUM: CPT

## 2023-07-12 PROCEDURE — 80061 LIPID PANEL: CPT

## 2023-07-12 PROCEDURE — 85025 COMPLETE CBC W/AUTO DIFF WBC: CPT

## 2023-07-12 PROCEDURE — 36415 COLL VENOUS BLD VENIPUNCTURE: CPT

## 2023-07-15 SDOH — ECONOMIC STABILITY: INCOME INSECURITY: HOW HARD IS IT FOR YOU TO PAY FOR THE VERY BASICS LIKE FOOD, HOUSING, MEDICAL CARE, AND HEATING?: NOT HARD AT ALL

## 2023-07-15 SDOH — ECONOMIC STABILITY: FOOD INSECURITY: WITHIN THE PAST 12 MONTHS, THE FOOD YOU BOUGHT JUST DIDN'T LAST AND YOU DIDN'T HAVE MONEY TO GET MORE.: NEVER TRUE

## 2023-07-15 SDOH — ECONOMIC STABILITY: TRANSPORTATION INSECURITY
IN THE PAST 12 MONTHS, HAS LACK OF TRANSPORTATION KEPT YOU FROM MEETINGS, WORK, OR FROM GETTING THINGS NEEDED FOR DAILY LIVING?: NO

## 2023-07-15 SDOH — HEALTH STABILITY: PHYSICAL HEALTH: ON AVERAGE, HOW MANY DAYS PER WEEK DO YOU ENGAGE IN MODERATE TO STRENUOUS EXERCISE (LIKE A BRISK WALK)?: 3 DAYS

## 2023-07-15 SDOH — ECONOMIC STABILITY: FOOD INSECURITY: WITHIN THE PAST 12 MONTHS, YOU WORRIED THAT YOUR FOOD WOULD RUN OUT BEFORE YOU GOT MONEY TO BUY MORE.: NEVER TRUE

## 2023-07-15 SDOH — ECONOMIC STABILITY: HOUSING INSECURITY
IN THE LAST 12 MONTHS, WAS THERE A TIME WHEN YOU DID NOT HAVE A STEADY PLACE TO SLEEP OR SLEPT IN A SHELTER (INCLUDING NOW)?: NO

## 2023-07-15 SDOH — HEALTH STABILITY: PHYSICAL HEALTH: ON AVERAGE, HOW MANY MINUTES DO YOU ENGAGE IN EXERCISE AT THIS LEVEL?: 30 MIN

## 2023-07-15 ASSESSMENT — LIFESTYLE VARIABLES
HOW OFTEN DURING THE LAST YEAR HAVE YOU FAILED TO DO WHAT WAS NORMALLY EXPECTED FROM YOU BECAUSE OF DRINKING: NEVER
HAVE YOU OR SOMEONE ELSE BEEN INJURED AS A RESULT OF YOUR DRINKING: NO
HOW OFTEN DURING THE LAST YEAR HAVE YOU FOUND THAT YOU WERE NOT ABLE TO STOP DRINKING ONCE YOU HAD STARTED: NEVER
HAS A RELATIVE, FRIEND, DOCTOR, OR ANOTHER HEALTH PROFESSIONAL EXPRESSED CONCERN ABOUT YOUR DRINKING OR SUGGESTED YOU CUT DOWN: NO
HAS A RELATIVE, FRIEND, DOCTOR, OR ANOTHER HEALTH PROFESSIONAL EXPRESSED CONCERN ABOUT YOUR DRINKING OR SUGGESTED YOU CUT DOWN: 0
HOW OFTEN DURING THE LAST YEAR HAVE YOU HAD A FEELING OF GUILT OR REMORSE AFTER DRINKING: 0
HOW MANY STANDARD DRINKS CONTAINING ALCOHOL DO YOU HAVE ON A TYPICAL DAY: 1 OR 2
HOW OFTEN DO YOU HAVE SIX OR MORE DRINKS ON ONE OCCASION: 1
HOW OFTEN DURING THE LAST YEAR HAVE YOU NEEDED AN ALCOHOLIC DRINK FIRST THING IN THE MORNING TO GET YOURSELF GOING AFTER A NIGHT OF HEAVY DRINKING: NEVER
HOW OFTEN DURING THE LAST YEAR HAVE YOU BEEN UNABLE TO REMEMBER WHAT HAPPENED THE NIGHT BEFORE BECAUSE YOU HAD BEEN DRINKING: NEVER
HOW OFTEN DURING THE LAST YEAR HAVE YOU BEEN UNABLE TO REMEMBER WHAT HAPPENED THE NIGHT BEFORE BECAUSE YOU HAD BEEN DRINKING: 0
HAVE YOU OR SOMEONE ELSE BEEN INJURED AS A RESULT OF YOUR DRINKING: 0
HOW OFTEN DURING THE LAST YEAR HAVE YOU FAILED TO DO WHAT WAS NORMALLY EXPECTED FROM YOU BECAUSE OF DRINKING: 0
HOW OFTEN DURING THE LAST YEAR HAVE YOU FOUND THAT YOU WERE NOT ABLE TO STOP DRINKING ONCE YOU HAD STARTED: 0
HOW MANY STANDARD DRINKS CONTAINING ALCOHOL DO YOU HAVE ON A TYPICAL DAY: 1
HOW OFTEN DURING THE LAST YEAR HAVE YOU NEEDED AN ALCOHOLIC DRINK FIRST THING IN THE MORNING TO GET YOURSELF GOING AFTER A NIGHT OF HEAVY DRINKING: 0
HOW OFTEN DO YOU HAVE A DRINK CONTAINING ALCOHOL: 5
HOW OFTEN DO YOU HAVE A DRINK CONTAINING ALCOHOL: 4 OR MORE TIMES A WEEK
HOW OFTEN DURING THE LAST YEAR HAVE YOU HAD A FEELING OF GUILT OR REMORSE AFTER DRINKING: NEVER

## 2023-07-15 ASSESSMENT — PATIENT HEALTH QUESTIONNAIRE - PHQ9
SUM OF ALL RESPONSES TO PHQ9 QUESTIONS 1 & 2: 0
SUM OF ALL RESPONSES TO PHQ QUESTIONS 1-9: 0
SUM OF ALL RESPONSES TO PHQ QUESTIONS 1-9: 0
2. FEELING DOWN, DEPRESSED OR HOPELESS: 0
SUM OF ALL RESPONSES TO PHQ QUESTIONS 1-9: 0
1. LITTLE INTEREST OR PLEASURE IN DOING THINGS: 0
SUM OF ALL RESPONSES TO PHQ QUESTIONS 1-9: 0

## 2023-07-18 ENCOUNTER — OFFICE VISIT (OUTPATIENT)
Age: 78
End: 2023-07-18
Payer: MEDICARE

## 2023-07-18 VITALS
HEART RATE: 88 BPM | WEIGHT: 150.8 LBS | DIASTOLIC BLOOD PRESSURE: 72 MMHG | BODY MASS INDEX: 25.12 KG/M2 | RESPIRATION RATE: 16 BRPM | OXYGEN SATURATION: 96 % | TEMPERATURE: 97 F | HEIGHT: 65 IN | SYSTOLIC BLOOD PRESSURE: 128 MMHG

## 2023-07-18 DIAGNOSIS — K44.9 HIATAL HERNIA: ICD-10-CM

## 2023-07-18 DIAGNOSIS — D36.9 MULTIPLE ADENOMATOUS POLYPS: ICD-10-CM

## 2023-07-18 DIAGNOSIS — Z12.11 COLON CANCER SCREENING: ICD-10-CM

## 2023-07-18 DIAGNOSIS — Z12.31 SCREENING MAMMOGRAM FOR BREAST CANCER: ICD-10-CM

## 2023-07-18 DIAGNOSIS — J30.2 SEASONAL ALLERGIC RHINITIS, UNSPECIFIED TRIGGER: ICD-10-CM

## 2023-07-18 DIAGNOSIS — I10 ESSENTIAL HYPERTENSION: Primary | ICD-10-CM

## 2023-07-18 DIAGNOSIS — Z71.89 ACP (ADVANCE CARE PLANNING): ICD-10-CM

## 2023-07-18 DIAGNOSIS — M81.0 AGE-RELATED OSTEOPOROSIS WITHOUT CURRENT PATHOLOGICAL FRACTURE: ICD-10-CM

## 2023-07-18 DIAGNOSIS — L40.3 PUSTULAR PSORIASIS OF PALMS AND SOLES: ICD-10-CM

## 2023-07-18 DIAGNOSIS — E66.3 OVERWEIGHT WITH BODY MASS INDEX (BMI) 25.0-29.9: ICD-10-CM

## 2023-07-18 DIAGNOSIS — R73.03 PREDIABETES: ICD-10-CM

## 2023-07-18 DIAGNOSIS — Z00.00 MEDICARE ANNUAL WELLNESS VISIT, SUBSEQUENT: ICD-10-CM

## 2023-07-18 DIAGNOSIS — M17.11 PRIMARY OSTEOARTHRITIS OF RIGHT KNEE: ICD-10-CM

## 2023-07-18 DIAGNOSIS — E87.6 HYPOKALEMIA: ICD-10-CM

## 2023-07-18 DIAGNOSIS — E78.00 PURE HYPERCHOLESTEROLEMIA: ICD-10-CM

## 2023-07-18 PROCEDURE — 1036F TOBACCO NON-USER: CPT | Performed by: INTERNAL MEDICINE

## 2023-07-18 PROCEDURE — G0439 PPPS, SUBSEQ VISIT: HCPCS | Performed by: INTERNAL MEDICINE

## 2023-07-18 PROCEDURE — 99211 OFF/OP EST MAY X REQ PHY/QHP: CPT | Performed by: INTERNAL MEDICINE

## 2023-07-18 PROCEDURE — 3078F DIAST BP <80 MM HG: CPT | Performed by: INTERNAL MEDICINE

## 2023-07-18 PROCEDURE — 1123F ACP DISCUSS/DSCN MKR DOCD: CPT | Performed by: INTERNAL MEDICINE

## 2023-07-18 PROCEDURE — 99497 ADVNCD CARE PLAN 30 MIN: CPT | Performed by: INTERNAL MEDICINE

## 2023-07-18 PROCEDURE — 1090F PRES/ABSN URINE INCON ASSESS: CPT | Performed by: INTERNAL MEDICINE

## 2023-07-18 PROCEDURE — 99214 OFFICE O/P EST MOD 30 MIN: CPT | Performed by: INTERNAL MEDICINE

## 2023-07-18 PROCEDURE — G8419 CALC BMI OUT NRM PARAM NOF/U: HCPCS | Performed by: INTERNAL MEDICINE

## 2023-07-18 PROCEDURE — 3074F SYST BP LT 130 MM HG: CPT | Performed by: INTERNAL MEDICINE

## 2023-07-18 PROCEDURE — G8399 PT W/DXA RESULTS DOCUMENT: HCPCS | Performed by: INTERNAL MEDICINE

## 2023-07-18 PROCEDURE — G8427 DOCREV CUR MEDS BY ELIG CLIN: HCPCS | Performed by: INTERNAL MEDICINE

## 2023-07-18 NOTE — PROGRESS NOTES
Bailey Foster presents today for   Chief Complaint   Patient presents with    Medicare AWV                 1. \"Have you been to the ER, urgent care clinic since your last visit? Hospitalized since your last visit? \" no    2. \"Have you seen or consulted any other health care providers outside of the [address] [place] [address] since your last visit? \" no     3. For patients aged 43-73: Has the patient had a colonoscopy / FIT/ Cologuard? NA - based on age      If the patient is female:    4. For patients aged 43-66: Has the patient had a mammogram within the past 2 years? NA - based on age or sex      11. For patients aged 21-65: Has the patient had a pap smear?  NA - based on age or sex

## 2023-07-18 NOTE — PATIENT INSTRUCTIONS
health benefits. Some of the tests and screenings are paid in full while other may be subject to a deductible, co-insurance, and/or copay. Some of these benefits include a comprehensive review of your medical history including lifestyle, illnesses that may run in your family, and various assessments and screenings as appropriate. After reviewing your medical record and screening and assessments performed today your provider may have ordered immunizations, labs, imaging, and/or referrals for you. A list of these orders (if applicable) as well as your Preventive Care list are included within your After Visit Summary for your review. Other Preventive Recommendations:    A preventive eye exam performed by an eye specialist is recommended every 1-2 years to screen for glaucoma; cataracts, macular degeneration, and other eye disorders. A preventive dental visit is recommended every 6 months. Try to get at least 150 minutes of exercise per week or 10,000 steps per day on a pedometer . Order or download the FREE \"Exercise & Physical Activity: Your Everyday Guide\" from [employer]. Call 8-190.921.9095 or search [employer] online. You need 1259-2476 mg of calcium and 5206-3292 IU of vitamin D per day. It is possible to meet your calcium requirement with diet alone, but a vitamin D supplement is usually necessary to meet this goal.  When exposed to the sun, use a sunscreen that protects against both UVA and UVB radiation with an SPF of 30 or greater. Reapply every 2 to 3 hours or after sweating, drying off with a towel, or swimming. Always wear a seat belt when traveling in a car. Always wear a helmet when riding a bicycle or motorcycle.

## 2023-07-18 NOTE — PROGRESS NOTES
HPI:   Camelia Nieves is a 68y.o. year old female who presents today for a routine visit. She has a history of hypertension, hyperlipidemia, impaired fasting glucose, osteoporosis, left sciatica, and chronic microhematuria. She has completed the Moderna COVID-19 vaccine series and received two Moderna booster doses and the updated bivalent booster dose. She reports that she is doing reasonably well. She discusses that she has been experiencing increased stress at home due to a porch renovation. She states that it has created increased dust and disorganization, but is hopeful that the project is near completion. She reports that she has been having increasing difficulty with bilateral foot rash and has been diagnosed with pustular psoriasis at a follow-up visit with Dr. Steve Pritchett. She is currently being managed with betamethasone cream and UV light treatments. Unfortunately, her last UV light treatment resulted in multiple blisters on her feet but these have since healed. She states that treatment with a biologic agent has been discussed, but she is concerned regarding side effects and currently does not wish to proceed. She reports ongoing difficulty with postnasal drainage and intermittent nonproductive cough despite use of Allegra and Flonase. She states that the cough is not daily but is bothersome at times. She denies any difficulty with reflux or esophageal/epigastric burning. She reports that her left-sided mid back pain has completely resolved. She continues to have difficulty with intermittent right knee pain, but states that it is generally well controlled with use of Cymbalta and Voltaren gel. She does describe intermittent left ear pain which worsens after showering. She denies any ear discharge or decreased hearing. She also reports noting a vaginal bulge after urinating, but denies any pelvic pain.   She does report ongoing difficulty with urinary leakage, but states that this is not

## 2023-07-18 NOTE — ACP (ADVANCE CARE PLANNING)
Advance Care Planning     Advance Care Planning (ACP) Physician/NP/PA Conversation    Date of Conversation: 7/18/2023  Conducted with: Patient with Decision Making Capacity    Healthcare Decision Maker:      Primary Decision Maker: Nydia Bernal - [phone]    Secondary Decision Maker: Apolonia Christian - Aureliano - [phone]    Click here to complete Healthcare Decision Makers including selection of the Healthcare Decision Maker Relationship (ie \"Primary\")  Today we termed healthcare decision-makers as her  and son. Care Preferences:    Hospitalization: \"If your health worsens and it becomes clear that your chance of recovery is unlikely, what would be your preference regarding hospitalization? \"  The patient would prefer hospitalization. Ventilation: \"If you were unable to breath on your own and your chance of recovery was unlikely, what would be your preference about the use of a ventilator (breathing machine) if it was available to you? \"  The patient would desire the use of a ventilator. Resuscitation: \"In the event your heart stopped as a result of an underlying serious health condition, would you want attempts made to restart your heart, or would you prefer a natural death? \"  Yes, attempt to resuscitate. treatment goals, benefit/burden of treatment options, ventilation preferences, hospitalization preferences, resuscitation preferences, and end of life care preferences (vegetative state/imminent death). Patient states that she would like all interventions initially, but does not wish prolonged attempts if no hope of meaningful recovery.     Conversation Outcomes / Follow-Up Plan:  ACP complete - no further action today  Reviewed DNR/DNI and patient elects Full Code (Attempt Resuscitation)    Length of Voluntary ACP Conversation in minutes:  16 minutes    Ancelmo Zee MD

## 2023-07-21 PROBLEM — J30.9 ALLERGIC RHINITIS: Status: ACTIVE | Noted: 2023-07-21

## 2023-07-21 PROBLEM — M81.0 AGE RELATED OSTEOPOROSIS: Status: ACTIVE | Noted: 2019-08-12

## 2023-08-01 RX ORDER — HYDROCHLOROTHIAZIDE 25 MG/1
TABLET ORAL
Qty: 90 TABLET | Refills: 3 | Status: SHIPPED | OUTPATIENT
Start: 2023-08-01

## 2023-08-01 NOTE — TELEPHONE ENCOUNTER
PCP: Mary Bergman MD    hydroCHLOROthiazide (HYDRODIURIL) 25 MG tablet  Edit       Summary: TAKE 1 TABLET BY MOUTH DAILY, Disp-90 tablet, R-0  Normal   Start: 5/5/2023  Ord/Sold: 5/5/2023 (O)            Future Appointments   Date Time Provider [address]   1/16/2024  8:25 AM IO LAB VISIT [facility] BS AMB   1/23/2024  8:30 AM Mary Bergman MD [facility] BS AMB

## 2023-08-30 ENCOUNTER — TRANSCRIBE ORDERS (OUTPATIENT)
Facility: HOSPITAL | Age: 78
End: 2023-08-30

## 2023-08-30 DIAGNOSIS — Z12.31 SCREENING MAMMOGRAM FOR BREAST CANCER: Primary | ICD-10-CM

## 2023-10-04 ENCOUNTER — HOSPITAL ENCOUNTER (OUTPATIENT)
Facility: HOSPITAL | Age: 78
Discharge: HOME | End: 2023-10-07
Attending: INTERNAL MEDICINE
Payer: MEDICARE

## 2023-10-04 VITALS — HEIGHT: 65 IN | WEIGHT: 150 LBS | BODY MASS INDEX: 24.99 KG/M2

## 2023-10-04 DIAGNOSIS — Z12.31 SCREENING MAMMOGRAM FOR BREAST CANCER: ICD-10-CM

## 2023-10-04 PROCEDURE — 77063 BREAST TOMOSYNTHESIS BI: CPT

## 2023-10-25 ENCOUNTER — OFFICE VISIT (OUTPATIENT)
Age: 78
End: 2023-10-25
Payer: MEDICARE

## 2023-10-25 VITALS
SYSTOLIC BLOOD PRESSURE: 128 MMHG | OXYGEN SATURATION: 98 % | WEIGHT: 150 LBS | BODY MASS INDEX: 24.99 KG/M2 | RESPIRATION RATE: 16 BRPM | DIASTOLIC BLOOD PRESSURE: 78 MMHG | HEIGHT: 65 IN | HEART RATE: 83 BPM | TEMPERATURE: 98.5 F

## 2023-10-25 DIAGNOSIS — M19.041 INFLAMMATION OF JOINT OF FINGER OF RIGHT HAND: ICD-10-CM

## 2023-10-25 DIAGNOSIS — L40.3 PUSTULAR PSORIASIS OF PALMS AND SOLES: ICD-10-CM

## 2023-10-25 DIAGNOSIS — E78.00 PURE HYPERCHOLESTEROLEMIA: ICD-10-CM

## 2023-10-25 DIAGNOSIS — I10 ESSENTIAL HYPERTENSION: ICD-10-CM

## 2023-10-25 DIAGNOSIS — R55 SYNCOPE AND COLLAPSE: Primary | ICD-10-CM

## 2023-10-25 DIAGNOSIS — R73.03 PREDIABETES: ICD-10-CM

## 2023-10-25 DIAGNOSIS — Z09 HOSPITAL DISCHARGE FOLLOW-UP: ICD-10-CM

## 2023-10-25 DIAGNOSIS — M81.0 AGE-RELATED OSTEOPOROSIS WITHOUT CURRENT PATHOLOGICAL FRACTURE: ICD-10-CM

## 2023-10-25 DIAGNOSIS — G96.89: ICD-10-CM

## 2023-10-25 DIAGNOSIS — R93.0 ABNORMAL CT SCAN OF HEAD: ICD-10-CM

## 2023-10-25 PROCEDURE — G8399 PT W/DXA RESULTS DOCUMENT: HCPCS | Performed by: INTERNAL MEDICINE

## 2023-10-25 PROCEDURE — G8427 DOCREV CUR MEDS BY ELIG CLIN: HCPCS | Performed by: INTERNAL MEDICINE

## 2023-10-25 PROCEDURE — 99215 OFFICE O/P EST HI 40 MIN: CPT | Performed by: INTERNAL MEDICINE

## 2023-10-25 PROCEDURE — 1036F TOBACCO NON-USER: CPT | Performed by: INTERNAL MEDICINE

## 2023-10-25 PROCEDURE — 1123F ACP DISCUSS/DSCN MKR DOCD: CPT | Performed by: INTERNAL MEDICINE

## 2023-10-25 PROCEDURE — G8420 CALC BMI NORM PARAMETERS: HCPCS | Performed by: INTERNAL MEDICINE

## 2023-10-25 PROCEDURE — 3074F SYST BP LT 130 MM HG: CPT | Performed by: INTERNAL MEDICINE

## 2023-10-25 PROCEDURE — 1111F DSCHRG MED/CURRENT MED MERGE: CPT | Performed by: INTERNAL MEDICINE

## 2023-10-25 PROCEDURE — 1090F PRES/ABSN URINE INCON ASSESS: CPT | Performed by: INTERNAL MEDICINE

## 2023-10-25 PROCEDURE — 3078F DIAST BP <80 MM HG: CPT | Performed by: INTERNAL MEDICINE

## 2023-10-25 PROCEDURE — G8484 FLU IMMUNIZE NO ADMIN: HCPCS | Performed by: INTERNAL MEDICINE

## 2023-10-25 SDOH — ECONOMIC STABILITY: FOOD INSECURITY: WITHIN THE PAST 12 MONTHS, YOU WORRIED THAT YOUR FOOD WOULD RUN OUT BEFORE YOU GOT MONEY TO BUY MORE.: NEVER TRUE

## 2023-10-25 SDOH — ECONOMIC STABILITY: FOOD INSECURITY: WITHIN THE PAST 12 MONTHS, THE FOOD YOU BOUGHT JUST DIDN'T LAST AND YOU DIDN'T HAVE MONEY TO GET MORE.: NEVER TRUE

## 2023-10-25 SDOH — ECONOMIC STABILITY: INCOME INSECURITY: HOW HARD IS IT FOR YOU TO PAY FOR THE VERY BASICS LIKE FOOD, HOUSING, MEDICAL CARE, AND HEATING?: NOT HARD AT ALL

## 2023-10-25 ASSESSMENT — PATIENT HEALTH QUESTIONNAIRE - PHQ9
2. FEELING DOWN, DEPRESSED OR HOPELESS: 0
SUM OF ALL RESPONSES TO PHQ QUESTIONS 1-9: 0
SUM OF ALL RESPONSES TO PHQ9 QUESTIONS 1 & 2: 0
SUM OF ALL RESPONSES TO PHQ QUESTIONS 1-9: 0
1. LITTLE INTEREST OR PLEASURE IN DOING THINGS: 0
SUM OF ALL RESPONSES TO PHQ QUESTIONS 1-9: 0
SUM OF ALL RESPONSES TO PHQ QUESTIONS 1-9: 0

## 2023-10-25 NOTE — PROGRESS NOTES
Kalpana Trevino presents today for   Chief Complaint   Patient presents with    Follow-Up from Hospital       1. \"Have you been to the ER, urgent care clinic since your last visit? Hospitalized since your last visit? \" Yes, went to ED last night for syncope. 2. \"Have you seen or consulted any other health care providers outside of the [address] [place] [address] since your last visit? \" no     3. For patients aged 43-73: Has the patient had a colonoscopy / FIT/ Cologuard? NA - based on age      If the patient is female:    4. For patients aged 43-66: Has the patient had a mammogram within the past 2 years? NA - based on age or sex      11. For patients aged 21-65: Has the patient had a pap smear?  NA - based on age or sex
and 2 glasses of wine each night. Advised to discontinue both. Abdominal ultrasound (1/2020) showed mildly increased echogenicity of liver consistent with hepatic steatosis, but otherwise normal. Subsequent normalization and remains normal today. Advised to minimize alcohol and NSAIDS. Also, emphasized importance of lifestyle modifications, including heart healthy diet and regular exercise. 7. Pustular psoriasis. Newly diagnosed on bilateral hands, lower legs, and feet. Being treated with betamethasone cream and UV light treatment to bilateral plantar surface of feet. Developed blistering with last UV light treatment. Not wishing to proceed with systemic biologic therapy due to concern for side effects. Continuing to follow with Dr. Louie Marmolejo. Now with evidence of inflammation of the second MCP joint of her right hand. Undergoing evaluation for psoriatic arthritis by Dr. Francine Barrientos and has completed lab testing and bilateral hand x-rays. Awaiting results. 8. Allergic rhinitis with cough. Prior difficulty with postnasal drainage and occasional cough. Continues on Allegra and Flonase. If not improved at next visit, will consider treatment with montelukast +/- PPI for possible GERD exacerbating symptoms. Overall stable symptoms today. 9. Hiatal hernia, moderate-large. Incidental finding of moderate-large hiatal hernia on chest CTA. Patient denies any symptoms of reflux or epigastric discomfort with eating. Will continue to monitor. 10. History of iron deficiency without anemia. Noted in 9/2019 and may have been related to foot surgery. Improved with supplementation. Colonoscopy completed (11/2020) and 10 polyps removed with majority of polyps tubular adenomas. Repeat in 3 years recommended (due 11/2023). Already referred to Dr. Joao Nielsen and in the process of scheduling. 11. Family history of breast cancer. Patient with family history of breast cancer in first degree relative (sister).   Not wishing to

## 2023-10-28 ENCOUNTER — PATIENT MESSAGE (OUTPATIENT)
Age: 78
End: 2023-10-28

## 2023-10-29 PROBLEM — R93.0 ABNORMAL CT SCAN OF HEAD: Status: ACTIVE | Noted: 2023-10-29

## 2023-10-29 PROBLEM — M19.041: Status: ACTIVE | Noted: 2023-10-29

## 2023-10-29 PROBLEM — G96.89: Status: ACTIVE | Noted: 2023-10-29

## 2023-11-30 ENCOUNTER — HOSPITAL ENCOUNTER (OUTPATIENT)
Facility: HOSPITAL | Age: 78
Discharge: HOME | End: 2023-12-02
Attending: INTERNAL MEDICINE
Payer: MEDICARE

## 2023-11-30 VITALS
HEART RATE: 83 BPM | SYSTOLIC BLOOD PRESSURE: 128 MMHG | BODY MASS INDEX: 24.99 KG/M2 | WEIGHT: 150 LBS | HEIGHT: 65 IN | DIASTOLIC BLOOD PRESSURE: 78 MMHG

## 2023-11-30 DIAGNOSIS — R55 SYNCOPE AND COLLAPSE: ICD-10-CM

## 2023-11-30 LAB
ECHO AO ROOT DIAM: 2.7 CM
ECHO AO ROOT INDEX: 1.54 CM/M2
ECHO AV AREA PEAK VELOCITY: 2.4 CM2
ECHO AV AREA VTI: 2.5 CM2
ECHO AV AREA/BSA PEAK VELOCITY: 1.4 CM2/M2
ECHO AV AREA/BSA VTI: 1.4 CM2/M2
ECHO AV MEAN GRADIENT: 10 MMHG
ECHO AV MEAN VELOCITY: 1.5 M/S
ECHO AV PEAK GRADIENT: 16 MMHG
ECHO AV PEAK VELOCITY: 2 M/S
ECHO AV VELOCITY RATIO: 0.8
ECHO AV VTI: 39.1 CM
ECHO BSA: 1.77 M2
ECHO LA DIAMETER INDEX: 1.66 CM/M2
ECHO LA DIAMETER: 2.9 CM
ECHO LA TO AORTIC ROOT RATIO: 1.07
ECHO LA VOL A-L A2C: 18 ML (ref 22–52)
ECHO LA VOL A-L A4C: 25 ML (ref 22–52)
ECHO LA VOL BP: 21 ML (ref 22–52)
ECHO LA VOL MOD A2C: 17 ML (ref 22–52)
ECHO LA VOL MOD A4C: 22 ML (ref 22–52)
ECHO LA VOL/BSA BIPLANE: 12 ML/M2 (ref 16–34)
ECHO LA VOLUME AREA LENGTH: 24 ML
ECHO LA VOLUME INDEX A-L A2C: 10 ML/M2 (ref 16–34)
ECHO LA VOLUME INDEX A-L A4C: 14 ML/M2 (ref 16–34)
ECHO LA VOLUME INDEX AREA LENGTH: 14 ML/M2 (ref 16–34)
ECHO LA VOLUME INDEX MOD A2C: 10 ML/M2 (ref 16–34)
ECHO LA VOLUME INDEX MOD A4C: 13 ML/M2 (ref 16–34)
ECHO LV E' LATERAL VELOCITY: 9 CM/S
ECHO LV E' SEPTAL VELOCITY: 7 CM/S
ECHO LV EDV A2C: 45 ML
ECHO LV EDV A4C: 50 ML
ECHO LV EDV BP: 48 ML (ref 56–104)
ECHO LV EDV INDEX A4C: 29 ML/M2
ECHO LV EDV INDEX BP: 27 ML/M2
ECHO LV EDV NDEX A2C: 26 ML/M2
ECHO LV EJECTION FRACTION A2C: 72 %
ECHO LV EJECTION FRACTION A4C: 63 %
ECHO LV EJECTION FRACTION BIPLANE: 68 % (ref 55–100)
ECHO LV ESV A2C: 13 ML
ECHO LV ESV A4C: 18 ML
ECHO LV ESV BP: 15 ML (ref 19–49)
ECHO LV ESV INDEX A2C: 7 ML/M2
ECHO LV ESV INDEX A4C: 10 ML/M2
ECHO LV ESV INDEX BP: 9 ML/M2
ECHO LV FRACTIONAL SHORTENING: 39 % (ref 28–44)
ECHO LV INTERNAL DIMENSION DIASTOLE INDEX: 2.91 CM/M2
ECHO LV INTERNAL DIMENSION DIASTOLIC: 5.1 CM (ref 3.9–5.3)
ECHO LV INTERNAL DIMENSION SYSTOLIC INDEX: 1.77 CM/M2
ECHO LV INTERNAL DIMENSION SYSTOLIC: 3.1 CM
ECHO LV IVSD: 0.8 CM (ref 0.6–0.9)
ECHO LV MASS 2D: 140.5 G (ref 67–162)
ECHO LV MASS INDEX 2D: 80.3 G/M2 (ref 43–95)
ECHO LV POSTERIOR WALL DIASTOLIC: 0.8 CM (ref 0.6–0.9)
ECHO LV RELATIVE WALL THICKNESS RATIO: 0.31
ECHO LVOT AREA: 3.1 CM2
ECHO LVOT AV VTI INDEX: 0.82
ECHO LVOT DIAM: 2 CM
ECHO LVOT MEAN GRADIENT: 6 MMHG
ECHO LVOT PEAK GRADIENT: 10 MMHG
ECHO LVOT PEAK VELOCITY: 1.6 M/S
ECHO LVOT STROKE VOLUME INDEX: 57.4 ML/M2
ECHO LVOT SV: 100.5 ML
ECHO LVOT VTI: 32 CM
ECHO MV A VELOCITY: 1.13 M/S
ECHO MV E VELOCITY: 0.83 M/S
ECHO MV E/A RATIO: 0.73
ECHO MV E/E' LATERAL: 9.22
ECHO MV E/E' RATIO (AVERAGED): 10.54
ECHO RA END SYSTOLIC VOLUME APICAL 4 CHAMBER INDEX BSA: 9 ML/M2
ECHO RA VOLUME: 15 ML
ECHO RV FREE WALL PEAK S': 19 CM/S
ECHO RV INTERNAL DIMENSION: 2.3 CM
ECHO RV TAPSE: 2.3 CM (ref 1.7–?)

## 2023-11-30 PROCEDURE — 93306 TTE W/DOPPLER COMPLETE: CPT

## 2023-12-01 ENCOUNTER — TELEPHONE (OUTPATIENT)
Age: 78
End: 2023-12-01

## 2023-12-01 NOTE — TELEPHONE ENCOUNTER
----- Message from Yesica Aleman sent at 11/30/2023  7:19 PM EST -----  Regarding: echocardiogram   Contact: [phone]  Good evening, Dr. Junior Khan,  I was just reading the results of my echocardiogram. The result abnormal certainly got my attention. I hope to hear from you soon.   Thanks,  [employer]

## 2023-12-18 NOTE — PROGRESS NOTES
medications.  .    Any questions regarding prep, please call the office at [phone].    For any questions or concerns on the day of procedure, please call the Endo Suite at [phone].    These surgical instructions were reviewed with patient during the PAT phone call.

## 2024-01-03 ENCOUNTER — PATIENT MESSAGE (OUTPATIENT)
Age: 79
End: 2024-01-03

## 2024-01-05 ENCOUNTER — ANESTHESIA EVENT (OUTPATIENT)
Facility: HOSPITAL | Age: 79
End: 2024-01-05
Payer: MEDICARE

## 2024-01-05 NOTE — TELEPHONE ENCOUNTER
From: Dali Kaur  To: Dr. Noemy Benitez  Sent: 1/3/2024 8:37 AM EST  Subject: acitretin    Good morning, Dr. Benitez,  Dr. Patel has recommended that I take 25mg of acitretin three times a week. I have been reading about possible side effects from this med particularly hair loss. What is your opinion? I am currently using clobetasol on my feet wrapped in Saran Wrap for three hours each night. The psoriasis is improving, but I don't know how long I can use that ointment. Just wondering what you think!  Thanks,  Hanh Kaur   Plastic Surgeon Procedure Text (A): After obtaining clear surgical margins the patient was sent to plastics for surgical repair.  The patient understands they will receive post-surgical care and follow-up from the referring physician's office.

## 2024-01-08 ENCOUNTER — ANESTHESIA (OUTPATIENT)
Facility: HOSPITAL | Age: 79
End: 2024-01-08
Payer: MEDICARE

## 2024-01-08 ENCOUNTER — HOSPITAL ENCOUNTER (OUTPATIENT)
Facility: HOSPITAL | Age: 79
Setting detail: OUTPATIENT SURGERY
Discharge: HOME | End: 2024-01-08
Attending: INTERNAL MEDICINE | Admitting: INTERNAL MEDICINE
Payer: MEDICARE

## 2024-01-08 VITALS
WEIGHT: 145.5 LBS | HEART RATE: 80 BPM | TEMPERATURE: 98 F | RESPIRATION RATE: 18 BRPM | OXYGEN SATURATION: 99 % | BODY MASS INDEX: 24.24 KG/M2 | DIASTOLIC BLOOD PRESSURE: 56 MMHG | HEIGHT: 65 IN | SYSTOLIC BLOOD PRESSURE: 111 MMHG

## 2024-01-08 PROCEDURE — 6360000002 HC RX W HCPCS: Performed by: ANESTHESIOLOGY

## 2024-01-08 PROCEDURE — 3600007502: Performed by: INTERNAL MEDICINE

## 2024-01-08 PROCEDURE — 7100000000 HC PACU RECOVERY - FIRST 15 MIN: Performed by: INTERNAL MEDICINE

## 2024-01-08 PROCEDURE — 2709999900 HC NON-CHARGEABLE SUPPLY: Performed by: INTERNAL MEDICINE

## 2024-01-08 PROCEDURE — 3600007512: Performed by: INTERNAL MEDICINE

## 2024-01-08 PROCEDURE — 7100000010 HC PHASE II RECOVERY - FIRST 15 MIN: Performed by: INTERNAL MEDICINE

## 2024-01-08 PROCEDURE — 88305 TISSUE EXAM BY PATHOLOGIST: CPT

## 2024-01-08 PROCEDURE — 3700000000 HC ANESTHESIA ATTENDED CARE: Performed by: INTERNAL MEDICINE

## 2024-01-08 PROCEDURE — 3700000001 HC ADD 15 MINUTES (ANESTHESIA): Performed by: INTERNAL MEDICINE

## 2024-01-08 PROCEDURE — 2580000003 HC RX 258: Performed by: NURSE ANESTHETIST, CERTIFIED REGISTERED

## 2024-01-08 RX ORDER — SODIUM CHLORIDE 0.9 % (FLUSH) 0.9 %
5-40 SYRINGE (ML) INJECTION PRN
Status: DISCONTINUED | OUTPATIENT
Start: 2024-01-08 | End: 2024-01-08 | Stop reason: HOSPADM

## 2024-01-08 RX ORDER — SODIUM CHLORIDE, SODIUM LACTATE, POTASSIUM CHLORIDE, CALCIUM CHLORIDE 600; 310; 30; 20 MG/100ML; MG/100ML; MG/100ML; MG/100ML
INJECTION, SOLUTION INTRAVENOUS CONTINUOUS
Status: DISCONTINUED | OUTPATIENT
Start: 2024-01-08 | End: 2024-01-08 | Stop reason: HOSPADM

## 2024-01-08 RX ORDER — FAMOTIDINE 20 MG/1
20 TABLET, FILM COATED ORAL ONCE
Status: DISCONTINUED | OUTPATIENT
Start: 2024-01-08 | End: 2024-01-08 | Stop reason: HOSPADM

## 2024-01-08 RX ORDER — SODIUM CHLORIDE 0.9 % (FLUSH) 0.9 %
5-40 SYRINGE (ML) INJECTION EVERY 12 HOURS SCHEDULED
Status: DISCONTINUED | OUTPATIENT
Start: 2024-01-08 | End: 2024-01-08 | Stop reason: HOSPADM

## 2024-01-08 RX ORDER — PROPOFOL 10 MG/ML
INJECTION, EMULSION INTRAVENOUS PRN
Status: DISCONTINUED | OUTPATIENT
Start: 2024-01-08 | End: 2024-01-08 | Stop reason: SDUPTHER

## 2024-01-08 RX ORDER — SODIUM CHLORIDE 9 MG/ML
INJECTION, SOLUTION INTRAVENOUS PRN
Status: DISCONTINUED | OUTPATIENT
Start: 2024-01-08 | End: 2024-01-08 | Stop reason: HOSPADM

## 2024-01-08 RX ADMIN — PROPOFOL 30 MG: 10 INJECTION, EMULSION INTRAVENOUS at 07:34

## 2024-01-08 RX ADMIN — PROPOFOL 30 MG: 10 INJECTION, EMULSION INTRAVENOUS at 08:01

## 2024-01-08 RX ADMIN — PROPOFOL 30 MG: 10 INJECTION, EMULSION INTRAVENOUS at 07:51

## 2024-01-08 RX ADMIN — PROPOFOL 30 MG: 10 INJECTION, EMULSION INTRAVENOUS at 07:47

## 2024-01-08 RX ADMIN — PROPOFOL 30 MG: 10 INJECTION, EMULSION INTRAVENOUS at 07:39

## 2024-01-08 RX ADMIN — PROPOFOL 30 MG: 10 INJECTION, EMULSION INTRAVENOUS at 07:43

## 2024-01-08 RX ADMIN — PROPOFOL 30 MG: 10 INJECTION, EMULSION INTRAVENOUS at 07:36

## 2024-01-08 RX ADMIN — PROPOFOL 30 MG: 10 INJECTION, EMULSION INTRAVENOUS at 07:58

## 2024-01-08 RX ADMIN — PROPOFOL 70 MG: 10 INJECTION, EMULSION INTRAVENOUS at 07:33

## 2024-01-08 RX ADMIN — PROPOFOL 30 MG: 10 INJECTION, EMULSION INTRAVENOUS at 07:55

## 2024-01-08 RX ADMIN — SODIUM CHLORIDE, POTASSIUM CHLORIDE, SODIUM LACTATE AND CALCIUM CHLORIDE: 600; 310; 30; 20 INJECTION, SOLUTION INTRAVENOUS at 07:27

## 2024-01-08 ASSESSMENT — PAIN - FUNCTIONAL ASSESSMENT
PAIN_FUNCTIONAL_ASSESSMENT: 0-10
PAIN_FUNCTIONAL_ASSESSMENT: 0-10

## 2024-01-08 ASSESSMENT — PAIN SCALES - GENERAL
PAINLEVEL_OUTOF10: 0
PAINLEVEL_OUTOF10: 0

## 2024-01-08 NOTE — ANESTHESIA POSTPROCEDURE EVALUATION
Department of Anesthesiology  Postprocedure Note    Patient: Dali Kaur  MRN: [id number]  YOB: 1945  Date of evaluation: 1/8/2024    Procedure Summary       Date: 01/08/24 Room / Location: [facility] ENDO 03 / [facility] ENDOSCOPY    Anesthesia Start: 0728 Anesthesia Stop: 0813    Procedure: COLONOSCOPY with polypectomies (Abdomen) Diagnosis:       Personal history of colonic polyps      (Personal history of colonic polyps [Z86.010])    Surgeons: aPdmaja Ordoñez MD Responsible Provider: Ruiz Murdock MD    Anesthesia Type: MAC ASA Status: 2            Anesthesia Type: MAC    David Phase I: David Score: 8    David Phase II: David Score: 10    Anesthesia Post Evaluation    Patient location during evaluation: bedside  Patient participation: complete - patient participated  Level of consciousness: responsive to verbal stimuli  Airway patency: patent  Nausea & Vomiting: no nausea  Respiratory status: acceptable  Hydration status: euvolemic    No notable events documented.

## 2024-01-08 NOTE — ANESTHESIA PRE PROCEDURE
CREATININE 0.70 07/12/2023 08:18 AM    GFRAA >60 06/23/2022 08:32 AM    AGRATIO 1.4 01/05/2023 08:20 AM    LABGLOM >60 07/12/2023 08:18 AM    GLUCOSE 92 07/12/2023 08:18 AM    PROT 7.2 07/12/2023 08:18 AM    CALCIUM 9.3 07/12/2023 08:18 AM    BILITOT 0.6 07/12/2023 08:18 AM    ALKPHOS 47 07/12/2023 08:18 AM    ALKPHOS 44 01/05/2023 08:20 AM    AST 18 07/12/2023 08:18 AM    ALT 23 07/12/2023 08:18 AM       POC Tests: No results for input(s): \"POCGLU\", \"POCNA\", \"POCK\", \"POCCL\", \"POCBUN\", \"POCHEMO\", \"POCHCT\" in the last 72 hours.    Coags: No results found for: \"PROTIME\", \"INR\", \"APTT\"    HCG (If Applicable): No results found for: \"PREGTESTUR\", \"PREGSERUM\", \"HCG\", \"HCGQUANT\"     ABGs: No results found for: \"PHART\", \"PO2ART\", \"DDX8OZG\", \"UTL7EBQ\", \"BEART\", \"D6TBNCXI\"     Type & Screen (If Applicable):  No results found for: \"LABABO\", \"LABRH\"    Drug/Infectious Status (If Applicable):  Lab Results   Component Value Date/Time    HEPCAB 0.03 09/17/2019 09:57 AM    HEPCAB NEGATIVE 09/17/2019 09:57 AM       COVID-19 Screening (If Applicable):   Lab Results   Component Value Date/Time    COVID19 Not Detected 12/04/2020 08:00 AM           Anesthesia Evaluation  Patient summary reviewed  Airway: Mallampati: III  TM distance: >3 FB   Neck ROM: limited  Mouth opening: > = 3 FB   Dental: normal exam         Pulmonary:Negative Pulmonary ROS breath sounds clear to auscultation                             Cardiovascular:  Exercise tolerance: good (>4 METS)  (+) hypertension: mild, hyperlipidemia        Rhythm: regular  Rate: normal                    Neuro/Psych:   (+) neuromuscular disease:            GI/Hepatic/Renal:   (+) hiatal hernia          Endo/Other:    (+) : arthritis:..                 Abdominal:             Vascular: negative vascular ROS.         Other Findings:       Anesthesia Plan      MAC     ASA 2       Induction: intravenous.      Anesthetic plan and risks discussed with patient.                    FRIDA BRADLEY MD

## 2024-01-08 NOTE — DISCHARGE INSTRUCTIONS
process. If you do not sign up before the expiration date, you must request a new code.    [employer] Access Code: Activation code not generated  Current [employer] Status: Active (This is the date your [employer] access code will )    Enter the last four digits of your Social Security Number (xxxx) and Date of Birth (mm/dd/yyyy) as indicated and click Submit. You will be taken to the next sign-up page.  Create a [employer] ID. This will be your [employer] login ID and cannot be changed, so think of one that is secure and easy to remember.  Create a [employer] password. You can change your password at any time.  Enter your Password Reset Question and Answer. This can be used at a later time if you forget your password.   Enter your e-mail address. You will receive e-mail notification when new information is available in [employer].  Click Sign Up. You can now view and download portions of your medical record.  Click the Download Summary menu link to download a portable copy of your medical information.    Additional Information    If you have questions, please call [phone]. Remember, [employer] is NOT to be used for urgent needs. For medical emergencies, dial 911.    Educational references and/or instructions provided during this visit included:    See Attached      APPOINTMENTS:    Per MD Instruction    Discharge information has been reviewed with the patient.  The patient verbalized understanding.     Patient armband removed and shredded      .

## 2024-01-08 NOTE — H&P
[email or web address]  [phone]      History and Physical    Patient: Dali Kaur MRN: [id number]  SSN: [id number]    YOB: 1945  Age: 78 y.o.  Sex: female      Subjective:      Dali Kaur is a 78 y.o. female who presents for increased risk CRCS. Pt has a personal history of colon polyps. Denies symptoms or concerns. .     Past Medical History:   Diagnosis Date    Eczema     Essential hypertension     FH: breast cancer     Fibroids     uterine    Fracture of radius 11/2009    with ulna, left, closed    History of cataract     History of foot fracture     right and left    Hyperlipidemia     Microhematuria     chronic    Osteopenia     Psoriasis     S/P radiation therapy     as child, to right shoulder for birthmark    Syncope and collapse 10/24/2023    Tinnitus     Zoster     right scapula     Past Surgical History:   Procedure Laterality Date    CATARACT REMOVAL      COLONOSCOPY      COLONOSCOPY N/A 11/17/2020    COLONOSCOPY with polypectomies performed by Padmaja Ordoñez MD at [facility] ENDOSCOPY    FRACTURE SURGERY Right 04/24/2019    right foot 3rd metatarsal fracture, screws and a plate    ORTHOPEDIC SURGERY      left arm    WISDOM TOOTH EXTRACTION      WRIST FRACTURE SURGERY        Family History   Problem Relation Age of Onset    Breast Cancer Sister 58    Cancer Mother         pancreatic    Mult Sclerosis Sister     Cancer Sister         breast    Heart Disease Father     Heart Disease Mother      Social History     Tobacco Use    Smoking status: Never    Smokeless tobacco: Never   Substance Use Topics    Alcohol use: Yes     Alcohol/week: 3.0 - 4.0 standard drinks of alcohol      Prior to Admission medications    Medication Sig Start Date End Date Taking? Authorizing Provider   hydroCHLOROthiazide (HYDRODIURIL) 25 MG tablet TAKE 1 TABLET BY MOUTH DAILY 8/1/23   Noemy Benitez MD   atorvastatin (LIPITOR) 20 MG tablet TAKE 1 TABLET BY MOUTH DAILY 7/10/23   Noemy Benitez MD

## 2024-01-16 ENCOUNTER — HOSPITAL ENCOUNTER (OUTPATIENT)
Facility: HOSPITAL | Age: 79
Setting detail: SPECIMEN
Discharge: HOME | End: 2024-01-19
Payer: MEDICARE

## 2024-01-16 ENCOUNTER — APPOINTMENT (OUTPATIENT)
Age: 79
End: 2024-01-16
Payer: MEDICARE

## 2024-01-16 ENCOUNTER — HOSPITAL ENCOUNTER (OUTPATIENT)
Facility: HOSPITAL | Age: 79
Discharge: HOME | End: 2024-01-19
Payer: MEDICARE

## 2024-01-16 DIAGNOSIS — R73.03 PREDIABETES: ICD-10-CM

## 2024-01-16 DIAGNOSIS — E87.6 HYPOKALEMIA: ICD-10-CM

## 2024-01-16 DIAGNOSIS — I10 ESSENTIAL HYPERTENSION: ICD-10-CM

## 2024-01-16 DIAGNOSIS — M81.0 AGE-RELATED OSTEOPOROSIS WITHOUT CURRENT PATHOLOGICAL FRACTURE: ICD-10-CM

## 2024-01-16 DIAGNOSIS — E78.00 PURE HYPERCHOLESTEROLEMIA: ICD-10-CM

## 2024-01-16 LAB
25(OH)D3 SERPL-MCNC: 41.9 NG/ML (ref 30–100)
ALBUMIN SERPL-MCNC: 3.8 G/DL (ref 3.4–5)
ALBUMIN/GLOB SERPL: 1.2 (ref 0.8–1.7)
ALP SERPL-CCNC: 48 U/L (ref 45–117)
ALT SERPL-CCNC: 20 U/L (ref 13–56)
ANION GAP SERPL CALC-SCNC: 4 MMOL/L (ref 3–18)
AST SERPL-CCNC: 18 U/L (ref 10–38)
BASOPHILS # BLD: 0 K/UL (ref 0–0.1)
BASOPHILS NFR BLD: 1 % (ref 0–2)
BILIRUB SERPL-MCNC: 0.5 MG/DL (ref 0.2–1)
BUN SERPL-MCNC: 24 MG/DL (ref 7–18)
BUN/CREAT SERPL: 36 (ref 12–20)
CALCIUM SERPL-MCNC: 9 MG/DL (ref 8.5–10.1)
CHLORIDE SERPL-SCNC: 108 MMOL/L (ref 100–111)
CHOLEST SERPL-MCNC: 179 MG/DL
CO2 SERPL-SCNC: 26 MMOL/L (ref 21–32)
CREAT SERPL-MCNC: 0.67 MG/DL (ref 0.6–1.3)
DIFFERENTIAL METHOD BLD: ABNORMAL
EOSINOPHIL # BLD: 0.2 K/UL (ref 0–0.4)
EOSINOPHIL NFR BLD: 4 % (ref 0–5)
ERYTHROCYTE [DISTWIDTH] IN BLOOD BY AUTOMATED COUNT: 13.3 % (ref 11.6–14.5)
EST. AVERAGE GLUCOSE BLD GHB EST-MCNC: 114 MG/DL
GLOBULIN SER CALC-MCNC: 3.2 G/DL (ref 2–4)
GLUCOSE SERPL-MCNC: 98 MG/DL (ref 74–99)
HBA1C MFR BLD: 5.6 % (ref 4.2–5.6)
HCT VFR BLD AUTO: 42.6 % (ref 35–45)
HDLC SERPL-MCNC: 80 MG/DL (ref 40–60)
HDLC SERPL: 2.2 (ref 0–5)
HGB BLD-MCNC: 12.8 G/DL (ref 12–16)
IMM GRANULOCYTES # BLD AUTO: 0 K/UL (ref 0–0.04)
IMM GRANULOCYTES NFR BLD AUTO: 0 % (ref 0–0.5)
LDLC SERPL CALC-MCNC: 85 MG/DL (ref 0–100)
LIPID PANEL: ABNORMAL
LYMPHOCYTES # BLD: 1.5 K/UL (ref 0.9–3.6)
LYMPHOCYTES NFR BLD: 25 % (ref 21–52)
MAGNESIUM SERPL-MCNC: 1.8 MG/DL (ref 1.6–2.6)
MCH RBC QN AUTO: 27.6 PG (ref 24–34)
MCHC RBC AUTO-ENTMCNC: 30 G/DL (ref 31–37)
MCV RBC AUTO: 91.8 FL (ref 78–100)
MONOCYTES # BLD: 0.6 K/UL (ref 0.05–1.2)
MONOCYTES NFR BLD: 9 % (ref 3–10)
NEUTS SEG # BLD: 3.7 K/UL (ref 1.8–8)
NEUTS SEG NFR BLD: 61 % (ref 40–73)
NRBC # BLD: 0 K/UL (ref 0–0.01)
NRBC BLD-RTO: 0 PER 100 WBC
PLATELET # BLD AUTO: 285 K/UL (ref 135–420)
PMV BLD AUTO: 9.7 FL (ref 9.2–11.8)
POTASSIUM SERPL-SCNC: 3.9 MMOL/L (ref 3.5–5.5)
PROT SERPL-MCNC: 7 G/DL (ref 6.4–8.2)
RBC # BLD AUTO: 4.64 M/UL (ref 4.2–5.3)
SODIUM SERPL-SCNC: 138 MMOL/L (ref 136–145)
TRIGL SERPL-MCNC: 70 MG/DL
TSH SERPL DL<=0.05 MIU/L-ACNC: 2.93 UIU/ML (ref 0.36–3.74)
VLDLC SERPL CALC-MCNC: 14 MG/DL
WBC # BLD AUTO: 6 K/UL (ref 4.6–13.2)

## 2024-01-16 PROCEDURE — 80053 COMPREHEN METABOLIC PANEL: CPT

## 2024-01-16 PROCEDURE — 77080 DXA BONE DENSITY AXIAL: CPT

## 2024-01-16 PROCEDURE — 85025 COMPLETE CBC W/AUTO DIFF WBC: CPT

## 2024-01-16 PROCEDURE — 36415 COLL VENOUS BLD VENIPUNCTURE: CPT

## 2024-01-16 PROCEDURE — 80061 LIPID PANEL: CPT

## 2024-01-16 PROCEDURE — 84443 ASSAY THYROID STIM HORMONE: CPT

## 2024-01-16 PROCEDURE — 83735 ASSAY OF MAGNESIUM: CPT

## 2024-01-16 PROCEDURE — 82306 VITAMIN D 25 HYDROXY: CPT

## 2024-01-16 PROCEDURE — 83036 HEMOGLOBIN GLYCOSYLATED A1C: CPT

## 2024-01-23 ENCOUNTER — HOSPITAL ENCOUNTER (OUTPATIENT)
Facility: HOSPITAL | Age: 79
Setting detail: SPECIMEN
Discharge: HOME | End: 2024-01-26
Payer: MEDICARE

## 2024-01-23 ENCOUNTER — OFFICE VISIT (OUTPATIENT)
Age: 79
End: 2024-01-23
Payer: MEDICARE

## 2024-01-23 VITALS
SYSTOLIC BLOOD PRESSURE: 136 MMHG | WEIGHT: 151 LBS | HEART RATE: 86 BPM | HEIGHT: 65 IN | BODY MASS INDEX: 25.16 KG/M2 | OXYGEN SATURATION: 97 % | RESPIRATION RATE: 16 BRPM | DIASTOLIC BLOOD PRESSURE: 79 MMHG | TEMPERATURE: 98.6 F

## 2024-01-23 DIAGNOSIS — Z87.898 HISTORY OF SYNCOPE: ICD-10-CM

## 2024-01-23 DIAGNOSIS — L40.3 PUSTULAR PSORIASIS OF PALMS AND SOLES: ICD-10-CM

## 2024-01-23 DIAGNOSIS — G89.29 CHRONIC PAIN OF RIGHT KNEE: ICD-10-CM

## 2024-01-23 DIAGNOSIS — R93.0 ABNORMAL CT SCAN OF HEAD: ICD-10-CM

## 2024-01-23 DIAGNOSIS — R73.03 PREDIABETES: ICD-10-CM

## 2024-01-23 DIAGNOSIS — G96.89: ICD-10-CM

## 2024-01-23 DIAGNOSIS — M25.561 CHRONIC PAIN OF RIGHT KNEE: ICD-10-CM

## 2024-01-23 DIAGNOSIS — I10 ESSENTIAL HYPERTENSION: Primary | ICD-10-CM

## 2024-01-23 DIAGNOSIS — E66.3 OVERWEIGHT WITH BODY MASS INDEX (BMI) 25.0-29.9: ICD-10-CM

## 2024-01-23 DIAGNOSIS — I10 ESSENTIAL HYPERTENSION: ICD-10-CM

## 2024-01-23 DIAGNOSIS — E78.00 PURE HYPERCHOLESTEROLEMIA: ICD-10-CM

## 2024-01-23 DIAGNOSIS — M81.0 AGE-RELATED OSTEOPOROSIS WITHOUT CURRENT PATHOLOGICAL FRACTURE: ICD-10-CM

## 2024-01-23 LAB
APPEARANCE UR: CLEAR
BACTERIA URNS QL MICRO: ABNORMAL /HPF
BILIRUB UR QL: NEGATIVE
COLOR UR: YELLOW
EPITH CASTS URNS QL MICRO: ABNORMAL /LPF (ref 0–5)
GLUCOSE UR STRIP.AUTO-MCNC: NEGATIVE MG/DL
HGB UR QL STRIP: NEGATIVE
KETONES UR QL STRIP.AUTO: NEGATIVE MG/DL
LEUKOCYTE ESTERASE UR QL STRIP.AUTO: ABNORMAL
NITRITE UR QL STRIP.AUTO: NEGATIVE
PH UR STRIP: 5.5 (ref 5–8)
PROT UR STRIP-MCNC: NEGATIVE MG/DL
RBC #/AREA URNS HPF: ABNORMAL /HPF (ref 0–5)
SP GR UR REFRACTOMETRY: 1.02 (ref 1–1.03)
UROBILINOGEN UR QL STRIP.AUTO: 0.2 EU/DL (ref 0.2–1)
WBC URNS QL MICRO: ABNORMAL /HPF (ref 0–4)

## 2024-01-23 PROCEDURE — G8419 CALC BMI OUT NRM PARAM NOF/U: HCPCS | Performed by: INTERNAL MEDICINE

## 2024-01-23 PROCEDURE — 1123F ACP DISCUSS/DSCN MKR DOCD: CPT | Performed by: INTERNAL MEDICINE

## 2024-01-23 PROCEDURE — 1090F PRES/ABSN URINE INCON ASSESS: CPT | Performed by: INTERNAL MEDICINE

## 2024-01-23 PROCEDURE — 99215 OFFICE O/P EST HI 40 MIN: CPT | Performed by: INTERNAL MEDICINE

## 2024-01-23 PROCEDURE — 81001 URINALYSIS AUTO W/SCOPE: CPT

## 2024-01-23 PROCEDURE — 3075F SYST BP GE 130 - 139MM HG: CPT | Performed by: INTERNAL MEDICINE

## 2024-01-23 PROCEDURE — G8427 DOCREV CUR MEDS BY ELIG CLIN: HCPCS | Performed by: INTERNAL MEDICINE

## 2024-01-23 PROCEDURE — 3078F DIAST BP <80 MM HG: CPT | Performed by: INTERNAL MEDICINE

## 2024-01-23 PROCEDURE — 1036F TOBACCO NON-USER: CPT | Performed by: INTERNAL MEDICINE

## 2024-01-23 PROCEDURE — G8399 PT W/DXA RESULTS DOCUMENT: HCPCS | Performed by: INTERNAL MEDICINE

## 2024-01-23 PROCEDURE — G8484 FLU IMMUNIZE NO ADMIN: HCPCS | Performed by: INTERNAL MEDICINE

## 2024-01-23 SDOH — ECONOMIC STABILITY: FOOD INSECURITY: WITHIN THE PAST 12 MONTHS, YOU WORRIED THAT YOUR FOOD WOULD RUN OUT BEFORE YOU GOT MONEY TO BUY MORE.: NEVER TRUE

## 2024-01-23 SDOH — ECONOMIC STABILITY: INCOME INSECURITY: HOW HARD IS IT FOR YOU TO PAY FOR THE VERY BASICS LIKE FOOD, HOUSING, MEDICAL CARE, AND HEATING?: NOT VERY HARD

## 2024-01-23 SDOH — ECONOMIC STABILITY: FOOD INSECURITY: WITHIN THE PAST 12 MONTHS, THE FOOD YOU BOUGHT JUST DIDN'T LAST AND YOU DIDN'T HAVE MONEY TO GET MORE.: NEVER TRUE

## 2024-01-23 ASSESSMENT — PATIENT HEALTH QUESTIONNAIRE - PHQ9
2. FEELING DOWN, DEPRESSED OR HOPELESS: 0
SUM OF ALL RESPONSES TO PHQ QUESTIONS 1-9: 0
SUM OF ALL RESPONSES TO PHQ9 QUESTIONS 1 & 2: 0
SUM OF ALL RESPONSES TO PHQ QUESTIONS 1-9: 0
1. LITTLE INTEREST OR PLEASURE IN DOING THINGS: 0

## 2024-01-23 NOTE — PATIENT INSTRUCTIONS
fat-free or low-fat dairy products.  Replace butter, margarine, and hydrogenated or partially hydrogenated oils with olive and canola oils. (Canola oil margarine without trans fat is fine.)  Replace red meat with fish, poultry, and soy protein (like tofu).  Limit processed and packaged foods like chips, crackers, and cookies.  Bake, broil, or steam foods. Don't pena them.  Be physically active. Get at least 30 minutes of exercise on most days of the week. Walking is a good choice. You also may want to do other activities, such as running, swimming, cycling, or playing tennis or team sports.  Stay at a healthy weight or lose weight by making the changes in eating and physical activity listed above. Losing just a small amount of weight, even 5 to 10 pounds, can reduce your risk for having a heart attack or stroke.  Do not smoke.  When should you call for help?  Watch closely for changes in your health, and be sure to contact your doctor if:    You need help making lifestyle changes.     You have questions about your medicine.   Where can you learn more?  Go to https://www.[employer].net/GuestDrivenpConnections  Enter I865 in the search box to learn more about \"High Cholesterol: Care Instructions.\"  Current as of: December 16, 2019               Content Version: 12.6  © 3994-8263 [employer].   Care instructions adapted under license by [employer] (which disclaims liability or warranty for this information). If you have questions about a medical condition or this instruction, always ask your healthcare professional. [employer] disclaims any warranty or liability for your use of this information.

## 2024-01-23 NOTE — PROGRESS NOTES
Dali Kaur presents today for   Chief Complaint   Patient presents with    3 Month Follow-Up       1. \"Have you been to the ER, urgent care clinic since your last visit?  Hospitalized since your last visit?\" no    2. \"Have you seen or consulted any other health care providers outside of the [facility] since your last visit?\" no     3. For patients aged 45-75: Has the patient had a colonoscopy / FIT/ Cologuard? NA - based on age      If the patient is female:    4. For patients aged 40-74: Has the patient had a mammogram within the past 2 years? NA - based on age or sex      5. For patients aged 21-65: Has the patient had a pap smear? NA - based on age or sex  
Moderna booster doses and the updated bivalent booster dose. Received 2/2 doses of Shingrix vaccine. Discussed recommended vaccines for the fall, including influenza vaccine, updated COVID vaccine, and new RSV vaccine, and all completed other immunizations up to date. Mammogram up-to-date. Colonoscopy completed as discussed and no further screening needed. Continue regular eye exams with Dr. Veliz. Vitamin D level remains normal on maintenance dose supplement.   Medicare wellness visit up-to-date.       Patient understands recommendations and agrees with plan.  Follow-up in 6 months.    This visit required high complexity medically necessary decision making and management plans.     Time spent in preparing for the visit, including review of history, tests done prior to arrival, additional time reviewing clinical data, imaging, outside records and test results:  5  minutes.  Time spent in counseling with patient and/or family members regarding care plan: 30 minutes.  Time spent on same-day documentation, ordering tests, treatments, and referring patient for further care: 10 minutes.   Time spent on visit does not include time for documentation not completed on day of visit.

## 2024-01-27 PROBLEM — Z87.898 HISTORY OF SYNCOPE: Status: ACTIVE | Noted: 2024-01-27

## 2024-01-27 PROBLEM — M19.041: Status: RESOLVED | Noted: 2023-10-29 | Resolved: 2024-01-27

## 2024-01-27 RX ORDER — CLOBETASOL PROPIONATE 0.5 MG/G
OINTMENT TOPICAL 2 TIMES DAILY
COMMUNITY

## 2024-02-15 RX ORDER — LISINOPRIL 10 MG/1
TABLET ORAL
Qty: 90 TABLET | Refills: 3 | Status: SHIPPED | OUTPATIENT
Start: 2024-02-15

## 2024-02-15 NOTE — TELEPHONE ENCOUNTER
PCP: Noemy Benitez MD    LAST REFILL PER CHART:  Medication:lisinopril (PRINIVIL;ZESTRIL) 10 MG tablet   Ordered On:05/26/2023   Instructions: TAKE 1 TABLET BY MOUTH DAILY   Dispense:90 tablets  Refills:3      Future Appointments   Date Time Provider Department Center   7/17/2024  8:45 AM [facility] LAB VISIT [facility] AMB   7/23/2024  8:30 AM Noemy Benitez MD [facility] BS AMB

## 2024-04-24 RX ORDER — HYDROCHLOROTHIAZIDE 25 MG/1
TABLET ORAL
Qty: 90 TABLET | Refills: 3 | Status: SHIPPED | OUTPATIENT
Start: 2024-04-24

## 2024-06-27 SDOH — HEALTH STABILITY: PHYSICAL HEALTH: ON AVERAGE, HOW MANY MINUTES DO YOU ENGAGE IN EXERCISE AT THIS LEVEL?: 30 MIN

## 2024-06-27 SDOH — HEALTH STABILITY: PHYSICAL HEALTH: ON AVERAGE, HOW MANY DAYS PER WEEK DO YOU ENGAGE IN MODERATE TO STRENUOUS EXERCISE (LIKE A BRISK WALK)?: 4 DAYS

## 2024-06-28 ENCOUNTER — HOSPITAL ENCOUNTER (OUTPATIENT)
Facility: HOSPITAL | Age: 79
End: 2024-06-28
Payer: MEDICARE

## 2024-06-28 ENCOUNTER — OFFICE VISIT (OUTPATIENT)
Age: 79
End: 2024-06-28

## 2024-06-28 VITALS — BODY MASS INDEX: 25.83 KG/M2 | RESPIRATION RATE: 14 BRPM | WEIGHT: 155 LBS | HEIGHT: 65 IN

## 2024-06-28 DIAGNOSIS — M45.6 ANKYLOSING SPONDYLITIS LUMBAR REGION (HCC): ICD-10-CM

## 2024-06-28 DIAGNOSIS — Z82.61 FAMILY HISTORY OF RHEUMATOID ARTHRITIS: ICD-10-CM

## 2024-06-28 DIAGNOSIS — M17.12 PRIMARY OSTEOARTHRITIS OF LEFT KNEE: Primary | ICD-10-CM

## 2024-06-28 DIAGNOSIS — L40.9 PSORIASIS: ICD-10-CM

## 2024-06-28 DIAGNOSIS — M17.12 PRIMARY OSTEOARTHRITIS OF LEFT KNEE: ICD-10-CM

## 2024-06-28 LAB
BASOPHILS # BLD: 0 K/UL (ref 0–0.1)
BASOPHILS NFR BLD: 1 % (ref 0–2)
CRP SERPL-MCNC: <0.3 MG/DL (ref 0–0.3)
DIFFERENTIAL METHOD BLD: NORMAL
EOSINOPHIL # BLD: 0.1 K/UL (ref 0–0.4)
EOSINOPHIL NFR BLD: 2 % (ref 0–5)
ERYTHROCYTE [DISTWIDTH] IN BLOOD BY AUTOMATED COUNT: 13.4 % (ref 11.6–14.5)
ERYTHROCYTE [SEDIMENTATION RATE] IN BLOOD: 11 MM/HR (ref 0–30)
HCT VFR BLD AUTO: 40 % (ref 35–45)
HGB BLD-MCNC: 12.4 G/DL (ref 12–16)
IMM GRANULOCYTES # BLD AUTO: 0 K/UL (ref 0–0.04)
IMM GRANULOCYTES NFR BLD AUTO: 0 % (ref 0–0.5)
LYMPHOCYTES # BLD: 1.3 K/UL (ref 0.9–3.6)
LYMPHOCYTES NFR BLD: 23 % (ref 21–52)
MCH RBC QN AUTO: 27.5 PG (ref 24–34)
MCHC RBC AUTO-ENTMCNC: 31 G/DL (ref 31–37)
MCV RBC AUTO: 88.7 FL (ref 78–100)
MONOCYTES # BLD: 0.5 K/UL (ref 0.05–1.2)
MONOCYTES NFR BLD: 10 % (ref 3–10)
NEUTS SEG # BLD: 3.6 K/UL (ref 1.8–8)
NEUTS SEG NFR BLD: 64 % (ref 40–73)
NRBC # BLD: 0 K/UL (ref 0–0.01)
NRBC BLD-RTO: 0 PER 100 WBC
PLATELET # BLD AUTO: 289 K/UL (ref 135–420)
PMV BLD AUTO: 9.6 FL (ref 9.2–11.8)
RBC # BLD AUTO: 4.51 M/UL (ref 4.2–5.3)
URATE SERPL-MCNC: 4.5 MG/DL (ref 2.6–7.2)
WBC # BLD AUTO: 5.7 K/UL (ref 4.6–13.2)

## 2024-06-28 PROCEDURE — 83529 ASAY OF INTERLEUKIN-6 (IL-6): CPT

## 2024-06-28 PROCEDURE — 86140 C-REACTIVE PROTEIN: CPT

## 2024-06-28 PROCEDURE — 86225 DNA ANTIBODY NATIVE: CPT

## 2024-06-28 PROCEDURE — 86235 NUCLEAR ANTIGEN ANTIBODY: CPT

## 2024-06-28 PROCEDURE — 85025 COMPLETE CBC W/AUTO DIFF WBC: CPT

## 2024-06-28 PROCEDURE — 84550 ASSAY OF BLOOD/URIC ACID: CPT

## 2024-06-28 PROCEDURE — 36415 COLL VENOUS BLD VENIPUNCTURE: CPT

## 2024-06-28 PROCEDURE — 85652 RBC SED RATE AUTOMATED: CPT

## 2024-06-28 RX ORDER — BETAMETHASONE SODIUM PHOSPHATE AND BETAMETHASONE ACETATE 3; 3 MG/ML; MG/ML
6 INJECTION, SUSPENSION INTRA-ARTICULAR; INTRALESIONAL; INTRAMUSCULAR; SOFT TISSUE ONCE
Status: COMPLETED | OUTPATIENT
Start: 2024-06-28 | End: 2024-06-28

## 2024-06-28 RX ORDER — LIDOCAINE HYDROCHLORIDE 10 MG/ML
3 INJECTION, SOLUTION INFILTRATION; PERINEURAL ONCE
Status: COMPLETED | OUTPATIENT
Start: 2024-06-28 | End: 2024-06-28

## 2024-06-28 RX ADMIN — LIDOCAINE HYDROCHLORIDE 3 ML: 10 INJECTION, SOLUTION INFILTRATION; PERINEURAL at 08:21

## 2024-06-28 RX ADMIN — BETAMETHASONE SODIUM PHOSPHATE AND BETAMETHASONE ACETATE 6 MG: 3; 3 INJECTION, SUSPENSION INTRA-ARTICULAR; INTRALESIONAL; INTRAMUSCULAR; SOFT TISSUE at 08:20

## 2024-06-28 NOTE — PROGRESS NOTES
Housing Stability: Unknown (1/23/2024)    Housing Stability Vital Sign     Unable to Pay for Housing in the Last Year: Not on file     Number of Places Lived in the Last Year: Not on file     Unstable Housing in the Last Year: No       Resp 14   Ht 1.651 m (5' 5\")   Wt 70.3 kg (155 lb)   LMP  (LMP Unknown)   BMI 25.79 kg/m²       PHYSICAL EXAMINATION:  GENERAL: Alert and oriented x3, in no acute distress, well-developed, well-nourished, afebrile.  HEART: No JVD.  EYES: No scleral icterus   NECK: No significant lymphadenopathy   LUNGS: No respiratory compromise or indrawing  ABDOMEN: Soft, non-tender, non-distended.         Note: This note was completed using voice recognition software. Any typographical/name errors or mistakes are unintentional.    Electronically signed by: KHOI KEARNS MD

## 2024-07-01 LAB
CENTROMERE B AB SER-ACNC: <0.2 AI (ref 0–0.9)
CHROMATIN AB SERPL-ACNC: <0.2 AI (ref 0–0.9)
DSDNA AB SER-ACNC: <1 IU/ML (ref 0–9)
ENA JO1 AB SER-ACNC: <0.2 AI (ref 0–0.9)
ENA RNP AB SER-ACNC: <0.2 AI (ref 0–0.9)
ENA SCL70 AB SER-ACNC: <0.2 AI (ref 0–0.9)
ENA SM AB SER-ACNC: <0.2 AI (ref 0–0.9)
ENA SS-A AB SER-ACNC: <0.2 AI (ref 0–0.9)
ENA SS-B AB SER-ACNC: <0.2 AI (ref 0–0.9)
Lab: NORMAL

## 2024-07-01 RX ORDER — ATORVASTATIN CALCIUM 20 MG/1
TABLET, FILM COATED ORAL
Qty: 90 TABLET | Refills: 3 | Status: SHIPPED | OUTPATIENT
Start: 2024-07-01

## 2024-07-02 ENCOUNTER — TELEPHONE (OUTPATIENT)
Age: 79
End: 2024-07-02

## 2024-07-02 NOTE — TELEPHONE ENCOUNTER
Informed patient per amrita amin otc meds and injection make take time to work. She was fine with that she just wanted to make sure this could be normal. Patient informed to keep follow up appointment,

## 2024-07-02 NOTE — TELEPHONE ENCOUNTER
Patient called and said she saw  last week 06/28/24 for her left knee.     Patient said she is still in extreme pain and is having difficulty walking. That the pain is coming from the right side of her knee.     Patient is asking for a call back from either , SHELIA Hernandez or a Nurse.     Patient tel. [phone].

## 2024-07-05 LAB — IL6 SERPL-MCNC: <2.5 PG/ML (ref 0–13)

## 2024-07-08 LAB — HLA-B27 QL NAA+PROBE: NEGATIVE

## 2024-07-17 ENCOUNTER — HOSPITAL ENCOUNTER (OUTPATIENT)
Facility: HOSPITAL | Age: 79
Setting detail: SPECIMEN
Discharge: HOME | End: 2024-07-20
Payer: MEDICARE

## 2024-07-17 DIAGNOSIS — M81.0 AGE-RELATED OSTEOPOROSIS WITHOUT CURRENT PATHOLOGICAL FRACTURE: ICD-10-CM

## 2024-07-17 DIAGNOSIS — I10 ESSENTIAL HYPERTENSION: ICD-10-CM

## 2024-07-17 DIAGNOSIS — R73.03 PREDIABETES: ICD-10-CM

## 2024-07-17 DIAGNOSIS — E78.00 PURE HYPERCHOLESTEROLEMIA: ICD-10-CM

## 2024-07-17 LAB
25(OH)D3 SERPL-MCNC: 29 NG/ML (ref 30–100)
ALBUMIN SERPL-MCNC: 4.1 G/DL (ref 3.4–5)
ALBUMIN/GLOB SERPL: 1.3 (ref 0.8–1.7)
ALP SERPL-CCNC: 43 U/L (ref 45–117)
ALT SERPL-CCNC: 22 U/L (ref 13–56)
ANION GAP SERPL CALC-SCNC: 6 MMOL/L (ref 3–18)
AST SERPL-CCNC: 23 U/L (ref 10–38)
BILIRUB SERPL-MCNC: 0.7 MG/DL (ref 0.2–1)
BUN SERPL-MCNC: 21 MG/DL (ref 7–18)
BUN/CREAT SERPL: 33 (ref 12–20)
CALCIUM SERPL-MCNC: 9.6 MG/DL (ref 8.5–10.1)
CHLORIDE SERPL-SCNC: 105 MMOL/L (ref 100–111)
CHOLEST SERPL-MCNC: 179 MG/DL
CO2 SERPL-SCNC: 27 MMOL/L (ref 21–32)
CREAT SERPL-MCNC: 0.64 MG/DL (ref 0.6–1.3)
EST. AVERAGE GLUCOSE BLD GHB EST-MCNC: 117 MG/DL
GLOBULIN SER CALC-MCNC: 3.1 G/DL (ref 2–4)
GLUCOSE SERPL-MCNC: 96 MG/DL (ref 74–99)
HBA1C MFR BLD: 5.7 % (ref 4.2–5.6)
HDLC SERPL-MCNC: 76 MG/DL (ref 40–60)
HDLC SERPL: 2.4 (ref 0–5)
LDLC SERPL CALC-MCNC: 87.8 MG/DL (ref 0–100)
LIPID PANEL: ABNORMAL
POTASSIUM SERPL-SCNC: 4.2 MMOL/L (ref 3.5–5.5)
PROT SERPL-MCNC: 7.2 G/DL (ref 6.4–8.2)
SODIUM SERPL-SCNC: 138 MMOL/L (ref 136–145)
TRIGL SERPL-MCNC: 76 MG/DL
VLDLC SERPL CALC-MCNC: 15.2 MG/DL

## 2024-07-17 PROCEDURE — 80053 COMPREHEN METABOLIC PANEL: CPT

## 2024-07-17 PROCEDURE — 36415 COLL VENOUS BLD VENIPUNCTURE: CPT

## 2024-07-17 PROCEDURE — 82306 VITAMIN D 25 HYDROXY: CPT

## 2024-07-17 PROCEDURE — 83036 HEMOGLOBIN GLYCOSYLATED A1C: CPT

## 2024-07-17 PROCEDURE — 80061 LIPID PANEL: CPT

## 2024-07-17 ASSESSMENT — LIFESTYLE VARIABLES
HOW OFTEN DO YOU HAVE SIX OR MORE DRINKS ON ONE OCCASION: 1
HOW OFTEN DO YOU HAVE A DRINK CONTAINING ALCOHOL: 4
HOW MANY STANDARD DRINKS CONTAINING ALCOHOL DO YOU HAVE ON A TYPICAL DAY: 1

## 2024-07-19 ENCOUNTER — OFFICE VISIT (OUTPATIENT)
Age: 79
End: 2024-07-19
Payer: MEDICARE

## 2024-07-19 VITALS — HEIGHT: 65 IN | BODY MASS INDEX: 25.83 KG/M2 | WEIGHT: 155 LBS

## 2024-07-19 DIAGNOSIS — M17.9 OSTEOARTHRITIS OF KNEE, UNSPECIFIED LATERALITY, UNSPECIFIED OSTEOARTHRITIS TYPE: Primary | ICD-10-CM

## 2024-07-19 PROCEDURE — 1036F TOBACCO NON-USER: CPT | Performed by: ORTHOPAEDIC SURGERY

## 2024-07-19 PROCEDURE — G8399 PT W/DXA RESULTS DOCUMENT: HCPCS | Performed by: ORTHOPAEDIC SURGERY

## 2024-07-19 PROCEDURE — G8427 DOCREV CUR MEDS BY ELIG CLIN: HCPCS | Performed by: ORTHOPAEDIC SURGERY

## 2024-07-19 PROCEDURE — G8419 CALC BMI OUT NRM PARAM NOF/U: HCPCS | Performed by: ORTHOPAEDIC SURGERY

## 2024-07-19 PROCEDURE — 1123F ACP DISCUSS/DSCN MKR DOCD: CPT | Performed by: ORTHOPAEDIC SURGERY

## 2024-07-19 PROCEDURE — 99214 OFFICE O/P EST MOD 30 MIN: CPT | Performed by: ORTHOPAEDIC SURGERY

## 2024-07-19 PROCEDURE — 1090F PRES/ABSN URINE INCON ASSESS: CPT | Performed by: ORTHOPAEDIC SURGERY

## 2024-07-19 NOTE — PROGRESS NOTES
Patient: Dali Kaur                MRN: [id number]       SSN: [id number]  YOB: 1945        AGE: 79 y.o.        SEX: female  Body mass index is 25.79 kg/m².    PCP: Noemy Benitez MD  07/19/24    Dali Kaur is a 79 year old female who presents today in follow-up. The patient reports she has found substantial pain relief with the cortisone injections administered 3 weeks ago, and is interested in pursuing visco supplementation in the future.     Labs were reviewed with the patient at length.    Plan: Patient has bilateral patellofemoral arthritis. We will plan to see her again in 8 weeks.    REVIEW OF SYSTEMS:      CON: negative  EYE: negative   ENT: negative  RESP: negative  GI:    negative   :  negative  MSK: Positive  A twelve point review of systems was completed, positives noted and all other systems were reviewed and are negative          Past Medical History:   Diagnosis Date    Arthritis     Eczema     Essential hypertension     FH: breast cancer     Fibroids     uterine    Fracture of radius 11/2009    with ulna, left, closed    History of cataract     History of foot fracture     right and left    Hyperlipidemia     Microhematuria     chronic    Osteopenia     Osteoporosis     Psoriasis     S/P radiation therapy     as child, to right shoulder for birthmark    Syncope and collapse 10/24/2023    Tinnitus     Zoster     right scapula       Family History   Problem Relation Age of Onset    Breast Cancer Sister 58    Mult Sclerosis Sister     Cancer Sister         breast    Cancer Mother         pancreatic    Heart Disease Mother     Heart Disease Father        Current Outpatient Medications   Medication Sig Dispense Refill    atorvastatin (LIPITOR) 20 MG tablet TAKE 1 TABLET BY MOUTH DAILY 90 tablet 3    hydroCHLOROthiazide (HYDRODIURIL) 25 MG tablet TAKE 1 TABLET BY MOUTH DAILY 90 tablet 3    lisinopril (PRINIVIL;ZESTRIL) 10 MG tablet TAKE 1 TABLET BY MOUTH DAILY 90 tablet

## 2024-07-23 ENCOUNTER — OFFICE VISIT (OUTPATIENT)
Facility: CLINIC | Age: 79
End: 2024-07-23

## 2024-07-23 VITALS
RESPIRATION RATE: 14 BRPM | SYSTOLIC BLOOD PRESSURE: 119 MMHG | WEIGHT: 150 LBS | HEIGHT: 65 IN | BODY MASS INDEX: 24.99 KG/M2 | DIASTOLIC BLOOD PRESSURE: 75 MMHG | HEART RATE: 80 BPM | TEMPERATURE: 98.3 F | OXYGEN SATURATION: 97 %

## 2024-07-23 DIAGNOSIS — Z71.89 ACP (ADVANCE CARE PLANNING): ICD-10-CM

## 2024-07-23 DIAGNOSIS — I10 ESSENTIAL HYPERTENSION: Primary | ICD-10-CM

## 2024-07-23 DIAGNOSIS — K76.0 HEPATIC STEATOSIS: ICD-10-CM

## 2024-07-23 DIAGNOSIS — E55.9 VITAMIN D DEFICIENCY: ICD-10-CM

## 2024-07-23 DIAGNOSIS — Z00.00 MEDICARE ANNUAL WELLNESS VISIT, SUBSEQUENT: ICD-10-CM

## 2024-07-23 DIAGNOSIS — M81.0 AGE-RELATED OSTEOPOROSIS WITHOUT CURRENT PATHOLOGICAL FRACTURE: ICD-10-CM

## 2024-07-23 DIAGNOSIS — M17.11 PRIMARY OSTEOARTHRITIS OF RIGHT KNEE: ICD-10-CM

## 2024-07-23 DIAGNOSIS — E78.00 PURE HYPERCHOLESTEROLEMIA: ICD-10-CM

## 2024-07-23 DIAGNOSIS — G89.29 CHRONIC PAIN OF RIGHT KNEE: ICD-10-CM

## 2024-07-23 DIAGNOSIS — M25.561 CHRONIC PAIN OF RIGHT KNEE: ICD-10-CM

## 2024-07-23 DIAGNOSIS — R73.03 PREDIABETES: ICD-10-CM

## 2024-07-23 DIAGNOSIS — Z87.898 HISTORY OF SYNCOPE: ICD-10-CM

## 2024-07-23 DIAGNOSIS — L40.3 PUSTULAR PSORIASIS OF PALMS AND SOLES: ICD-10-CM

## 2024-07-23 RX ORDER — UREA 40 %
CREAM (GRAM) TOPICAL PRN
COMMUNITY

## 2024-07-23 NOTE — PROGRESS NOTES
Dali Kaur presents today for   Chief Complaint   Patient presents with    Medicare AWV       \"Have you been to the ER, urgent care clinic since your last visit?  Hospitalized since your last visit?\"    NO    “Have you seen or consulted any other health care providers outside of [facility] since your last visit?”    NO            
     Body mass index is 24.96 kg/m².      See office progress note for details.         Allergies   Allergen Reactions    Povidone-Iodine Hives    Chlorhexidine Gluconate Rash    Codeine Nausea Only and Nausea And Vomiting     Prior to Visit Medications    Medication Sig Taking? Authorizing Provider   Misc Natural Products (GLUCOSAMINE CHOND CMP ADVANCED PO) Take by mouth Yes ProviderRaz MD   Urea (CARMOL) 40 % cream Apply topically as needed Yes Raz Manzanares MD   atorvastatin (LIPITOR) 20 MG tablet TAKE 1 TABLET BY MOUTH DAILY Yes Noemy Benitez MD   hydroCHLOROthiazide (HYDRODIURIL) 25 MG tablet TAKE 1 TABLET BY MOUTH DAILY Yes Noemy Benitez MD   lisinopril (PRINIVIL;ZESTRIL) 10 MG tablet TAKE 1 TABLET BY MOUTH DAILY Yes Noemy Benitez MD   clobetasol (TEMOVATE) 0.05 % ointment Apply topically 2 times daily Apply topically 2 times daily. Yes ProviderRaz MD   Amoxicillin 500 MG TABS  Yes Automatic Reconciliation, Ar   betamethasone dipropionate 0.05 % cream Apply topically 2 times daily as needed Yes Automatic Reconciliation, Ar   Biotin 2.5 MG TABS Take 2,500 mcg by mouth Yes Automatic Reconciliation, Ar   vitamin D 25 MCG (1000 UT) CAPS Take 1 tablet by mouth daily Yes Automatic Reconciliation, Ar   clotrimazole-betamethasone (LOTRISONE) 1-0.05 % cream Apply topically 2 times daily Yes Automatic Reconciliation, Ar   coenzyme Q10 100 MG CAPS capsule Take 1 capsule by mouth daily Yes Automatic Reconciliation, Ar   denosumab (PROLIA) 60 MG/ML SOSY SC injection Inject 1 mL into the skin Yes Automatic Reconciliation, Ar   docusate (COLACE, DULCOLAX) 100 MG CAPS Take 100 mg by mouth daily as needed Yes Automatic Reconciliation, Ar   DULoxetine (CYMBALTA) 20 MG extended release capsule Take 1 capsule by mouth daily Yes Automatic Reconciliation, Ar   fexofenadine (ALLEGRA) 60 MG tablet Take 1 tablet by mouth daily Yes Automatic Reconciliation, Ar       CareTeam (Including outside 
Subsequent normalization and remains normal today.  Advised to minimize alcohol and NSAIDS. Also, emphasized importance of lifestyle modifications, including heart healthy diet and regular exercise.   8. Pustular psoriasis.  Newly diagnosed on bilateral hands, lower legs, and feet.  Being treated with betamethasone cream and UV light treatment to bilateral plantar surface of feet.  Developed blistering with UV light treatment, and now applying clobetasol and Cling wrap nightly with some improvement on her feet. Not wishing to proceed with systemic biologic therapy due to concern for side effects, and declined treatment with acitretin due to concern for possible hair loss.  Now following with Dr. Patel.  Completed evaluation for inflammatory arthritis with bilateral hand x-rays and lab evaluation which were unremarkable.  Some evidence of inflammation of the second MCP joint of her right hand has improved with application of Voltaren gel.  Repeat lab evaluation with Dr. Granados in 6/2024 also negative for inflammatory arthritis.  Continuing to do well with topical therapy.  Will continue to monitor.  9. Cerebral lipoma.  Incidental finding of low attenuated lobulated structure in the quadrigeminal cistern on MRI in 7/2019 and felt to be unchanged when compared to prior imaging.  Noted again in the ED on head CT scan (10/2023) and felt to be unchanged and not causing any obstruction or hydrocephalus.  10. Allergic rhinitis with cough.  Prior difficulty with postnasal drainage and occasional cough.  Continues on Allegra and Flonase with improvement.    11. Hiatal hernia, moderate-large.  Incidental finding of moderate-large hiatal hernia on chest CTA.  Patient denies any symptoms of reflux or epigastric discomfort with eating.  Will continue to monitor.  12. Prolapse with urinary incontinence.  Reported noting vaginal bulge and experiencing intermittent leakage with incontinence.  Wishing evaluation by gynecology

## 2024-07-23 NOTE — ACP (ADVANCE CARE PLANNING)
Advance Care Planning     Advance Care Planning (ACP) Physician/NP/PA Conversation    Date of Conversation: 7/23/2024  Conducted with: Patient with Decision Making Capacity    Healthcare Decision Maker:      Primary Decision Maker: Pascual Kaur - Spouse - [phone]    Secondary Decision Maker: Ronald Kaur - Child - [phone]    Click here to complete Healthcare Decision Makers including selection of the Healthcare Decision Maker Relationship (ie \"Primary\")  Today we confirmed healthcare decision-maker as her  and son    Care Preferences:    Hospitalization:  \"If your health worsens and it becomes clear that your chance of recovery is unlikely, what would be your preference regarding hospitalization?\"  The patient would prefer hospitalization.    Ventilation:  \"If you were unable to breath on your own and your chance of recovery was unlikely, what would be your preference about the use of a ventilator (breathing machine) if it was available to you?\"  The patient would desire the use of a ventilator.    Resuscitation:  \"In the event your heart stopped as a result of an underlying serious health condition, would you want attempts made to restart your heart, or would you prefer a natural death?\"  Yes, attempt to resuscitate.    treatment goals, benefit/burden of treatment options, ventilation preferences, hospitalization preferences, resuscitation preferences, and end of life care preferences (vegetative state/imminent death)    Conversation Outcomes / Follow-Up Plan:  ACP complete - no further action today  Reviewed DNR/DNI and patient elects Full Code (Attempt Resuscitation)    Length of Voluntary ACP Conversation in minutes:  16 minutes    Noemy Benitez MD

## 2024-07-27 PROBLEM — K76.0 HEPATIC STEATOSIS: Status: ACTIVE | Noted: 2024-07-27

## 2024-08-15 ENCOUNTER — TRANSCRIBE ORDERS (OUTPATIENT)
Facility: HOSPITAL | Age: 79
End: 2024-08-15

## 2024-08-15 DIAGNOSIS — Z12.31 SCREENING MAMMOGRAM FOR HIGH-RISK PATIENT: Primary | ICD-10-CM

## 2024-09-20 ENCOUNTER — OFFICE VISIT (OUTPATIENT)
Age: 79
End: 2024-09-20

## 2024-09-20 VITALS — HEIGHT: 65 IN | WEIGHT: 153.6 LBS | BODY MASS INDEX: 25.59 KG/M2

## 2024-09-20 DIAGNOSIS — M17.12 PRIMARY OSTEOARTHRITIS OF LEFT KNEE: Primary | ICD-10-CM

## 2024-09-20 RX ORDER — LIDOCAINE HYDROCHLORIDE 10 MG/ML
3 INJECTION, SOLUTION INFILTRATION; PERINEURAL ONCE
Status: COMPLETED | OUTPATIENT
Start: 2024-09-20 | End: 2024-09-20

## 2024-09-20 RX ORDER — BETAMETHASONE SODIUM PHOSPHATE AND BETAMETHASONE ACETATE 3; 3 MG/ML; MG/ML
6 INJECTION, SUSPENSION INTRA-ARTICULAR; INTRALESIONAL; INTRAMUSCULAR; SOFT TISSUE ONCE
Status: COMPLETED | OUTPATIENT
Start: 2024-09-20 | End: 2024-09-20

## 2024-09-20 RX ADMIN — LIDOCAINE HYDROCHLORIDE 3 ML: 10 INJECTION, SOLUTION INFILTRATION; PERINEURAL at 08:51

## 2024-09-20 RX ADMIN — BETAMETHASONE SODIUM PHOSPHATE AND BETAMETHASONE ACETATE 6 MG: 3; 3 INJECTION, SUSPENSION INTRA-ARTICULAR; INTRALESIONAL; INTRAMUSCULAR; SOFT TISSUE at 08:49

## 2024-10-07 ENCOUNTER — HOSPITAL ENCOUNTER (OUTPATIENT)
Facility: HOSPITAL | Age: 79
Discharge: HOME | End: 2024-10-10
Attending: INTERNAL MEDICINE
Payer: MEDICARE

## 2024-10-07 VITALS — HEIGHT: 65 IN | BODY MASS INDEX: 25.56 KG/M2

## 2024-10-07 DIAGNOSIS — Z12.31 ENCOUNTER FOR SCREENING MAMMOGRAM FOR HIGH-RISK PATIENT: ICD-10-CM

## 2024-10-07 PROCEDURE — 77063 BREAST TOMOSYNTHESIS BI: CPT

## 2024-12-20 ENCOUNTER — OFFICE VISIT (OUTPATIENT)
Age: 79
End: 2024-12-20

## 2024-12-20 VITALS — HEIGHT: 65 IN | WEIGHT: 150 LBS | BODY MASS INDEX: 24.99 KG/M2

## 2024-12-20 DIAGNOSIS — M17.12 PRIMARY OSTEOARTHRITIS OF LEFT KNEE: Primary | ICD-10-CM

## 2024-12-20 RX ORDER — LIDOCAINE HYDROCHLORIDE 10 MG/ML
3 INJECTION, SOLUTION INFILTRATION; PERINEURAL ONCE
Status: COMPLETED | OUTPATIENT
Start: 2024-12-20 | End: 2024-12-20

## 2024-12-20 RX ORDER — BETAMETHASONE SODIUM PHOSPHATE AND BETAMETHASONE ACETATE 3; 3 MG/ML; MG/ML
6 INJECTION, SUSPENSION INTRA-ARTICULAR; INTRALESIONAL; INTRAMUSCULAR; SOFT TISSUE ONCE
Status: COMPLETED | OUTPATIENT
Start: 2024-12-20 | End: 2024-12-20

## 2024-12-20 RX ADMIN — BETAMETHASONE SODIUM PHOSPHATE AND BETAMETHASONE ACETATE 6 MG: 3; 3 INJECTION, SUSPENSION INTRA-ARTICULAR; INTRALESIONAL; INTRAMUSCULAR; SOFT TISSUE at 08:43

## 2024-12-20 RX ADMIN — LIDOCAINE HYDROCHLORIDE 3 ML: 10 INJECTION, SOLUTION INFILTRATION; PERINEURAL at 08:43

## 2024-12-20 NOTE — PROGRESS NOTES
Patient: Dali Kaur                MRN: [id number]       SSN: [id number]  YOB: 1945        AGE: 79 y.o.        SEX: female  Body mass index is 24.96 kg/m².    PCP: Noemy Benitez MD  12/20/24    Dali is here today complaining of bilateral knee pain worse on the left than on the right she has been getting cortisone injections regularly she may be a good candidate for viscosupplementation she stays very busy with the family and of the right knee clicks but does not hurt hide examined her today both the knees both hips the right hip scheduled with mild arthritis but not severely so she has a pause Phill's test but the knee is not particularly painful no swelling full motion the left knee she is got symptomatic patellofemoral arthritis minimal peripheral edema she appears quite a bit younger than her stated age she stays very active    I think she is got an asymptomatic medial meniscus tear without locking or pain but clicking and have offered an MRI she just wants to watch it for now for the left knee she like an injection she might be a good candidate for viscosupplementation which would be happy to do i.e. Euflexxa she wanted a cortisone injection today therefore left knee injected as per protocol and social determinants of health were again reviewed no negative factors affecting patient care been a pleasure to share in her care     REVIEW OF SYSTEMS:      CON: negative  EYE: negative   ENT: negative  RESP: negative  GI:    negative   :  negative  MSK: Positive  A twelve point review of systems was completed, positives noted and all other systems were reviewed and are negative          Past Medical History:   Diagnosis Date    Arthritis     Eczema     Essential hypertension     FH: breast cancer     Fibroids     uterine    Fracture of radius 11/2009    with ulna, left, closed    History of cataract     History of foot fracture     right and left    Hyperlipidemia

## 2025-01-16 ENCOUNTER — HOSPITAL ENCOUNTER (OUTPATIENT)
Facility: HOSPITAL | Age: 80
Setting detail: SPECIMEN
Discharge: HOME | End: 2025-01-19
Payer: MEDICARE

## 2025-01-16 DIAGNOSIS — E55.9 VITAMIN D DEFICIENCY: ICD-10-CM

## 2025-01-16 DIAGNOSIS — E78.00 PURE HYPERCHOLESTEROLEMIA: ICD-10-CM

## 2025-01-16 DIAGNOSIS — M81.0 AGE-RELATED OSTEOPOROSIS WITHOUT CURRENT PATHOLOGICAL FRACTURE: ICD-10-CM

## 2025-01-16 DIAGNOSIS — I10 ESSENTIAL HYPERTENSION: ICD-10-CM

## 2025-01-16 DIAGNOSIS — R73.03 PREDIABETES: ICD-10-CM

## 2025-01-16 LAB
25(OH)D3 SERPL-MCNC: 41.5 NG/ML (ref 30–100)
ALBUMIN SERPL-MCNC: 4 G/DL (ref 3.4–5)
ALBUMIN/GLOB SERPL: 1.3 (ref 0.8–1.7)
ALP SERPL-CCNC: 48 U/L (ref 45–117)
ALT SERPL-CCNC: 23 U/L (ref 13–56)
ANION GAP SERPL CALC-SCNC: 2 MMOL/L (ref 3–18)
APPEARANCE UR: ABNORMAL
AST SERPL-CCNC: 22 U/L (ref 10–38)
BACTERIA URNS QL MICRO: ABNORMAL /HPF
BASOPHILS # BLD: 0.04 K/UL (ref 0–0.1)
BASOPHILS NFR BLD: 0.7 % (ref 0–2)
BILIRUB SERPL-MCNC: 0.6 MG/DL (ref 0.2–1)
BILIRUB UR QL: NEGATIVE
BUN SERPL-MCNC: 22 MG/DL (ref 7–18)
BUN/CREAT SERPL: 31 (ref 12–20)
CALCIUM SERPL-MCNC: 9.4 MG/DL (ref 8.5–10.1)
CHLORIDE SERPL-SCNC: 103 MMOL/L (ref 100–111)
CHOLEST SERPL-MCNC: 174 MG/DL
CO2 SERPL-SCNC: 31 MMOL/L (ref 21–32)
COLOR UR: YELLOW
CREAT SERPL-MCNC: 0.7 MG/DL (ref 0.6–1.3)
CREAT UR-MCNC: 221 MG/DL (ref 30–125)
DIFFERENTIAL METHOD BLD: ABNORMAL
EOSINOPHIL # BLD: 0.24 K/UL (ref 0–0.4)
EOSINOPHIL NFR BLD: 4.1 % (ref 0–5)
EPITH CASTS URNS QL MICRO: ABNORMAL /LPF (ref 0–5)
ERYTHROCYTE [DISTWIDTH] IN BLOOD BY AUTOMATED COUNT: 13.6 % (ref 11.6–14.5)
EST. AVERAGE GLUCOSE BLD GHB EST-MCNC: 117 MG/DL
GLOBULIN SER CALC-MCNC: 3.2 G/DL (ref 2–4)
GLUCOSE SERPL-MCNC: 97 MG/DL (ref 74–99)
GLUCOSE UR STRIP.AUTO-MCNC: NEGATIVE MG/DL
HBA1C MFR BLD: 5.7 % (ref 4.2–5.6)
HCT VFR BLD AUTO: 41.5 % (ref 35–45)
HDLC SERPL-MCNC: 85 MG/DL (ref 40–60)
HDLC SERPL: 2 (ref 0–5)
HGB BLD-MCNC: 12.6 G/DL (ref 12–16)
HGB UR QL STRIP: ABNORMAL
IMM GRANULOCYTES # BLD AUTO: 0.01 K/UL (ref 0–0.04)
IMM GRANULOCYTES NFR BLD AUTO: 0.2 % (ref 0–0.5)
KETONES UR QL STRIP.AUTO: ABNORMAL MG/DL
LDLC SERPL CALC-MCNC: 73.2 MG/DL (ref 0–100)
LEUKOCYTE ESTERASE UR QL STRIP.AUTO: ABNORMAL
LIPID PANEL: ABNORMAL
LYMPHOCYTES # BLD: 1.5 K/UL (ref 0.9–3.6)
LYMPHOCYTES NFR BLD: 25.4 % (ref 21–52)
MCH RBC QN AUTO: 27.8 PG (ref 24–34)
MCHC RBC AUTO-ENTMCNC: 30.4 G/DL (ref 31–37)
MCV RBC AUTO: 91.4 FL (ref 78–100)
MICROALBUMIN UR-MCNC: 3.3 MG/DL (ref 0–3)
MICROALBUMIN/CREAT UR-RTO: 15 MG/G (ref 0–30)
MONOCYTES # BLD: 0.53 K/UL (ref 0.05–1.2)
MONOCYTES NFR BLD: 9 % (ref 3–10)
NEUTS SEG # BLD: 3.58 K/UL (ref 1.8–8)
NEUTS SEG NFR BLD: 60.6 % (ref 40–73)
NITRITE UR QL STRIP.AUTO: NEGATIVE
NRBC # BLD: 0 K/UL (ref 0–0.01)
NRBC BLD-RTO: 0 PER 100 WBC
PH UR STRIP: 5 (ref 5–8)
PLATELET # BLD AUTO: 296 K/UL (ref 135–420)
PMV BLD AUTO: 9.6 FL (ref 9.2–11.8)
POTASSIUM SERPL-SCNC: 4.2 MMOL/L (ref 3.5–5.5)
PROT SERPL-MCNC: 7.2 G/DL (ref 6.4–8.2)
PROT UR STRIP-MCNC: NEGATIVE MG/DL
RBC # BLD AUTO: 4.54 M/UL (ref 4.2–5.3)
RBC #/AREA URNS HPF: ABNORMAL /HPF (ref 0–5)
SODIUM SERPL-SCNC: 136 MMOL/L (ref 136–145)
SP GR UR REFRACTOMETRY: 1.02 (ref 1–1.03)
TRIGL SERPL-MCNC: 79 MG/DL
UROBILINOGEN UR QL STRIP.AUTO: 0.2 EU/DL (ref 0.2–1)
VLDLC SERPL CALC-MCNC: 15.8 MG/DL
WBC # BLD AUTO: 5.9 K/UL (ref 4.6–13.2)
WBC URNS QL MICRO: ABNORMAL /HPF (ref 0–5)

## 2025-01-16 PROCEDURE — 82570 ASSAY OF URINE CREATININE: CPT

## 2025-01-16 PROCEDURE — 82306 VITAMIN D 25 HYDROXY: CPT

## 2025-01-16 PROCEDURE — 36415 COLL VENOUS BLD VENIPUNCTURE: CPT

## 2025-01-16 PROCEDURE — 81001 URINALYSIS AUTO W/SCOPE: CPT

## 2025-01-16 PROCEDURE — 85025 COMPLETE CBC W/AUTO DIFF WBC: CPT

## 2025-01-16 PROCEDURE — 83036 HEMOGLOBIN GLYCOSYLATED A1C: CPT

## 2025-01-16 PROCEDURE — 82043 UR ALBUMIN QUANTITATIVE: CPT

## 2025-01-16 PROCEDURE — 80053 COMPREHEN METABOLIC PANEL: CPT

## 2025-01-16 PROCEDURE — 80061 LIPID PANEL: CPT

## 2025-01-19 SDOH — ECONOMIC STABILITY: FOOD INSECURITY: WITHIN THE PAST 12 MONTHS, THE FOOD YOU BOUGHT JUST DIDN'T LAST AND YOU DIDN'T HAVE MONEY TO GET MORE.: NEVER TRUE

## 2025-01-19 SDOH — ECONOMIC STABILITY: FOOD INSECURITY: WITHIN THE PAST 12 MONTHS, YOU WORRIED THAT YOUR FOOD WOULD RUN OUT BEFORE YOU GOT MONEY TO BUY MORE.: NEVER TRUE

## 2025-01-19 SDOH — ECONOMIC STABILITY: TRANSPORTATION INSECURITY
IN THE PAST 12 MONTHS, HAS THE LACK OF TRANSPORTATION KEPT YOU FROM MEDICAL APPOINTMENTS OR FROM GETTING MEDICATIONS?: NO

## 2025-01-19 SDOH — ECONOMIC STABILITY: INCOME INSECURITY: IN THE LAST 12 MONTHS, WAS THERE A TIME WHEN YOU WERE NOT ABLE TO PAY THE MORTGAGE OR RENT ON TIME?: NO

## 2025-01-22 ENCOUNTER — TELEPHONE (OUTPATIENT)
Facility: CLINIC | Age: 80
End: 2025-01-22

## 2025-01-22 ENCOUNTER — TELEMEDICINE (OUTPATIENT)
Facility: CLINIC | Age: 80
End: 2025-01-22

## 2025-01-22 DIAGNOSIS — G89.29 CHRONIC PAIN OF BOTH KNEES: ICD-10-CM

## 2025-01-22 DIAGNOSIS — M25.561 CHRONIC PAIN OF BOTH KNEES: ICD-10-CM

## 2025-01-22 DIAGNOSIS — E66.3 OVERWEIGHT WITH BODY MASS INDEX (BMI) 25.0-29.9: ICD-10-CM

## 2025-01-22 DIAGNOSIS — K76.0 HEPATIC STEATOSIS: ICD-10-CM

## 2025-01-22 DIAGNOSIS — M25.562 CHRONIC PAIN OF BOTH KNEES: ICD-10-CM

## 2025-01-22 DIAGNOSIS — M17.10 PRIMARY OSTEOARTHRITIS OF KNEE, UNSPECIFIED LATERALITY: ICD-10-CM

## 2025-01-22 DIAGNOSIS — E78.00 PURE HYPERCHOLESTEROLEMIA: ICD-10-CM

## 2025-01-22 DIAGNOSIS — M81.0 AGE-RELATED OSTEOPOROSIS WITHOUT CURRENT PATHOLOGICAL FRACTURE: ICD-10-CM

## 2025-01-22 DIAGNOSIS — I10 ESSENTIAL HYPERTENSION: Primary | ICD-10-CM

## 2025-01-22 DIAGNOSIS — L40.3 PUSTULAR PSORIASIS OF PALMS AND SOLES: ICD-10-CM

## 2025-01-22 DIAGNOSIS — R73.03 PREDIABETES: ICD-10-CM

## 2025-01-22 RX ORDER — LISINOPRIL 10 MG/1
10 TABLET ORAL DAILY
Qty: 90 TABLET | Refills: 3 | Status: SHIPPED | OUTPATIENT
Start: 2025-01-22

## 2025-01-22 ASSESSMENT — PATIENT HEALTH QUESTIONNAIRE - PHQ9
SUM OF ALL RESPONSES TO PHQ QUESTIONS 1-9: 0
2. FEELING DOWN, DEPRESSED OR HOPELESS: NOT AT ALL
SUM OF ALL RESPONSES TO PHQ9 QUESTIONS 1 & 2: 0
1. LITTLE INTEREST OR PLEASURE IN DOING THINGS: NOT AT ALL

## 2025-01-22 NOTE — PROGRESS NOTES
Dali Kaur presents today for   Chief Complaint   Patient presents with    Follow-up       \"Have you been to the ER, urgent care clinic since your last visit?  Hospitalized since your last visit?\"    NO    “Have you seen or consulted any other health care providers outside of [facility] since your last visit?”    NO            
[x] Appears well-developed and well-nourished [x] No apparent distress      [] Abnormal-   Mental status  [x] Alert and awake  [x] Oriented to person/place/time [x]Able to follow commands      Eyes:  EOM    [x]  Normal  [] Abnormal-  Sclera  [x]  Normal  [] Abnormal -         Discharge [x]  None visible  [] Abnormal -    HENT:   [x] Normocephalic, atraumatic.  [] Abnormal   [] Mouth/Throat: Mucous membranes are moist.     External Ears [x] Normal  [] Abnormal-     Neck: [x] No visualized mass     Pulmonary/Chest: [x] Respiratory effort normal.  [x] No visualized signs of difficulty breathing or respiratory distress        [] Abnormal-      Musculoskeletal:   [] Normal gait with no signs of ataxia         [x] Normal range of motion of neck        [] Abnormal-       Neurological:        [x] No Facial Asymmetry (Cranial nerve 7 motor function) (limited exam to video visit)          [x] No gaze palsy        [] Abnormal-         Skin:        [x] No significant exanthematous lesions or discoloration noted on facial skin         [] Abnormal-            Psychiatric:       [x] Normal Affect [x] No Hallucinations        [] Abnormal-           Review of Data:  Labs:  Hospital Outpatient Visit on 01/16/2025   Component Date Value Ref Range Status    WBC 01/16/2025 5.9  4.6 - 13.2 K/uL Final    RBC 01/16/2025 4.54  4.20 - 5.30 M/uL Final    Hemoglobin 01/16/2025 12.6  12.0 - 16.0 g/dL Final    Hematocrit 01/16/2025 41.5  35.0 - 45.0 % Final    MCV 01/16/2025 91.4  78.0 - 100.0 FL Final    MCH 01/16/2025 27.8  24.0 - 34.0 PG Final    MCHC 01/16/2025 30.4 (L)  31.0 - 37.0 g/dL Final    RDW 01/16/2025 13.6  11.6 - 14.5 % Final    Platelets 01/16/2025 296  135 - 420 K/uL Final    MPV 01/16/2025 9.6  9.2 - 11.8 FL Final    Nucleated RBCs 01/16/2025 0.0  0  WBC Final    nRBC 01/16/2025 0.00  0.00 - 0.01 K/uL Final    Neutrophils % 01/16/2025 60.6  40.0 - 73.0 % Final    Lymphocytes % 01/16/2025 25.4  21.0 - 52.0 % Final

## 2025-01-22 NOTE — TELEPHONE ENCOUNTER
Patient seen for virtual visit today.  Please schedule her for a follow-up appointment with labs prior in 6 months.    Thank you.

## 2025-01-28 ENCOUNTER — HOSPITAL ENCOUNTER (EMERGENCY)
Facility: HOSPITAL | Age: 80
Discharge: HOME | End: 2025-01-28
Attending: STUDENT IN AN ORGANIZED HEALTH CARE EDUCATION/TRAINING PROGRAM
Payer: MEDICARE

## 2025-01-28 ENCOUNTER — APPOINTMENT (OUTPATIENT)
Facility: HOSPITAL | Age: 80
End: 2025-01-28
Payer: MEDICARE

## 2025-01-28 VITALS
WEIGHT: 151 LBS | BODY MASS INDEX: 25.16 KG/M2 | HEART RATE: 88 BPM | RESPIRATION RATE: 20 BRPM | OXYGEN SATURATION: 100 % | TEMPERATURE: 98.5 F | SYSTOLIC BLOOD PRESSURE: 136 MMHG | HEIGHT: 65 IN | DIASTOLIC BLOOD PRESSURE: 57 MMHG

## 2025-01-28 DIAGNOSIS — S62.101A CLOSED FRACTURE OF RIGHT WRIST, INITIAL ENCOUNTER: Primary | ICD-10-CM

## 2025-01-28 PROCEDURE — 25605 CLTX DST RDL FX/EPHYS SEP W/: CPT

## 2025-01-28 PROCEDURE — 73090 X-RAY EXAM OF FOREARM: CPT

## 2025-01-28 PROCEDURE — 6360000002 HC RX W HCPCS: Performed by: STUDENT IN AN ORGANIZED HEALTH CARE EDUCATION/TRAINING PROGRAM

## 2025-01-28 PROCEDURE — 99283 EMERGENCY DEPT VISIT LOW MDM: CPT

## 2025-01-28 PROCEDURE — 6370000000 HC RX 637 (ALT 250 FOR IP): Performed by: STUDENT IN AN ORGANIZED HEALTH CARE EDUCATION/TRAINING PROGRAM

## 2025-01-28 PROCEDURE — 73110 X-RAY EXAM OF WRIST: CPT

## 2025-01-28 RX ORDER — LIDOCAINE HYDROCHLORIDE 10 MG/ML
5 INJECTION, SOLUTION EPIDURAL; INFILTRATION; INTRACAUDAL; PERINEURAL
Status: COMPLETED | OUTPATIENT
Start: 2025-01-28 | End: 2025-01-28

## 2025-01-28 RX ORDER — ACETAMINOPHEN 500 MG
1000 TABLET ORAL
Status: COMPLETED | OUTPATIENT
Start: 2025-01-28 | End: 2025-01-28

## 2025-01-28 RX ORDER — IBUPROFEN 600 MG/1
600 TABLET, FILM COATED ORAL
Status: COMPLETED | OUTPATIENT
Start: 2025-01-28 | End: 2025-01-28

## 2025-01-28 RX ADMIN — LIDOCAINE HYDROCHLORIDE 5 ML: 10 INJECTION, SOLUTION EPIDURAL; INFILTRATION; INTRACAUDAL; PERINEURAL at 11:52

## 2025-01-28 RX ADMIN — ACETAMINOPHEN 1000 MG: 500 TABLET ORAL at 11:52

## 2025-01-28 RX ADMIN — IBUPROFEN 600 MG: 600 TABLET, FILM COATED ORAL at 11:52

## 2025-01-28 ASSESSMENT — LIFESTYLE VARIABLES
HOW OFTEN DO YOU HAVE A DRINK CONTAINING ALCOHOL: MONTHLY OR LESS
HOW MANY STANDARD DRINKS CONTAINING ALCOHOL DO YOU HAVE ON A TYPICAL DAY: 1 OR 2

## 2025-01-28 ASSESSMENT — PAIN DESCRIPTION - LOCATION: LOCATION: ARM;HAND

## 2025-01-28 ASSESSMENT — PAIN - FUNCTIONAL ASSESSMENT
PAIN_FUNCTIONAL_ASSESSMENT: 0-10
PAIN_FUNCTIONAL_ASSESSMENT: PREVENTS OR INTERFERES WITH ALL ACTIVE AND SOME PASSIVE ACTIVITIES

## 2025-01-28 ASSESSMENT — PAIN DESCRIPTION - ORIENTATION: ORIENTATION: RIGHT

## 2025-01-28 ASSESSMENT — PAIN DESCRIPTION - PAIN TYPE: TYPE: ACUTE PAIN

## 2025-01-28 ASSESSMENT — PAIN SCALES - GENERAL: PAINLEVEL_OUTOF10: 4

## 2025-01-28 ASSESSMENT — PAIN DESCRIPTION - FREQUENCY: FREQUENCY: CONTINUOUS

## 2025-01-28 ASSESSMENT — PAIN DESCRIPTION - DESCRIPTORS: DESCRIPTORS: DULL

## 2025-01-28 NOTE — ED PROVIDER NOTES
[facility] EMERGENCY DEPARTMENT  EMERGENCY DEPARTMENT ENCOUNTER      Pt Name: Dali Kaur  MRN: [id number]  Birthdate 1945  Date of evaluation: 1/28/2025  Provider: Gay Tejeda MD    CHIEF COMPLAINT       Chief Complaint   Patient presents with    Arm Injury         HISTORY OF PRESENT ILLNESS   (Location/Symptom, Timing/Onset, Context/Setting, Quality, Duration, Modifying Factors, Severity)  Note limiting factors.   Dali Kaur is a 80 y.o. female who presents to the emergency department with right wrist pain.  States that last night she tripped on a rug and fell and landed on her right arm.  She denies any head or neck pain.  She she did not hit her head.  No concerning that happened.  Denies any nausea or vomiting.  Denies any pain elsewhere.  She took some Aleve and was able to sleep tonight but this morning when she woke up she noticed it was quite swollen which concerned her.  She is still able to ambulate.  Denies aching or numbness.  Does have a history of a wrist fracture on the left but not on this side.  Denies any symptoms prior to her fall.     Nursing Notes were reviewed.    REVIEW OF SYSTEMS    (2-9 systems for level 4, 10 or more for level 5)     Constitutional: No fever  Respiratory: No SOB  Cardio: No chest pain  GI: No blood in stool  : No hematuria  MSK: No back pain  Neuro: No headache    Except as noted above the remainder of the review of systems was reviewed and negative.       PAST MEDICAL HISTORY     Past Medical History:   Diagnosis Date    Arthritis     Eczema     Essential hypertension     FH: breast cancer     Fibroids     uterine    Fracture of radius 11/2009    with ulna, left, closed    History of cataract     History of foot fracture     right and left    Hyperlipidemia     Microhematuria     chronic    Osteopenia     Osteoporosis     Psoriasis     S/P radiation therapy     as child, to right shoulder for birthmark    Syncope and collapse 10/24/2023

## 2025-01-28 NOTE — ED TRIAGE NOTES
Patient had fall last night, fell onto her right arm. Pain to right forearm and wrist. Swelling noted to area.

## 2025-01-28 NOTE — DISCHARGE INSTRUCTIONS
Recommend taking extra strength Tylenol every 4-6 hours and ibuprofen 600 mg every 6 hours as needed for pain and discomfort.  Follow-up with orthopedics.

## 2025-01-30 ENCOUNTER — OFFICE VISIT (OUTPATIENT)
Age: 80
End: 2025-01-30
Payer: MEDICARE

## 2025-01-30 VITALS — HEIGHT: 65 IN | WEIGHT: 151 LBS | BODY MASS INDEX: 25.16 KG/M2

## 2025-01-30 DIAGNOSIS — S52.571A OTHER CLOSED INTRA-ARTICULAR FRACTURE OF DISTAL END OF RIGHT RADIUS, INITIAL ENCOUNTER: Primary | ICD-10-CM

## 2025-01-30 PROCEDURE — 99213 OFFICE O/P EST LOW 20 MIN: CPT | Performed by: ORTHOPAEDIC SURGERY

## 2025-01-30 PROCEDURE — G8419 CALC BMI OUT NRM PARAM NOF/U: HCPCS | Performed by: ORTHOPAEDIC SURGERY

## 2025-01-30 PROCEDURE — 1123F ACP DISCUSS/DSCN MKR DOCD: CPT | Performed by: ORTHOPAEDIC SURGERY

## 2025-01-30 PROCEDURE — G8427 DOCREV CUR MEDS BY ELIG CLIN: HCPCS | Performed by: ORTHOPAEDIC SURGERY

## 2025-01-30 PROCEDURE — G8399 PT W/DXA RESULTS DOCUMENT: HCPCS | Performed by: ORTHOPAEDIC SURGERY

## 2025-01-30 PROCEDURE — 1036F TOBACCO NON-USER: CPT | Performed by: ORTHOPAEDIC SURGERY

## 2025-01-30 PROCEDURE — 1159F MED LIST DOCD IN RCRD: CPT | Performed by: ORTHOPAEDIC SURGERY

## 2025-01-30 PROCEDURE — 1125F AMNT PAIN NOTED PAIN PRSNT: CPT | Performed by: ORTHOPAEDIC SURGERY

## 2025-01-30 PROCEDURE — 1090F PRES/ABSN URINE INCON ASSESS: CPT | Performed by: ORTHOPAEDIC SURGERY

## 2025-01-30 PROCEDURE — 1160F RVW MEDS BY RX/DR IN RCRD: CPT | Performed by: ORTHOPAEDIC SURGERY

## 2025-01-30 NOTE — PROGRESS NOTES
Dali Kaur is a 80 y.o. female right handed retiree.  Worker's Compensation and legal considerations: none    Chief Complaint   Patient presents with    Wrist Pain     Right wrist      Pain Score:   3    Subjective:     Initial HPI: Patient presents today with complaints of wrist pain and swelling after suffering a fall 2 days prior.  She was diagnosed with a wrist fracture in the emergency department and has been in a splint which has been removed today.    Date of onset: 1/28/2025  Injury: Yes: Comment: Fall  Prior Treatment:  Yes: Comment: Splint  Contributory history: None    ROS: Review of Systems - General ROS: negative except HPI    Past Medical History:   Diagnosis Date    Arthritis     Eczema     Essential hypertension     FH: breast cancer     Fibroids     uterine    Fracture of radius 11/2009    with ulna, left, closed    History of cataract     History of foot fracture     right and left    Hyperlipidemia     Microhematuria     chronic    Osteopenia     Osteoporosis     Psoriasis     S/P radiation therapy     as child, to right shoulder for birthmark    Syncope and collapse 10/24/2023    Tinnitus     Zoster     right scapula       Past Surgical History:   Procedure Laterality Date    CATARACT REMOVAL      COLONOSCOPY      COLONOSCOPY N/A 11/17/2020    COLONOSCOPY with polypectomies performed by Padmaja Ordoñez MD at [facility] ENDOSCOPY    COLONOSCOPY N/A 01/08/2024    COLONOSCOPY with polypectomies performed by Padmaja Ordoñez MD at [facility] ENDOSCOPY    FOOT SURGERY      FRACTURE SURGERY Right 04/24/2019    right foot 3rd metatarsal fracture, screws and a plate    ORTHOPEDIC SURGERY      left arm    WISDOM TOOTH EXTRACTION      WRIST FRACTURE SURGERY          Current Outpatient Medications   Medication Sig Dispense Refill    lisinopril (PRINIVIL;ZESTRIL) 10 MG tablet Take 1 tablet by mouth daily 90 tablet 3    Misc Natural Products (GLUCOSAMINE CHOND CMP ADVANCED PO) Take by mouth      Urea

## 2025-02-13 ENCOUNTER — OFFICE VISIT (OUTPATIENT)
Age: 80
End: 2025-02-13
Payer: MEDICARE

## 2025-02-13 VITALS — BODY MASS INDEX: 25.16 KG/M2 | HEIGHT: 65 IN | WEIGHT: 151 LBS

## 2025-02-13 DIAGNOSIS — S52.571A OTHER CLOSED INTRA-ARTICULAR FRACTURE OF DISTAL END OF RIGHT RADIUS, INITIAL ENCOUNTER: Primary | ICD-10-CM

## 2025-02-13 PROCEDURE — G8399 PT W/DXA RESULTS DOCUMENT: HCPCS | Performed by: ORTHOPAEDIC SURGERY

## 2025-02-13 PROCEDURE — 1159F MED LIST DOCD IN RCRD: CPT | Performed by: ORTHOPAEDIC SURGERY

## 2025-02-13 PROCEDURE — 1123F ACP DISCUSS/DSCN MKR DOCD: CPT | Performed by: ORTHOPAEDIC SURGERY

## 2025-02-13 PROCEDURE — 73110 X-RAY EXAM OF WRIST: CPT | Performed by: ORTHOPAEDIC SURGERY

## 2025-02-13 PROCEDURE — 99213 OFFICE O/P EST LOW 20 MIN: CPT | Performed by: ORTHOPAEDIC SURGERY

## 2025-02-13 PROCEDURE — G8419 CALC BMI OUT NRM PARAM NOF/U: HCPCS | Performed by: ORTHOPAEDIC SURGERY

## 2025-02-13 PROCEDURE — 1090F PRES/ABSN URINE INCON ASSESS: CPT | Performed by: ORTHOPAEDIC SURGERY

## 2025-02-13 PROCEDURE — 1126F AMNT PAIN NOTED NONE PRSNT: CPT | Performed by: ORTHOPAEDIC SURGERY

## 2025-02-13 PROCEDURE — 1036F TOBACCO NON-USER: CPT | Performed by: ORTHOPAEDIC SURGERY

## 2025-02-13 PROCEDURE — G8427 DOCREV CUR MEDS BY ELIG CLIN: HCPCS | Performed by: ORTHOPAEDIC SURGERY

## 2025-02-13 PROCEDURE — 1160F RVW MEDS BY RX/DR IN RCRD: CPT | Performed by: ORTHOPAEDIC SURGERY

## 2025-02-13 NOTE — PROGRESS NOTES
Dali Kaur is a 80 y.o. female right handed retiree.  Worker's Compensation and legal considerations: none    Chief Complaint   Patient presents with    Follow-up     Right wrist     Pain Score:   0 - No pain    Subjective:     2/13/2025 HPI: Patient presents today for follow-up of her right distal radius fracture.  She reports significant improvement in pain to be minimal at this point.  She has been wearing her wrist brace at all times.    Initial HPI: Patient presents today with complaints of wrist pain and swelling after suffering a fall 2 days prior.  She was diagnosed with a wrist fracture in the emergency department and has been in a splint which has been removed today.    Date of onset: 1/28/2025  Injury: Yes: Comment: Fall  Prior Treatment:  Yes: Comment: Splint  Contributory history: None    ROS: Review of Systems - General ROS: negative except HPI    Past Medical History:   Diagnosis Date    Arthritis     Eczema     Essential hypertension     FH: breast cancer     Fibroids     uterine    Fracture of radius 11/2009    with ulna, left, closed    History of cataract     History of foot fracture     right and left    Hyperlipidemia     Microhematuria     chronic    Osteopenia     Osteoporosis     Psoriasis     S/P radiation therapy     as child, to right shoulder for birthmark    Syncope and collapse 10/24/2023    Tinnitus     Zoster     right scapula       Past Surgical History:   Procedure Laterality Date    CATARACT REMOVAL      COLONOSCOPY      COLONOSCOPY N/A 11/17/2020    COLONOSCOPY with polypectomies performed by Padmaja Ordoñez MD at [facility] ENDOSCOPY    COLONOSCOPY N/A 01/08/2024    COLONOSCOPY with polypectomies performed by Padmaja Ordoñez MD at [facility] ENDOSCOPY    FOOT SURGERY      FRACTURE SURGERY Right 04/24/2019    right foot 3rd metatarsal fracture, screws and a plate    ORTHOPEDIC SURGERY      left arm    WISDOM TOOTH EXTRACTION      WRIST FRACTURE SURGERY          Current

## 2025-03-13 ENCOUNTER — OFFICE VISIT (OUTPATIENT)
Age: 80
End: 2025-03-13

## 2025-03-13 VITALS — HEIGHT: 65 IN | WEIGHT: 151 LBS | BODY MASS INDEX: 25.16 KG/M2

## 2025-03-13 DIAGNOSIS — S52.571P OTHER CLOSED INTRA-ARTICULAR FRACTURE OF DISTAL END OF RIGHT RADIUS WITH MALUNION, SUBSEQUENT ENCOUNTER: Primary | ICD-10-CM

## 2025-03-13 NOTE — PROGRESS NOTES
Dali Kaur is a 80 y.o. female right handed retiree.  Worker's Compensation and legal considerations: none    Chief Complaint   Patient presents with    Wrist Pain     Right          Subjective:     3/13/2025 HPI: Patient presents today for follow-up of her right distal radius fracture.  We have previously had thorough discussions regarding operative versus nonoperative treatment and although radiographically surgery would be indicated, patient decided to proceed without operative treatment and is having no pain today with range of motion.    2/13/2025 HPI: Patient presents today for follow-up of her right distal radius fracture.  She reports significant improvement in pain to be minimal at this point.  She has been wearing her wrist brace at all times.    Initial HPI: Patient presents today with complaints of wrist pain and swelling after suffering a fall 2 days prior.  She was diagnosed with a wrist fracture in the emergency department and has been in a splint which has been removed today.    Date of onset: 1/28/2025  Injury: Yes: Comment: Fall  Prior Treatment:  Yes: Comment: Splint  Contributory history: None    ROS: Review of Systems - General ROS: negative except HPI    Past Medical History:   Diagnosis Date    Arthritis     Eczema     Essential hypertension     FH: breast cancer     Fibroids     uterine    Fracture of radius 11/2009    with ulna, left, closed    History of cataract     History of foot fracture     right and left    Hyperlipidemia     Microhematuria     chronic    Osteopenia     Osteoporosis     Psoriasis     S/P radiation therapy     as child, to right shoulder for birthmark    Syncope and collapse 10/24/2023    Tinnitus     Zoster     right scapula       Past Surgical History:   Procedure Laterality Date    CATARACT REMOVAL      COLONOSCOPY      COLONOSCOPY N/A 11/17/2020    COLONOSCOPY with polypectomies performed by Padmaja Ordoñez MD at [facility] ENDOSCOPY    COLONOSCOPY N/A

## 2025-03-20 ENCOUNTER — OFFICE VISIT (OUTPATIENT)
Age: 80
End: 2025-03-20
Payer: MEDICARE

## 2025-03-20 VITALS — BODY MASS INDEX: 25.76 KG/M2 | WEIGHT: 154.6 LBS | RESPIRATION RATE: 16 BRPM | HEIGHT: 65 IN

## 2025-03-20 DIAGNOSIS — M17.12 PRIMARY OSTEOARTHRITIS OF LEFT KNEE: Primary | ICD-10-CM

## 2025-03-20 PROCEDURE — 99214 OFFICE O/P EST MOD 30 MIN: CPT | Performed by: ORTHOPAEDIC SURGERY

## 2025-03-20 PROCEDURE — 1036F TOBACCO NON-USER: CPT | Performed by: ORTHOPAEDIC SURGERY

## 2025-03-20 PROCEDURE — G8427 DOCREV CUR MEDS BY ELIG CLIN: HCPCS | Performed by: ORTHOPAEDIC SURGERY

## 2025-03-20 PROCEDURE — 1126F AMNT PAIN NOTED NONE PRSNT: CPT | Performed by: ORTHOPAEDIC SURGERY

## 2025-03-20 PROCEDURE — 1160F RVW MEDS BY RX/DR IN RCRD: CPT | Performed by: ORTHOPAEDIC SURGERY

## 2025-03-20 PROCEDURE — 1159F MED LIST DOCD IN RCRD: CPT | Performed by: ORTHOPAEDIC SURGERY

## 2025-03-20 PROCEDURE — G8399 PT W/DXA RESULTS DOCUMENT: HCPCS | Performed by: ORTHOPAEDIC SURGERY

## 2025-03-20 PROCEDURE — G8419 CALC BMI OUT NRM PARAM NOF/U: HCPCS | Performed by: ORTHOPAEDIC SURGERY

## 2025-03-20 PROCEDURE — 1090F PRES/ABSN URINE INCON ASSESS: CPT | Performed by: ORTHOPAEDIC SURGERY

## 2025-03-20 PROCEDURE — 1123F ACP DISCUSS/DSCN MKR DOCD: CPT | Performed by: ORTHOPAEDIC SURGERY

## 2025-03-20 NOTE — PROGRESS NOTES
Psoriasis     S/P radiation therapy     as child, to right shoulder for birthmark    Syncope and collapse 10/24/2023    Tinnitus     Zoster     right scapula       Family History   Problem Relation Age of Onset    Breast Cancer Sister 58    Mult Sclerosis Sister     Cancer Sister         breast    Cancer Mother         pancreatic    Heart Disease Mother     Heart Disease Father        Current Outpatient Medications   Medication Sig Dispense Refill    lisinopril (PRINIVIL;ZESTRIL) 10 MG tablet Take 1 tablet by mouth daily 90 tablet 3    Misc Natural Products (GLUCOSAMINE CHOND CMP ADVANCED PO) Take by mouth      Urea (CARMOL) 40 % cream Apply topically as needed      atorvastatin (LIPITOR) 20 MG tablet TAKE 1 TABLET BY MOUTH DAILY 90 tablet 3    hydroCHLOROthiazide (HYDRODIURIL) 25 MG tablet TAKE 1 TABLET BY MOUTH DAILY 90 tablet 3    clobetasol (TEMOVATE) 0.05 % ointment Apply topically 2 times daily Apply topically 2 times daily.      Amoxicillin 500 MG TABS       betamethasone dipropionate 0.05 % cream Apply topically 2 times daily as needed      Biotin 2.5 MG TABS Take 2,500 mcg by mouth      vitamin D 25 MCG (1000 UT) CAPS Take 1 tablet by mouth daily      clotrimazole-betamethasone (LOTRISONE) 1-0.05 % cream Apply topically 2 times daily      coenzyme Q10 100 MG CAPS capsule Take 1 capsule by mouth daily      denosumab (PROLIA) 60 MG/ML SOSY SC injection Inject 1 mL into the skin      docusate (COLACE, DULCOLAX) 100 MG CAPS Take 100 mg by mouth daily as needed      DULoxetine (CYMBALTA) 20 MG extended release capsule Take 1 capsule by mouth daily      fexofenadine (ALLEGRA) 60 MG tablet Take 1 tablet by mouth daily       No current facility-administered medications for this visit.       Allergies   Allergen Reactions    Povidone-Iodine Hives    Chlorhexidine Gluconate Rash    Codeine Nausea Only and Nausea And Vomiting       Past Surgical History:   Procedure Laterality Date    CATARACT REMOVAL      COLONOSCOPY

## 2025-04-24 ENCOUNTER — OFFICE VISIT (OUTPATIENT)
Age: 80
End: 2025-04-24

## 2025-04-24 DIAGNOSIS — M17.0 PRIMARY OSTEOARTHRITIS OF BOTH KNEES: Primary | ICD-10-CM

## 2025-04-24 DIAGNOSIS — M17.12 PRIMARY OSTEOARTHRITIS OF LEFT KNEE: ICD-10-CM

## 2025-04-24 RX ORDER — BETAMETHASONE SODIUM PHOSPHATE AND BETAMETHASONE ACETATE 3; 3 MG/ML; MG/ML
12 INJECTION, SUSPENSION INTRA-ARTICULAR; INTRALESIONAL; INTRAMUSCULAR; SOFT TISSUE ONCE
Status: COMPLETED | OUTPATIENT
Start: 2025-04-24 | End: 2025-04-24

## 2025-04-24 RX ADMIN — BETAMETHASONE SODIUM PHOSPHATE AND BETAMETHASONE ACETATE 12 MG: 3; 3 INJECTION, SUSPENSION INTRA-ARTICULAR; INTRALESIONAL; INTRAMUSCULAR; SOFT TISSUE at 15:47

## 2025-04-24 NOTE — PROGRESS NOTES
Patient: Dali Kaur                MRN: [id number]       SSN: [id number]  YOB: 1945        AGE: 80 y.o.        SEX: female  There is no height or weight on file to calculate BMI.    PCP: Noemy Benitez MD  04/24/25    Hanh is here today follow-up assessment bilateral knee pain she is going on a cruise and requesting injections for both knees for nonradicular pain previous injections have been quite helpful for her social determinants of health again reviewed    Exam today no evidence for infection or DVT hips rotate nicely just mild angular deformity of both knees just missing a degree or 2 extension bending well no cyanosis or clubbing calf nontender she appears younger than her stated age previous x-rays again reviewed advancing arthritis bilaterally both knees injected as per protocol and return to see us in 3 months time sooner if there is any problem    REVIEW OF SYSTEMS:      CON: negative  EYE: negative   ENT: negative  RESP: negative  GI:    negative   :  negative  MSK: Positive  A twelve point review of systems was completed, positives noted and all other systems were reviewed and are negative          Past Medical History:   Diagnosis Date    Arthritis     Eczema     Essential hypertension     FH: breast cancer     Fibroids     uterine    Fracture of radius 11/2009    with ulna, left, closed    History of cataract     History of foot fracture     right and left    Hyperlipidemia     Microhematuria     chronic    Osteopenia     Osteoporosis     Psoriasis     S/P radiation therapy     as child, to right shoulder for birthmark    Syncope and collapse 10/24/2023    Tinnitus     Zoster     right scapula       Family History   Problem Relation Age of Onset    Breast Cancer Sister 58    Mult Sclerosis Sister     Cancer Sister         breast    Cancer Mother         pancreatic    Heart Disease Mother     Heart Disease Father        Current Outpatient Medications   Medication Sig

## 2025-04-30 RX ORDER — HYDROCHLOROTHIAZIDE 25 MG/1
25 TABLET ORAL DAILY
Qty: 90 TABLET | Refills: 3 | Status: SHIPPED | OUTPATIENT
Start: 2025-04-30

## 2025-05-08 ENCOUNTER — OFFICE VISIT (OUTPATIENT)
Age: 80
End: 2025-05-08

## 2025-05-08 VITALS — HEIGHT: 65 IN | BODY MASS INDEX: 25.66 KG/M2 | WEIGHT: 154 LBS

## 2025-05-08 DIAGNOSIS — S52.571P OTHER CLOSED INTRA-ARTICULAR FRACTURE OF DISTAL END OF RIGHT RADIUS WITH MALUNION, SUBSEQUENT ENCOUNTER: Primary | ICD-10-CM

## 2025-05-08 NOTE — PROGRESS NOTES
Dali Kaur is a 80 y.o. female right handed retiree.  Worker's Compensation and legal considerations: none    Chief Complaint   Patient presents with    Follow-up     Right wrist     Pain Score:   1    Subjective:     5/8/2025 HPI: Patient presents today for follow-up on her right distal radius fracture which has been treated nonoperatively per patient choice.  Patient reports to be independent with all ADLs at this time.  She does feeling her strength is diminished as she struggles with lifting heavy items, but overall she reports the wrist really does not bother her much.    3/13/2025 HPI: Patient presents today for follow-up of her right distal radius fracture.  We have previously had thorough discussions regarding operative versus nonoperative treatment and although radiographically surgery would be indicated, patient decided to proceed without operative treatment and is having no pain today with range of motion.    2/13/2025 HPI: Patient presents today for follow-up of her right distal radius fracture.  She reports significant improvement in pain to be minimal at this point.  She has been wearing her wrist brace at all times.    Initial HPI: Patient presents today with complaints of wrist pain and swelling after suffering a fall 2 days prior.  She was diagnosed with a wrist fracture in the emergency department and has been in a splint which has been removed today.    Date of onset: 1/28/2025  Injury: Yes: Comment: Fall  Prior Treatment:  Yes: Comment: Splint  Contributory history: None    ROS: Review of Systems - General ROS: negative except HPI    Past Medical History:   Diagnosis Date    Arthritis     Eczema     Essential hypertension     FH: breast cancer     Fibroids     uterine    Fracture of radius 11/2009    with ulna, left, closed    History of cataract     History of foot fracture     right and left    Hyperlipidemia     Microhematuria     chronic    Osteopenia     Osteoporosis     Psoriasis

## 2025-06-12 ENCOUNTER — OFFICE VISIT (OUTPATIENT)
Age: 80
End: 2025-06-12
Payer: MEDICARE

## 2025-06-12 VITALS — BODY MASS INDEX: 25.83 KG/M2 | HEIGHT: 65 IN | WEIGHT: 155 LBS

## 2025-06-12 DIAGNOSIS — M17.0 PRIMARY OSTEOARTHRITIS OF BOTH KNEES: Primary | ICD-10-CM

## 2025-06-12 PROCEDURE — 1090F PRES/ABSN URINE INCON ASSESS: CPT | Performed by: ORTHOPAEDIC SURGERY

## 2025-06-12 PROCEDURE — 1159F MED LIST DOCD IN RCRD: CPT | Performed by: ORTHOPAEDIC SURGERY

## 2025-06-12 PROCEDURE — G8419 CALC BMI OUT NRM PARAM NOF/U: HCPCS | Performed by: ORTHOPAEDIC SURGERY

## 2025-06-12 PROCEDURE — 99214 OFFICE O/P EST MOD 30 MIN: CPT | Performed by: ORTHOPAEDIC SURGERY

## 2025-06-12 PROCEDURE — 1126F AMNT PAIN NOTED NONE PRSNT: CPT | Performed by: ORTHOPAEDIC SURGERY

## 2025-06-12 PROCEDURE — 1160F RVW MEDS BY RX/DR IN RCRD: CPT | Performed by: ORTHOPAEDIC SURGERY

## 2025-06-12 PROCEDURE — 1036F TOBACCO NON-USER: CPT | Performed by: ORTHOPAEDIC SURGERY

## 2025-06-12 PROCEDURE — 1123F ACP DISCUSS/DSCN MKR DOCD: CPT | Performed by: ORTHOPAEDIC SURGERY

## 2025-06-12 PROCEDURE — G8399 PT W/DXA RESULTS DOCUMENT: HCPCS | Performed by: ORTHOPAEDIC SURGERY

## 2025-06-12 PROCEDURE — G8427 DOCREV CUR MEDS BY ELIG CLIN: HCPCS | Performed by: ORTHOPAEDIC SURGERY

## 2025-06-12 NOTE — PROGRESS NOTES
Patient: Dali Kaur                MRN: [id number]       SSN: [id number]  YOB: 1945        AGE: 80 y.o.        SEX: female  Body mass index is 25.79 kg/m².    PCP: Noemy Benitez MD  06/12/25    Ms Kaur is here today follow-up assessment bilateral knees she has known psoriasis mainly affects her palms and on her hands and also her feet she uses some medication for that couple different steroids she was able to go on her trip and injections were extremely helpful she is having minimal pain currently the right 1 is worse than the left knee and she is quite thankful she had over close the pain was trip in the examination today she does appear younger than her stated age both hips rotate nicely both knees the skin is normal she has good motion in the knees and just negligible swelling calf nontender her palms shows some mild evidence of psoriasis but it is very mild currently no major skin or nail changes    Overall impression is advanced arthritis of the knees likely inflammatory doing very well with nonoperative measures social determinants of health again reviewed and we will plan on a reevaluation at the end of July sooner if there is any problems.    And we will plan on nonoperative measures for the time being we discussed various treatment regimens anti-inflammatory medications and injections and also Visco as well thank you    REVIEW OF SYSTEMS:      CON: negative  EYE: negative   ENT: negative  RESP: negative  GI:    negative   :  negative  MSK: Positive  A twelve point review of systems was completed, positives noted and all other systems were reviewed and are negative          Past Medical History:   Diagnosis Date    Arthritis     Eczema     Essential hypertension     FH: breast cancer     Fibroids     uterine    Fracture of radius 11/2009    with ulna, left, closed    History of cataract     History of foot fracture     right and left    Hyperlipidemia     Microhematuria

## 2025-07-17 ENCOUNTER — HOSPITAL ENCOUNTER (OUTPATIENT)
Facility: HOSPITAL | Age: 80
Setting detail: SPECIMEN
Discharge: HOME | End: 2025-07-20
Payer: MEDICARE

## 2025-07-17 DIAGNOSIS — I10 ESSENTIAL HYPERTENSION: ICD-10-CM

## 2025-07-17 DIAGNOSIS — E78.00 PURE HYPERCHOLESTEROLEMIA: ICD-10-CM

## 2025-07-17 DIAGNOSIS — K76.0 HEPATIC STEATOSIS: ICD-10-CM

## 2025-07-17 DIAGNOSIS — M81.0 AGE-RELATED OSTEOPOROSIS WITHOUT CURRENT PATHOLOGICAL FRACTURE: ICD-10-CM

## 2025-07-17 DIAGNOSIS — R73.03 PREDIABETES: ICD-10-CM

## 2025-07-17 LAB
25(OH)D3 SERPL-MCNC: 38.7 NG/ML (ref 30–100)
ALBUMIN SERPL-MCNC: 3.9 G/DL (ref 3.4–5)
ALBUMIN/GLOB SERPL: 1.5 (ref 0.8–1.7)
ALP SERPL-CCNC: 46 U/L (ref 45–117)
ALT SERPL-CCNC: 26 U/L (ref 10–35)
ANION GAP SERPL CALC-SCNC: 14 MMOL/L (ref 3–18)
AST SERPL-CCNC: 29 U/L (ref 10–38)
BILIRUB SERPL-MCNC: 0.5 MG/DL (ref 0.2–1)
BUN SERPL-MCNC: 22 MG/DL (ref 6–23)
BUN/CREAT SERPL: 33 (ref 12–20)
CALCIUM SERPL-MCNC: 9.9 MG/DL (ref 8.5–10.1)
CHLORIDE SERPL-SCNC: 102 MMOL/L (ref 98–107)
CHOLEST SERPL-MCNC: 171 MG/DL
CO2 SERPL-SCNC: 24 MMOL/L (ref 21–32)
CREAT SERPL-MCNC: 0.68 MG/DL (ref 0.6–1.3)
EST. AVERAGE GLUCOSE BLD GHB EST-MCNC: 118 MG/DL
GLOBULIN SER CALC-MCNC: 2.7 G/DL (ref 2–4)
GLUCOSE SERPL-MCNC: 91 MG/DL (ref 74–108)
HBA1C MFR BLD: 5.7 % (ref 4.2–5.6)
HDLC SERPL-MCNC: 67 MG/DL (ref 40–60)
HDLC SERPL: 2.6 (ref 0–5)
LDLC SERPL CALC-MCNC: 89 MG/DL (ref 0–100)
POTASSIUM SERPL-SCNC: 3.8 MMOL/L (ref 3.5–5.5)
PROT SERPL-MCNC: 6.6 G/DL (ref 6.4–8.2)
SODIUM SERPL-SCNC: 140 MMOL/L (ref 136–145)
TRIGL SERPL-MCNC: 77 MG/DL (ref 0–150)
VLDLC SERPL CALC-MCNC: 15 MG/DL

## 2025-07-17 PROCEDURE — 82306 VITAMIN D 25 HYDROXY: CPT

## 2025-07-17 PROCEDURE — 83036 HEMOGLOBIN GLYCOSYLATED A1C: CPT

## 2025-07-17 PROCEDURE — 80061 LIPID PANEL: CPT

## 2025-07-17 PROCEDURE — 36415 COLL VENOUS BLD VENIPUNCTURE: CPT

## 2025-07-17 PROCEDURE — 80053 COMPREHEN METABOLIC PANEL: CPT

## 2025-07-23 SDOH — HEALTH STABILITY: PHYSICAL HEALTH: ON AVERAGE, HOW MANY MINUTES DO YOU ENGAGE IN EXERCISE AT THIS LEVEL?: 20 MIN

## 2025-07-23 SDOH — HEALTH STABILITY: PHYSICAL HEALTH: ON AVERAGE, HOW MANY DAYS PER WEEK DO YOU ENGAGE IN MODERATE TO STRENUOUS EXERCISE (LIKE A BRISK WALK)?: 3 DAYS

## 2025-07-23 ASSESSMENT — PATIENT HEALTH QUESTIONNAIRE - PHQ9
SUM OF ALL RESPONSES TO PHQ QUESTIONS 1-9: 0
1. LITTLE INTEREST OR PLEASURE IN DOING THINGS: NOT AT ALL
2. FEELING DOWN, DEPRESSED OR HOPELESS: NOT AT ALL
SUM OF ALL RESPONSES TO PHQ QUESTIONS 1-9: 0

## 2025-07-23 ASSESSMENT — LIFESTYLE VARIABLES
HOW MANY STANDARD DRINKS CONTAINING ALCOHOL DO YOU HAVE ON A TYPICAL DAY: 1 OR 2
HOW MANY STANDARD DRINKS CONTAINING ALCOHOL DO YOU HAVE ON A TYPICAL DAY: 1
HOW OFTEN DO YOU HAVE SIX OR MORE DRINKS ON ONE OCCASION: 1
HOW OFTEN DO YOU HAVE A DRINK CONTAINING ALCOHOL: 4
HOW OFTEN DO YOU HAVE A DRINK CONTAINING ALCOHOL: 2-3 TIMES A WEEK

## 2025-07-24 ENCOUNTER — OFFICE VISIT (OUTPATIENT)
Facility: CLINIC | Age: 80
End: 2025-07-24

## 2025-07-24 ENCOUNTER — HOSPITAL ENCOUNTER (OUTPATIENT)
Facility: HOSPITAL | Age: 80
Setting detail: SPECIMEN
Discharge: HOME | End: 2025-07-27
Payer: MEDICARE

## 2025-07-24 VITALS
HEART RATE: 83 BPM | HEIGHT: 65 IN | TEMPERATURE: 97.5 F | RESPIRATION RATE: 14 BRPM | BODY MASS INDEX: 25.33 KG/M2 | OXYGEN SATURATION: 97 % | WEIGHT: 152 LBS | DIASTOLIC BLOOD PRESSURE: 70 MMHG | SYSTOLIC BLOOD PRESSURE: 122 MMHG

## 2025-07-24 DIAGNOSIS — I10 ESSENTIAL HYPERTENSION: Primary | ICD-10-CM

## 2025-07-24 DIAGNOSIS — M81.0 AGE-RELATED OSTEOPOROSIS WITHOUT CURRENT PATHOLOGICAL FRACTURE: ICD-10-CM

## 2025-07-24 DIAGNOSIS — L40.3 PUSTULAR PSORIASIS OF PALMS AND SOLES: ICD-10-CM

## 2025-07-24 DIAGNOSIS — M25.562 CHRONIC PAIN OF BOTH KNEES: ICD-10-CM

## 2025-07-24 DIAGNOSIS — M17.10 PRIMARY OSTEOARTHRITIS OF KNEE, UNSPECIFIED LATERALITY: ICD-10-CM

## 2025-07-24 DIAGNOSIS — M25.561 CHRONIC PAIN OF BOTH KNEES: ICD-10-CM

## 2025-07-24 DIAGNOSIS — R73.03 PREDIABETES: ICD-10-CM

## 2025-07-24 DIAGNOSIS — G89.29 CHRONIC PAIN OF BOTH KNEES: ICD-10-CM

## 2025-07-24 DIAGNOSIS — Z00.00 MEDICARE ANNUAL WELLNESS VISIT, SUBSEQUENT: ICD-10-CM

## 2025-07-24 DIAGNOSIS — K76.0 METABOLIC DYSFUNCTION-ASSOCIATED STEATOTIC LIVER DISEASE (MASLD): ICD-10-CM

## 2025-07-24 DIAGNOSIS — S52.591S OTHER CLOSED FRACTURE OF DISTAL END OF RIGHT RADIUS, SEQUELA: ICD-10-CM

## 2025-07-24 DIAGNOSIS — E78.00 PURE HYPERCHOLESTEROLEMIA: ICD-10-CM

## 2025-07-24 DIAGNOSIS — R29.898 WEAKNESS OF RIGHT HAND: ICD-10-CM

## 2025-07-24 DIAGNOSIS — Z71.89 ACP (ADVANCE CARE PLANNING): ICD-10-CM

## 2025-07-24 DIAGNOSIS — Z12.31 SCREENING MAMMOGRAM FOR BREAST CANCER: ICD-10-CM

## 2025-07-24 DIAGNOSIS — E66.3 OVERWEIGHT WITH BODY MASS INDEX (BMI) 25.0-29.9: ICD-10-CM

## 2025-07-24 LAB
CREAT UR-MCNC: 46.7 MG/DL (ref 30–125)
MICROALBUMIN UR-MCNC: <1.2 MG/DL (ref 0–3)
MICROALBUMIN/CREAT UR-RTO: NORMAL MG/G (ref 0–30)

## 2025-07-24 PROCEDURE — 82043 UR ALBUMIN QUANTITATIVE: CPT

## 2025-07-24 PROCEDURE — 82570 ASSAY OF URINE CREATININE: CPT

## 2025-07-24 RX ORDER — HYDROCHLOROTHIAZIDE 25 MG/1
25 TABLET ORAL DAILY
Qty: 90 TABLET | Refills: 3 | Status: SHIPPED | OUTPATIENT
Start: 2025-07-24

## 2025-07-24 RX ORDER — LISINOPRIL 10 MG/1
10 TABLET ORAL DAILY
Qty: 90 TABLET | Refills: 3 | Status: SHIPPED | OUTPATIENT
Start: 2025-07-24

## 2025-07-24 RX ORDER — ATORVASTATIN CALCIUM 20 MG/1
20 TABLET, FILM COATED ORAL DAILY
Qty: 90 TABLET | Refills: 3 | Status: SHIPPED | OUTPATIENT
Start: 2025-07-24

## 2025-07-24 NOTE — PATIENT INSTRUCTIONS
Care instructions adapted under license by [employer] (which disclaims liability or warranty for this information). If you have questions about a medical condition or this instruction, always ask your healthcare professional. [employer] disclaims any warranty or liability for your use of this information.    A Healthy Lifestyle: Care Instructions  A healthy lifestyle can help you feel good, have more energy, and stay at a weight that's healthy for you. You can share a healthy lifestyle with your friends and family. And you can do it on your own.    Eat meals with your friends or family. You could try cooking together.    Plan activities with other people. Go for a walk with a friend, try a free online fitness class, or join a sports league.    Eat a variety of healthy foods. These include fruits, vegetables, whole grains, low-fat dairy, and lean protein.    Choose healthy portions of food. You can use the Nutrition Facts label on food packages as a guide.    Eat more fruits and vegetables. You could add vegetables to sandwiches or add fruit to cereal.    Drink water when you are thirsty. Limit soda, juice, and sports drinks.    Try to exercise most days. Aim for at least 2½ hours of exercise each week.    Keep moving. Work in the garden or take your dog on a walk. Use the stairs instead of the elevator.    If you use tobacco or nicotine, try to quit. Ask your doctor about programs and medicines to help you quit.    Limit alcohol. Men should have no more than 2 drinks a day. Women should have no more than 1. For some people, no alcohol is the best choice.  Follow-up care is a key part of your treatment and safety. Be sure to make and go to all appointments, and call your doctor if you are having problems. It's also a good idea to know your test results and keep a list of the medicines you take.  Where can you learn more?  Go to https://[email or web address].net/patientEd and enter U807 to learn

## 2025-07-24 NOTE — PROGRESS NOTES
Dali Kuar presents today for   Chief Complaint   Patient presents with    Medicare AWV       \"Have you been to the ER, urgent care clinic since your last visit?  Hospitalized since your last visit?\"    01/28/2025  [facility]  Right wrist fracture     “Have you seen or consulted any other health care providers outside of [facility] since your last visit?”    NO            
Medicare Annual Wellness Visit    Dali Kaur is here for Medicare AWV    Assessment & Plan   Essential hypertension  -     CBC with Auto Differential; Future  -     Comprehensive Metabolic Panel; Future  -     Magnesium; Future  -     Urinalysis with Microscopic; Future  Pure hypercholesterolemia  -     Comprehensive Metabolic Panel; Future  -     Lipid Panel; Future  Prediabetes  -     Comprehensive Metabolic Panel; Future  -     Hemoglobin A1C; Future  -     Albumin/Creatinine Ratio, Urine; Future  Metabolic dysfunction-associated steatotic liver disease (MASLD)  Pustular psoriasis of palms and soles  Age-related osteoporosis without current pathological fracture  -     Vitamin D 25 Hydroxy; Future  Other closed fracture of distal end of right radius, sequela  -     [facility] [place], [place]  Weakness of right hand  -     [facility] [place], [place]  Primary osteoarthritis of knee, unspecified laterality  Chronic pain of both knees  Overweight with body mass index (BMI) 25.0-29.9  Medicare annual wellness visit, subsequent  ACP (advance care planning)  Screening mammogram for breast cancer  -     [facility] DIGITAL SCREEN W OR WO CAD BILATERAL; Future       Return in about 6 months (around 1/24/2026), or if symptoms worsen or fail to improve.     Subjective   See office progress note for details.      Follow-up with complete Health Risk Assessment and screening values have been reviewed and are found in Flowsheets. The following problems were reviewed today and where indicated follow up appointments were made and/or referrals ordered.    Positive Risk Factor Screenings with Interventions:    Fall Risk:  Do you feel unsteady or are you worried about falling? : no  2 or more falls in past year?: no  Fall with injury in past year?: (!) yes  Interventions:    Reviewed medications, home hazards, visual acuity, and co-morbidities that can increase risk 
referred to Dr. Lindquist in 11/2019 and changed to Prolia. Repeat bone density study (1/2024) showed overall improvement in the lumbar, distal radius, and bilateral proximal femur. Continues on Prolia every 6 months with last dose on 7/8/2025. Continue calcium and Vitamin D supplements, and encouraged her to exercise, particularly weight bearing activities.  Vitamin D level low normal today and advised to increase vitamin D supplement to 2000 units daily.  Scheduled for repeat bone density study on 1/5/2026.  6. Right distal radial fracture, sequela.  Sustained mechanical fall at home on 1/28/2025 and noted to have a comminuted and displaced intra-articular distal radial fracture on imaging.  Fracture reduced in the ED and placed in a splint.  Opted for nonoperative management and follow-up with Dr. Perez and PA sarah with last visit on 5/8/2025.  Imaging showed near fully healed distal radius malunion.  Continuing to have difficulty with weakness in hand and some forearm discomfort.  Will place referral to occupational therapy.  7. Osteoarthritis, bilateral knee.  Presented in 5/2022 with severe right knee pain and right knee x-ray (5/3/2022) showed mild tricompartmental joint space narrowing most prominent at the patellofemoral compartment with trace joint effusion and edema at the infrapatellar fat pad. Evaluated by Dr. Grant Ramirez (5/5/2022) with aspiration and cortisone injection without significant improvement. New posterior knee swelling with tightness in 10/2022 likely consistent with Baker's cyst and had follow-up visit with Dr. Grant Ramirez on 10/13/2022 confirming findings. Recommendation was to proceed with Euflexxa, but did not proceed given significant improvement after starting Cymbalta.  Noted worsening left knee pain and evaluated by Dr. Granados on 6/28/2024.  Lab evaluation (6/28/2024) did not show any evidence of inflammatory arthritis with normal ESR, CRP, interleukin-6, uric acid, HLA-B27,

## 2025-07-24 NOTE — ACP (ADVANCE CARE PLANNING)
Advance Care Planning     Advance Care Planning (ACP) Physician/NP/PA Conversation    Date of Conversation: 7/24/2025  Conducted with: Patient with Decision Making Capacity    Healthcare Decision Maker:      Primary Decision Maker: Pascual Kaur - Spouse - [phone]    Secondary Decision Maker: Ronald Kaur - Child - [phone]    Click here to complete Healthcare Decision Makers including selection of the Healthcare Decision Maker Relationship (ie \"Primary\")  Today we confirmed healthcare decision makers as her  and son    Care Preferences:    Hospitalization:  \"If your health worsens and it becomes clear that your chance of recovery is unlikely, what would be your preference regarding hospitalization?\"  The patient would prefer hospitalization.    Ventilation:  \"If you were unable to breath on your own and your chance of recovery was unlikely, what would be your preference about the use of a ventilator (breathing machine) if it was available to you?\"  The patient would desire the use of a ventilator.    Resuscitation:  \"In the event your heart stopped as a result of an underlying serious health condition, would you want attempts made to restart your heart, or would you prefer a natural death?\"  Yes, attempt to resuscitate.    treatment goals, benefit/burden of treatment options, ventilation preferences, hospitalization preferences, resuscitation preferences, and end of life care preferences (vegetative state/imminent death)    Conversation Outcomes / Follow-Up Plan:  ACP in process - completing/providing documents. Reviewed current advanced directive that is on file and confirmed that information is current and up-to-date.  She presents with an updated DURABLE POWER OF  today, but states that she has also completed an updated advance directive with their .  She addresses the current advance directive on file and states that it is accurate but it was requested that she bring the updated

## 2025-07-29 RX ORDER — BETAMETHASONE SODIUM PHOSPHATE AND BETAMETHASONE ACETATE 3; 3 MG/ML; MG/ML
6 INJECTION, SUSPENSION INTRA-ARTICULAR; INTRALESIONAL; INTRAMUSCULAR; SOFT TISSUE ONCE
Status: CANCELLED | OUTPATIENT
Start: 2025-07-29 | End: 2025-07-29

## 2025-07-30 ENCOUNTER — OFFICE VISIT (OUTPATIENT)
Age: 80
End: 2025-07-30
Payer: MEDICARE

## 2025-07-30 DIAGNOSIS — M17.11 PRIMARY OSTEOARTHRITIS OF RIGHT KNEE: ICD-10-CM

## 2025-07-30 DIAGNOSIS — M17.12 PRIMARY OSTEOARTHRITIS OF LEFT KNEE: ICD-10-CM

## 2025-07-30 DIAGNOSIS — M17.0 PRIMARY OSTEOARTHRITIS OF BOTH KNEES: Primary | ICD-10-CM

## 2025-07-30 PROCEDURE — 1160F RVW MEDS BY RX/DR IN RCRD: CPT | Performed by: ORTHOPAEDIC SURGERY

## 2025-07-30 PROCEDURE — 1123F ACP DISCUSS/DSCN MKR DOCD: CPT | Performed by: ORTHOPAEDIC SURGERY

## 2025-07-30 PROCEDURE — 1159F MED LIST DOCD IN RCRD: CPT | Performed by: ORTHOPAEDIC SURGERY

## 2025-07-30 PROCEDURE — G8427 DOCREV CUR MEDS BY ELIG CLIN: HCPCS | Performed by: ORTHOPAEDIC SURGERY

## 2025-07-30 PROCEDURE — G8399 PT W/DXA RESULTS DOCUMENT: HCPCS | Performed by: ORTHOPAEDIC SURGERY

## 2025-07-30 PROCEDURE — 1125F AMNT PAIN NOTED PAIN PRSNT: CPT | Performed by: ORTHOPAEDIC SURGERY

## 2025-07-30 PROCEDURE — 1036F TOBACCO NON-USER: CPT | Performed by: ORTHOPAEDIC SURGERY

## 2025-07-30 PROCEDURE — 99214 OFFICE O/P EST MOD 30 MIN: CPT | Performed by: ORTHOPAEDIC SURGERY

## 2025-07-30 PROCEDURE — G8419 CALC BMI OUT NRM PARAM NOF/U: HCPCS | Performed by: ORTHOPAEDIC SURGERY

## 2025-07-30 PROCEDURE — 1090F PRES/ABSN URINE INCON ASSESS: CPT | Performed by: ORTHOPAEDIC SURGERY

## 2025-07-30 NOTE — PROGRESS NOTES
Allergies   Allergen Reactions    Povidone-Iodine Hives    Chlorhexidine Gluconate Rash    Codeine Nausea Only and Nausea And Vomiting       Past Surgical History:   Procedure Laterality Date    CATARACT REMOVAL      COLONOSCOPY      COLONOSCOPY N/A 11/17/2020    COLONOSCOPY with polypectomies performed by Padmaja Ordoñez MD at [facility] ENDOSCOPY    COLONOSCOPY N/A 01/08/2024    COLONOSCOPY with polypectomies performed by Padmaja Ordoñez MD at [facility] ENDOSCOPY    FOOT SURGERY      FRACTURE SURGERY Right 04/24/2019    right foot 3rd metatarsal fracture, screws and a plate    ORTHOPEDIC SURGERY      left arm    WISDOM TOOTH EXTRACTION      WRIST FRACTURE SURGERY         Social History     Socioeconomic History    Marital status:      Spouse name: Not on file    Number of children: Not on file    Years of education: Not on file    Highest education level: Not on file   Occupational History    Not on file   Tobacco Use    Smoking status: Never    Smokeless tobacco: Never   Substance and Sexual Activity    Alcohol use: Yes     Alcohol/week: 7.0 standard drinks of alcohol     Types: 7 Glasses of wine per week    Drug use: No    Sexual activity: Yes     Partners: Male   Other Topics Concern    Not on file   Social History Narrative    Not on file     Social Drivers of Health     Financial Resource Strain: Low Risk  (1/23/2024)    Overall Financial Resource Strain (CARDIA)     Difficulty of Paying Living Expenses: Not very hard   Food Insecurity: No Food Insecurity (1/19/2025)    Hunger Vital Sign     Worried About Running Out of Food in the Last Year: Never true     Ran Out of Food in the Last Year: Never true   Transportation Needs: No Transportation Needs (1/19/2025)    PRAPARE - Transportation     Lack of Transportation (Medical): No     Lack of Transportation (Non-Medical): No   Physical Activity: Insufficiently Active (7/23/2025)    Exercise Vital Sign     Days of Exercise per Week: 3 days     Minutes

## 2025-08-01 ENCOUNTER — HOSPITAL ENCOUNTER (OUTPATIENT)
Facility: HOSPITAL | Age: 80
Setting detail: RECURRING SERIES
Discharge: HOME | End: 2025-08-04
Attending: INTERNAL MEDICINE
Payer: MEDICARE

## 2025-08-01 PROCEDURE — 97110 THERAPEUTIC EXERCISES: CPT

## 2025-08-01 PROCEDURE — 97535 SELF CARE MNGMENT TRAINING: CPT

## 2025-08-01 PROCEDURE — 97165 OT EVAL LOW COMPLEX 30 MIN: CPT

## 2025-08-14 ENCOUNTER — APPOINTMENT (OUTPATIENT)
Facility: HOSPITAL | Age: 80
End: 2025-08-14
Attending: INTERNAL MEDICINE
Payer: MEDICARE

## 2025-08-20 ENCOUNTER — HOSPITAL ENCOUNTER (OUTPATIENT)
Facility: HOSPITAL | Age: 80
Setting detail: RECURRING SERIES
Discharge: HOME | End: 2025-08-23
Attending: INTERNAL MEDICINE
Payer: MEDICARE

## 2025-08-20 PROCEDURE — 97535 SELF CARE MNGMENT TRAINING: CPT

## 2025-08-20 PROCEDURE — 97110 THERAPEUTIC EXERCISES: CPT

## 2025-08-27 ENCOUNTER — APPOINTMENT (OUTPATIENT)
Facility: HOSPITAL | Age: 80
End: 2025-08-27
Attending: INTERNAL MEDICINE
Payer: MEDICARE

## (undated) DEVICE — UNDERPAD INCONT W23XL36IN STD BLU POLYPR BK FLUF SFT

## (undated) DEVICE — INSTRMT BB TAK LP MTP PLT --

## (undated) DEVICE — TRAP SPEC POLYP REM STRNR CLN DSGN MAGNIFYING WIND DISP

## (undated) DEVICE — SUTURE VCRL SZ 0 L27IN ABSRB UD L26MM CT-2 1/2 CIR J270H

## (undated) DEVICE — BANDAGE COMPR SELF ADH 5 YDX4 IN TAN STRL PREMIERPRO LF

## (undated) DEVICE — ABDOMINAL PAD: Brand: DERMACEA

## (undated) DEVICE — PACK SURG BSHR TOT KNEE LF

## (undated) DEVICE — SYRINGE 20ML LL S/C 50

## (undated) DEVICE — CANNULA ORIG TL CLR W FOAM CUSHIONS AND 14FT SUPL TB 3 CHN

## (undated) DEVICE — FLUFF AND POLYMER UNDERPAD,EXTRA HEAVY: Brand: WINGS

## (undated) DEVICE — KIT CLN UP BON SECOURS MARYV

## (undated) DEVICE — FLEX ADVANTAGE 3000CC: Brand: FLEX ADVANTAGE

## (undated) DEVICE — INTENDED FOR TISSUE SEPARATION, AND OTHER PROCEDURES THAT REQUIRE A SHARP SURGICAL BLADE TO PUNCTURE OR CUT.: Brand: BARD-PARKER SAFETY BLADES SIZE 15, STERILE

## (undated) DEVICE — CATHETER SUCT TR FL TIP 14FR W/ O CTRL

## (undated) DEVICE — COVER LT HNDL BLU STRL -- MEDICHOICE

## (undated) DEVICE — SNARE ENDOSCP POLYP 2.4 MM 240 CM 10 MM 2.8 MM CAPTIVATOR

## (undated) DEVICE — MEDI-VAC NON-CONDUCTIVE SUCTION TUBING: Brand: CARDINAL HEALTH

## (undated) DEVICE — OCCLUSIVE GAUZE STRIP,3% BISMUTH TRIBROMOPHENATE IN PETROLATUM BLEND: Brand: XEROFORM

## (undated) DEVICE — TRAP SPEC COLL POLYP POLYSTYR --

## (undated) DEVICE — STERILE POLYISOPRENE POWDER-FREE SURGICAL GLOVES: Brand: PROTEXIS

## (undated) DEVICE — GAUZE SPONGES,16 PLY: Brand: CURITY

## (undated) DEVICE — SUTURE VCRL SZ 3-0 L27IN ABSRB UD L26MM SH 1/2 CIR J416H

## (undated) DEVICE — ENDOSCOPY PUMP TUBING/ CAP SET: Brand: ERBE

## (undated) DEVICE — SYR 20ML LL STRL LF --

## (undated) DEVICE — SYRINGE MED 10ML LUERLOCK TIP W/O SFTY DISP

## (undated) DEVICE — GOWN ISOL IMPERV UNIV, DISP, OPEN BACK, BLUE --

## (undated) DEVICE — DRAPE XR C ARM 41X74IN LF --

## (undated) DEVICE — STERILE LATEX POWDER-FREE SURGICAL GLOVESWITH NITRILE COATING: Brand: PROTEXIS

## (undated) DEVICE — MEDI-VAC SUCTION HIGH CAPACITY: Brand: CARDINAL HEALTH

## (undated) DEVICE — SNARE VASC L240CM LOOP W10MM SHTH DIA2.4MM RND STIFF CLD

## (undated) DEVICE — DERMACEA GAUZE ROLL: Brand: DERMACEA

## (undated) DEVICE — SYRINGE MED 25GA 3ML L5/8IN SUBQ PLAS W/ DETACH NDL SFTY

## (undated) DEVICE — ZIMMER® STERILE DISPOSABLE TOURNIQUET CUFF WITH PLC, DUAL PORT, SINGLE BLADDER, 34 IN. (86 CM)

## (undated) DEVICE — INTENDED FOR TISSUE SEPARATION, AND OTHER PROCEDURES THAT REQUIRE A SHARP SURGICAL BLADE TO PUNCTURE OR CUT.: Brand: BARD-PARKER SAFETY BLADES SIZE 10, STERILE

## (undated) DEVICE — TABLE COVER: Brand: CONVERTORS

## (undated) DEVICE — BLADE ASSEMB CLP HAIR FINE --

## (undated) DEVICE — SYRINGE MED 50ML LUERSLIP TIP

## (undated) DEVICE — SYR 50ML SLIP TIP NSAF LF STRL --

## (undated) DEVICE — SNARE POLYP M W27MMXL240CM OVL STIFF DISP CAPTIVATOR

## (undated) DEVICE — SOLUTION SCRB 4OZ 4% CHG CLN BASE FOR PT SKIN ANTISEPSIS

## (undated) DEVICE — SOFT SILICONE HYDROCELLULAR SACRUM DRESSING WITH LOCK AWAY LAYER: Brand: ALLEVYN LIFE SACRUM (LARGE) PACK OF 10

## (undated) DEVICE — Z DISCONTINUED BY MEDLINE USE 2711682 TRAY SKIN PREP DRY W/ PREM GLV

## (undated) DEVICE — GOWN PLASTIC FILM THMBHKS UNIV BLUE: Brand: CARDINAL HEALTH

## (undated) DEVICE — GAUZE,SPONGE,4"X4",16PLY,STRL,LF,10/TRAY: Brand: MEDLINE

## (undated) DEVICE — BANDAGE COMPR EXSANGUATION SGL LAYERED NO CLSR 9FT LEN 4IN W

## (undated) DEVICE — KERLIX BANDAGE ROLL: Brand: KERLIX

## (undated) DEVICE — SOLUTION IRRIG 1000ML H2O STRL BLT

## (undated) DEVICE — WRAP COMPR W4INXL5YD NONSTERILE TAN SELF ADH COBAN

## (undated) DEVICE — LINER SUCT CANSTR 3000CC PLAS SFT PRE ASSEMB W/OUT TBNG W/

## (undated) DEVICE — YANKAUER,SMOOTH HANDLE,HIGH CAPACITY: Brand: MEDLINE INDUSTRIES, INC.

## (undated) DEVICE — NEEDLE SPNL 22GA L3.5IN BLK HUB S STL REG WALL FIT STYL W/

## (undated) DEVICE — FORCEPS BX L240CM JAW DIA2.8MM L CAP W/ NDL MIC MESH TOOTH

## (undated) DEVICE — AIRLIFE™ NASAL OXYGEN CANNULA CURVED, NONFLARED TIP WITH 14 FOOT (4.3 M) CRUSH-RESISTANT TUBING, OVER-THE-EAR STYLE: Brand: AIRLIFE™

## (undated) DEVICE — PADDING CAST W4INXL4YD ST COT COHESIVE HND TEARABLE SPEC

## (undated) DEVICE — CANNULA NSL AD TBNG L14FT STD PVC O2 CRV CONN NONFLARED NSL

## (undated) DEVICE — KENDALL SCD EXPRESS SLEEVES, KNEE LENGTH, MEDIUM: Brand: KENDALL SCD

## (undated) DEVICE — THREE-QUARTER SHEET: Brand: CONVERTORS

## (undated) DEVICE — SOLUTION IV 1000ML 0.9% SOD CHL

## (undated) DEVICE — STAPLER SKIN H3.9MM WIRE DIA0.58MM CRWN 6.9MM 35 STPL ROT

## (undated) DEVICE — BIT DRL ARTHROSCOP 1.7MM 10LB -- DISP

## (undated) DEVICE — 3M™ BAIR PAWS FLEX™ WARMING GOWN, STANDARD, 20 PER CASE 81003: Brand: BAIR PAWS™

## (undated) DEVICE — Device

## (undated) DEVICE — BANDAGE COMPR W4INXL5YD BGE COHESIVE SELF ADH ADBAN CBN1104] AVCOR HEALTHCARE PRODUCTS INC]

## (undated) DEVICE — (D)PREP SKN CHLRAPRP APPL 26ML -- CONVERT TO ITEM 371833

## (undated) DEVICE — SYR 10ML LUER LOK 1/5ML GRAD --

## (undated) DEVICE — SUTURE VCRL SZ 2-0 L27IN ABSRB UD L26MM SH 1/2 CIR J417H